# Patient Record
Sex: FEMALE | URBAN - METROPOLITAN AREA
[De-identification: names, ages, dates, MRNs, and addresses within clinical notes are randomized per-mention and may not be internally consistent; named-entity substitution may affect disease eponyms.]

---

## 2018-07-30 ENCOUNTER — APPOINTMENT (RX ONLY)
Dept: URBAN - METROPOLITAN AREA CLINIC 20 | Facility: CLINIC | Age: 73
Setting detail: DERMATOLOGY
End: 2018-07-30

## 2018-07-30 DIAGNOSIS — Z85.828 PERSONAL HISTORY OF OTHER MALIGNANT NEOPLASM OF SKIN: ICD-10-CM

## 2018-07-30 DIAGNOSIS — L82.0 INFLAMED SEBORRHEIC KERATOSIS: ICD-10-CM

## 2018-07-30 DIAGNOSIS — L57.0 ACTINIC KERATOSIS: ICD-10-CM

## 2018-07-30 DIAGNOSIS — D22 MELANOCYTIC NEVI: ICD-10-CM

## 2018-07-30 DIAGNOSIS — L81.4 OTHER MELANIN HYPERPIGMENTATION: ICD-10-CM

## 2018-07-30 DIAGNOSIS — L82.1 OTHER SEBORRHEIC KERATOSIS: ICD-10-CM

## 2018-07-30 DIAGNOSIS — B37.2 CANDIDIASIS OF SKIN AND NAIL: ICD-10-CM

## 2018-07-30 DIAGNOSIS — Z71.89 OTHER SPECIFIED COUNSELING: ICD-10-CM

## 2018-07-30 DIAGNOSIS — D18.0 HEMANGIOMA: ICD-10-CM

## 2018-07-30 PROBLEM — D22.5 MELANOCYTIC NEVI OF TRUNK: Status: ACTIVE | Noted: 2018-07-30

## 2018-07-30 PROBLEM — D22.61 MELANOCYTIC NEVI OF RIGHT UPPER LIMB, INCLUDING SHOULDER: Status: ACTIVE | Noted: 2018-07-30

## 2018-07-30 PROBLEM — D18.01 HEMANGIOMA OF SKIN AND SUBCUTANEOUS TISSUE: Status: ACTIVE | Noted: 2018-07-30

## 2018-07-30 PROBLEM — M12.9 ARTHROPATHY, UNSPECIFIED: Status: ACTIVE | Noted: 2018-07-30

## 2018-07-30 PROBLEM — D22.62 MELANOCYTIC NEVI OF LEFT UPPER LIMB, INCLUDING SHOULDER: Status: ACTIVE | Noted: 2018-07-30

## 2018-07-30 PROCEDURE — ? PRESCRIPTION

## 2018-07-30 PROCEDURE — ? OBSERVATION

## 2018-07-30 PROCEDURE — ? LIQUID NITROGEN

## 2018-07-30 PROCEDURE — ? SUNSCREEN RECOMMENDATIONS

## 2018-07-30 PROCEDURE — ? COUNSELING

## 2018-07-30 PROCEDURE — 99203 OFFICE O/P NEW LOW 30 MIN: CPT | Mod: 25

## 2018-07-30 PROCEDURE — 17110 DESTRUCTION B9 LES UP TO 14: CPT

## 2018-07-30 PROCEDURE — 17000 DESTRUCT PREMALG LESION: CPT | Mod: 59

## 2018-07-30 RX ORDER — KETOCONAZOLE 20 MG/G
CREAM TOPICAL
Qty: 1 | Refills: 1 | Status: ERX | COMMUNITY
Start: 2018-07-30

## 2018-07-30 RX ADMIN — KETOCONAZOLE: 20 CREAM TOPICAL at 20:46

## 2018-07-30 ASSESSMENT — LOCATION ZONE DERM
LOCATION ZONE: FACE
LOCATION ZONE: TRUNK
LOCATION ZONE: ARM
LOCATION ZONE: HAND
LOCATION ZONE: NOSE

## 2018-07-30 ASSESSMENT — LOCATION SIMPLE DESCRIPTION DERM
LOCATION SIMPLE: LEFT BREAST
LOCATION SIMPLE: RIGHT BREAST
LOCATION SIMPLE: LEFT HAND
LOCATION SIMPLE: LEFT FOREARM
LOCATION SIMPLE: RIGHT HAND
LOCATION SIMPLE: NOSE
LOCATION SIMPLE: RIGHT FOREHEAD
LOCATION SIMPLE: RIGHT FOREARM
LOCATION SIMPLE: UPPER BACK
LOCATION SIMPLE: RIGHT SHOULDER
LOCATION SIMPLE: RIGHT UPPER BACK

## 2018-07-30 ASSESSMENT — LOCATION DETAILED DESCRIPTION DERM
LOCATION DETAILED: RIGHT PROXIMAL DORSAL FOREARM
LOCATION DETAILED: LEFT RADIAL DORSAL HAND
LOCATION DETAILED: RIGHT MEDIAL BREAST 5-6:00 REGION
LOCATION DETAILED: RIGHT VENTRAL DISTAL FOREARM
LOCATION DETAILED: RIGHT INFERIOR UPPER BACK
LOCATION DETAILED: RIGHT INFERIOR MEDIAL FOREHEAD
LOCATION DETAILED: LEFT VENTRAL DISTAL FOREARM
LOCATION DETAILED: RIGHT MEDIAL BREAST 4-5:00 REGION
LOCATION DETAILED: RIGHT POSTERIOR SHOULDER
LOCATION DETAILED: INFERIOR THORACIC SPINE
LOCATION DETAILED: LEFT VENTRAL PROXIMAL FOREARM
LOCATION DETAILED: LEFT MEDIAL BREAST 6-7:00 REGION
LOCATION DETAILED: SUPERIOR THORACIC SPINE
LOCATION DETAILED: NASAL DORSUM
LOCATION DETAILED: LEFT PROXIMAL DORSAL FOREARM
LOCATION DETAILED: RIGHT MEDIAL UPPER BACK
LOCATION DETAILED: RIGHT LATERAL UPPER BACK
LOCATION DETAILED: LEFT MEDIAL BREAST 8-9:00 REGION
LOCATION DETAILED: RIGHT RADIAL DORSAL HAND
LOCATION DETAILED: RIGHT SUPERIOR UPPER BACK

## 2018-10-30 ENCOUNTER — APPOINTMENT (RX ONLY)
Dept: URBAN - METROPOLITAN AREA CLINIC 20 | Facility: CLINIC | Age: 73
Setting detail: DERMATOLOGY
End: 2018-10-30

## 2018-10-30 DIAGNOSIS — L57.0 ACTINIC KERATOSIS: ICD-10-CM

## 2018-10-30 DIAGNOSIS — B37.2 CANDIDIASIS OF SKIN AND NAIL: ICD-10-CM

## 2018-10-30 DIAGNOSIS — L82.0 INFLAMED SEBORRHEIC KERATOSIS: ICD-10-CM

## 2018-10-30 PROCEDURE — ? COUNSELING

## 2018-10-30 PROCEDURE — ? PRESCRIPTION

## 2018-10-30 PROCEDURE — ? LIQUID NITROGEN

## 2018-10-30 PROCEDURE — 17110 DESTRUCTION B9 LES UP TO 14: CPT

## 2018-10-30 PROCEDURE — 99213 OFFICE O/P EST LOW 20 MIN: CPT | Mod: 25

## 2018-10-30 RX ORDER — KETOCONAZOLE 20 MG/G
CREAM TOPICAL
Qty: 1 | Refills: 1 | Status: ERX

## 2018-10-30 ASSESSMENT — LOCATION DETAILED DESCRIPTION DERM
LOCATION DETAILED: LEFT INFERIOR UPPER BACK
LOCATION DETAILED: LEFT INFERIOR LATERAL UPPER BACK
LOCATION DETAILED: RIGHT MID-UPPER BACK
LOCATION DETAILED: LEFT MEDIAL BREAST 8-9:00 REGION
LOCATION DETAILED: RIGHT SUPERIOR LATERAL MIDBACK
LOCATION DETAILED: RIGHT INFERIOR UPPER BACK
LOCATION DETAILED: LEFT SUPERIOR LATERAL MIDBACK
LOCATION DETAILED: RIGHT SUPERIOR UPPER BACK
LOCATION DETAILED: RIGHT INFERIOR LATERAL UPPER BACK

## 2018-10-30 ASSESSMENT — LOCATION SIMPLE DESCRIPTION DERM
LOCATION SIMPLE: LEFT UPPER BACK
LOCATION SIMPLE: LEFT LOWER BACK
LOCATION SIMPLE: LEFT BREAST
LOCATION SIMPLE: RIGHT LOWER BACK
LOCATION SIMPLE: RIGHT UPPER BACK

## 2018-10-30 ASSESSMENT — LOCATION ZONE DERM: LOCATION ZONE: TRUNK

## 2018-10-30 NOTE — PROCEDURE: LIQUID NITROGEN
Render Post-Care Instructions In Note?: no
Medical Necessity Information: It is in your best interest to select a reason for this procedure from the list below. All of these items fulfill various CMS LCD requirements except the new and changing color options.
Detail Level: Detailed
Consent: The patient's consent was obtained including but not limited to risks of crusting, scabbing, blistering, scarring, darker or lighter pigmentary change, recurrence, incomplete removal and infection.
Medical Necessity Clause: This procedure was medically necessary because the lesions that were treated were:
Post-Care Instructions: I reviewed with the patient in detail post-care instructions. Patient is to wear sunprotection, and avoid picking at any of the treated lesions. Pt may apply Vaseline to crusted or scabbing areas.

## 2019-03-13 ENCOUNTER — HOSPITAL ENCOUNTER (OUTPATIENT)
Facility: MEDICAL CENTER | Age: 74
End: 2019-03-15
Attending: EMERGENCY MEDICINE | Admitting: INTERNAL MEDICINE
Payer: MEDICARE

## 2019-03-13 ENCOUNTER — HOSPITAL ENCOUNTER (OUTPATIENT)
Dept: RADIOLOGY | Facility: MEDICAL CENTER | Age: 74
End: 2019-03-13

## 2019-03-13 PROBLEM — I10 ESSENTIAL HYPERTENSION: Status: ACTIVE | Noted: 2019-03-13

## 2019-03-13 PROBLEM — E87.6 HYPOKALEMIA: Status: ACTIVE | Noted: 2019-03-13

## 2019-03-13 PROBLEM — R25.2 LEG CRAMPS: Status: ACTIVE | Noted: 2019-03-13

## 2019-03-13 PROBLEM — E11.8 TYPE 2 DIABETES MELLITUS WITH COMPLICATION, WITHOUT LONG-TERM CURRENT USE OF INSULIN (HCC): Status: ACTIVE | Noted: 2019-03-13

## 2019-03-13 PROBLEM — R39.81 FUNCTIONAL URINARY INCONTINENCE: Status: ACTIVE | Noted: 2019-03-13

## 2019-03-13 PROBLEM — R07.9 PAIN IN THE CHEST: Status: ACTIVE | Noted: 2019-03-13

## 2019-03-13 LAB
ALBUMIN SERPL BCP-MCNC: 3.5 G/DL (ref 3.2–4.9)
ALBUMIN/GLOB SERPL: 1.5 G/DL
ALP SERPL-CCNC: 65 U/L (ref 30–99)
ALT SERPL-CCNC: 8 U/L (ref 2–50)
ANION GAP SERPL CALC-SCNC: 11 MMOL/L (ref 0–11.9)
AST SERPL-CCNC: 19 U/L (ref 12–45)
B-OH-BUTYR SERPL-MCNC: 0.08 MMOL/L (ref 0.02–0.27)
BASE EXCESS BLDV CALC-SCNC: -1 MMOL/L
BASOPHILS # BLD AUTO: 0.3 % (ref 0–1.8)
BASOPHILS # BLD: 0.03 K/UL (ref 0–0.12)
BILIRUB SERPL-MCNC: 0.3 MG/DL (ref 0.1–1.5)
BODY TEMPERATURE: ABNORMAL CENTIGRADE
BUN SERPL-MCNC: 9 MG/DL (ref 8–22)
CALCIUM SERPL-MCNC: 8.5 MG/DL (ref 8.5–10.5)
CHLORIDE SERPL-SCNC: 110 MMOL/L (ref 96–112)
CHOLEST SERPL-MCNC: 181 MG/DL (ref 100–199)
CO2 SERPL-SCNC: 23 MMOL/L (ref 20–33)
CREAT SERPL-MCNC: 0.62 MG/DL (ref 0.5–1.4)
EKG IMPRESSION: NORMAL
EOSINOPHIL # BLD AUTO: 0.05 K/UL (ref 0–0.51)
EOSINOPHIL NFR BLD: 0.5 % (ref 0–6.9)
ERYTHROCYTE [DISTWIDTH] IN BLOOD BY AUTOMATED COUNT: 39.1 FL (ref 35.9–50)
EST. AVERAGE GLUCOSE BLD GHB EST-MCNC: 321 MG/DL
FERRITIN SERPL-MCNC: 37.2 NG/ML (ref 10–291)
GLOBULIN SER CALC-MCNC: 2.4 G/DL (ref 1.9–3.5)
GLUCOSE BLD-MCNC: 138 MG/DL (ref 65–99)
GLUCOSE BLD-MCNC: 86 MG/DL (ref 65–99)
GLUCOSE SERPL-MCNC: 74 MG/DL (ref 65–99)
HBA1C MFR BLD: 12.8 % (ref 0–5.6)
HCO3 BLDV-SCNC: 23 MMOL/L (ref 24–28)
HCT VFR BLD AUTO: 35.5 % (ref 37–47)
HDLC SERPL-MCNC: 46 MG/DL
HGB BLD-MCNC: 11.6 G/DL (ref 12–16)
IMM GRANULOCYTES # BLD AUTO: 0.04 K/UL (ref 0–0.11)
IMM GRANULOCYTES NFR BLD AUTO: 0.4 % (ref 0–0.9)
IRON SATN MFR SERPL: 7 % (ref 15–55)
IRON SERPL-MCNC: 23 UG/DL (ref 40–170)
LDLC SERPL CALC-MCNC: 119 MG/DL
LIPASE SERPL-CCNC: 5 U/L (ref 11–82)
LYMPHOCYTES # BLD AUTO: 2.49 K/UL (ref 1–4.8)
LYMPHOCYTES NFR BLD: 25.9 % (ref 22–41)
MAGNESIUM SERPL-MCNC: 1.7 MG/DL (ref 1.5–2.5)
MCH RBC QN AUTO: 28 PG (ref 27–33)
MCHC RBC AUTO-ENTMCNC: 32.7 G/DL (ref 33.6–35)
MCV RBC AUTO: 85.7 FL (ref 81.4–97.8)
MONOCYTES # BLD AUTO: 0.63 K/UL (ref 0–0.85)
MONOCYTES NFR BLD AUTO: 6.5 % (ref 0–13.4)
NEUTROPHILS # BLD AUTO: 6.39 K/UL (ref 2–7.15)
NEUTROPHILS NFR BLD: 66.4 % (ref 44–72)
NRBC # BLD AUTO: 0 K/UL
NRBC BLD-RTO: 0 /100 WBC
OSMOLALITY SERPL: 295 MOSM/KG H2O (ref 278–298)
PCO2 BLDV: 38.2 MMHG (ref 41–51)
PH BLDV: 7.4 [PH] (ref 7.31–7.45)
PHOSPHATE SERPL-MCNC: 2.7 MG/DL (ref 2.5–4.5)
PLATELET # BLD AUTO: 305 K/UL (ref 164–446)
PMV BLD AUTO: 10.5 FL (ref 9–12.9)
PO2 BLDV: 37.1 MMHG (ref 25–40)
POTASSIUM SERPL-SCNC: 2.7 MMOL/L (ref 3.6–5.5)
PROT SERPL-MCNC: 5.9 G/DL (ref 6–8.2)
RBC # BLD AUTO: 4.14 M/UL (ref 4.2–5.4)
SAO2 % BLDV: 67 %
SODIUM SERPL-SCNC: 144 MMOL/L (ref 135–145)
TIBC SERPL-MCNC: 343 UG/DL (ref 250–450)
TRIGL SERPL-MCNC: 81 MG/DL (ref 0–149)
TROPONIN I SERPL-MCNC: 0.01 NG/ML (ref 0–0.04)
WBC # BLD AUTO: 9.6 K/UL (ref 4.8–10.8)

## 2019-03-13 PROCEDURE — 83690 ASSAY OF LIPASE: CPT

## 2019-03-13 PROCEDURE — 99220 PR INITIAL OBSERVATION CARE,LEVL III: CPT | Mod: GC | Performed by: INTERNAL MEDICINE

## 2019-03-13 PROCEDURE — 84484 ASSAY OF TROPONIN QUANT: CPT

## 2019-03-13 PROCEDURE — 82803 BLOOD GASES ANY COMBINATION: CPT

## 2019-03-13 PROCEDURE — 83735 ASSAY OF MAGNESIUM: CPT

## 2019-03-13 PROCEDURE — 83036 HEMOGLOBIN GLYCOSYLATED A1C: CPT

## 2019-03-13 PROCEDURE — 83930 ASSAY OF BLOOD OSMOLALITY: CPT

## 2019-03-13 PROCEDURE — 82728 ASSAY OF FERRITIN: CPT

## 2019-03-13 PROCEDURE — 93005 ELECTROCARDIOGRAM TRACING: CPT | Performed by: STUDENT IN AN ORGANIZED HEALTH CARE EDUCATION/TRAINING PROGRAM

## 2019-03-13 PROCEDURE — 36415 COLL VENOUS BLD VENIPUNCTURE: CPT

## 2019-03-13 PROCEDURE — 80053 COMPREHEN METABOLIC PANEL: CPT

## 2019-03-13 PROCEDURE — 81002 URINALYSIS NONAUTO W/O SCOPE: CPT

## 2019-03-13 PROCEDURE — 84100 ASSAY OF PHOSPHORUS: CPT

## 2019-03-13 PROCEDURE — 82962 GLUCOSE BLOOD TEST: CPT | Mod: 91

## 2019-03-13 PROCEDURE — A9270 NON-COVERED ITEM OR SERVICE: HCPCS | Performed by: STUDENT IN AN ORGANIZED HEALTH CARE EDUCATION/TRAINING PROGRAM

## 2019-03-13 PROCEDURE — 82010 KETONE BODYS QUAN: CPT

## 2019-03-13 PROCEDURE — G0378 HOSPITAL OBSERVATION PER HR: HCPCS

## 2019-03-13 PROCEDURE — 83550 IRON BINDING TEST: CPT

## 2019-03-13 PROCEDURE — 83540 ASSAY OF IRON: CPT

## 2019-03-13 PROCEDURE — 700105 HCHG RX REV CODE 258: Performed by: EMERGENCY MEDICINE

## 2019-03-13 PROCEDURE — 700102 HCHG RX REV CODE 250 W/ 637 OVERRIDE(OP): Performed by: STUDENT IN AN ORGANIZED HEALTH CARE EDUCATION/TRAINING PROGRAM

## 2019-03-13 PROCEDURE — 80061 LIPID PANEL: CPT

## 2019-03-13 PROCEDURE — 99285 EMERGENCY DEPT VISIT HI MDM: CPT

## 2019-03-13 PROCEDURE — 85025 COMPLETE CBC W/AUTO DIFF WBC: CPT

## 2019-03-13 RX ORDER — CALCIUM CITRATE/VITAMIN D3 200MG-6.25
1 TABLET ORAL DAILY
Status: ON HOLD | COMMUNITY
End: 2023-11-21

## 2019-03-13 RX ORDER — IBUPROFEN 400 MG/1
400 TABLET ORAL EVERY 6 HOURS PRN
Status: DISCONTINUED | OUTPATIENT
Start: 2019-03-13 | End: 2019-03-15 | Stop reason: HOSPADM

## 2019-03-13 RX ORDER — POTASSIUM CHLORIDE 20 MEQ/1
40 TABLET, EXTENDED RELEASE ORAL ONCE
Status: DISCONTINUED | OUTPATIENT
Start: 2019-03-13 | End: 2019-03-13

## 2019-03-13 RX ORDER — SODIUM CHLORIDE 9 MG/ML
1000 INJECTION, SOLUTION INTRAVENOUS ONCE
Status: COMPLETED | OUTPATIENT
Start: 2019-03-13 | End: 2019-03-13

## 2019-03-13 RX ORDER — AMOXICILLIN 250 MG
2 CAPSULE ORAL 2 TIMES DAILY
Status: DISCONTINUED | OUTPATIENT
Start: 2019-03-13 | End: 2019-03-15 | Stop reason: HOSPADM

## 2019-03-13 RX ORDER — ACETAMINOPHEN 325 MG/1
650 TABLET ORAL EVERY 4 HOURS PRN
Status: DISCONTINUED | OUTPATIENT
Start: 2019-03-13 | End: 2019-03-15 | Stop reason: HOSPADM

## 2019-03-13 RX ORDER — POTASSIUM CHLORIDE 20 MEQ/1
40 TABLET, EXTENDED RELEASE ORAL 2 TIMES DAILY
Status: DISCONTINUED | OUTPATIENT
Start: 2019-03-13 | End: 2019-03-14

## 2019-03-13 RX ORDER — POLYETHYLENE GLYCOL 3350 17 G/17G
1 POWDER, FOR SOLUTION ORAL
Status: DISCONTINUED | OUTPATIENT
Start: 2019-03-13 | End: 2019-03-15 | Stop reason: HOSPADM

## 2019-03-13 RX ORDER — HYDRALAZINE HYDROCHLORIDE 20 MG/ML
10 INJECTION INTRAMUSCULAR; INTRAVENOUS EVERY 6 HOURS PRN
Status: DISCONTINUED | OUTPATIENT
Start: 2019-03-13 | End: 2019-03-15 | Stop reason: HOSPADM

## 2019-03-13 RX ORDER — NAPROXEN SODIUM 220 MG
440 TABLET ORAL 2 TIMES DAILY PRN
Status: ON HOLD | COMMUNITY
End: 2019-05-29

## 2019-03-13 RX ORDER — BISACODYL 10 MG
10 SUPPOSITORY, RECTAL RECTAL
Status: DISCONTINUED | OUTPATIENT
Start: 2019-03-13 | End: 2019-03-15 | Stop reason: HOSPADM

## 2019-03-13 RX ORDER — DEXTROSE MONOHYDRATE 25 G/50ML
25 INJECTION, SOLUTION INTRAVENOUS
Status: DISCONTINUED | OUTPATIENT
Start: 2019-03-13 | End: 2019-03-14

## 2019-03-13 RX ORDER — IBUPROFEN 200 MG
400 TABLET ORAL EVERY 8 HOURS PRN
Status: ON HOLD | COMMUNITY
End: 2019-05-29

## 2019-03-13 RX ADMIN — SODIUM CHLORIDE 1000 ML: 9 INJECTION, SOLUTION INTRAVENOUS at 19:00

## 2019-03-13 RX ADMIN — POTASSIUM CHLORIDE 40 MEQ: 1500 TABLET, EXTENDED RELEASE ORAL at 22:55

## 2019-03-13 ASSESSMENT — ENCOUNTER SYMPTOMS
SEIZURES: 0
DIZZINESS: 1
ORTHOPNEA: 0
FOCAL WEAKNESS: 0
GASTROINTESTINAL NEGATIVE: 1
LOSS OF CONSCIOUSNESS: 0
CONSTITUTIONAL NEGATIVE: 1
RESPIRATORY NEGATIVE: 1
PSYCHIATRIC NEGATIVE: 1
SENSORY CHANGE: 0
HEADACHES: 0
PALPITATIONS: 0
EYES NEGATIVE: 1
MUSCULOSKELETAL NEGATIVE: 1

## 2019-03-13 ASSESSMENT — PATIENT HEALTH QUESTIONNAIRE - PHQ9
2. FEELING DOWN, DEPRESSED, IRRITABLE, OR HOPELESS: SEVERAL DAYS
8. MOVING OR SPEAKING SO SLOWLY THAT OTHER PEOPLE COULD HAVE NOTICED. OR THE OPPOSITE, BEING SO FIGETY OR RESTLESS THAT YOU HAVE BEEN MOVING AROUND A LOT MORE THAN USUAL: NOT AT ALL
6. FEELING BAD ABOUT YOURSELF - OR THAT YOU ARE A FAILURE OR HAVE LET YOURSELF OR YOUR FAMILY DOWN: NOT AL ALL
1. LITTLE INTEREST OR PLEASURE IN DOING THINGS: SEVERAL DAYS
4. FEELING TIRED OR HAVING LITTLE ENERGY: NEARLY EVERY DAY
SUM OF ALL RESPONSES TO PHQ9 QUESTIONS 1 AND 2: 2
3. TROUBLE FALLING OR STAYING ASLEEP OR SLEEPING TOO MUCH: NEARLY EVERY DAY
7. TROUBLE CONCENTRATING ON THINGS, SUCH AS READING THE NEWSPAPER OR WATCHING TELEVISION: NOT AT ALL
SUM OF ALL RESPONSES TO PHQ QUESTIONS 1-9: 8
5. POOR APPETITE OR OVEREATING: NOT AT ALL
9. THOUGHTS THAT YOU WOULD BE BETTER OFF DEAD, OR OF HURTING YOURSELF: NOT AT ALL

## 2019-03-13 ASSESSMENT — LIFESTYLE VARIABLES
EVER_SMOKED: YES
DO YOU DRINK ALCOHOL: NO

## 2019-03-14 LAB
ACETONE UR QL: NEGATIVE
ALBUMIN SERPL BCP-MCNC: 3.4 G/DL (ref 3.2–4.9)
ALBUMIN/GLOB SERPL: 1.4 G/DL
ALP SERPL-CCNC: 63 U/L (ref 30–99)
ALT SERPL-CCNC: 7 U/L (ref 2–50)
ANION GAP SERPL CALC-SCNC: 6 MMOL/L (ref 0–11.9)
APPEARANCE UR: CLEAR
AST SERPL-CCNC: 11 U/L (ref 12–45)
BACTERIA #/AREA URNS HPF: NEGATIVE /HPF
BASOPHILS # BLD AUTO: 0.4 % (ref 0–1.8)
BASOPHILS # BLD: 0.03 K/UL (ref 0–0.12)
BILIRUB SERPL-MCNC: 0.4 MG/DL (ref 0.1–1.5)
BILIRUB UR QL STRIP.AUTO: NEGATIVE
BUN SERPL-MCNC: 7 MG/DL (ref 8–22)
CALCIUM SERPL-MCNC: 8.5 MG/DL (ref 8.5–10.5)
CHLORIDE SERPL-SCNC: 109 MMOL/L (ref 96–112)
CO2 SERPL-SCNC: 27 MMOL/L (ref 20–33)
COLOR UR: YELLOW
CREAT SERPL-MCNC: 0.62 MG/DL (ref 0.5–1.4)
EOSINOPHIL # BLD AUTO: 0.11 K/UL (ref 0–0.51)
EOSINOPHIL NFR BLD: 1.6 % (ref 0–6.9)
EPI CELLS #/AREA URNS HPF: NEGATIVE /HPF
ERYTHROCYTE [DISTWIDTH] IN BLOOD BY AUTOMATED COUNT: 37.8 FL (ref 35.9–50)
FERRITIN SERPL-MCNC: 34.5 NG/ML (ref 10–291)
GLOBULIN SER CALC-MCNC: 2.4 G/DL (ref 1.9–3.5)
GLUCOSE BLD-MCNC: 154 MG/DL (ref 65–99)
GLUCOSE BLD-MCNC: 162 MG/DL (ref 65–99)
GLUCOSE BLD-MCNC: 163 MG/DL (ref 65–99)
GLUCOSE BLD-MCNC: 166 MG/DL (ref 65–99)
GLUCOSE BLD-MCNC: 176 MG/DL (ref 65–99)
GLUCOSE BLD-MCNC: 185 MG/DL (ref 65–99)
GLUCOSE SERPL-MCNC: 180 MG/DL (ref 65–99)
GLUCOSE UR STRIP.AUTO-MCNC: 100 MG/DL
HCT VFR BLD AUTO: 31 % (ref 37–47)
HGB BLD-MCNC: 10.5 G/DL (ref 12–16)
HYALINE CASTS #/AREA URNS LPF: NORMAL /LPF
IMM GRANULOCYTES # BLD AUTO: 0.02 K/UL (ref 0–0.11)
IMM GRANULOCYTES NFR BLD AUTO: 0.3 % (ref 0–0.9)
KETONES UR STRIP.AUTO-MCNC: NEGATIVE MG/DL
LEUKOCYTE ESTERASE UR QL STRIP.AUTO: ABNORMAL
LYMPHOCYTES # BLD AUTO: 1.91 K/UL (ref 1–4.8)
LYMPHOCYTES NFR BLD: 28.3 % (ref 22–41)
MAGNESIUM SERPL-MCNC: 1.6 MG/DL (ref 1.5–2.5)
MCH RBC QN AUTO: 28 PG (ref 27–33)
MCHC RBC AUTO-ENTMCNC: 33.9 G/DL (ref 33.6–35)
MCV RBC AUTO: 82.7 FL (ref 81.4–97.8)
MICRO URNS: ABNORMAL
MONOCYTES # BLD AUTO: 0.5 K/UL (ref 0–0.85)
MONOCYTES NFR BLD AUTO: 7.4 % (ref 0–13.4)
NEUTROPHILS # BLD AUTO: 4.19 K/UL (ref 2–7.15)
NEUTROPHILS NFR BLD: 62 % (ref 44–72)
NITRITE UR QL STRIP.AUTO: NEGATIVE
NRBC # BLD AUTO: 0 K/UL
NRBC BLD-RTO: 0 /100 WBC
PH UR STRIP.AUTO: 6 [PH]
PLATELET # BLD AUTO: 266 K/UL (ref 164–446)
PMV BLD AUTO: 9.8 FL (ref 9–12.9)
POTASSIUM SERPL-SCNC: 4.1 MMOL/L (ref 3.6–5.5)
PROT SERPL-MCNC: 5.8 G/DL (ref 6–8.2)
PROT UR QL STRIP: NEGATIVE MG/DL
RBC # BLD AUTO: 3.75 M/UL (ref 4.2–5.4)
RBC # URNS HPF: NORMAL /HPF
RBC UR QL AUTO: NEGATIVE
SODIUM SERPL-SCNC: 142 MMOL/L (ref 135–145)
SP GR UR STRIP.AUTO: 1.01
UROBILINOGEN UR STRIP.AUTO-MCNC: 0.2 MG/DL
WBC # BLD AUTO: 6.8 K/UL (ref 4.8–10.8)
WBC #/AREA URNS HPF: NORMAL /HPF

## 2019-03-14 PROCEDURE — A9270 NON-COVERED ITEM OR SERVICE: HCPCS | Performed by: STUDENT IN AN ORGANIZED HEALTH CARE EDUCATION/TRAINING PROGRAM

## 2019-03-14 PROCEDURE — 700102 HCHG RX REV CODE 250 W/ 637 OVERRIDE(OP): Performed by: STUDENT IN AN ORGANIZED HEALTH CARE EDUCATION/TRAINING PROGRAM

## 2019-03-14 PROCEDURE — 85025 COMPLETE CBC W/AUTO DIFF WBC: CPT

## 2019-03-14 PROCEDURE — 700102 HCHG RX REV CODE 250 W/ 637 OVERRIDE(OP): Performed by: INTERNAL MEDICINE

## 2019-03-14 PROCEDURE — 80053 COMPREHEN METABOLIC PANEL: CPT

## 2019-03-14 PROCEDURE — A9270 NON-COVERED ITEM OR SERVICE: HCPCS | Performed by: INTERNAL MEDICINE

## 2019-03-14 PROCEDURE — 82962 GLUCOSE BLOOD TEST: CPT

## 2019-03-14 PROCEDURE — 36415 COLL VENOUS BLD VENIPUNCTURE: CPT

## 2019-03-14 PROCEDURE — 96372 THER/PROPH/DIAG INJ SC/IM: CPT

## 2019-03-14 PROCEDURE — 82728 ASSAY OF FERRITIN: CPT

## 2019-03-14 PROCEDURE — 83735 ASSAY OF MAGNESIUM: CPT

## 2019-03-14 PROCEDURE — 700111 HCHG RX REV CODE 636 W/ 250 OVERRIDE (IP): Performed by: STUDENT IN AN ORGANIZED HEALTH CARE EDUCATION/TRAINING PROGRAM

## 2019-03-14 PROCEDURE — 81001 URINALYSIS AUTO W/SCOPE: CPT

## 2019-03-14 PROCEDURE — G0378 HOSPITAL OBSERVATION PER HR: HCPCS

## 2019-03-14 RX ORDER — DEXTROSE MONOHYDRATE 25 G/50ML
25 INJECTION, SOLUTION INTRAVENOUS
Status: DISCONTINUED | OUTPATIENT
Start: 2019-03-14 | End: 2019-03-15

## 2019-03-14 RX ORDER — ATORVASTATIN CALCIUM 20 MG/1
20 TABLET, FILM COATED ORAL EVERY EVENING
Status: DISCONTINUED | OUTPATIENT
Start: 2019-03-14 | End: 2019-03-15 | Stop reason: HOSPADM

## 2019-03-14 RX ORDER — ANALGESIC BALM 1.74; 4.06 G/29G; G/29G
OINTMENT TOPICAL 3 TIMES DAILY
Status: DISCONTINUED | OUTPATIENT
Start: 2019-03-14 | End: 2019-03-15 | Stop reason: HOSPADM

## 2019-03-14 RX ORDER — LISINOPRIL 2.5 MG/1
5 TABLET ORAL
Status: DISCONTINUED | OUTPATIENT
Start: 2019-03-14 | End: 2019-03-14

## 2019-03-14 RX ORDER — POTASSIUM CHLORIDE 20 MEQ/1
40 TABLET, EXTENDED RELEASE ORAL ONCE
Status: COMPLETED | OUTPATIENT
Start: 2019-03-14 | End: 2019-03-14

## 2019-03-14 RX ORDER — GABAPENTIN 100 MG/1
100 CAPSULE ORAL 3 TIMES DAILY
Status: DISCONTINUED | OUTPATIENT
Start: 2019-03-14 | End: 2019-03-14

## 2019-03-14 RX ORDER — INSULIN GLARGINE 100 [IU]/ML
5 INJECTION, SOLUTION SUBCUTANEOUS EVERY EVENING
Status: DISCONTINUED | OUTPATIENT
Start: 2019-03-14 | End: 2019-03-14

## 2019-03-14 RX ADMIN — ACETAMINOPHEN 650 MG: 325 TABLET, FILM COATED ORAL at 04:06

## 2019-03-14 RX ADMIN — GABAPENTIN 100 MG: 100 CAPSULE ORAL at 08:22

## 2019-03-14 RX ADMIN — METFORMIN HYDROCHLORIDE 500 MG: 500 TABLET, FILM COATED ORAL at 11:45

## 2019-03-14 RX ADMIN — IBUPROFEN 400 MG: 400 TABLET, FILM COATED ORAL at 00:20

## 2019-03-14 RX ADMIN — ATORVASTATIN CALCIUM 20 MG: 20 TABLET, FILM COATED ORAL at 16:42

## 2019-03-14 RX ADMIN — POTASSIUM CHLORIDE 40 MEQ: 1500 TABLET, EXTENDED RELEASE ORAL at 11:45

## 2019-03-14 RX ADMIN — ENOXAPARIN SODIUM 40 MG: 100 INJECTION SUBCUTANEOUS at 06:28

## 2019-03-14 RX ADMIN — POTASSIUM BICARBONATE 50 MEQ: 978 TABLET, EFFERVESCENT ORAL at 04:03

## 2019-03-14 RX ADMIN — ANALGESIC BALM: 1.74; 4.06 OINTMENT TOPICAL at 14:39

## 2019-03-14 RX ADMIN — IBUPROFEN 400 MG: 400 TABLET, FILM COATED ORAL at 20:23

## 2019-03-14 RX ADMIN — LISINOPRIL 5 MG: 2.5 TABLET ORAL at 08:23

## 2019-03-14 RX ADMIN — IBUPROFEN 400 MG: 400 TABLET, FILM COATED ORAL at 13:47

## 2019-03-14 RX ADMIN — POTASSIUM BICARBONATE 50 MEQ: 978 TABLET, EFFERVESCENT ORAL at 06:28

## 2019-03-14 RX ADMIN — ANALGESIC BALM: 1.74; 4.06 OINTMENT TOPICAL at 20:23

## 2019-03-14 RX ADMIN — METFORMIN HYDROCHLORIDE 500 MG: 500 TABLET, FILM COATED ORAL at 16:42

## 2019-03-14 RX ADMIN — IBUPROFEN 400 MG: 400 TABLET, FILM COATED ORAL at 06:25

## 2019-03-14 ASSESSMENT — COGNITIVE AND FUNCTIONAL STATUS - GENERAL
SUGGESTED CMS G CODE MODIFIER MOBILITY: CH
MOBILITY SCORE: 24
SUGGESTED CMS G CODE MODIFIER DAILY ACTIVITY: CH
DAILY ACTIVITIY SCORE: 24

## 2019-03-14 ASSESSMENT — ENCOUNTER SYMPTOMS
DOUBLE VISION: 0
DIZZINESS: 0
CHILLS: 0
NAUSEA: 0
BLURRED VISION: 0
FEVER: 0
COUGH: 0
HEMOPTYSIS: 0
INSOMNIA: 0
VOMITING: 0
DEPRESSION: 0
HEADACHES: 0
PALPITATIONS: 0
MYALGIAS: 0

## 2019-03-14 NOTE — H&P
Internal Medicine Admitting History and Physical    Note Author: Scalret Cole M.D.     Name Molly Fountain       1945   Age/Sex 73 y.o. female   MRN [id number]   Code Status DNAR/DNI     After 5PM or if no immediate response to page, please call for cross-coverage  Attending/Team:  / Eliezer See Patient List for primary contact information  Call [phone] to page    1st Call - Day Intern (R1):   Dr. Sims 2nd Call - Day Sr. Resident (R2/R3):   Dr. Billings     Chief Complaint:   Chief Complaint   Patient presents with   • ALOC     transfer from [facility], AOx1 to [facility], FSBS initially 510 received 20 units insulin subq, now AOx4. FSBS now 86 on arrival.    • Leg Pain     cramping pain x 3-4 weeks       HPI:  Molly Fountain is a 73 y.o. old patient with PMH of hypertension and diabetes mellitus type 2 who was transferred from [facility].  She presented with an altered mental status and leg cramps.  She had associated lightheadedness, headache as well.  Her blood sugar was noted to be 510 and received 20 units of insulin.  This improved her alertness.  On presentation at this hospital, she was found to have blood glucose of 86.  She was diagnosed with diabetes mellitus about 2 years ago however not taking any medications.  She was also diagnosed with high blood pressure and is not taking any medications at this time.  She has leg cramps for the past 4 weeks, mostly at night.  She denied having any fever, chills, new rash, shortness of breath, abdominal pain, nausea or vomiting, heartburn, or joint pains.   She stated that she has intermittent chest pain on the left side, last about 30 seconds, no radiation, not reproducible, no identified aggravating or alleviating factors.  She also has urinary incontinence for the past couple months and uses diaper.   In the ED, the patient was stable. Vitals unremarkable except blood pressure 155/86. The patient will be  admitted to the hospital for further evaluation and management for multiple reasons including altered mental status, leg cramps, urinary incontinence and chest pain.    Review of Systems   Constitutional: Negative.    HENT: Negative.    Eyes: Negative.    Respiratory: Negative.    Cardiovascular: Positive for chest pain. Negative for palpitations, orthopnea and leg swelling.   Gastrointestinal: Negative.    Genitourinary: Negative.         Urinary incontinence   Musculoskeletal: Negative.    Skin: Negative.    Neurological: Positive for dizziness. Negative for sensory change, focal weakness, seizures, loss of consciousness and headaches.   Endo/Heme/Allergies: Negative.    Psychiatric/Behavioral: Negative.    All other systems reviewed and are negative.      Past Medical History (Chronic medical problem, known complications and current treatment)    Past Medical History:   Diagnosis Date   • Essential hypertension 3/13/2019   • Type 2 diabetes mellitus with complication, without long-term current use of insulin (HCC) 3/13/2019       Past Surgical History:  History reviewed. No pertinent surgical history.    Current Outpatient Medications:  Home Medications     Reviewed by Roberto Perez R.N. (Registered Nurse) on 03/13/19 at 1659  Med List Status: Not Addressed   Medication Last Dose Status        Patient Oliver Taking any Medications                       Medication Allergy/Sensitivities:  Allergies   Allergen Reactions   • Sulfa Drugs Rash     Full body rash       Family History (mandatory)   Family History   Problem Relation Age of Onset   • Heart Disease Father    • Hypertension Father        Social History (mandatory)   Social History     Social History   • Marital status:      Spouse name: N/A   • Number of children: N/A   • Years of education: N/A     Occupational History   • Not on file.     Social History Main Topics   • Smoking status: Never Smoker   • Smokeless tobacco: Never Used   • Alcohol  use Yes      Comment: occasionally   • Drug use: No   • Sexual activity: Not on file     Other Topics Concern   • Not on file     Social History Narrative   • No narrative on file     Living situation: Home  PCP : No primary care provider on file.    Physical Exam     Vitals:    03/13/19 1830 03/13/19 1931 03/13/19 2000 03/13/19 2030   BP:       Pulse: (!) 102 97 97 98   Resp: 14      Temp:       TempSrc:       SpO2: 96% 95% 98% 98%   Weight:       Height:         Body mass index is 27.34 kg/m².  /91   Pulse 98   Temp 37.3 °C (99.1 °F) (Temporal)   Resp 14   Ht 1.524 m (5')   Wt 63.5 kg (140 lb)   LMP  (LMP Unknown)   SpO2 98%   BMI 27.34 kg/m²   O2 therapy: Pulse Oximetry: 98 %    Physical Exam   Constitutional: She is oriented to person, place, and time and well-developed, well-nourished, and in no distress. No distress.   HENT:   Head: Normocephalic and atraumatic.   Eyes: Pupils are equal, round, and reactive to light. EOM are normal.   Neck: Normal range of motion. Neck supple.   Cardiovascular: Normal rate, regular rhythm and normal heart sounds.    No murmur heard.  Pulmonary/Chest: Effort normal and breath sounds normal. No respiratory distress. She has no wheezes.   Abdominal: Soft. Bowel sounds are normal. She exhibits no distension. There is no tenderness.   Musculoskeletal: Normal range of motion. She exhibits no edema or tenderness.   Neurological: She is alert and oriented to person, place, and time.   Skin: Skin is warm and dry.   Vitals reviewed.      Data Review       Old Records Request:   Completed  Current Records review/summary: Completed    Lab Data Review:  Recent Results (from the past 24 hour(s))   ACCU-CHEK GLUCOSE    Collection Time: 03/13/19  4:50 PM   Result Value Ref Range    Glucose - Accu-Ck 86 65 - 99 mg/dL   HEMOGLOBIN A1C    Collection Time: 03/13/19  4:50 PM   Result Value Ref Range    Glycohemoglobin 12.8 (H) 0.0 - 5.6 %    Est Avg Glucose 321 mg/dL   MAGNESIUM     Collection Time: 03/13/19  4:50 PM   Result Value Ref Range    Magnesium 1.7 1.5 - 2.5 mg/dL   PHOSPHORUS    Collection Time: 03/13/19  4:50 PM   Result Value Ref Range    Phosphorus 2.7 2.5 - 4.5 mg/dL   BETA-HYDROXYBUTYRIC ACID    Collection Time: 03/13/19  4:50 PM   Result Value Ref Range    beta-Hydroxybutyric Acid 0.08 0.02 - 0.27 mmol/L   OSMOLALITY SERUM    Collection Time: 03/13/19  4:50 PM   Result Value Ref Range    Osmolality Serum 295 278 - 298 mOsm/kg H2O   LIPASE    Collection Time: 03/13/19  4:50 PM   Result Value Ref Range    Lipase 5 (L) 11 - 82 U/L   VENOUS BLOOD GAS    Collection Time: 03/13/19  7:05 PM   Result Value Ref Range    Venous Bg Ph 7.40 7.31 - 7.45    Venous Bg Pco2 38.2 (L) 41.0 - 51.0 mmHg    Venous Bg Po2 37.1 25.0 - 40.0 mmHg    Venous Bg O2 Saturation 67.0 %    Venous Bg Hco3 23 (L) 24 - 28 mmol/L    Venous Bg Base Excess -1 mmol/L    Body Temp see below Centigrade       Imaging/Procedures Review:    Independant Imaging Review: Completed  OUTSIDE IMAGES-CT HEAD   Final Result           EKG:   EKG Independant Review: Deferred  EKG to be performed on the floor    Records reviewed and summarized in current documentation :  Yes  [employer] teaching service handout given to patient:  Yes         Assessment/Plan     * Type 2 diabetes mellitus with complication, without long-term current use of insulin (HCC)   Assessment & Plan    Diagnosed with type II tablets mellitus about couple years ago, not on any medication.  -Consult for diabetic education  -A1c 12.8%, goal A1c 7-8%  -Sliding scale insulin with hypoglycemia protocol  -Day team to consider starting Lantus and lispro before sending her home.  I think Metformin and glipizide alone will not be enough to bring her A1c down to 7 days 8%     Pain in the chest   Assessment & Plan    Atypical chest pain.  Less likely to be cardiac pain.  -Follow-up with EKG and troponin I  - Aggressive lifestyle and risk factor modifications discussed  extensively with patient.  - Pain control with Tylenol     Leg cramps   Assessment & Plan    May be associated with diabetes (diabetic neuropathy), associated with hypokalemia or restless leg syndrome  -A1c 12.8%, possibility of diabetic neuropathy- however she denied having any tingling or numbness at this time.  Intact sensations.  -Ask her about leg cramps in the morning after repeating potassium  -Follow-up with iron panel and ferritin     Essential hypertension   Assessment & Plan    Stated her blood pressure is elevated and she is not taking any medication.  Blood pressure 155/86 in the ER, asymptomatic  -Consider starting her on lisinopril given diabetes     Functional urinary incontinence   Assessment & Plan    Has been noticing urinary incontinence for the past couple months, uses diaper  -Encourage pelvic floor muscle exercise and consider oxybutynin if needed     Hypokalemia   Assessment & Plan    Potassium 3.0 previous hospital  -Provided K Dur 40 mEq x2, replete as needed       Anticipated Hospital stay: Observation admit    Quality Measures  Quality-Core Measures   Reviewed items::  EKG reviewed, Labs reviewed, Medications reviewed and Radiology images reviewed  Aden catheter::  No Aden  DVT prophylaxis pharmacological::  Enoxaparin (Lovenox)  DVT prophylaxis - mechanical:  SCDs  Ulcer Prophylaxis::  Not indicated      PCP: No primary care provider on file.    Scarlet Cole M.D.

## 2019-03-14 NOTE — ED NOTES
Pt lying in bed, family at bedside, aox4, resp even/unlabored, vss, pt/family updated on status/plan of care, pt c/o pains to bilat lower ext - chronic x 2-3 years

## 2019-03-14 NOTE — PROGRESS NOTES
Contacted on call doctor for the pts blood sugar of 176 and no insulin ordered for 0030. MD said new orders would be placed for it.  No new orders placed for this am insulin

## 2019-03-14 NOTE — ASSESSMENT & PLAN NOTE
Atypical chest pain.  Less likely to be cardiac pain.  -Follow-up with EKG and troponin I  - Aggressive lifestyle and risk factor modifications discussed extensively with patient.  - Pain control with Tylenol

## 2019-03-14 NOTE — PROGRESS NOTES
Patient educated on insulin injection at lunchtime. For dinner insulin check required 2 units. Patient remembered education with minimal prompting and was able to do injection successfully and safely.

## 2019-03-14 NOTE — ASSESSMENT & PLAN NOTE
Diagnosed with type II tablets mellitus about 4 years ago, not on any medication.  A1c 12.8%, goal A1c 7-8%  Sliding scale insulin with hypoglycemia protocol  Metformin started  Atorvastatin started   Continue sliding scale to assess insulin requirement, will consider starting lantus   Diabetes education

## 2019-03-14 NOTE — PROGRESS NOTES
Shift report received from night RN, assumed care at 0715. Patient up in bed, routinely medicated prn per MAR. AOx4, up with assist x1, calls appropriately, bed alarm not in use. PIV assessed and locked. O2 RA, SPO2 97%. VTE lovenox. Plan is DM education prior to dc and med management. Needs met at this time.    Discussed POC for multimodal pain management, monitoring VS/labs/I&O, mobility, day time routine, comfort, and safety. Patient has call light and personal belongings within reach. Safety and fall precautions in place. Reviewed current labs, notes, medications, and orders. Hourly rounding in place with RN and CNA.

## 2019-03-14 NOTE — ASSESSMENT & PLAN NOTE
Stated her blood pressure is elevated and she is not taking any medication.  Blood pressure 155/86 in the ER, asymptomatic    -Will hold off on starting blood pressure medications in the acute setting   -Pt has pCP appointment in few weeks.

## 2019-03-14 NOTE — CARE PLAN
Problem: Safety  Goal: Will remain free from falls  Outcome: PROGRESSING AS EXPECTED  No fall has occurred. Pt is SBA with a steady gait. Pt calls for assistance.     Problem: Infection  Goal: Will remain free from infection  Outcome: PROGRESSING AS EXPECTED  No s/s of infection. Hands are washed before and after entering the room.

## 2019-03-14 NOTE — ED NOTES
Chief Complaint   Patient presents with   • ALOC     transfer from [facility], AOx1 to self, FSBS initially 510 received 20 units insulin subq, now AOx4. FSBS now 86 on arrival.    • Leg Pain     cramping pain x 3-4 weeks     BIB EMS to [facility], triage completed, labs drawn and sent, in gown, on monitor.     /91   Pulse (!) 109   Temp 37.3 °C (99.1 °F) (Temporal)   Resp 20   Ht 1.524 m (5')   Wt 63.5 kg (140 lb)   LMP  (LMP Unknown)   SpO2 95%   BMI 27.34 kg/m²

## 2019-03-14 NOTE — PROGRESS NOTES
Internal Medicine Interval Note  Note Author: Sandra Billings M.D.     Name Molly Fountain       1945   Age/Sex 73 y.o. female   MRN [id number]   Code Status DNAR/DNI     After 5PM or if no immediate response to page, please call for cross-coverage  Attending/Team: Dr Muller/porsha  See Patient List for primary contact information  Call [phone] to page    1st Call - Day Intern (R1):   Bethany  2nd Call - Day Sr. Resident (R2/R3):   bala          Reason for interval visit  (Principal Problem)   Type 2 diabetes mellitus with complication, without long-term current use of insulin (HCC)    Interval Problem Daily Status Update  (24 hours)   Admitted overnight after transfer from [facility] for hyperglycemia. Blood sugar <200 overnight. IV fluids discontinued. Will start metformin, will consider starting lantus prior to discharge.   Has b/l leg pains over the shins without numbness and tingling. Unlikely to be diabetic neuropathy. Also has knee and hip pain, likely OA.       Review of Systems   Constitutional: Negative for chills and fever.   HENT: Negative for hearing loss and tinnitus.    Eyes: Negative for blurred vision and double vision.   Respiratory: Negative for cough and hemoptysis.    Cardiovascular: Negative for chest pain and palpitations.   Gastrointestinal: Negative for nausea and vomiting.   Genitourinary: Negative for dysuria and urgency.   Musculoskeletal: Positive for joint pain. Negative for myalgias.   Skin: Negative for itching and rash.   Neurological: Negative for dizziness and headaches.   Psychiatric/Behavioral: Negative for depression. The patient does not have insomnia.        Consultants/Specialty  None   PCP: @PCP    Disposition  inpatient    Quality Measures  Quality-Core Measures   Reviewed items::  EKG reviewed, Labs reviewed, Radiology images reviewed and Medications reviewed  Aden catheter::  No Aden  DVT prophylaxis pharmacological::   Enoxaparin (Lovenox)          Physical Exam       Vitals:    03/14/19 0500 03/14/19 0823 03/14/19 0845 03/14/19 1220   BP: (!) 172/96 121/86 (!) 175/89 145/89   Pulse: 93  (!) 102 99   Resp: 17  18 18   Temp: 36.6 °C (97.8 °F)  36.6 °C (97.8 °F) 36.6 °C (97.9 °F)   TempSrc: Temporal  Temporal Temporal   SpO2: 97%  96% 95%   Weight:       Height:         Body mass index is 27.34 kg/m². Weight: 63.5 kg (140 lb)  Oxygen Therapy:  Pulse Oximetry: 95 %, O2 (LPM): 0, O2 Delivery: None (Room Air)    Physical Exam   Constitutional: She is oriented to person, place, and time and well-developed, well-nourished, and in no distress.   HENT:   Head: Normocephalic and atraumatic.   Eyes: Pupils are equal, round, and reactive to light. Conjunctivae and EOM are normal.   Neck: Normal range of motion. Neck supple.   Cardiovascular: Normal rate, regular rhythm and normal heart sounds.    Pulmonary/Chest: Breath sounds normal. No respiratory distress. She has no wheezes.   Abdominal: Soft. Bowel sounds are normal. She exhibits no distension. There is no tenderness.   Musculoskeletal: Normal range of motion. She exhibits no edema.   Tenderness over B/L shins R>L    Neurological: She is alert and oriented to person, place, and time. GCS score is 15.   Skin: Skin is warm.   Psychiatric: Affect normal.         Lab Data Review:         3/14/2019  1:00 PM    Recent Labs      03/13/19 1650 03/13/19 1905 03/14/19   0612   SODIUM   --   144  142   POTASSIUM   --   2.7*  4.1   CHLORIDE   --   110  109   CO2   --   23  27   BUN   --   9  7*   CREATININE   --   0.62  0.62   MAGNESIUM  1.7   --   1.6   PHOSPHORUS  2.7   --    --    CALCIUM   --   8.5  8.5       Recent Labs      03/13/19   1650  03/13/19   1905  03/14/19   0612   ALTSGPT   --   8  7   ASTSGOT   --   19  11*   ALKPHOSPHAT   --   65  63   TBILIRUBIN   --   0.3  0.4   LIPASE  5*   --    --    GLUCOSE   --   74  180*       Recent Labs      03/13/19   1650  03/13/19   2684   03/14/19   0612  03/14/19   1048   RBC  4.14*   --   3.75*   --    HEMOGLOBIN  11.6*   --   10.5*   --    HEMATOCRIT  35.5*   --   31.0*   --    PLATELETCT  305   --   266   --    IRON   --   23*   --    --    FERRITIN   --   37.2   --   34.5   TOTIRONBC   --   343   --    --        Recent Labs      03/13/19   1650  03/13/19   1905  03/14/19   0612   WBC  9.6   --   6.8   NEUTSPOLYS  66.40   --   62.00   LYMPHOCYTES  25.90   --   28.30   MONOCYTES  6.50   --   7.40   EOSINOPHILS  0.50   --   1.60   BASOPHILS  0.30   --   0.40   ASTSGOT   --   19  11*   ALTSGPT   --   8  7   ALKPHOSPHAT   --   65  63   TBILIRUBIN   --   0.3  0.4           Assessment/Plan     * Type 2 diabetes mellitus with complication, without long-term current use of insulin (HCC)   Assessment & Plan    Diagnosed with type II tablets mellitus about 4 years ago, not on any medication.  A1c 12.8%, goal A1c 7-8%  Sliding scale insulin with hypoglycemia protocol  Metformin started  Atorvastatin started   Continue sliding scale to assess insulin requirement, will consider starting lantus   Diabetes education      Pain in the chest   Assessment & Plan    Atypical chest pain.  Less likely to be cardiac pain.  -Follow-up with EKG and troponin I  - Aggressive lifestyle and risk factor modifications discussed extensively with patient.  - Pain control with Tylenol     Leg cramps   Assessment & Plan    Has bilateral leg pain, anteriorly on the shins  No numbness tingling   Unlikely to be diabetic neuropathy   Ct tylenol       Essential hypertension   Assessment & Plan    Stated her blood pressure is elevated and she is not taking any medication.  Blood pressure 155/86 in the ER, asymptomatic  Will hold off on starting blood pressure medications in the acute setting   Pt has pCP appointment in few weeks.      Functional urinary incontinence   Assessment & Plan    Has been noticing urinary incontinence for the past couple months, uses diaper  -Encourage pelvic  floor muscle exercise   Likely related to hyperglycemia uncontrolled   Follow up with PCP     Hypokalemia   Assessment & Plan    Potassium 3.0 previous hospital  -Provided K Dur 40 mEq x2, replete as needed  Improved to 4

## 2019-03-14 NOTE — DISCHARGE SUMMARY
Internal Medicine Discharge Summary  Note Author: Sandra Billings M.D.       Admit Date:  3/13/2019       Discharge Date:   3/15/2019    Service:   R Internal Medicine [place] Team  Attending Physician(s):   Dr Muller        Senior Resident(s):   Dr Billings  German Resident(s):   Dr Sims   PCP: No primary care provider on file.      Primary Diagnosis:   Uncontrolled diabetes mellitus     Secondary Diagnoses:                Principal Problem:    Type 2 diabetes mellitus with complication, without long-term current use of insulin (HCC) POA: Unknown  Active Problems:    Essential hypertension POA: Unknown    Leg cramps POA: Unknown    Pain in the chest POA: Unknown    Hypokalemia POA: Unknown    Functional urinary incontinence POA: Unknown  Resolved Problems:    * No resolved hospital problems. *      Hospital Summary (Brief Narrative):       Ms Fountain is a 74 yo female with PMH of Diabetes mellitus and HTN but not taking any medications, transferred from [facility]. She presented with an altered mental status and leg pain.  She had associated lightheadedness, headache as well.  Her blood sugar was noted to be 510 and received 20 units of insulin.  This improved her alertness.  On presentation to [facility], she was found to have blood glucose of 86.  She has leg pain for the past 4 weeks, mostly at night. It is on bilateral anterior legs over the shins, aching pain, no leg swelling  She denied having any fever, chills, new rash, shortness of breath, abdominal pain, nausea or vomiting, heartburn, or joint pains. In the ED,  Vitals unremarkable except blood pressure 155/86. She was started on Insulin sliding scale, IV fluids. Her blood sugars remained less than 200 throughout hospital stay and ranged between 140-180s. She was started on Metformin 500 mg BID which she tolerated and increase to thousand milligrams twice daily.  Although she had a HbA1C 12.4 since her pre-meal and  fasting blood sugars were less than 180 we did not initiate Lantus insulin on discharge.  We educated the patient to keep a log of blood sugar at home and take it to the primary care provider.  Patient received diabetes education along with her  prior to discharge.  Her lipid panel showed total cholesterol 181, triglycerides 81, HDL 46, .  Atorvastatin 20 mg started.  She was noted to have high blood pressure, highest recorded was 176/96 mmhg.  On the day of discharge it was 136/82.  We recommended the patient to keep a log of blood pressure at home and take it to the primary care provider.  we did not initiate antihypertensives in the acute setting.  Patient also complained of leg pain over bilateral shin area with tenderness.  She denied any burning sensation numbness and tingling.  There was no's redness or swelling.  She received Toradol and menthol local application cream with good response.  It was not suggestive of neuropathy pain.   She was noted to have iron deficiency anemia. Ferous sulphate started, recommended outpatient GI consult for colonoscopy.   Patient was discharged on 3/15/2019.  She has a follow-up appointment with her primary care provider at the end of this month.  We strongly advised her to follow-up with her PCP and continue taking her medications.  Patient verbalized understanding.    Patient /Hospital Summary (Details -- Problem Oriented) :          Pain in the chest   Assessment & Plan    No EKG changes or troponin elevation.  - Aggressive lifestyle and risk factor modifications discussed extensively with patient.  - Pain control with Tylenol  Resolved on discharge.   Leg cramps   Assessment & Plan    Has bilateral leg pain, anteriorly on the shins  No numbness tingling   Unlikely to be diabetic neuropathy   Ct tylenol, menthol local application.  Short course of ibuprofen with food.       Essential hypertension   Assessment & Plan    Stated her blood pressure is elevated  and she is not taking any medication.  Blood pressure 155/86 in the ER, highest reported was 176/92.  Asymptomatic  Will hold off on starting blood pressure medications in the acute setting   Pt has pCP appointment in few weeks.      * Type 2 diabetes mellitus with complication, without long-term current use of insulin ([facility])   Assessment & Plan    Diagnosed with type II tablets mellitus about 4 years ago, not on any medication.  A1c 12.8%, goal A1c 7-8%  Sliding scale insulin with hypoglycemia protocol  Metformin thousand milligrams twice daily started  Atorvastatin started   Diabetes education done prior to discharge.     Functional urinary incontinence   Assessment & Plan    Has been noticing urinary incontinence for the past couple months, uses diaper  -Encourage pelvic floor muscle exercise   Likely related to hyperglycemia uncontrolled   Follow up with PCP     Hypokalemia   Assessment & Plan    Potassium 3.0 previous hospital  -Provided K Dur 40 mEq x2, replete as needed  Improved to 4         Consultants:     None    Procedures:        None    Imaging/ Testing:      OUTSIDE IMAGES-CT HEAD   Final Result            Discharge Medications:         Medication Reconciliation: Completed     Medication List      START taking these medications      Instructions   atorvastatin 20 MG Tabs  Commonly known as:  LIPITOR   Doctor's comments:  For diabetes.  Take 1 Tab by mouth every evening.  Dose:  20 mg     Blood Glucose Test Strips   Doctor's comments:  Or per formulary preference. ICD-10 code: E11.65 Uncontrolled type 2 Diabetes Mellitus  Use one Accucheck Guide strip to test blood sugar once daily early morning before first meal. For uncontrolled diabetes. E11.65     ferrous sulfate 325 (65 Fe) MG tablet   Doctor's comments:  For iron deficiency anemia.  Take 1 Tab by mouth every day.  Dose:  325 mg     Lancets   Doctor's comments:  Or per formulary preference. ICD-10 code: E11.65 Uncontrolled type 2 Diabetes  Mellitus.  Use one fast clix lancet to test blood sugar once daily early morning before first meal. For uncontrolled diabetes. E11.65     metformin 1000 MG tablet  Commonly known as:  GLUCOPHAGE   Doctor's comments:  For diabetes.  Take 1 Tab by mouth 2 times a day, with meals.  Dose:  1000 mg        CONTINUE taking these medications      Instructions   ibuprofen 200 MG Tabs  Commonly known as:  MOTRIN   Take 400 mg by mouth every 8 hours as needed for Mild Pain.  Dose:  400 mg     MULTIVITAMIN GUMMIES WOMENS Chew   Take 1 Tab by mouth every day.  Dose:  1 Tab     naproxen 220 MG tablet  Commonly known as:  ANAPROX   Take 440 mg by mouth 2 times a day as needed (pain).  Dose:  440 mg            Can use .DISCHARGEMEDSLIST if going to another facility         Disposition:   Discharge to home     Diet:   Diabetic diet     Activity:   As tolerated     Instructions:         The patient was instructed to return to the ER in the event of worsening symptoms. I have counseled the patient on the importance of compliance and the patient has agreed to proceed with all medical recommendations and follow up plan indicated above.   The patient understands that all medications come with benefits and risks. Risks may include permanent injury or death and these risks can be minimized with close reassessment and monitoring.        Primary Care Provider:    Dr Bayron Fowler   Discharge summary faxed to primary care provider:  Completed  Copy of discharge summary given to the patient: Deferred      Follow up appointment details :      FU with PCP     Pending Studies:        None     Time spent on discharge day patient visit, preparing discharge paperwork and arranging for patient follow up.    Summary of follow up issues:   Patient to keep a log of blood sugars at home daily and blood pressure checks, recommended to take it to PCP.     Discharge Time (Minutes) :    45 min   Hospital Course Type: Observation Stay      Condition on  Discharge  Stable   ______________________________________________________________________    Interval history/exam for day of discharge:    No new complaints.    Vitals:    03/14/19 0845 03/14/19 1220 03/14/19 1300 03/14/19 1629   BP: (!) 175/89 145/89  147/95   Pulse: (!) 102 99  (!) 107   Resp: 18 18  18   Temp: 36.6 °C (97.8 °F) 36.6 °C (97.9 °F)  36.1 °C (97 °F)   TempSrc: Temporal Temporal  Temporal   SpO2: 96% 95%  95%   Weight:   68.5 kg (151 lb 0.2 oz)    Height:         Weight/BMI: Body mass index is 29.49 kg/m².  Pulse Oximetry: 95 %, O2 (LPM): 0, O2 Delivery: None (Room Air)    General: alert and oriented   CVS: S1 S2 normal   PULM: Clear to auscultate       Most Recent Labs:    Lab Results   Component Value Date/Time    WBC 6.8 03/14/2019 06:12 AM    RBC 3.75 (L) 03/14/2019 06:12 AM    HEMOGLOBIN 10.5 (L) 03/14/2019 06:12 AM    HEMATOCRIT 31.0 (L) 03/14/2019 06:12 AM    MCV 82.7 03/14/2019 06:12 AM    MCH 28.0 03/14/2019 06:12 AM    MCHC 33.9 03/14/2019 06:12 AM    MPV 9.8 03/14/2019 06:12 AM    NEUTSPOLYS 62.00 03/14/2019 06:12 AM    LYMPHOCYTES 28.30 03/14/2019 06:12 AM    MONOCYTES 7.40 03/14/2019 06:12 AM    EOSINOPHILS 1.60 03/14/2019 06:12 AM    BASOPHILS 0.40 03/14/2019 06:12 AM      Lab Results   Component Value Date/Time    SODIUM 142 03/14/2019 06:12 AM    POTASSIUM 4.1 03/14/2019 06:12 AM    CHLORIDE 109 03/14/2019 06:12 AM    CO2 27 03/14/2019 06:12 AM    GLUCOSE 180 (H) 03/14/2019 06:12 AM    BUN 7 (L) 03/14/2019 06:12 AM    CREATININE 0.62 03/14/2019 06:12 AM      Lab Results   Component Value Date/Time    ALTSGPT 7 03/14/2019 06:12 AM    ASTSGOT 11 (L) 03/14/2019 06:12 AM    ALKPHOSPHAT 63 03/14/2019 06:12 AM    TBILIRUBIN 0.4 03/14/2019 06:12 AM    LIPASE 5 (L) 03/13/2019 04:50 PM    ALBUMIN 3.4 03/14/2019 06:12 AM    GLOBULIN 2.4 03/14/2019 06:12 AM     No results found for: PROTHROMBTM, INR

## 2019-03-14 NOTE — PROGRESS NOTES
Pt came to the floor at 2332. Pt was made comfortable in bed. Pts vitals vini assessment were done. Pt

## 2019-03-14 NOTE — SENIOR ADMIT NOTE
Senior Admission Note    In summary: Molly Fountain is a 73 y.o. female with past medical history of DM2 (on no meds) presented to [facility] ED with AMS and LE cramping states this has been going on for months but was significantly exacerbated day of presentation. She also notes intermittent atypical chest pain. She was noted to have  and hypokalemia, as well as PREET vs CKD, she was given 20 units subQ insulin and transferred to the [facility] ED. In the [facility] ED pts sugar was 86. HA1c was 12.8. Vitals showed tachycardia and HTN. Pt was then admitted for further mgmt of her conditions.    Pertinent physical exam findings:     Benign Exam    Assessment and plan in summary:    1.DM2   - Start on insulin regimen and IVF   - Metformin on DC   - Lipid panel for ASCVD, will likely need statin    - Consider ACEi/ARB on DC as well as pt has HTN and likely CKD    2.Hypokalemia   - Likely worsened given insulin administration   - Recheck and replete appropriately     3.PREET vs CKD   - Pt appears euvolemic but possibly PREET from dehydration due to polyuria   - Urea wnl suggesting this is chronic   - IVF and CTM, avoid nephrotoxic drugs   - Consider ACEi/ARB on DC    4.Atypical Chest Pain   - None today, states occurs randomly, not associated with exertion/SOB/N/V/diaphoresis, L sided, nonradiating, could not describe further   - Trop/EKG x1   - CTM    For full plan, please see Intern note for details   Nigel Castillo M.D.  PGY 2

## 2019-03-14 NOTE — ASSESSMENT & PLAN NOTE
Has been noticing urinary incontinence for the past couple months, uses diaper  -Encourage pelvic floor muscle exercise   Likely related to hyperglycemia uncontrolled   Follow up with PCP

## 2019-03-14 NOTE — ASSESSMENT & PLAN NOTE
Has bilateral leg pain, anteriorly on the shins  No numbness tingling     Unlikely to be diabetic neuropathy   Ct tylenol  -Patient counseled on use/risks/benefits/how to take/mitigate risk of ibuprofen and naproxen. Encouraged to use acetaminophen as well. Patient verbalized understanding and agreement.

## 2019-03-14 NOTE — ED PROVIDER NOTES
"ED Provider Note    Scribed for Roberto Spain M.D. by Roberto Spain. 3/13/2019,  6:26 PM.    CHIEF COMPLAINT  Chief Complaint   Patient presents with   • ALOC     transfer from [facility], AOx1 to [facility], FSBS initially 510 received 20 units insulin subq, now AOx4. FSBS now 86 on arrival.    • Leg Pain     cramping pain x 3-4 weeks       HPI  Molly Fountain is a 73 y.o. female who presents to the Emergency Department as a transfer from [facility], where she presented today at about 1 PM.  She presented there with altered mental status.  She was noted to be \"an incredibly poor historian,\" reporting that she has been having intermittent tingling sensations in her lower extremities.  She was denying any pain, and has been ambulatory.  She describes vague bilateral hip pain.  She denied headache and or confusion, but was oriented only to herself.  Her blood sugar was noted to be 510, and she received 20 units of subcu insulin.  This seemed to improve her alertness.  Her blood sugars 86 on arrival. During evaluation in our facility the patient answers questions appropriately. She states that she has had symptoms of leg cramps, light headedness, and increased urinary incontinence for 3 or 4 weeks.She continues to report crampy leg pain. She states that she was told many years ago that she had diabetes, however she stopped taking medication due to adverse medication side effects. She denies any fevers, chills, nausea, vomiting, chest pain or shortness of breath.     Her vital signs at the transferring facility showed a heart rate as high as 126, though it has improved significantly.  She had no localizing findings on her neurologic exam.  The patient's laboratory tests show an unremarkable CBC, a complete metabolic panel that was also unremarkable, with the exception of a glucose of greater than 500, as reported above.  She had a chest x-ray and a head CT performed at the transferring facility.  " These were unremarkable.  She also had an unremarkable EKG except for sinus tachycardia.    REVIEW OF SYSTEMS  See HPI for further details. All other systems are negative.     PAST MEDICAL HISTORY   has a past medical history of Essential hypertension (3/13/2019) and Type 2 diabetes mellitus with complication, without long-term current use of insulin (HCC) (3/13/2019).Remote history of diabetes.  Currently untreated.  No history of stroke, DVT, or PE.    SOCIAL HISTORY  Social History     Social History Main Topics   • Smoking status: Never Smoker   • Smokeless tobacco: Never Used   • Alcohol use Yes      Comment: occasionally   • Drug use: No   • Sexual activity: Not on file     History   Drug Use No       SURGICAL HISTORY  patient denies any surgical history    CURRENT MEDICATIONS  Home Medications     Reviewed by Jose Quesada (Pharmacy Tech) on 03/13/19 at 2058  Med List Status: Complete   Medication Last Dose Status   ibuprofen (MOTRIN) 200 MG Tab 3/12/2019 Active   Multiple Vitamins-Minerals (MULTIVITAMIN GUMMIES WOMENS) Chew Tab >2 days Active   naproxen (ANAPROX) 220 MG tablet 3/12/2019 Active                ALLERGIES  Allergies   Allergen Reactions   • Sulfa Drugs Rash     Full body rash       PHYSICAL EXAM  VITAL SIGNS: /91   Pulse 94   Temp 37.3 °C (99.1 °F) (Temporal)   Resp 14   Ht 1.524 m (5')   Wt 63.5 kg (140 lb)   LMP  (LMP Unknown)   SpO2 96%   BMI 27.34 kg/m²   Pulse ox interpretation: I interpret this pulse ox as normal.  Constitutional: Alert in no apparent distress.  Slightly slow to respond.  HENT: No signs of trauma, Bilateral external ears normal, Nose normal.   Eyes: Conjunctiva normal, Non-icteric.   Neck: Normal range of motion, Supple, No stridor.   Lymphatic: No lymphadenopathy noted.   Cardiovascular: Regular slightly tachycardic rate and rhythm, no murmurs.   Thorax & Lungs: Normal breath sounds, No respiratory distress, No wheezing, No chest tenderness.    Abdomen: Bowel sounds normal, Soft, No tenderness, No masses, No pulsatile masses. No peritoneal signs.  Skin: Warm, Dry, No erythema, No rash.   Extremities: Intact distal pulses, No edema, No cyanosis.  Musculoskeletal: Good range of motion in all major joints. No or major deformities noted.   Neurologic: Alert , Normal motor function, Normal sensory function, No focal deficits noted.   Psychiatric: Affect normal, Judgment normal, Mood normal.     DIAGNOSTIC STUDIES / PROCEDURES    EKG  Interpreted by me    Outpatient EKG reviewed.  Unremarkable and nonischemic except for sinus tachycardia.    LABS  Labs Reviewed   HEMOGLOBIN A1C - Abnormal; Notable for the following:        Result Value    Glycohemoglobin 12.8 (*)     All other components within normal limits   VENOUS BLOOD GAS - Abnormal; Notable for the following:     Venous Bg Pco2 38.2 (*)     Venous Bg Hco3 23 (*)     All other components within normal limits   LIPASE - Abnormal; Notable for the following:     Lipase 5 (*)     All other components within normal limits   URINALYSIS - Abnormal; Notable for the following:     Glucose 100 (*)     Leukocyte Esterase Trace (*)     All other components within normal limits   IRON/TOTAL IRON BIND - Abnormal; Notable for the following:     Iron 23 (*)     % Saturation 7 (*)     All other components within normal limits   CBC WITH DIFFERENTIAL - Abnormal; Notable for the following:     RBC 4.14 (*)     Hemoglobin 11.6 (*)     Hematocrit 35.5 (*)     MCHC 32.7 (*)     All other components within normal limits   COMP METABOLIC PANEL - Abnormal; Notable for the following:     Potassium 2.7 (*)     Total Protein 5.9 (*)     All other components within normal limits   LIPID PROFILE - Abnormal; Notable for the following:      (*)     All other components within normal limits   ACCU-CHEK GLUCOSE - Abnormal; Notable for the following:     Glucose - Accu-Ck 138 (*)     All other components within normal limits    ACCU-CHEK GLUCOSE - Abnormal; Notable for the following:     Glucose - Accu-Ck 176 (*)     All other components within normal limits   MAGNESIUM   PHOSPHORUS   BETA-HYDROXYBUTYRIC ACID   OSMOLALITY SERUM   KETONES-URINE QUAL(ACETONE URINE QUAL)   FERRITIN   TROPONIN   ESTIMATED GFR   URINE MICROSCOPIC (W/UA)   CBC WITH DIFFERENTIAL   COMP METABOLIC PANEL   MAGNESIUM   ACCU-CHEK GLUCOSE     All labs reviewed by me.    RADIOLOGY  OUTSIDE IMAGES-CT HEAD   Final Result        The radiologist's interpretation of all radiological studies have been reviewed by me.    COURSE & MEDICAL DECISION MAKING  Nursing notes, VS, PMSFHx reviewed in chart.     6:26 PM Patient seen and examined at bedside. Differential diagnosis includes but is not limited to altered mental status secondary to dehydration, uremia, liver disease, hypothyroidism, less likely stroke, given the patient has had a normal head CT.  Further, she has improved significantly in terms of her mental status as her severe hyper glycemia was corrected.  Nonetheless, she is an uncontrolled diabetic who will need prompt implementation of appropriate glycemic control to avoid serious consequences such as DKA. Ordered for screening laboratory tests to evaluate. Patient will be treated with IV fluids for her symptoms.     HYDRATION: Based on the patient's presentation of Dehydration, Hyperglycemia and Tachycardia the patient was given IV fluids. IV Hydration was used because oral hydration was not as rapid as required. Upon recheck following hydration, the patient was improved, with moist mucus membranes, and improved heart rate. .    This patient continued to improve with IV fluids.  She has a hemoglobin A1c of 12.8, indicative of prolonged and severe hyperglycemia with no controlled.  She will need to be admitted, and I have paged and spoken with the resident team, who agrees to admit.    DISPOSITION:  Patient will be admitted to R in stable condition.      FINAL  IMPRESSION  1. Uncontrolled diabetes mellitus  2. Altered mental status  3. Dehydration

## 2019-03-14 NOTE — CONSULTS
Diabetes education: Pt has a hx of diabetes, with no diabetes medications listed. Pt had a blood sugar of        510 before transferred to [facility]. Blood sugar at [facility] was 74. Hg a1c was 12.8%.  Pt is currently on Regular insulin sliding scale coverage ac and hs with current blood sugars of 162 (2 units), 163 ( 2 units) and 166  (2 units). Pt is also on Metformin.  Spoke with nursing, who will have patient give her own insulin. CDE to follow up tomorrow when  present.  Plan: Please have patient give her own insulin. CDE to meet with pt and  tomorrow.  Please call [phone] when  available for education.

## 2019-03-14 NOTE — ED NOTES
Med rec completed per pt at bedside  Allergies reviewed  No ABX in last 30 days   Pt denies taking any prescription medications

## 2019-03-15 VITALS
OXYGEN SATURATION: 92 % | HEIGHT: 60 IN | TEMPERATURE: 98.2 F | BODY MASS INDEX: 29.65 KG/M2 | RESPIRATION RATE: 18 BRPM | SYSTOLIC BLOOD PRESSURE: 134 MMHG | WEIGHT: 151.01 LBS | DIASTOLIC BLOOD PRESSURE: 92 MMHG | HEART RATE: 101 BPM

## 2019-03-15 LAB
GLUCOSE BLD-MCNC: 161 MG/DL (ref 65–99)
GLUCOSE BLD-MCNC: 209 MG/DL (ref 65–99)

## 2019-03-15 PROCEDURE — 82962 GLUCOSE BLOOD TEST: CPT

## 2019-03-15 PROCEDURE — 99217 PR OBSERVATION CARE DISCHARGE: CPT | Performed by: INTERNAL MEDICINE

## 2019-03-15 PROCEDURE — 96372 THER/PROPH/DIAG INJ SC/IM: CPT

## 2019-03-15 PROCEDURE — A9270 NON-COVERED ITEM OR SERVICE: HCPCS | Performed by: STUDENT IN AN ORGANIZED HEALTH CARE EDUCATION/TRAINING PROGRAM

## 2019-03-15 PROCEDURE — 700111 HCHG RX REV CODE 636 W/ 250 OVERRIDE (IP): Performed by: STUDENT IN AN ORGANIZED HEALTH CARE EDUCATION/TRAINING PROGRAM

## 2019-03-15 PROCEDURE — 700102 HCHG RX REV CODE 250 W/ 637 OVERRIDE(OP): Performed by: STUDENT IN AN ORGANIZED HEALTH CARE EDUCATION/TRAINING PROGRAM

## 2019-03-15 PROCEDURE — G0378 HOSPITAL OBSERVATION PER HR: HCPCS

## 2019-03-15 RX ORDER — DEXTROSE MONOHYDRATE 25 G/50ML
25 INJECTION, SOLUTION INTRAVENOUS
Status: DISCONTINUED | OUTPATIENT
Start: 2019-03-15 | End: 2019-03-15 | Stop reason: HOSPADM

## 2019-03-15 RX ORDER — FERROUS SULFATE 325(65) MG
325 TABLET ORAL DAILY
Qty: 30 TAB | Refills: 3 | Status: ON HOLD | OUTPATIENT
Start: 2019-03-15 | End: 2023-11-21

## 2019-03-15 RX ORDER — DEXTROSE MONOHYDRATE 25 G/50ML
25 INJECTION, SOLUTION INTRAVENOUS
Status: DISCONTINUED | OUTPATIENT
Start: 2019-03-15 | End: 2019-03-15

## 2019-03-15 RX ORDER — LANCETS 30 GAUGE
EACH MISCELLANEOUS
Qty: 100 EACH | Refills: 0 | Status: SHIPPED | OUTPATIENT
Start: 2019-03-15 | End: 2019-03-15

## 2019-03-15 RX ORDER — ATORVASTATIN CALCIUM 20 MG/1
20 TABLET, FILM COATED ORAL EVERY EVENING
Qty: 30 TAB | Refills: 0 | Status: ON HOLD | OUTPATIENT
Start: 2019-03-15 | End: 2023-11-21

## 2019-03-15 RX ORDER — KETOROLAC TROMETHAMINE 10 MG/1
10 TABLET, FILM COATED ORAL ONCE
Status: COMPLETED | OUTPATIENT
Start: 2019-03-15 | End: 2019-03-15

## 2019-03-15 RX ORDER — LANCETS 30 GAUGE
EACH MISCELLANEOUS
Qty: 100 EACH | Refills: 1 | Status: SHIPPED | OUTPATIENT
Start: 2019-03-15

## 2019-03-15 RX ADMIN — ANALGESIC BALM: 1.74; 4.06 OINTMENT TOPICAL at 06:07

## 2019-03-15 RX ADMIN — ANALGESIC BALM: 1.74; 4.06 OINTMENT TOPICAL at 11:21

## 2019-03-15 RX ADMIN — KETOROLAC TROMETHAMINE 10 MG: 10 TABLET, FILM COATED ORAL at 11:21

## 2019-03-15 RX ADMIN — IBUPROFEN 400 MG: 400 TABLET, FILM COATED ORAL at 12:20

## 2019-03-15 RX ADMIN — ENOXAPARIN SODIUM 40 MG: 100 INJECTION SUBCUTANEOUS at 06:10

## 2019-03-15 RX ADMIN — IBUPROFEN 400 MG: 400 TABLET, FILM COATED ORAL at 06:06

## 2019-03-15 ASSESSMENT — ENCOUNTER SYMPTOMS
DEPRESSION: 0
DIZZINESS: 0
HEMOPTYSIS: 0
INSOMNIA: 0
HEADACHES: 0
VOMITING: 0
NAUSEA: 0
COUGH: 0
PALPITATIONS: 0
DOUBLE VISION: 0
CHILLS: 0
FEVER: 0
BLURRED VISION: 0
MYALGIAS: 0

## 2019-03-15 NOTE — PROGRESS NOTES
Shift report received from night RN, assumed care at 0715. Patient up in bed, routinely medicated prn per MAR. AOx4, up by self, calls appropriately, bed alarm not in use. PIV assessed and locked. O2 RA, SPO2 91%. VTE lovenox. Plan is DM education prior to dc and med management. Needs met at this time.     Discussed POC for multimodal pain management, monitoring VS/labs/I&O, mobility, day time routine, comfort, and safety. Patient has call light and personal belongings within reach. Safety and fall precautions in place. Reviewed current labs, notes, medications, and orders. Hourly rounding in place with RN and CNA.

## 2019-03-15 NOTE — CARE PLAN
Problem: Communication  Goal: The ability to communicate needs accurately and effectively will improve  Outcome: PROGRESSING AS EXPECTED  Report given bedside. Pt updated on plan of care. Pt verbalizes understanding. Pt encouraged to voice needs and concerns. Communication board in use. Call light within reach. Pt calls appropriately. Hourly rounding in place. Pt has no needs or concerns at this time.     Problem: Knowledge Deficit  Goal: Knowledge of disease process/condition, treatment plan, diagnostic tests, and medications will improve  Outcome: PROGRESSING AS EXPECTED  Reviewed POC including safety, mobility, pain management, medication administration, DVT prophylaxis, and discharge. Call light within reach. Pt calls appropriately. Hourly rounding in place. Pt has no needs or concerns at this time.

## 2019-03-15 NOTE — PROGRESS NOTES
Diabetes education: Met with pt and  for diabetes education. Discussed what diabetes was, what effects blood sugars, goals for blood sugars, hypoglycemia, hyperglycemia and finger sticks. Discussed metformin and how it works.  Pt was given an Accucheck guide meter and taught to use. Pt had done finger sticks before.  Plan: Pt will need a prescription for Accucheck guide test strips and fast clix lancets e scripted to [facility]. Prescriptions needs to have dx code ( E11.65), directions, quantity and refills for medicare to cover. Medicare allows for testing once a day on oral or no diabetes medications. Provider needs to have Medicare privileges for coverage. Please call [phone] if needs change.

## 2019-03-15 NOTE — PROGRESS NOTES
Internal Medicine Interval Note  Note Author: Fernando Sims M.D.     Name Molly Fountain 1945   Age/Sex 73 y.o. female   MRN [id number]   Code Status DNAR/DNI     After 5PM or if no immediate response to page, please call for cross-coverage  Attending/Team: Dr Muller/porsha  See Patient List for primary contact information  Call [phone] to page    1st Call - Day Intern (R1):   Dr Sims  2nd Call - Day Sr. Resident (R2/R3):   Dr Billings          Reason for interval visit  (Principal Problem)   Type 2 diabetes mellitus with complication, without long-term current use of insulin (HCC)    Interval Problem Daily Status Update  (24 hours)     No acute events overnight. Patient is doing well without concerns. Blood sugar in tolerable range.    -Discharge to home after diabetes education.  -Metformin 100o mg BID.  -No daily asa/ACEi/ARB upon discharge. PCP to evaluate need for these medications.      Review of Systems   Constitutional: Negative for chills and fever.   HENT: Negative for hearing loss and tinnitus.    Eyes: Negative for blurred vision and double vision.   Respiratory: Negative for cough and hemoptysis.    Cardiovascular: Negative for chest pain and palpitations.   Gastrointestinal: Negative for nausea and vomiting.   Genitourinary: Negative for dysuria and urgency.   Musculoskeletal: Positive for joint pain. Negative for myalgias.   Skin: Negative for itching and rash.   Neurological: Negative for dizziness and headaches.   Psychiatric/Behavioral: Negative for depression. The patient does not have insomnia.        Consultants/Specialty  None   PCP: No primary care provider on file.      Disposition  Discharge to home after diabetes education.    Quality Measures  Quality-Core Measures   Reviewed items::  EKG reviewed, Labs reviewed, Radiology images reviewed and Medications reviewed  Aden catheter::  No Aden  DVT prophylaxis pharmacological::  Enoxaparin  (Lovenox)          Physical Exam       Vitals:    03/14/19 1629 03/14/19 2053 03/15/19 0429 03/15/19 0923   BP: 147/95 152/83 134/81 134/92   Pulse: (!) 107 99 (!) 104 (!) 101   Resp: 18 16 20 18   Temp: 36.1 °C (97 °F) 37.3 °C (99.2 °F) 36.2 °C (97.2 °F) 36.8 °C (98.2 °F)   TempSrc: Temporal Temporal Temporal Temporal   SpO2: 95% 95% 91% 92%   Weight:       Height:         Body mass index is 29.49 kg/m².    Oxygen Therapy:  Pulse Oximetry: 92 %, O2 Delivery: None (Room Air)    Physical Exam   Constitutional: She is oriented to person, place, and time and well-developed, well-nourished, and in no distress.   HENT:   Head: Normocephalic and atraumatic.   Eyes: Pupils are equal, round, and reactive to light. Conjunctivae and EOM are normal.   Neck: Normal range of motion. Neck supple.   Cardiovascular: Normal rate, regular rhythm and normal heart sounds.    Pulmonary/Chest: Breath sounds normal. No respiratory distress. She has no wheezes.   Abdominal: Soft. Bowel sounds are normal. She exhibits no distension. There is no tenderness.   Musculoskeletal: Normal range of motion. She exhibits no edema.   Tenderness over B/L shins R>L    Neurological: She is alert and oriented to person, place, and time. GCS score is 15.   Skin: Skin is warm.   Psychiatric: Affect normal.         Lab Data Review:         3/14/2019  1:00 PM    Recent Labs      03/13/19   1650 03/13/19 1905 03/14/19   0612   SODIUM   --   144  142   POTASSIUM   --   2.7*  4.1   CHLORIDE   --   110  109   CO2   --   23  27   BUN   --   9  7*   CREATININE   --   0.62  0.62   MAGNESIUM  1.7   --   1.6   PHOSPHORUS  2.7   --    --    CALCIUM   --   8.5  8.5       Recent Labs      03/13/19   1650  03/13/19   1905 03/14/19   0612   ALTSGPT   --   8  7   ASTSGOT   --   19  11*   ALKPHOSPHAT   --   65  63   TBILIRUBIN   --   0.3  0.4   LIPASE  5*   --    --    GLUCOSE   --   74  180*       Recent Labs      03/13/19   1650  03/13/19   1905  03/14/19   0612   03/14/19   1048   RBC  4.14*   --   3.75*   --    HEMOGLOBIN  11.6*   --   10.5*   --    HEMATOCRIT  35.5*   --   31.0*   --    PLATELETCT  305   --   266   --    IRON   --   23*   --    --    FERRITIN   --   37.2   --   34.5   [facility]   --   343   --    --        Recent Labs      03/13/19   1650  03/13/19   1905  03/14/19   0612   WBC  9.6   --   6.8   NEUTSPOLYS  66.40   --   62.00   LYMPHOCYTES  25.90   --   28.30   MONOCYTES  6.50   --   7.40   EOSINOPHILS  0.50   --   1.60   BASOPHILS  0.30   --   0.40   ASTSGOT   --   19  11*   ALTSGPT   --   8  7   ALKPHOSPHAT   --   65  63   TBILIRUBIN   --   0.3  0.4           Assessment/Plan     * Type 2 diabetes mellitus with complication, without long-term current use of insulin (HCC)   Assessment & Plan    Diagnosed with type II tablets mellitus about 4 years ago, not on any medication.  A1c 12.8%, goal A1c 7-8%  Sliding scale insulin with hypoglycemia protocol  Metformin started  Atorvastatin started   Continue sliding scale to assess insulin requirement, will consider starting lantus   Diabetes education      Pain in the chest   Assessment & Plan    Atypical chest pain.  Less likely to be cardiac pain.  -Follow-up with EKG and troponin I  - Aggressive lifestyle and risk factor modifications discussed extensively with patient.  - Pain control with Tylenol     Leg cramps   Assessment & Plan    Has bilateral leg pain, anteriorly on the shins  No numbness tingling     Unlikely to be diabetic neuropathy   Ct tylenol  -Patient counseled on use/risks/benefits/how to take/mitigate risk of ibuprofen and naproxen. Encouraged to use acetaminophen as well. Patient verbalized understanding and agreement.       Essential hypertension   Assessment & Plan    Stated her blood pressure is elevated and she is not taking any medication.  Blood pressure 155/86 in the ER, asymptomatic    -Will hold off on starting blood pressure medications in the acute setting   -Pt has pCP appointment  in few weeks.      Functional urinary incontinence   Assessment & Plan    Has been noticing urinary incontinence for the past couple months, uses diaper  -Encourage pelvic floor muscle exercise   Likely related to hyperglycemia uncontrolled   Follow up with PCP     Hypokalemia   Assessment & Plan    Potassium 3.0 previous hospital  -Provided K Dur 40 mEq x2, replete as needed  Improved to 4

## 2019-03-15 NOTE — CARE PLAN
Problem: Safety  Goal: Will remain free from injury  Non-skid socks on and belongings within reach. Call light within reach. Hourly rounding in place.      Problem: Venous Thromboembolism (VTW)/Deep Vein Thrombosis (DVT) Prevention:  Goal: Patient will participate in Venous Thrombosis (VTE)/Deep Vein Thrombosis (DVT)Prevention Measures  SCDs to BLE. Mobilization at least three times a day with staff and PT/OT.

## 2019-03-15 NOTE — PROGRESS NOTES
Diabetes education: CDE will meet with pt tomorrow. Please see consult note.  Plan: Please have patient give her own insulin. CDE to meet with pt and  tomorrow.  Please call [phone] when  available for education.

## 2019-03-15 NOTE — DISCHARGE INSTRUCTIONS
Discharge Instructions    Discharged to home by car with relative. Discharged via wheelchair, hospital escort: Yes.  Special equipment needed: Not Applicable    Be sure to schedule a follow-up appointment with your primary care doctor or any specialists as instructed.     Discharge Plan:   Diet Plan: Discussed  Activity Level: Discussed  Confirmed Follow up Appointment: Patient to Call and Schedule Appointment  Confirmed Symptoms Management: Discussed  Medication Reconciliation Updated: Yes  Pneumococcal Vaccine Administered/Refused: Not given - Patient refused pneumococcal vaccine  Influenza Vaccine Indication: Patient Refuses    I understand that a diet low in cholesterol, fat, and sodium is recommended for good health. Unless I have been given specific instructions below for another diet, I accept this instruction as my diet prescription.   Other diet: Regular    Special Instructions: None    · Is patient discharged on Warfarin / Coumadin?   No     Diabetes Mellitus and Food  It is important for you to manage your blood sugar (glucose) level. Your blood glucose level can be greatly affected by what you eat. Eating healthier foods in the appropriate amounts throughout the day at about the same time each day will help you control your blood glucose level. It can also help slow or prevent worsening of your diabetes mellitus. Healthy eating may even help you improve the level of your blood pressure and reach or maintain a healthy weight.  General recommendations for healthful eating and cooking habits include:  · Eating meals and snacks regularly. Avoid going long periods of time without eating to lose weight.  · Eating a diet that consists mainly of plant-based foods, such as fruits, vegetables, nuts, legumes, and whole grains.  · Using low-heat cooking methods, such as baking, instead of high-heat cooking methods, such as deep frying.  Work with your dietitian to make sure you understand how to use the Nutrition  Facts information on food labels.  How can food affect me?  Carbohydrates   Carbohydrates affect your blood glucose level more than any other type of food. Your dietitian will help you determine how many carbohydrates to eat at each meal and teach you how to count carbohydrates. Counting carbohydrates is important to keep your blood glucose at a healthy level, especially if you are using insulin or taking certain medicines for diabetes mellitus.  Alcohol   Alcohol can cause sudden decreases in blood glucose (hypoglycemia), especially if you use insulin or take certain medicines for diabetes mellitus. Hypoglycemia can be a life-threatening condition. Symptoms of hypoglycemia (sleepiness, dizziness, and disorientation) are similar to symptoms of having too much alcohol.  If your health care provider has given you approval to drink alcohol, do so in moderation and use the following guidelines:  · Women should not have more than one drink per day, and men should not have more than two drinks per day. One drink is equal to:  ¨ 12 oz of beer.  ¨ 5 oz of wine.  ¨ 1½ oz of hard liquor.  · Do not drink on an empty stomach.  · Keep yourself hydrated. Have water, diet soda, or unsweetened iced tea.  · Regular soda, juice, and other mixers might contain a lot of carbohydrates and should be counted.  What foods are not recommended?  As you make food choices, it is important to remember that all foods are not the same. Some foods have fewer nutrients per serving than other foods, even though they might have the same number of calories or carbohydrates. It is difficult to get your body what it needs when you eat foods with fewer nutrients. Examples of foods that you should avoid that are high in calories and carbohydrates but low in nutrients include:  · Trans fats (most processed foods list trans fats on the Nutrition Facts label).  · Regular soda.  · Juice.  · Candy.  · Sweets, such as cake, pie, doughnuts, and cookies.  · Fried  foods.  What foods can I eat?  Eat nutrient-rich foods, which will nourish your body and keep you healthy. The food you should eat also will depend on several factors, including:  · The calories you need.  · The medicines you take.  · Your weight.  · Your blood glucose level.  · Your blood pressure level.  · Your cholesterol level.  You should eat a variety of foods, including:  · Protein.  ¨ Lean cuts of meat.  ¨ Proteins low in saturated fats, such as fish, egg whites, and beans. Avoid processed meats.  · Fruits and vegetables.  ¨ Fruits and vegetables that may help control blood glucose levels, such as apples, mangoes, and yams.  · Dairy products.  ¨ Choose fat-free or low-fat dairy products, such as milk, yogurt, and cheese.  · Grains, bread, pasta, and rice.  ¨ Choose whole grain products, such as multigrain bread, whole oats, and brown rice. These foods may help control blood pressure.  · Fats.  ¨ Foods containing healthful fats, such as nuts, avocado, olive oil, canola oil, and fish.  Does everyone with diabetes mellitus have the same meal plan?  Because every person with diabetes mellitus is different, there is not one meal plan that works for everyone. It is very important that you meet with a dietitian who will help you create a meal plan that is just right for you.  This information is not intended to replace advice given to you by your health care provider. Make sure you discuss any questions you have with your health care provider.  Document Released: 09/14/2006 Document Revised: 05/25/2017 Document Reviewed: 11/14/2014  [employer] Interactive Patient Education © 2017 [employer]    Diabetes Mellitus and Exercise  Exercising regularly is important for your overall health, especially when you have diabetes (diabetes mellitus). Exercising is not only about losing weight. It has many health benefits, such as increasing muscle strength and bone density and reducing body fat and stress. This leads to  improved fitness, flexibility, and endurance, all of which result in better overall health.  Exercise has additional benefits for people with diabetes, including:  · Reducing appetite.  · Helping to lower and control blood glucose.  · Lowering blood pressure.  · Helping to control amounts of fatty substances (lipids) in the blood, such as cholesterol and triglycerides.  · Helping the body to respond better to insulin (improving insulin sensitivity).  · Reducing how much insulin the body needs.  · Decreasing the risk for heart disease by:  ¨ Lowering cholesterol and triglyceride levels.  ¨ Increasing the levels of good cholesterol.  ¨ Lowering blood glucose levels.  What is my activity plan?  Your health care provider or certified diabetes educator can help you make a plan for the type and frequency of exercise (activity plan) that works for you. Make sure that you:  · Do at least 150 minutes of moderate-intensity or vigorous-intensity exercise each week. This could be brisk walking, biking, or water aerobics.  ¨ Do stretching and strength exercises, such as yoga or weightlifting, at least 2 times a week.  ¨ Spread out your activity over at least 3 days of the week.  · Get some form of physical activity every day.  ¨ Do not go more than 2 days in a row without some kind of physical activity.  ¨ Avoid being inactive for more than 90 minutes at a time. Take frequent breaks to walk or stretch.  · Choose a type of exercise or activity that you enjoy, and set realistic goals.  · Start slowly, and gradually increase the intensity of your exercise over time.  What do I need to know about managing my diabetes?  · Check your blood glucose before and after exercising.  ¨ If your blood glucose is higher than 240 mg/dL (13.3 mmol/L) before you exercise, check your urine for ketones. If you have ketones in your urine, do not exercise until your blood glucose returns to normal.  · Know the symptoms of low blood glucose  (hypoglycemia) and how to treat it. Your risk for hypoglycemia increases during and after exercise. Common symptoms of hypoglycemia can include:  ¨ Hunger.  ¨ Anxiety.  ¨ Sweating and feeling clammy.  ¨ Confusion.  ¨ Dizziness or feeling light-headed.  ¨ Increased heart rate or palpitations.  ¨ Blurry vision.  ¨ Tingling or numbness around the mouth, lips, or tongue.  ¨ Tremors or shakes.  ¨ Irritability.  · Keep a rapid-acting carbohydrate snack available before, during, and after exercise to help prevent or treat hypoglycemia.  · Avoid injecting insulin into areas of the body that are going to be exercised. For example, avoid injecting insulin into:  ¨ The arms, when playing tennis.  ¨ The legs, when jogging.  · Keep records of your exercise habits. Doing this can help you and your health care provider adjust your diabetes management plan as needed. Write down:  ¨ Food that you eat before and after you exercise.  ¨ Blood glucose levels before and after you exercise.  ¨ The type and amount of exercise you have done.  ¨ When your insulin is expected to peak, if you use insulin. Avoid exercising at times when your insulin is peaking.  · When you start a new exercise or activity, work with your health care provider to make sure the activity is safe for you, and to adjust your insulin, medicines, or food intake as needed.  · Drink plenty of water while you exercise to prevent dehydration or heat stroke. Drink enough fluid to keep your urine clear or pale yellow.  This information is not intended to replace advice given to you by your health care provider. Make sure you discuss any questions you have with your health care provider.  Document Released: 03/09/2005 Document Revised: 07/07/2017 Document Reviewed: 05/29/2017  [employer] Interactive Patient Education © 2017 [employer]      Diabetes Mellitus and Skin Care  Diabetes (diabetes mellitus) can lead to health problems over time, including skin problems. People with  diabetes have a higher risk for many types of skin complications. This is because having poorly controlled blood sugar (glucose) levels can:  · Damage nerves and blood vessels. This can result in decreased feeling in your legs and feet, which means you may not notice minor skin injuries that could lead to serious problems.  · Reduce blood flow (circulation), which makes wounds heal more slowly and increases your risk of infection.  · Cause areas of skin to become thick or discolored.  What are some common skin conditions that affect people with diabetes?  Diabetes often causes dry skin. It can also cause the skin on the feet to get thinner, break more easily, and heal more slowly. There are certain skin conditions that commonly affect people who have diabetes, such as:  · Bacterial skin infections, such as styes, boils, infected hair follicles, and infections of the skin around the nails.  · Fungal skin infections. These are most common in areas where skin rubs together, such as in the armpits or under the breasts.  · Open sores, especially on the feet.  · Tissue death (gangrene). This can happen on your feet if a serious infection does not heal properly. Gangrene can cause the need for a foot or leg to be surgically removed (amputated).  Diabetes can also cause the skin to change. You may develop:  · Dark, velvety markings on the skin that usually appear on the face, neck, armpits, inner thighs, and groin (acanthosis nigricans). This typically affects people of -American and American-[place] descent.  · Red, raised, scar-like tissue that may itch, feel painful, or develop into a wound (necrobiosis lipoidica).  · Blisters on feet, toes, hands, or fingers.  · Thickened, wax-like areas of skin that usually occur on the hands, forehead, or toes (digital sclerosis).  · Brown or red ring-shaped or half-ring-shaped patches of skin on the ears or fingers (disseminated granuloma).  · Pea-shaped yellow bumps that may  be itchy and surrounded by a red ring (eruptive xanthomatosis). This usually affects the arms, feet, buttocks, and the top of the hands.  · Round, discolored patches of tan skin that do not hurt or itch (diabetic dermopathy). These may look like age spots.  What do I need to know about itchy skin?  It is common for people with diabetes to have itchy skin caused by dryness. Frequent high blood glucose levels can cause itchiness, and poor circulation and certain skin infections can make dry, itchy skin worse. If you have itchy skin that is red or covered in a rash, this could be a sign of an allergic reaction to a medicine.  If you have a rash or if your skin is very itchy, contact your health care provider. You may need help to manage your diabetes better, or you may need treatment for an infection.  How can I prevent skin breakdown?  When you have diabetes and you get a badly infected ulcer or sore that does not heal, your skin can break down, especially if you have poor circulation or are on bed rest. To prevent skin breakdown:  · Keep your skin clean and dry. Wash your skin often. Do not use hot water.  · Do not use any products that contain nicotine or tobacco, such as cigarettes and e-cigarettes. Smoking affects the body’s ability to heal. If you need help quitting, ask your health care provider.  · Check your skin every day for cuts, bruises, redness, blisters, or sores, especially on your feet. Tell your health care provider about any cuts, wounds, or sores you have, especially if they are healing slowly.  · If you are on bed rest, try to change positions often.  What else do I need to know about taking care of my skin?    · To relieve dry skin and itching:  ¨ Limit baths and showers to 5-10 minutes.  ¨ Bathe with lukewarm water instead of hot water.  ¨ Use mild soap and gentle skin cleansers. Do not use soap that is perfumed or harsh or dries your skin.  ¨ Put on lotion as soon as you finish bathing.  · Make  sure that your health care provider performs a visual foot exam at every medical visit.  · Schedule a foot exam with your health care provider once every year. This exam includes an inspection of the structure and skin of your feet.  · If you get a skin injury, such as a cut, blister, or sore, check the area every day for signs of infection. Check for:  ¨ More redness, swelling, or pain.  ¨ More fluid or blood.  ¨ Warmth.  ¨ Pus or a bad smell.  Contact a health care provider if:  · You develop a cut or sore, especially on your feet.  · You develop signs of infection after a skin injury.  · Your blood glucose level is higher than 240 mg/dL (13.3 mmol/L) for 2 days in a row.  · You have itchy skin that develops redness or a rash.  · You have discolored areas of skin.  · You have areas where your skin is changing, such as thickening or appearing shiny.  This information is not intended to replace advice given to you by your health care provider. Make sure you discuss any questions you have with your health care provider.  Document Released: 05/30/2017 Document Revised: 07/07/2017 Document Reviewed: 05/30/2017  [employer] Interactive Patient Education © 2017 [employer]        Diabetes Mellitus and Sick Day Management  Blood sugar (glucose) can be difficult to control when you are sick. Common illnesses that can cause problems for people with diabetes (diabetes mellitus) include colds, fever, flu (influenza), nausea, vomiting, and diarrhea. These illnesses can cause stress and loss of body fluids (dehydration), and those issues can cause blood glucose levels to increase. Because of this, it is very important to take your insulin and diabetes medicines and eat some form of carbohydrate when you are sick.  You should make a plan for days when you are sick (sick day plan) as part of your diabetes management plan. You and your health care provider should make this plan in advance. The following guidelines are intended to  help you manage an illness that lasts for about 24 hours or less. Your health care provider may also give you more specific instructions.  What do I need to do to manage my blood glucose?  · Check your blood glucose every 2-4 hours, or as often as told by your health care provider.  · Know your sick day treatment goals. Your target blood glucose levels may be different when you are sick.  · If you use insulin, take your usual dose.  ¨ If your blood glucose continues to be too high, you may need to take an additional insulin dose as told by your health care provider.  · If you use oral diabetes medicine, you may need to stop taking it if you are not able to eat or drink normally. Ask your health care provider about whether you need to stop taking these medicines while you are sick.  · If you use injectable hormone medicines other than insulin to control your diabetes, ask your health care provider about whether you need to stop taking these medicines while you are sick.  What else can I do to manage my diabetes when I am sick?  Check your ketones  · If you have type 1 diabetes, check your urine ketones every 4 hours.  · If you have type 2 diabetes, check your urine ketones as often as told by your health care provider.  Drink fluids  · Drink enough fluid to keep your urine clear or pale yellow. This is especially important if you have a fever, vomiting, or diarrhea. Those symptoms can lead to dehydration.  · Follow any instructions from your health care provider about beverages to avoid.  ¨ Do not drink alcohol, caffeine, or drinks that contain a lot of sugar.  Take medicines as directed  · Take-over-the-counter and prescription medicines only as told by your health care provider.  · Check medicine labels for added sugars. Some medicines may contain sugar or types of sugars that can raise your blood glucose level.  What foods can I eat when I am sick?  You need to eat some form of carbohydrates when you are sick. You  should eat 45-50 grams (45-50 g) of carbohydrates every 3-4 hours until you feel better.  All of the food choices below contain about 15 g of carbohydrates. Plan ahead and keep some of these foods around so you have them if you get sick.  · 4-6 oz (120-177 mL) carbonated beverage that contains sugar, such as regular (not diet) soda. You may be able to drink carbonated beverages more easily if you open the beverage and let it sit at room temperature for a few minutes before drinking.  · ½ of a twin frozen ice pop.  · 4 oz (120 g) regular gelatin.  · 4 oz (120 mL) fruit juice.  · 4 oz (120 g) ice cream or frozen yogurt.  · 2 oz (60 g) sherbet.  · 8 oz (240 mL) clear broth or soup.  · 4 oz (120 g) regular custard.  · 4 oz (120 g) regular pudding.  · 8 oz (240 g) plain yogurt.  · 1 slice bread or toast.  · 6 saltine crackers.  · 5 vanilla wafers.  Questions to ask your health care provider  Consider asking the following questions so you know what to do on days when you are sick:  · Should I adjust my diabetes medicines?  · How often do I need to check my blood glucose?  · What supplies do I need to manage my diabetes at home when I am sick?  · What number can I call if I have questions?  · What foods and drinks should I avoid?  Contact a health care provider if:  · You develop symptoms of diabetic ketoacidosis, such as:  ¨ Fatigue.  ¨ Weight loss.  ¨ Excessive thirst.  ¨ Light-headedness.  ¨ Fruity or sweet-smelling breath.  ¨ Excessive urination.  ¨ Vision changes.  ¨ Confusion or irritability.  ¨ Nausea.  ¨ Vomiting.  ¨ Rapid breathing.  ¨ Pain in the abdomen.  ¨ Feeling flushed.  · You are unable to drink fluids without vomiting.  · You have any of the following for more than 6 hours:  ¨ Nausea.  ¨ Vomiting.  ¨ Diarrhea.  · Your blood glucose is at or above 240 mg/dL (13.3 mmol/L), even after you take an additional insulin dose.  · You have a change in how you think, feel, or act (mental status).  · You develop  another serious illness.  · You have been sick or have had a fever for 2 days or longer and you are not getting better.  Get help right away if:  · Your blood glucose is lower than 54 mg/dL (3.0 mmol/L).  · You have difficulty breathing.  · You have moderate or high ketone levels in your urine.  · You used emergency glucagon to treat low blood glucose.  Summary  · Blood sugar (glucose) can be difficult to control when you are sick. Common illnesses that can cause problems for people with diabetes (diabetes mellitus) include colds, fever, flu (influenza), nausea, vomiting, and diarrhea.  · Illnesses can cause stress and loss of body fluids (dehydration), and those issues can cause blood glucose levels to increase.  · Make a plan for days when you are sick (sick day plan) as part of your diabetes management plan. You and your health care provider should make this plan in advance.  · It is very important to take your insulin and diabetes medicines and to eat some form of carbohydrate when you are sick.  · Contact your health care provider if have problems managing your blood glucose levels when you are sick, or if you have been sick or had a fever for 2 days or longer and are not getting better.  This information is not intended to replace advice given to you by your health care provider. Make sure you discuss any questions you have with your health care provider.  Document Released: 12/20/2004 Document Revised: 09/15/2017 Document Reviewed: 09/15/2017  [employer] Interactive Patient Education © 2017 [employer]      Depression / Suicide Risk    As you are discharged from this [facility] facility, it is important to learn how to keep safe from harming yourself.    Recognize the warning signs:  · Abrupt changes in personality, positive or negative- including increase in energy   · Giving away possessions  · Change in eating patterns- significant weight changes-  positive or negative  · Change in sleeping patterns-  unable to sleep or sleeping all the time   · Unwillingness or inability to communicate  · Depression  · Unusual sadness, discouragement and loneliness  · Talk of wanting to die  · Neglect of personal appearance   · Rebelliousness- reckless behavior  · Withdrawal from people/activities they love  · Confusion- inability to concentrate     If you or a loved one observes any of these behaviors or has concerns about self-harm, here's what you can do:  · Talk about it- your feelings and reasons for harming yourself  · Remove any means that you might use to hurt yourself (examples: pills, rope, extension cords, firearm)  · Get professional help from the community (Mental Health, Substance Abuse, psychological counseling)  · Do not be alone:Call your Safe Contact- someone whom you trust who will be there for you.  · Call your local CRISIS HOTLINE 849-6265 or 111-108-2257  · Call your local Children's Mobile Crisis Response Team [place] (165) 116-3997 or www.[employer]  · Call the toll free National Suicide Prevention Hotlines   · National Suicide Prevention Lifeline 124-309-NCFP (8162)  · National Hope Line Network 800-SUICIDE (323-9045)

## 2019-03-15 NOTE — PROGRESS NOTES
MDs have signed off on patient to go home today. Patient will be leaving today with  to go home. Discharge instructions to be given and post education on DM.

## 2019-05-26 ENCOUNTER — HOSPITAL ENCOUNTER (OUTPATIENT)
Facility: MEDICAL CENTER | Age: 74
DRG: 378 | End: 2019-05-26
Payer: MEDICARE

## 2019-05-26 ENCOUNTER — HOSPITAL ENCOUNTER (OUTPATIENT)
Dept: RADIOLOGY | Facility: MEDICAL CENTER | Age: 74
End: 2019-05-26

## 2019-05-26 ENCOUNTER — HOSPITAL ENCOUNTER (INPATIENT)
Facility: MEDICAL CENTER | Age: 74
LOS: 3 days | DRG: 378 | End: 2019-05-29
Attending: HOSPITALIST | Admitting: INTERNAL MEDICINE
Payer: MEDICARE

## 2019-05-26 PROBLEM — D62 ACUTE BLOOD LOSS ANEMIA: Status: ACTIVE | Noted: 2019-05-26

## 2019-05-26 PROBLEM — K92.2 GI BLEED: Status: ACTIVE | Noted: 2019-05-26

## 2019-05-26 LAB
ALBUMIN SERPL BCP-MCNC: 3.7 G/DL (ref 3.2–4.9)
ALBUMIN/GLOB SERPL: 2.1 G/DL
ALP SERPL-CCNC: 48 U/L (ref 30–99)
ALT SERPL-CCNC: 10 U/L (ref 2–50)
ANION GAP SERPL CALC-SCNC: 9 MMOL/L (ref 0–11.9)
APTT PPP: 29.7 SEC (ref 24.7–36)
AST SERPL-CCNC: 14 U/L (ref 12–45)
BASOPHILS # BLD AUTO: 0.6 % (ref 0–1.8)
BASOPHILS # BLD: 0.05 K/UL (ref 0–0.12)
BILIRUB SERPL-MCNC: 0.3 MG/DL (ref 0.1–1.5)
BUN SERPL-MCNC: 23 MG/DL (ref 8–22)
CALCIUM SERPL-MCNC: 8.6 MG/DL (ref 8.5–10.5)
CHLORIDE SERPL-SCNC: 106 MMOL/L (ref 96–112)
CO2 SERPL-SCNC: 25 MMOL/L (ref 20–33)
CREAT SERPL-MCNC: 0.6 MG/DL (ref 0.5–1.4)
EOSINOPHIL # BLD AUTO: 0.05 K/UL (ref 0–0.51)
EOSINOPHIL NFR BLD: 0.6 % (ref 0–6.9)
ERYTHROCYTE [DISTWIDTH] IN BLOOD BY AUTOMATED COUNT: 40.5 FL (ref 35.9–50)
GLOBULIN SER CALC-MCNC: 1.8 G/DL (ref 1.9–3.5)
GLUCOSE SERPL-MCNC: 108 MG/DL (ref 65–99)
HCT VFR BLD AUTO: 30.9 % (ref 37–47)
HGB BLD-MCNC: 9.8 G/DL (ref 12–16)
IMM GRANULOCYTES # BLD AUTO: 0.03 K/UL (ref 0–0.11)
IMM GRANULOCYTES NFR BLD AUTO: 0.4 % (ref 0–0.9)
INR PPP: 1.13 (ref 0.87–1.13)
IRON SATN MFR SERPL: 14 % (ref 15–55)
IRON SERPL-MCNC: 41 UG/DL (ref 40–170)
LYMPHOCYTES # BLD AUTO: 2.34 K/UL (ref 1–4.8)
LYMPHOCYTES NFR BLD: 29.2 % (ref 22–41)
MCH RBC QN AUTO: 27.1 PG (ref 27–33)
MCHC RBC AUTO-ENTMCNC: 31.7 G/DL (ref 33.6–35)
MCV RBC AUTO: 85.6 FL (ref 81.4–97.8)
MONOCYTES # BLD AUTO: 0.54 K/UL (ref 0–0.85)
MONOCYTES NFR BLD AUTO: 6.7 % (ref 0–13.4)
NEUTROPHILS # BLD AUTO: 5.01 K/UL (ref 2–7.15)
NEUTROPHILS NFR BLD: 62.5 % (ref 44–72)
NRBC # BLD AUTO: 0 K/UL
NRBC BLD-RTO: 0 /100 WBC
PLATELET # BLD AUTO: 282 K/UL (ref 164–446)
PMV BLD AUTO: 9.7 FL (ref 9–12.9)
POTASSIUM SERPL-SCNC: 3.4 MMOL/L (ref 3.6–5.5)
PROT SERPL-MCNC: 5.5 G/DL (ref 6–8.2)
PROTHROMBIN TIME: 14.6 SEC (ref 12–14.6)
RBC # BLD AUTO: 3.61 M/UL (ref 4.2–5.4)
SODIUM SERPL-SCNC: 140 MMOL/L (ref 135–145)
TIBC SERPL-MCNC: 294 UG/DL (ref 250–450)
WBC # BLD AUTO: 8 K/UL (ref 4.8–10.8)

## 2019-05-26 PROCEDURE — 700105 HCHG RX REV CODE 258: Performed by: INTERNAL MEDICINE

## 2019-05-26 PROCEDURE — 83540 ASSAY OF IRON: CPT

## 2019-05-26 PROCEDURE — 85610 PROTHROMBIN TIME: CPT

## 2019-05-26 PROCEDURE — 85730 THROMBOPLASTIN TIME PARTIAL: CPT

## 2019-05-26 PROCEDURE — 82962 GLUCOSE BLOOD TEST: CPT

## 2019-05-26 PROCEDURE — A9270 NON-COVERED ITEM OR SERVICE: HCPCS | Performed by: INTERNAL MEDICINE

## 2019-05-26 PROCEDURE — 85025 COMPLETE CBC W/AUTO DIFF WBC: CPT

## 2019-05-26 PROCEDURE — 80053 COMPREHEN METABOLIC PANEL: CPT

## 2019-05-26 PROCEDURE — 700111 HCHG RX REV CODE 636 W/ 250 OVERRIDE (IP): Performed by: INTERNAL MEDICINE

## 2019-05-26 PROCEDURE — 700102 HCHG RX REV CODE 250 W/ 637 OVERRIDE(OP): Performed by: INTERNAL MEDICINE

## 2019-05-26 PROCEDURE — 99223 1ST HOSP IP/OBS HIGH 75: CPT | Mod: AI | Performed by: INTERNAL MEDICINE

## 2019-05-26 PROCEDURE — 82728 ASSAY OF FERRITIN: CPT

## 2019-05-26 PROCEDURE — 83550 IRON BINDING TEST: CPT

## 2019-05-26 PROCEDURE — C9113 INJ PANTOPRAZOLE SODIUM, VIA: HCPCS | Performed by: INTERNAL MEDICINE

## 2019-05-26 PROCEDURE — 770020 HCHG ROOM/CARE - TELE (206)

## 2019-05-26 PROCEDURE — 36415 COLL VENOUS BLD VENIPUNCTURE: CPT

## 2019-05-26 RX ORDER — CALCIUM CITRATE/VITAMIN D3 200MG-6.25
1 TABLET ORAL DAILY
Status: DISCONTINUED | OUTPATIENT
Start: 2019-05-27 | End: 2019-05-26

## 2019-05-26 RX ORDER — LISINOPRIL 10 MG/1
10 TABLET ORAL DAILY
Status: DISCONTINUED | OUTPATIENT
Start: 2019-05-27 | End: 2019-05-29 | Stop reason: HOSPADM

## 2019-05-26 RX ORDER — ATORVASTATIN CALCIUM 20 MG/1
20 TABLET, FILM COATED ORAL EVERY EVENING
Status: DISCONTINUED | OUTPATIENT
Start: 2019-05-26 | End: 2019-05-29 | Stop reason: HOSPADM

## 2019-05-26 RX ORDER — AMOXICILLIN 250 MG
2 CAPSULE ORAL 2 TIMES DAILY
Status: DISCONTINUED | OUTPATIENT
Start: 2019-05-26 | End: 2019-05-29 | Stop reason: HOSPADM

## 2019-05-26 RX ORDER — POLYETHYLENE GLYCOL 3350 17 G/17G
1 POWDER, FOR SOLUTION ORAL
Status: DISCONTINUED | OUTPATIENT
Start: 2019-05-26 | End: 2019-05-29 | Stop reason: HOSPADM

## 2019-05-26 RX ORDER — POTASSIUM CHLORIDE 1.5 G/1.58G
20 POWDER, FOR SOLUTION ORAL DAILY
Status: ON HOLD | COMMUNITY
End: 2023-11-21

## 2019-05-26 RX ORDER — PANTOPRAZOLE SODIUM 40 MG/10ML
40 INJECTION, POWDER, LYOPHILIZED, FOR SOLUTION INTRAVENOUS 2 TIMES DAILY
Status: DISCONTINUED | OUTPATIENT
Start: 2019-05-26 | End: 2019-05-28

## 2019-05-26 RX ORDER — MELOXICAM 7.5 MG/1
15 TABLET ORAL 2 TIMES DAILY
Status: ON HOLD | COMMUNITY
End: 2019-05-29

## 2019-05-26 RX ORDER — ONDANSETRON 4 MG/1
4 TABLET, ORALLY DISINTEGRATING ORAL EVERY 4 HOURS PRN
Status: DISCONTINUED | OUTPATIENT
Start: 2019-05-26 | End: 2019-05-29 | Stop reason: HOSPADM

## 2019-05-26 RX ORDER — LISINOPRIL 10 MG/1
10 TABLET ORAL DAILY
Status: ON HOLD | COMMUNITY
End: 2023-11-21

## 2019-05-26 RX ORDER — SODIUM CHLORIDE 9 MG/ML
INJECTION, SOLUTION INTRAVENOUS CONTINUOUS
Status: DISCONTINUED | OUTPATIENT
Start: 2019-05-26 | End: 2019-05-29 | Stop reason: HOSPADM

## 2019-05-26 RX ORDER — POTASSIUM CHLORIDE 1.5 G/1.58G
20 POWDER, FOR SOLUTION ORAL DAILY
Status: DISCONTINUED | OUTPATIENT
Start: 2019-05-27 | End: 2019-05-27

## 2019-05-26 RX ORDER — BISACODYL 10 MG
10 SUPPOSITORY, RECTAL RECTAL
Status: DISCONTINUED | OUTPATIENT
Start: 2019-05-26 | End: 2019-05-29 | Stop reason: HOSPADM

## 2019-05-26 RX ORDER — FERROUS SULFATE 325(65) MG
325 TABLET ORAL DAILY
Status: DISCONTINUED | OUTPATIENT
Start: 2019-05-27 | End: 2019-05-29 | Stop reason: HOSPADM

## 2019-05-26 RX ORDER — ONDANSETRON 2 MG/ML
4 INJECTION INTRAMUSCULAR; INTRAVENOUS EVERY 4 HOURS PRN
Status: DISCONTINUED | OUTPATIENT
Start: 2019-05-26 | End: 2019-05-29 | Stop reason: HOSPADM

## 2019-05-26 RX ADMIN — SODIUM CHLORIDE: 9 INJECTION, SOLUTION INTRAVENOUS at 21:49

## 2019-05-26 RX ADMIN — ATORVASTATIN CALCIUM 20 MG: 20 TABLET, FILM COATED ORAL at 21:49

## 2019-05-26 RX ADMIN — PANTOPRAZOLE SODIUM 40 MG: 40 INJECTION, POWDER, LYOPHILIZED, FOR SOLUTION INTRAVENOUS at 21:50

## 2019-05-26 ASSESSMENT — ENCOUNTER SYMPTOMS
LOSS OF CONSCIOUSNESS: 0
HEADACHES: 0
INSOMNIA: 0
FALLS: 0
DIZZINESS: 0
NERVOUS/ANXIOUS: 0
ABDOMINAL PAIN: 0
MYALGIAS: 0
CONSTIPATION: 0
PALPITATIONS: 0
COUGH: 0
SEIZURES: 0
EYE REDNESS: 0
BLOOD IN STOOL: 1
HEMOPTYSIS: 0
WEAKNESS: 0
FEVER: 0
SHORTNESS OF BREATH: 0
WHEEZING: 0
CHILLS: 0
FOCAL WEAKNESS: 0
TREMORS: 0
NAUSEA: 0
EYE PAIN: 0
DIARRHEA: 0
VOMITING: 0

## 2019-05-26 ASSESSMENT — COGNITIVE AND FUNCTIONAL STATUS - GENERAL
SUGGESTED CMS G CODE MODIFIER MOBILITY: CH
DAILY ACTIVITIY SCORE: 24
MOBILITY SCORE: 24
SUGGESTED CMS G CODE MODIFIER DAILY ACTIVITY: CH

## 2019-05-26 ASSESSMENT — PATIENT HEALTH QUESTIONNAIRE - PHQ9
7. TROUBLE CONCENTRATING ON THINGS, SUCH AS READING THE NEWSPAPER OR WATCHING TELEVISION: NOT AT ALL
SUM OF ALL RESPONSES TO PHQ9 QUESTIONS 1 AND 2: 1
1. LITTLE INTEREST OR PLEASURE IN DOING THINGS: NOT AT ALL
6. FEELING BAD ABOUT YOURSELF - OR THAT YOU ARE A FAILURE OR HAVE LET YOURSELF OR YOUR FAMILY DOWN: NOT AL ALL
8. MOVING OR SPEAKING SO SLOWLY THAT OTHER PEOPLE COULD HAVE NOTICED. OR THE OPPOSITE, BEING SO FIGETY OR RESTLESS THAT YOU HAVE BEEN MOVING AROUND A LOT MORE THAN USUAL: NOT AT ALL
SUM OF ALL RESPONSES TO PHQ QUESTIONS 1-9: 1
2. FEELING DOWN, DEPRESSED, IRRITABLE, OR HOPELESS: SEVERAL DAYS
9. THOUGHTS THAT YOU WOULD BE BETTER OFF DEAD, OR OF HURTING YOURSELF: NOT AT ALL
3. TROUBLE FALLING OR STAYING ASLEEP OR SLEEPING TOO MUCH: NOT AT ALL
4. FEELING TIRED OR HAVING LITTLE ENERGY: NOT AT ALL
5. POOR APPETITE OR OVEREATING: NOT AT ALL

## 2019-05-26 ASSESSMENT — LIFESTYLE VARIABLES: EVER_SMOKED: NEVER

## 2019-05-27 LAB
ALBUMIN SERPL BCP-MCNC: 3.5 G/DL (ref 3.2–4.9)
ALBUMIN/GLOB SERPL: 2.2 G/DL
ALP SERPL-CCNC: 44 U/L (ref 30–99)
ALT SERPL-CCNC: 9 U/L (ref 2–50)
ANION GAP SERPL CALC-SCNC: 6 MMOL/L (ref 0–11.9)
AST SERPL-CCNC: 12 U/L (ref 12–45)
BILIRUB SERPL-MCNC: 0.3 MG/DL (ref 0.1–1.5)
BUN SERPL-MCNC: 23 MG/DL (ref 8–22)
CALCIUM SERPL-MCNC: 8.4 MG/DL (ref 8.5–10.5)
CHLORIDE SERPL-SCNC: 110 MMOL/L (ref 96–112)
CO2 SERPL-SCNC: 25 MMOL/L (ref 20–33)
CREAT SERPL-MCNC: 0.59 MG/DL (ref 0.5–1.4)
ERYTHROCYTE [DISTWIDTH] IN BLOOD BY AUTOMATED COUNT: 40.7 FL (ref 35.9–50)
FERRITIN SERPL-MCNC: 13.3 NG/ML (ref 10–291)
GLOBULIN SER CALC-MCNC: 1.6 G/DL (ref 1.9–3.5)
GLUCOSE BLD-MCNC: 100 MG/DL (ref 65–99)
GLUCOSE BLD-MCNC: 119 MG/DL (ref 65–99)
GLUCOSE SERPL-MCNC: 98 MG/DL (ref 65–99)
HCT VFR BLD AUTO: 28.3 % (ref 37–47)
HCT VFR BLD AUTO: 28.6 % (ref 37–47)
HCT VFR BLD AUTO: 29.3 % (ref 37–47)
HCT VFR BLD AUTO: 30.5 % (ref 37–47)
HCT VFR BLD AUTO: 32.1 % (ref 37–47)
HGB BLD-MCNC: 10.1 G/DL (ref 12–16)
HGB BLD-MCNC: 8.9 G/DL (ref 12–16)
HGB BLD-MCNC: 9 G/DL (ref 12–16)
HGB BLD-MCNC: 9.6 G/DL (ref 12–16)
HGB BLD-MCNC: 9.8 G/DL (ref 12–16)
MCH RBC QN AUTO: 26.9 PG (ref 27–33)
MCHC RBC AUTO-ENTMCNC: 31.5 G/DL (ref 33.6–35)
MCV RBC AUTO: 85.6 FL (ref 81.4–97.8)
PLATELET # BLD AUTO: 250 K/UL (ref 164–446)
PMV BLD AUTO: 9.6 FL (ref 9–12.9)
POTASSIUM SERPL-SCNC: 3.2 MMOL/L (ref 3.6–5.5)
PROT SERPL-MCNC: 5.1 G/DL (ref 6–8.2)
RBC # BLD AUTO: 3.34 M/UL (ref 4.2–5.4)
SODIUM SERPL-SCNC: 141 MMOL/L (ref 135–145)
WBC # BLD AUTO: 6.9 K/UL (ref 4.8–10.8)

## 2019-05-27 PROCEDURE — 97165 OT EVAL LOW COMPLEX 30 MIN: CPT

## 2019-05-27 PROCEDURE — 700105 HCHG RX REV CODE 258: Performed by: INTERNAL MEDICINE

## 2019-05-27 PROCEDURE — 85018 HEMOGLOBIN: CPT | Mod: 91

## 2019-05-27 PROCEDURE — 80053 COMPREHEN METABOLIC PANEL: CPT

## 2019-05-27 PROCEDURE — 700101 HCHG RX REV CODE 250: Performed by: INTERNAL MEDICINE

## 2019-05-27 PROCEDURE — 700111 HCHG RX REV CODE 636 W/ 250 OVERRIDE (IP): Performed by: FAMILY MEDICINE

## 2019-05-27 PROCEDURE — 36415 COLL VENOUS BLD VENIPUNCTURE: CPT

## 2019-05-27 PROCEDURE — 85014 HEMATOCRIT: CPT | Mod: 91

## 2019-05-27 PROCEDURE — C9113 INJ PANTOPRAZOLE SODIUM, VIA: HCPCS | Performed by: INTERNAL MEDICINE

## 2019-05-27 PROCEDURE — A9270 NON-COVERED ITEM OR SERVICE: HCPCS | Performed by: INTERNAL MEDICINE

## 2019-05-27 PROCEDURE — 700102 HCHG RX REV CODE 250 W/ 637 OVERRIDE(OP): Performed by: INTERNAL MEDICINE

## 2019-05-27 PROCEDURE — 700111 HCHG RX REV CODE 636 W/ 250 OVERRIDE (IP): Performed by: INTERNAL MEDICINE

## 2019-05-27 PROCEDURE — 99233 SBSQ HOSP IP/OBS HIGH 50: CPT | Performed by: INTERNAL MEDICINE

## 2019-05-27 PROCEDURE — 97161 PT EVAL LOW COMPLEX 20 MIN: CPT

## 2019-05-27 PROCEDURE — 82962 GLUCOSE BLOOD TEST: CPT

## 2019-05-27 PROCEDURE — 770020 HCHG ROOM/CARE - TELE (206)

## 2019-05-27 PROCEDURE — 85027 COMPLETE CBC AUTOMATED: CPT

## 2019-05-27 RX ORDER — POTASSIUM CHLORIDE 20 MEQ/1
20 TABLET, EXTENDED RELEASE ORAL DAILY
Status: DISCONTINUED | OUTPATIENT
Start: 2019-05-28 | End: 2019-05-29

## 2019-05-27 RX ORDER — HYDRALAZINE HYDROCHLORIDE 20 MG/ML
10 INJECTION INTRAMUSCULAR; INTRAVENOUS ONCE
Status: COMPLETED | OUTPATIENT
Start: 2019-05-27 | End: 2019-05-27

## 2019-05-27 RX ADMIN — PANTOPRAZOLE SODIUM 40 MG: 40 INJECTION, POWDER, LYOPHILIZED, FOR SOLUTION INTRAVENOUS at 04:57

## 2019-05-27 RX ADMIN — SENNOSIDES,DOCUSATE SODIUM 2 TABLET: 8.6; 5 TABLET, FILM COATED ORAL at 17:03

## 2019-05-27 RX ADMIN — LISINOPRIL 10 MG: 10 TABLET ORAL at 04:53

## 2019-05-27 RX ADMIN — PANTOPRAZOLE SODIUM 40 MG: 40 INJECTION, POWDER, LYOPHILIZED, FOR SOLUTION INTRAVENOUS at 17:02

## 2019-05-27 RX ADMIN — HYDRALAZINE HYDROCHLORIDE 10 MG: 20 INJECTION INTRAMUSCULAR; INTRAVENOUS at 22:04

## 2019-05-27 RX ADMIN — FERROUS SULFATE TAB 325 MG (65 MG ELEMENTAL FE) 325 MG: 325 (65 FE) TAB at 04:53

## 2019-05-27 RX ADMIN — POTASSIUM CHLORIDE 20 MEQ: 1.5 POWDER, FOR SOLUTION ORAL at 04:53

## 2019-05-27 RX ADMIN — POLYETHYLENE GLYCOL 3350, SODIUM SULFATE ANHYDROUS, SODIUM BICARBONATE, SODIUM CHLORIDE, POTASSIUM CHLORIDE 4 L: 236; 22.74; 6.74; 5.86; 2.97 POWDER, FOR SOLUTION ORAL at 17:03

## 2019-05-27 RX ADMIN — SODIUM CHLORIDE: 9 INJECTION, SOLUTION INTRAVENOUS at 09:40

## 2019-05-27 RX ADMIN — ATORVASTATIN CALCIUM 20 MG: 20 TABLET, FILM COATED ORAL at 17:03

## 2019-05-27 ASSESSMENT — PATIENT HEALTH QUESTIONNAIRE - PHQ9
1. LITTLE INTEREST OR PLEASURE IN DOING THINGS: NOT AT ALL
8. MOVING OR SPEAKING SO SLOWLY THAT OTHER PEOPLE COULD HAVE NOTICED. OR THE OPPOSITE, BEING SO FIGETY OR RESTLESS THAT YOU HAVE BEEN MOVING AROUND A LOT MORE THAN USUAL: NOT AT ALL
7. TROUBLE CONCENTRATING ON THINGS, SUCH AS READING THE NEWSPAPER OR WATCHING TELEVISION: NOT AT ALL
3. TROUBLE FALLING OR STAYING ASLEEP OR SLEEPING TOO MUCH: NOT AT ALL
2. FEELING DOWN, DEPRESSED, IRRITABLE, OR HOPELESS: NOT AT ALL
4. FEELING TIRED OR HAVING LITTLE ENERGY: NOT AT ALL
6. FEELING BAD ABOUT YOURSELF - OR THAT YOU ARE A FAILURE OR HAVE LET YOURSELF OR YOUR FAMILY DOWN: NOT AL ALL
SUM OF ALL RESPONSES TO PHQ9 QUESTIONS 1 AND 2: 0
9. THOUGHTS THAT YOU WOULD BE BETTER OFF DEAD, OR OF HURTING YOURSELF: NOT AT ALL
5. POOR APPETITE OR OVEREATING: NOT AT ALL
SUM OF ALL RESPONSES TO PHQ QUESTIONS 1-9: 0

## 2019-05-27 ASSESSMENT — GAIT ASSESSMENTS
GAIT LEVEL OF ASSIST: SUPERVISED
DISTANCE (FEET): 225

## 2019-05-27 ASSESSMENT — ENCOUNTER SYMPTOMS
STRIDOR: 0
FEVER: 0
VOMITING: 0
FOCAL WEAKNESS: 0
BLURRED VISION: 0
EYE REDNESS: 0
NERVOUS/ANXIOUS: 0
NAUSEA: 0
COUGH: 0
SHORTNESS OF BREATH: 0
BLOOD IN STOOL: 1
INSOMNIA: 0
PALPITATIONS: 0
ORTHOPNEA: 0
MYALGIAS: 0
DIZZINESS: 0
DIARRHEA: 0
DEPRESSION: 0
EYE DISCHARGE: 0
SEIZURES: 0
SPUTUM PRODUCTION: 0
HEARTBURN: 0
BACK PAIN: 0
ABDOMINAL PAIN: 0
EYE PAIN: 0
CHILLS: 0
HEADACHES: 0
WEIGHT LOSS: 0
NECK PAIN: 0

## 2019-05-27 ASSESSMENT — COGNITIVE AND FUNCTIONAL STATUS - GENERAL
CLIMB 3 TO 5 STEPS WITH RAILING: A LITTLE
WALKING IN HOSPITAL ROOM: A LITTLE
MOBILITY SCORE: 22
DAILY ACTIVITIY SCORE: 24
SUGGESTED CMS G CODE MODIFIER DAILY ACTIVITY: CH
SUGGESTED CMS G CODE MODIFIER MOBILITY: CJ

## 2019-05-27 ASSESSMENT — ACTIVITIES OF DAILY LIVING (ADL): TOILETING: INDEPENDENT

## 2019-05-27 NOTE — THERAPY
"Physical Therapy Evaluation completed.   Bed Mobility:  Supine to Sit: Modified Independent  Transfers: Sit to Stand: Modified Independent  Gait: Level Of Assist: Supervised with No Equipment Needed       Plan of Care: Patient with no further skilled PT needs in the acute care setting at this time  Discharge Recommendations: Equipment: No Equipment Needed. Post-acute therapy Anticipate that the patient will have no further physical therapy needs after discharge from the hospital.    Ms. Fountain is a 74 y/o female who presents to acute secondary to GI bleed. She presents with lower extremity strenght that is equal bilaterally and WFL. She was able to perform gait, transfers, and bed mobility without physical assist. No AD used. No additional acute PT needs. Recommend continue to ambulate regularly with nursing staff to maintain current level of function.  Patient is currently not being actively followed for therapy services at this time, however may be seen if requested by attending provider for 1 more visit within 30 days to address any discharge or equipment needs    See \"Rehab Therapy-Acute\" Patient Summary Report for complete documentation.     "

## 2019-05-27 NOTE — PROGRESS NOTES
Patient admitted to room 732-2 at 1900.   Patient oriented to room and answered all questions. Paged hospitalist and GI on call to alert that patient has been admitted.   Patient is A/O x 4. Patient has no complaints at this time. Respirations are even and unlabored, no chest pain noted, no distress noted. Will continue to monitor patient closely.

## 2019-05-27 NOTE — H&P
Hospital Medicine History & Physical Note    Date of Service  5/26/2019    Primary Care Physician  No primary care provider on file.    Consultants  CHARMAINE Keller    Code Status  full    Chief Complaint  Direct admiit for GI bleed    History of Presenting Illness  73 y.o. female who presented 5/26/2019 Direct admiit for GI bleed  No records were available when I saw the patient at this time of writing.   This morning she went to the bathroom and she had bright red blood per rectum. She actually took a picture of the toilet an showed it to me. Prior to that she has been having loose stools as she is on metformin for diabetes. She denied belly pain. She was slightly nauseous. She denied dizziness or palpitations. No unusual food or recent travel.   She has followed [facility] before and last had a colonoscopy that she said was notmal and nofurther follow up recommended, in 2016. In the past she said she has had polyps removed. She denied history of ulcers. She is not on blood thinners. She has been taken off NSAIDs in the past and is only on Mobic.  She was seen at outside facility. Per accepting hospitalist intake note, Hb unchanged and a CT Ab was done there and it was benign. CHARMAINE Keller was made aware and will consult. Acoording to the patient, she had another episode of bloody stools there. She was not dizzy, no chest pain, palpitations or shortness of breath  When I saw her at the medical floor, she is in no acute distress. She is slightly tachycardic on auscultation. Currently has not gone to the bathroom yet for bowel movement.    Review of Systems  Review of Systems   Constitutional: Negative for chills and fever.   HENT: Negative for congestion, hearing loss and nosebleeds.    Eyes: Negative for pain and redness.   Respiratory: Negative for cough, hemoptysis, shortness of breath and wheezing.    Cardiovascular: Negative for chest pain and palpitations.   Gastrointestinal: Positive for blood in  stool. Negative for abdominal pain, constipation, diarrhea, nausea and vomiting.   Genitourinary: Negative for dysuria, frequency and hematuria.   Musculoskeletal: Negative for falls, joint pain and myalgias.   Skin: Negative for rash.   Neurological: Negative for dizziness, tremors, focal weakness, seizures, loss of consciousness, weakness and headaches.   Psychiatric/Behavioral: The patient is not nervous/anxious and does not have insomnia.    All other systems reviewed and are negative.      Past Medical History   has a past medical history of Essential hypertension (3/13/2019) and Type 2 diabetes mellitus with complication, without long-term current use of insulin (HCC) (3/13/2019). She also has no past medical history of Asthma; CAD (coronary artery disease); Cancer (HCC); Chronic obstructive pulmonary disease (HCC); Congestive heart failure (HCC); Infectious disease; Liver disease; Psychiatric disorder; Renal disorder; Seizure disorder (HCC); or Stroke ([facility]).    Surgical History   has no past surgical history on file.     Family History  family history includes Heart Disease in her father; Hypertension in her father.     Social History   reports that she has never smoked. She has never used smokeless tobacco. She reports that she drinks alcohol. She reports that she does not use drugs.    Allergies  Allergies   Allergen Reactions   • Sulfa Drugs Rash     Full body rash       Medications  Prior to Admission Medications   Prescriptions Last Dose Informant Patient Reported? Taking?   Blood Glucose Test Strips 5/26/2019 at Unknown time  No No   Sig: Use one Accucheck Guide strip to test blood sugar once daily early morning before first meal. For uncontrolled diabetes. E11.65   Lancets 5/26/2019 at Unknown time  No No   Sig: Use one fast clix lancet to test blood sugar once daily early morning before first meal. For uncontrolled diabetes. E11.65   Multiple Vitamins-Minerals (MULTIVITAMIN GUMMIES WOMENS) Chew Tab  5/25/2019 at Unknown time Patient Yes No   Sig: Take 1 Tab by mouth every day.   atorvastatin (LIPITOR) 20 MG Tab 5/25/2019 at Unknown time  No No   Sig: Take 1 Tab by mouth every evening.   ferrous sulfate 325 (65 Fe) MG tablet   No No   Sig: Take 1 Tab by mouth every day.   ibuprofen (MOTRIN) 200 MG Tab  Patient Yes No   Sig: Take 400 mg by mouth every 8 hours as needed for Mild Pain.   lisinopril (PRINIVIL) 10 MG Tab   Yes Yes   Sig: Take 10 mg by mouth every day.   meloxicam (MOBIC) 7.5 MG Tab 5/25/2019 at Unknown time  Yes Yes   Sig: Take 15 mg by mouth 2 Times a Day.   metFORMIN (GLUCOPHAGE) 1000 MG tablet 5/26/2019 at 0900  No No   Sig: Take 1 Tab by mouth 2 times a day, with meals.   naproxen (ANAPROX) 220 MG tablet  Patient Yes No   Sig: Take 440 mg by mouth 2 times a day as needed (pain).   potassium chloride (KLOR-CON) 20 MEQ Pack   Yes Yes   Sig: Take 20 mEq by mouth every day.      Facility-Administered Medications: None       Physical Exam  Temp:  [36.8 °C (98.3 °F)] 36.8 °C (98.3 °F)  Pulse:  [111] 111  Resp:  [18] 18  BP: (147)/(86) 147/86  SpO2:  [96 %] 96 %    Physical Exam   Constitutional: She appears well-developed.   HENT:   Head: Normocephalic and atraumatic.   Eyes: Conjunctivae are normal. No scleral icterus.   Neck: Normal range of motion. Neck supple.   Cardiovascular: Normal rate and regular rhythm.  Exam reveals no gallop and no friction rub.    No murmur heard.  Tachycardia   Pulmonary/Chest: Effort normal and breath sounds normal. No respiratory distress. She has no wheezes. She has no rales.   Abdominal: Soft. Bowel sounds are normal. She exhibits no distension. There is no tenderness. There is no rebound and no guarding.   Musculoskeletal: She exhibits no edema or tenderness.   Neurological: She is alert.   Skin: Skin is warm. There is pallor.   Psychiatric: She has a normal mood and affect. Her behavior is normal.       Laboratory:          No results for input(s): ALTSGPT, ASTSGOT,  ALKPHOSPHAT, TBILIRUBIN, DBILIRUBIN, GAMMAGT, AMYLASE, LIPASE, ALB, PREALBUMIN, GLUCOSE in the last 72 hours.              No results for input(s): TROPONINI in the last 72 hours.    Urinalysis:    No results found     Imaging:  No orders to display         Assessment/Plan:  I anticipate this patient will require at least two midnights for appropriate medical management, necessitating inpatient admission.    * GI bleed   Assessment & Plan    Bowel rest, IV fluids, IV PPI, antiemetics.  Dr. Oliveira, GI consulting.  Ordered labs. Trend hemoglobin  Ordered coagulation studies  Told nurse that if records can't be found to have records from the transferring facility resent.     Essential hypertension- (present on admission)   Assessment & Plan    Blood pressure normal range  Resume her blood pressure medications.     Type 2 diabetes mellitus with complication, without long-term current use of insulin (HCC)- (present on admission)   Assessment & Plan    STOP metformin. May have to do alternatives as she is having GI symptoms with this medication.  Ordered sliding scale for now     Acute blood loss anemia   Assessment & Plan    Ordered H/H, trend.         VTE prophylaxis: SCDs  Reviewed vitals, labs, imaging, staff notes.  Discussed assessment and plan with Molly Fountain  Discussed with RN.

## 2019-05-27 NOTE — PROGRESS NOTES
Patient is a direct admit from [facility].   Dr Hannah is accepting the patient.   Dr Oliveira is consulting for GI.   ADT signed and held, needs to be released when the patient arrives on the unit.

## 2019-05-27 NOTE — CARE PLAN
Problem: Communication  Goal: The ability to communicate needs accurately and effectively will improve  Outcome: PROGRESSING AS EXPECTED  Discussed with patient during admission

## 2019-05-27 NOTE — ASSESSMENT & PLAN NOTE
GI consulted  S/p colonoscopy on 5/28 showing diverticulosis, old bloods no active bleeding, per GI continue monitoring hb today possible dc in am if stable.

## 2019-05-27 NOTE — CARE PLAN
Problem: Bowel/Gastric:  Goal: Normal bowel function is maintained or improved  Outcome: PROGRESSING AS EXPECTED  Pt had large bm this morning on my shift. She notified me of bloody appearance and I was able to assess bm in toilet. Pt reports no abdominal pain/discomfort. No N/V/D present at this time. Will continue to monitor.

## 2019-05-27 NOTE — PROGRESS NOTES
2 RN skin check complete with KOLE Grijalva.   Devices in place NA.  Skin assessed under devices NA.  Confirmed pressure ulcers found on NA.  New potential pressure ulcers noted on NA. Wound consult placed NA.  The following interventions in place patient turns self.

## 2019-05-27 NOTE — CARE PLAN
Problem: Safety  Goal: Will remain free from injury  Outcome: PROGRESSING AS EXPECTED  Pt has not suffered any injury. She is compliant with calling for assistance and ambulates well with standby assistance. Will continue to monitor.

## 2019-05-27 NOTE — PROGRESS NOTES
[facility] Medicine Daily Progress Note    Date of Service  5/27/2019    Chief Complaint  73 y.o. female admitted 5/26/2019 with GI bleed     Hospital Course    74 y/o F with PMH of DMII, DLD, HTN came in for above.      Interval Problem Update  Still having mild bloody stool. Denies nausea, vomiting, abdominal pain. She takes mobic regularly. No history of /DU  I saw and examined her today    Consultants/Specialty  GI    Code Status  full    Disposition  Remain on the floor    Review of Systems  Review of Systems   Constitutional: Negative for chills, fever and weight loss.   HENT: Negative for congestion and nosebleeds.    Eyes: Negative for blurred vision, pain, discharge and redness.   Respiratory: Negative for cough, sputum production, shortness of breath and stridor.    Cardiovascular: Negative for chest pain, palpitations and orthopnea.   Gastrointestinal: Positive for blood in stool. Negative for abdominal pain, diarrhea, heartburn, nausea and vomiting.   Genitourinary: Negative for dysuria, frequency and urgency.   Musculoskeletal: Negative for back pain, myalgias and neck pain.   Skin: Negative for itching and rash.   Neurological: Negative for dizziness, focal weakness, seizures and headaches.   Psychiatric/Behavioral: Negative for depression. The patient is not nervous/anxious and does not have insomnia.         Physical Exam  Temp:  [36.2 °C (97.1 °F)-36.9 °C (98.4 °F)] 36.2 °C (97.1 °F)  Pulse:  [] 108  Resp:  [18] 18  BP: (142-156)/(86-91) 142/91  SpO2:  [92 %-96 %] 92 %    Physical Exam   Constitutional: She is oriented to person, place, and time. No distress.   HENT:   Head: Normocephalic and atraumatic.   Mouth/Throat: Oropharynx is clear and moist.   Eyes: Pupils are equal, round, and reactive to light. Conjunctivae and EOM are normal.   Neck: Normal range of motion. Neck supple. No tracheal deviation present. No thyromegaly present.   Cardiovascular: Normal rate and regular rhythm.    No  murmur heard.  Pulmonary/Chest: Effort normal and breath sounds normal. No respiratory distress. She has no wheezes.   Abdominal: Soft. Bowel sounds are normal. She exhibits no distension. There is no tenderness.   Musculoskeletal: She exhibits no edema or tenderness.   Neurological: She is alert and oriented to person, place, and time. No cranial nerve deficit.   Skin: Skin is warm and dry. She is not diaphoretic. No erythema.   Psychiatric: She has a normal mood and affect. Her behavior is normal. Thought content normal.   Nursing note and vitals reviewed.      Fluids  No intake or output data in the 24 hours ending 05/27/19 0755    Laboratory  Recent Labs      05/26/19 2038 05/27/19   0026  05/27/19   0446   WBC  8.0  6.9   --    RBC  3.61*  3.34*   --    HEMOGLOBIN  9.8*  9.0*  9.6*   HEMATOCRIT  30.9*  28.6*  29.3*   MCV  85.6  85.6   --    MCH  27.1  26.9*   --    MCHC  31.7*  31.5*   --    RDW  40.5  40.7   --    PLATELETCT  282  250   --    MPV  9.7  9.6   --      Recent Labs      05/26/19 2038 05/27/19   0026   SODIUM  140  141   POTASSIUM  3.4*  3.2*   CHLORIDE  106  110   CO2  25  25   GLUCOSE  108*  98   BUN  23*  23*   CREATININE  0.60  0.59   CALCIUM  8.6  8.4*     Recent Labs      05/26/19   2255   APTT  29.7   INR  1.13               Imaging  No orders to display        Assessment/Plan  * GI bleed   Assessment & Plan    Likely lower GI bleed  hgb stable since admission  GI on  Will need colonoscopy       Essential hypertension- (present on admission)   Assessment & Plan    Blood pressure normal range  Resume her blood pressure medications.     Type 2 diabetes mellitus with complication, without long-term current use of insulin (HCC)- (present on admission)   Assessment & Plan    STOP metformin. May have to do alternatives as she is having GI symptoms with this medication.  Ordered sliding scale for now     Hypokalemia- (present on admission)   Assessment & Plan    K 3.2 worsening  Replete as  needed  Follow cmp in am     Acute blood loss anemia   Assessment & Plan    Trend Hbg closely  No need for transfusion yet          VTE prophylaxis: scds

## 2019-05-27 NOTE — CONSULTS
Gastroenterology Consult Note     Date of Consult: 05/27/2019  Referring Physician: Orlando     Reason for consult: hematochezia        HPI: This is a 74 yo female with history of DM and colon polyps who reports that she began passing BRB with her stool yesterday morning. She says that she felt like she was going to have diarrhea and ended up passing liquid stool with BRB. She says that she had no associated pain. She denies N/V. She reports no anticoagulation use. She does typically have some diarrhea which has been associated with her Metformin use. She says that she has followed with Dr Doss in the past and her last colonoscopy was 3 years ago. She says that she didn't have any polyps during this exam but she did have polyps in the past. Originally, she was seen at an outside hospital but was transported to [facility] for further GI evaluation. Currently she has no acute symptoms.      PMHX:  Past Medical History:   Diagnosis Date   • Essential hypertension 3/13/2019   • Type 2 diabetes mellitus with complication, without long-term current use of insulin (HCC) 3/13/2019          PSurgHx: No previous surgeries     ALLERGIES:Sulfa drugs     SocHx:   Social History     Social History   • Marital status:      Spouse name: N/A   • Number of children: N/A   • Years of education: N/A     Occupational History   • Not on file.     Social History Main Topics   • Smoking status: Never Smoker   • Smokeless tobacco: Never Used   • Alcohol use Yes      Comment: occasionally   • Drug use: No   • Sexual activity: Not on file     Other Topics Concern   • Not on file     Social History Narrative   • No narrative on file        FAMHx:   Family History   Problem Relation Age of Onset   • Heart Disease Father    • Hypertension Father         ROS:  Constitutional: No fevers, chills, no night sweats, no weight changes  HEENT: no vision or hearing changes, no dry mouth, no change in  "smell  CARDIO: no palpitations, no orthopnea, no chest pain  PULM: no cough, no shortness of breath  NEURO: no Seizures, no memory impairment, no change in sensation  GI: as above  : no dysuria, no hematuria  HEME: no anemia, no easy brusing  MUSCULOSKELETAL: no muscle aches, no back pain, no arthritis  PSYCH: no anxiety or depression  SKIN: no rashes     PE:  Vitals:    05/26/19 1900 05/27/19 0025 05/27/19 0625 05/27/19 0800   BP: 147/86 156/89 142/91 (!) 162/92   Pulse: (!) 111 98 (!) 108 (!) 113   Resp: 18 18 18 20   Temp: 36.8 °C (98.3 °F) 36.9 °C (98.4 °F) 36.2 °C (97.1 °F) 36.5 °C (97.7 °F)   TempSrc: Temporal Temporal Temporal Temporal   SpO2: 96% 92% 92% 96%   Weight: 61.6 kg (135 lb 12.9 oz)      Height: 1.6 m (5' 3\")        Gen: AAOx3, NAD, lying in bed  HEENT: PERRL, EOMI, nares patent, Mucous membranes moist  Neck: supple, no cervical or supraclavicular adenopathy  CVS: regular rhythm, normal rate, no MRG  Pulm: CTAB, no crackles  Abd: soft, Nd, NT, no guarding or rebound  Ext: no edema, normal sensation  NEURO: grossly normal, no weakness  Skin: warm, no rash  Psych: normal Affect, no anxiety     LABS:  Lab Results   Component Value Date/Time    SODIUM 141 05/27/2019 12:26 AM    POTASSIUM 3.2 (L) 05/27/2019 12:26 AM    CHLORIDE 110 05/27/2019 12:26 AM    CO2 25 05/27/2019 12:26 AM    GLUCOSE 98 05/27/2019 12:26 AM    BUN 23 (H) 05/27/2019 12:26 AM    CREATININE 0.59 05/27/2019 12:26 AM      Lab Results   Component Value Date/Time    WBC 6.9 05/27/2019 12:26 AM    RBC 3.34 (L) 05/27/2019 12:26 AM    HEMOGLOBIN 9.8 (L) 05/27/2019 12:10 PM    HEMATOCRIT 30.5 (L) 05/27/2019 12:10 PM    MCV 85.6 05/27/2019 12:26 AM    MCH 26.9 (L) 05/27/2019 12:26 AM    MCHC 31.5 (L) 05/27/2019 12:26 AM    MPV 9.6 05/27/2019 12:26 AM    NEUTSPOLYS 62.50 05/26/2019 08:38 PM    LYMPHOCYTES 29.20 05/26/2019 08:38 PM    MONOCYTES 6.70 05/26/2019 08:38 PM    EOSINOPHILS 0.60 05/26/2019 08:38 PM    BASOPHILS 0.60 05/26/2019 " 08:38 PM        Lab Results   Component Value Date/Time    PROTHROMBTM 14.6 05/26/2019 10:55 PM    INR 1.13 05/26/2019 10:55 PM      Recent Labs      05/26/19 2038  05/26/19   2255  05/27/19   0026   ASTSGOT  14   --   12   ALTSGPT  10   --   9   TBILIRUBIN  0.3   --   0.3   GLOBULIN  1.8*   --   1.6*   INR   --   1.13   --           Problem List Items Addressed This Visit     None           ASSESSMENT: 72 yo female with painless hematochezia. She has now had multiple bloody stools. Most likely this is a diverticular bleed. She did have a colonoscopy 3 years ago which was unremarkable and she was given f/u in 5 years. She does have anemia which is likely due to acute blood loss. She does have an elevated BUN which potentially would raise concern for upper GI bleed but the patient has not had melena or any other symptoms to suggest PUD.     PLAN:   1) clear liquid diet now  2) NPO after midnight  3) colonoscopy tomorrow in the OR  4) monitor stools and H/H. Transfuse if necessary  5) Start bowel prep tonight at 1800      Thank you for this consult.     Ger Nichols MD

## 2019-05-27 NOTE — PROGRESS NOTES
[facility]  GI Dr Oliveira aware  Multiple episodes of BRBPR.  Hgb unchanged from baseline but cont's to have bleeding  CT Abd benign  Takes Alleve but is on no blood thinners

## 2019-05-28 ENCOUNTER — ANESTHESIA (OUTPATIENT)
Dept: SURGERY | Facility: MEDICAL CENTER | Age: 74
DRG: 378 | End: 2019-05-28
Payer: MEDICARE

## 2019-05-28 ENCOUNTER — ANESTHESIA EVENT (OUTPATIENT)
Dept: SURGERY | Facility: MEDICAL CENTER | Age: 74
DRG: 378 | End: 2019-05-28
Payer: MEDICARE

## 2019-05-28 LAB
GLUCOSE BLD-MCNC: 100 MG/DL (ref 65–99)
GLUCOSE BLD-MCNC: 102 MG/DL (ref 65–99)
GLUCOSE BLD-MCNC: 118 MG/DL (ref 65–99)
GLUCOSE BLD-MCNC: 222 MG/DL (ref 65–99)
GLUCOSE BLD-MCNC: 243 MG/DL (ref 65–99)
GLUCOSE BLD-MCNC: 89 MG/DL (ref 65–99)
GLUCOSE BLD-MCNC: 98 MG/DL (ref 65–99)
GLUCOSE BLD-MCNC: 99 MG/DL (ref 65–99)
HCT VFR BLD AUTO: 28.2 % (ref 37–47)
HCT VFR BLD AUTO: 29.2 % (ref 37–47)
HCT VFR BLD AUTO: 29.4 % (ref 37–47)
HCT VFR BLD AUTO: 32.8 % (ref 37–47)
HGB BLD-MCNC: 10.6 G/DL (ref 12–16)
HGB BLD-MCNC: 9.2 G/DL (ref 12–16)
HGB BLD-MCNC: 9.2 G/DL (ref 12–16)
HGB BLD-MCNC: 9.6 G/DL (ref 12–16)

## 2019-05-28 PROCEDURE — A9270 NON-COVERED ITEM OR SERVICE: HCPCS | Performed by: INTERNAL MEDICINE

## 2019-05-28 PROCEDURE — 85014 HEMATOCRIT: CPT

## 2019-05-28 PROCEDURE — 0DJD8ZZ INSPECTION OF LOWER INTESTINAL TRACT, VIA NATURAL OR ARTIFICIAL OPENING ENDOSCOPIC: ICD-10-PCS | Performed by: INTERNAL MEDICINE

## 2019-05-28 PROCEDURE — 160009 HCHG ANES TIME/MIN: Performed by: INTERNAL MEDICINE

## 2019-05-28 PROCEDURE — 700102 HCHG RX REV CODE 250 W/ 637 OVERRIDE(OP): Performed by: HOSPITALIST

## 2019-05-28 PROCEDURE — 160035 HCHG PACU - 1ST 60 MINS PHASE I: Performed by: INTERNAL MEDICINE

## 2019-05-28 PROCEDURE — 770020 HCHG ROOM/CARE - TELE (206)

## 2019-05-28 PROCEDURE — 700101 HCHG RX REV CODE 250: Performed by: ANESTHESIOLOGY

## 2019-05-28 PROCEDURE — 700102 HCHG RX REV CODE 250 W/ 637 OVERRIDE(OP): Performed by: INTERNAL MEDICINE

## 2019-05-28 PROCEDURE — 160002 HCHG RECOVERY MINUTES (STAT): Performed by: INTERNAL MEDICINE

## 2019-05-28 PROCEDURE — 700111 HCHG RX REV CODE 636 W/ 250 OVERRIDE (IP): Performed by: ANESTHESIOLOGY

## 2019-05-28 PROCEDURE — 700105 HCHG RX REV CODE 258: Performed by: ANESTHESIOLOGY

## 2019-05-28 PROCEDURE — A9270 NON-COVERED ITEM OR SERVICE: HCPCS | Performed by: HOSPITALIST

## 2019-05-28 PROCEDURE — 82962 GLUCOSE BLOOD TEST: CPT

## 2019-05-28 PROCEDURE — 160028 HCHG SURGERY MINUTES - 1ST 30 MINS LEVEL 3: Performed by: INTERNAL MEDICINE

## 2019-05-28 PROCEDURE — 36415 COLL VENOUS BLD VENIPUNCTURE: CPT

## 2019-05-28 PROCEDURE — 99232 SBSQ HOSP IP/OBS MODERATE 35: CPT | Performed by: HOSPITALIST

## 2019-05-28 PROCEDURE — 85018 HEMOGLOBIN: CPT | Mod: 91

## 2019-05-28 PROCEDURE — 160048 HCHG OR STATISTICAL LEVEL 1-5: Performed by: INTERNAL MEDICINE

## 2019-05-28 RX ORDER — OXYCODONE HYDROCHLORIDE AND ACETAMINOPHEN 5; 325 MG/1; MG/1
2 TABLET ORAL
Status: DISCONTINUED | OUTPATIENT
Start: 2019-05-28 | End: 2019-05-28 | Stop reason: HOSPADM

## 2019-05-28 RX ORDER — DEXAMETHASONE SODIUM PHOSPHATE 4 MG/ML
INJECTION, SOLUTION INTRA-ARTICULAR; INTRALESIONAL; INTRAMUSCULAR; INTRAVENOUS; SOFT TISSUE PRN
Status: DISCONTINUED | OUTPATIENT
Start: 2019-05-28 | End: 2019-05-28 | Stop reason: SURG

## 2019-05-28 RX ORDER — ONDANSETRON 2 MG/ML
INJECTION INTRAMUSCULAR; INTRAVENOUS PRN
Status: DISCONTINUED | OUTPATIENT
Start: 2019-05-28 | End: 2019-05-28 | Stop reason: SURG

## 2019-05-28 RX ORDER — ONDANSETRON 2 MG/ML
4 INJECTION INTRAMUSCULAR; INTRAVENOUS
Status: DISCONTINUED | OUTPATIENT
Start: 2019-05-28 | End: 2019-05-28 | Stop reason: HOSPADM

## 2019-05-28 RX ORDER — OXYCODONE HYDROCHLORIDE AND ACETAMINOPHEN 5; 325 MG/1; MG/1
1 TABLET ORAL
Status: DISCONTINUED | OUTPATIENT
Start: 2019-05-28 | End: 2019-05-28 | Stop reason: HOSPADM

## 2019-05-28 RX ORDER — HALOPERIDOL 5 MG/ML
1 INJECTION INTRAMUSCULAR
Status: DISCONTINUED | OUTPATIENT
Start: 2019-05-28 | End: 2019-05-28 | Stop reason: HOSPADM

## 2019-05-28 RX ORDER — OMEPRAZOLE 20 MG/1
20 CAPSULE, DELAYED RELEASE ORAL 2 TIMES DAILY
Status: DISCONTINUED | OUTPATIENT
Start: 2019-05-28 | End: 2019-05-29 | Stop reason: HOSPADM

## 2019-05-28 RX ORDER — SODIUM CHLORIDE, SODIUM LACTATE, POTASSIUM CHLORIDE, CALCIUM CHLORIDE 600; 310; 30; 20 MG/100ML; MG/100ML; MG/100ML; MG/100ML
INJECTION, SOLUTION INTRAVENOUS
Status: DISCONTINUED | OUTPATIENT
Start: 2019-05-28 | End: 2019-05-28 | Stop reason: SURG

## 2019-05-28 RX ORDER — SODIUM CHLORIDE, SODIUM LACTATE, POTASSIUM CHLORIDE, CALCIUM CHLORIDE 600; 310; 30; 20 MG/100ML; MG/100ML; MG/100ML; MG/100ML
INJECTION, SOLUTION INTRAVENOUS CONTINUOUS
Status: DISCONTINUED | OUTPATIENT
Start: 2019-05-28 | End: 2019-05-28 | Stop reason: HOSPADM

## 2019-05-28 RX ORDER — HYDRALAZINE HYDROCHLORIDE 20 MG/ML
5 INJECTION INTRAMUSCULAR; INTRAVENOUS
Status: DISCONTINUED | OUTPATIENT
Start: 2019-05-28 | End: 2019-05-28 | Stop reason: HOSPADM

## 2019-05-28 RX ADMIN — ONDANSETRON 4 MG: 2 INJECTION INTRAMUSCULAR; INTRAVENOUS at 11:20

## 2019-05-28 RX ADMIN — SODIUM CHLORIDE, POTASSIUM CHLORIDE, SODIUM LACTATE AND CALCIUM CHLORIDE: 600; 310; 30; 20 INJECTION, SOLUTION INTRAVENOUS at 10:59

## 2019-05-28 RX ADMIN — LISINOPRIL 10 MG: 10 TABLET ORAL at 04:58

## 2019-05-28 RX ADMIN — FENTANYL CITRATE 50 MCG: 50 INJECTION, SOLUTION INTRAMUSCULAR; INTRAVENOUS at 11:02

## 2019-05-28 RX ADMIN — PROPOFOL 120 MG: 10 INJECTION, EMULSION INTRAVENOUS at 11:02

## 2019-05-28 RX ADMIN — FENTANYL CITRATE 50 MCG: 50 INJECTION, SOLUTION INTRAMUSCULAR; INTRAVENOUS at 11:20

## 2019-05-28 RX ADMIN — INSULIN HUMAN 2 UNITS: 100 INJECTION, SOLUTION PARENTERAL at 17:23

## 2019-05-28 RX ADMIN — DEXAMETHASONE SODIUM PHOSPHATE 4 MG: 4 INJECTION, SOLUTION INTRA-ARTICULAR; INTRALESIONAL; INTRAMUSCULAR; INTRAVENOUS; SOFT TISSUE at 11:05

## 2019-05-28 RX ADMIN — INSULIN HUMAN 2 UNITS: 100 INJECTION, SOLUTION PARENTERAL at 21:00

## 2019-05-28 RX ADMIN — OMEPRAZOLE 20 MG: 20 CAPSULE, DELAYED RELEASE ORAL at 16:52

## 2019-05-28 RX ADMIN — ATORVASTATIN CALCIUM 20 MG: 20 TABLET, FILM COATED ORAL at 16:52

## 2019-05-28 ASSESSMENT — ENCOUNTER SYMPTOMS
ORTHOPNEA: 0
COUGH: 0
HEADACHES: 0
DEPRESSION: 0
BLURRED VISION: 0
BLOOD IN STOOL: 1
NECK PAIN: 0
NAUSEA: 0
DIZZINESS: 0
MYALGIAS: 0
STRIDOR: 0
HEARTBURN: 0
PALPITATIONS: 0
FEVER: 0
CHILLS: 0

## 2019-05-28 ASSESSMENT — PAIN SCALES - GENERAL: PAIN_LEVEL: 0

## 2019-05-28 NOTE — OP REPORT
DATE OF SERVICE:  05/28/2018     INDICATION FOR PROCEDURE:  hematochezia     PROCEDURE PERFORMED: Colonoscopy      CONSENT:  Informed consent was obtained directly from the patient after benefits, risks and possible alternatives were discussed.     MEDICATIONS:  The patient was given general anesthesia for this procedure. Please see the anesthesia record for details     PROCEDURE DESCRIPTION:  The patient was placed in the left lateral decubitus position and provided with supplemental oxygen via nasal cannula.  Vital signs were monitored continuously throughout the procedure. After appropriate sedation, XIAO was performed. The colonoscope was then introduced into the rectum through the anus. The colonoscope was advanced through the colon under direct visualization to the cecum. The scope was then withdrawn and mucosa examined. Retroflexion was performed in the rectum. The patients toleration of this procedure was excellent. The prep was fair as well.      FINDINGS:    1)  large amount of maroon old blood in the colon. No fresh blood in the colon  2) diverticulosis with swelling of the sigmoid mucosa with mild erythema. Suspect diverticular bleed from this area but no active bleeding was noted.        COMPLICATIONS:  No complications or blood loss during or in the immediate postoperative period.     IMPRESSION:  1.  suspected diverticular bleed, no longer bleeding     RECOMMENDATION:    1) monitor stools and H/H  2) If patient continues to bleed, consider bleeding scan or CTA to assess for bleeding  3) Consider IR eval and embolization  4) restart diet

## 2019-05-28 NOTE — ANESTHESIA TIME REPORT
Anesthesia Start and Stop Event Times     Date Time Event    5/28/2019 1059 Anesthesia Start     1133 Anesthesia Stop        Responsible Staff  05/28/19    Name Role Begin End    Daina Renee Anesth 1059 1133        Preop Diagnosis (Free Text):  Pre-op Diagnosis     hematochezia        Preop Diagnosis (Codes):  Diagnosis Information     Diagnosis Code(s):         Post op Diagnosis  Hematochezia      Premium Reason  Non-Premium    Comments:

## 2019-05-28 NOTE — PROGRESS NOTES
Back to room from PACU. AAO x4. Denies any pain. VSS bp 152/78. O2 1L per NC sats. >96%. Taking liquid po's w/out problems, denies nausea/vomiting.

## 2019-05-28 NOTE — ANESTHESIA PREPROCEDURE EVALUATION
From GI note :    72 yo female with painless hematochezia. She has now had multiple bloody stools. Most likely this is a diverticular bleed. She did have a colonoscopy 3 years ago which was unremarkable and she was given f/u in 5 years. She does have anemia which is likely due to acute blood loss. She does have an elevated BUN which potentially would raise concern for upper GI bleed but the patient has not had melena or any other symptoms to suggest PUD.    Relevant Problems   (+) Acute blood loss anemia   (+) Essential hypertension   (+) GI bleed   (+) Type 2 diabetes mellitus with complication, without long-term current use of insulin (HCC)      Within Normal Limits   NEURO     Lab Results   Component Value Date/Time    WBC 6.9 05/27/2019 12:26 AM    RBC 3.34 (L) 05/27/2019 12:26 AM    HEMOGLOBIN 9.2 (L) 05/28/2019 06:02 AM    HEMATOCRIT 28.2 (L) 05/28/2019 06:02 AM    MCV 85.6 05/27/2019 12:26 AM    MCH 26.9 (L) 05/27/2019 12:26 AM    MCHC 31.5 (L) 05/27/2019 12:26 AM    MPV 9.6 05/27/2019 12:26 AM    NEUTSPOLYS 62.50 05/26/2019 08:38 PM    LYMPHOCYTES 29.20 05/26/2019 08:38 PM    MONOCYTES 6.70 05/26/2019 08:38 PM    EOSINOPHILS 0.60 05/26/2019 08:38 PM    BASOPHILS 0.60 05/26/2019 08:38 PM      Lab Results   Component Value Date/Time    SODIUM 141 05/27/2019 12:26 AM    POTASSIUM 3.2 (L) 05/27/2019 12:26 AM    CHLORIDE 110 05/27/2019 12:26 AM    CO2 25 05/27/2019 12:26 AM    GLUCOSE 98 05/27/2019 12:26 AM    BUN 23 (H) 05/27/2019 12:26 AM    CREATININE 0.59 05/27/2019 12:26 AM      Physical Exam    Airway   Mallampati: II  TM distance: >3 FB  Neck ROM: full       Cardiovascular - normal exam  Rhythm: regular  Rate: normal     Dental - normal exam         Pulmonary    Abdominal    Neurological - normal exam                 Anesthesia Plan    ASA 2       Plan - general       Airway plan will be LMA        Induction: intravenous      Pertinent diagnostic labs and testing reviewed    Informed Consent:    Anesthetic  plan and risks discussed with patient.

## 2019-05-28 NOTE — PROGRESS NOTES
0715 Assumed care, bedside report from KOLE Diaz. Pt awake, resting in bed and in no distress. Bed in low position, call light w/in reach.

## 2019-05-28 NOTE — ANESTHESIA PROCEDURE NOTES
Airway  Date/Time: 5/28/2019 11:02 AM  Performed by: ALESHA SAWYER  Authorized by: ALESHA SAWYER     Location:  OR  Urgency:  Elective  Indications for Airway Management:  Anesthesia  Spontaneous Ventilation: absent    Sedation Level:  Deep  Preoxygenated: Yes    Mask Difficulty Assessment:  1 - vent by mask  Final Airway Type:  Supraglottic airway  Final Supraglottic Airway:  Standard LMA  SGA Size:  4  Number of Attempts at Approach:  1

## 2019-05-28 NOTE — PROGRESS NOTES
NPO for colonoscopy scheduled 1233 today. Updated pt of time. Reports having liquid dark/bloody stool. No form stools per pt. VSS /72. RA sats >95%. Denies pain.

## 2019-05-28 NOTE — ANESTHESIA QCDR
2019 [facility] Clinical Data Registry (for Quality Improvement)     Postoperative nausea/vomiting risk protocol (Adult = 18 yrs and Pediatric 3-17 yrs)- (430 and 463)  General inhalation anesthetic (NOT TIVA) with PONV risk factors: No  Provision of anti-emetic therapy with at least 2 different classes of agents: N/A  Patient DID NOT receive anti-emetic therapy and reason is documented in Medical Record: N/A    Multimodal Pain Management- (AQI59)  Patient undergoing Elective Surgery (i.e. Outpatient, or ASC, or Prescheduled Surgery prior to Hospital Admission): No  Use of Multimodal Pain Management, two or more drugs and/or interventions, NOT including systemic opioids: N/A  Exception: Documented allergy to multiple classes of analgesics: N/A    PACU assessment of acute postoperative pain prior to Anesthesia Care End- Applies to Patients Age = 18- (ABG7)  Initial PACU pain score is which of the following: < 7/10  Patient unable to report pain score: N/A    Post-anesthetic transfer of care checklist/protocol to PACU/ICU- (426 and 427)  Upon conclusion of case, patient transferred to which of the following locations: PACU/Non-ICU  Use of transfer checklist/protocol: Yes  Exclusion: Service Performed in Patient Hospital Room (and thus did not require transfer): N/A    PACU Reintubation- (AQI31)  General anesthesia requiring endotracheal intubation (ETT) along with subsequent extubation in OR or PACU: Yes  Required reintubation in the PACU: No   Extubation was a planned trial documented in the medical record prior to removal of the original airway device:  N/A    Unplanned admission to ICU related to anesthesia service up through end of PACU care- (MD51)  Unplanned admission to ICU (not initially anticipated at anesthesia start time): No

## 2019-05-28 NOTE — PROGRESS NOTES
Assumed care of patient, bedside report received from day shift RN.   Patient denies CP, no SOB. Patient is stable with no complaints at this time.   All needs met, fall precautions in place, bed low and locked, call light in reach.  Discussed plan of care with patient, answered any questions.  Will continue to monitor patient closely.

## 2019-05-28 NOTE — ANESTHESIA POSTPROCEDURE EVALUATION
Patient: Molly Fountain    Procedure Summary     Date:  05/28/19 Room / Location:  [facility] 04 / SURGERY [facility]    Anesthesia Start:  1059 Anesthesia Stop:  1133    Procedure:  COLONOSCOPY (N/A Ankle) Diagnosis:  (sigmoid diverticulum)    Surgeon:  Ger Nichols M.D. Responsible Provider:  Daina Rneee    Anesthesia Type:  general ASA Status:  2          Final Anesthesia Type: general  Last vitals  BP   Blood Pressure : 155/83, NIBP: 150/81    Temp   36.9 °C (98.5 °F)    Pulse   Pulse: 96, Heart Rate (Monitored): 96   Resp   18    SpO2   98 %      Anesthesia Post Evaluation    Patient location during evaluation: PACU  Patient participation: complete - patient participated  Level of consciousness: awake and alert  Pain score: 0    Airway patency: patent  Anesthetic complications: no  Cardiovascular status: hemodynamically stable  Respiratory status: acceptable  Hydration status: euvolemic    PONV: none           Nurse Pain Score: 0 (NPRS)

## 2019-05-28 NOTE — THERAPY
"Occupational Therapy Evaluation completed.   Functional Status:  Pt currently able to perform basic ADl's & functional mobility with supervision.  Pt has no AE needs.  Pt reports her  will be able to assist her if needed upon D/C.  Pt encouraged to be OOB for meals.  Plan of Care: Patient with no further skilled OT needs in the acute care setting at this time  Discharge Recommendations:  Equipment: No Equipment Needed. Post-acute therapy Currently anticipate no further skilled therapy needs once patient is discharged from the inpatient setting.    See \"Rehab Therapy-Acute\" Patient Summary Report for complete documentation.    "

## 2019-05-28 NOTE — PROGRESS NOTES
To preop via hospital bed, zoll placed, monitor tech notified. To holding in a hospital gown, armband to left wrist, IV HL, in stable condition.

## 2019-05-29 ENCOUNTER — PATIENT OUTREACH (OUTPATIENT)
Dept: HEALTH INFORMATION MANAGEMENT | Facility: OTHER | Age: 74
End: 2019-05-29

## 2019-05-29 VITALS
HEIGHT: 63 IN | OXYGEN SATURATION: 91 % | SYSTOLIC BLOOD PRESSURE: 140 MMHG | TEMPERATURE: 98.3 F | DIASTOLIC BLOOD PRESSURE: 88 MMHG | HEART RATE: 80 BPM | BODY MASS INDEX: 24.53 KG/M2 | RESPIRATION RATE: 16 BRPM | WEIGHT: 138.45 LBS

## 2019-05-29 LAB
GLUCOSE BLD-MCNC: 111 MG/DL (ref 65–99)
GLUCOSE BLD-MCNC: 166 MG/DL (ref 65–99)
HCT VFR BLD AUTO: 26 % (ref 37–47)
HCT VFR BLD AUTO: 26.7 % (ref 37–47)
HCT VFR BLD AUTO: 26.9 % (ref 37–47)
HGB BLD-MCNC: 8.4 G/DL (ref 12–16)
HGB BLD-MCNC: 8.5 G/DL (ref 12–16)
HGB BLD-MCNC: 8.7 G/DL (ref 12–16)
POTASSIUM SERPL-SCNC: 3.1 MMOL/L (ref 3.6–5.5)

## 2019-05-29 PROCEDURE — 700102 HCHG RX REV CODE 250 W/ 637 OVERRIDE(OP): Performed by: INTERNAL MEDICINE

## 2019-05-29 PROCEDURE — A9270 NON-COVERED ITEM OR SERVICE: HCPCS | Performed by: INTERNAL MEDICINE

## 2019-05-29 PROCEDURE — 85014 HEMATOCRIT: CPT

## 2019-05-29 PROCEDURE — 82962 GLUCOSE BLOOD TEST: CPT

## 2019-05-29 PROCEDURE — 85018 HEMOGLOBIN: CPT | Mod: 91

## 2019-05-29 PROCEDURE — A9270 NON-COVERED ITEM OR SERVICE: HCPCS | Performed by: HOSPITALIST

## 2019-05-29 PROCEDURE — 84132 ASSAY OF SERUM POTASSIUM: CPT

## 2019-05-29 PROCEDURE — 700102 HCHG RX REV CODE 250 W/ 637 OVERRIDE(OP): Performed by: HOSPITALIST

## 2019-05-29 PROCEDURE — 36415 COLL VENOUS BLD VENIPUNCTURE: CPT

## 2019-05-29 PROCEDURE — 99239 HOSP IP/OBS DSCHRG MGMT >30: CPT | Performed by: HOSPITALIST

## 2019-05-29 RX ORDER — AMOXICILLIN 250 MG
2 CAPSULE ORAL 2 TIMES DAILY
Qty: 30 TAB | Refills: 0 | Status: ON HOLD | COMMUNITY
Start: 2019-05-29 | End: 2023-11-21

## 2019-05-29 RX ORDER — POTASSIUM CHLORIDE 20 MEQ/1
40 TABLET, EXTENDED RELEASE ORAL ONCE
Status: COMPLETED | OUTPATIENT
Start: 2019-05-29 | End: 2019-05-29

## 2019-05-29 RX ORDER — OMEPRAZOLE 20 MG/1
20 CAPSULE, DELAYED RELEASE ORAL 2 TIMES DAILY
Qty: 60 CAP | Refills: 0 | Status: ON HOLD | OUTPATIENT
Start: 2019-05-29 | End: 2023-11-21

## 2019-05-29 RX ADMIN — LISINOPRIL 10 MG: 10 TABLET ORAL at 05:59

## 2019-05-29 RX ADMIN — INSULIN HUMAN 1 UNITS: 100 INJECTION, SOLUTION PARENTERAL at 11:41

## 2019-05-29 RX ADMIN — SENNOSIDES,DOCUSATE SODIUM 2 TABLET: 8.6; 5 TABLET, FILM COATED ORAL at 05:58

## 2019-05-29 RX ADMIN — FERROUS SULFATE TAB 325 MG (65 MG ELEMENTAL FE) 325 MG: 325 (65 FE) TAB at 05:57

## 2019-05-29 RX ADMIN — POTASSIUM CHLORIDE 20 MEQ: 1500 TABLET, EXTENDED RELEASE ORAL at 05:58

## 2019-05-29 RX ADMIN — POTASSIUM CHLORIDE 40 MEQ: 1500 TABLET, EXTENDED RELEASE ORAL at 08:35

## 2019-05-29 RX ADMIN — OMEPRAZOLE 20 MG: 20 CAPSULE, DELAYED RELEASE ORAL at 05:59

## 2019-05-29 NOTE — CARE PLAN
Problem: Communication  Goal: The ability to communicate needs accurately and effectively will improve  Outcome: PROGRESSING AS EXPECTED      Problem: Infection  Goal: Will remain free from infection  Outcome: PROGRESSING AS EXPECTED      Problem: Knowledge Deficit  Goal: Knowledge of disease process/condition, treatment plan, diagnostic tests, and medications will improve  Outcome: PROGRESSING AS EXPECTED    Goal: Knowledge of the prescribed therapeutic regimen will improve  Outcome: PROGRESSING AS EXPECTED

## 2019-05-29 NOTE — PROGRESS NOTES
Spoke to hospitalist RN and MD in regards to pt having a bowel movement. Per Dr. Reyes pt is okay to go home with out having a BM. Pt aware and awaiting transportation.

## 2019-05-29 NOTE — PROGRESS NOTES
Handoff report received. Assumed patient care. Patient sitting up in bed.  Denied pain. Patient not in distress, AAOX 4.  Safety precautions in place. Call light and personal belongings within reach. Educated to call for assistance if needed.

## 2019-05-29 NOTE — PROGRESS NOTES
Hospital Medicine Daily Progress Note    Date of Service  5/28/2019    Chief Complaint  73 y.o. female admitted 5/26/2019 with GI bleed     Hospital Course    74 y/o F with PMH of DMII, DLD, HTN came in for above.      Interval Problem Update  S/p colonoscopy showing old blood no active bleeding, per GI will continue monitoring hb if she re bleeds will need bleeding scan and possible IR consult for embolization, denies any N/V/D/C, no abdominal pain, no dizziness or lightheadedness.     Consultants/Specialty  GI    Code Status  full    Disposition  Home when stable.     Review of Systems  Review of Systems   Constitutional: Negative for chills and fever.   HENT: Negative for nosebleeds.    Eyes: Negative for blurred vision.   Respiratory: Negative for cough and stridor.    Cardiovascular: Negative for chest pain, palpitations and orthopnea.   Gastrointestinal: Positive for blood in stool. Negative for heartburn and nausea.   Genitourinary: Negative for dysuria and urgency.   Musculoskeletal: Negative for myalgias and neck pain.   Skin: Negative for rash.   Neurological: Negative for dizziness and headaches.   Psychiatric/Behavioral: Negative for depression and suicidal ideas.        Physical Exam  Temp:  [36.4 °C (97.6 °F)-37.3 °C (99.2 °F)] 37.1 °C (98.7 °F)  Pulse:  [] 103  Resp:  [15-20] 17  BP: (111-176)/(71-91) 143/89  SpO2:  [91 %-99 %] 95 %    Physical Exam   Constitutional: She is oriented to person, place, and time. She appears well-nourished. No distress.   HENT:   Head: Normocephalic and atraumatic.   Mouth/Throat: Oropharynx is clear and moist.   Eyes: Pupils are equal, round, and reactive to light. Conjunctivae and EOM are normal.   Neck: Normal range of motion. Neck supple. No thyromegaly present.   Cardiovascular: Normal rate and regular rhythm.    No murmur heard.  Pulmonary/Chest: Effort normal and breath sounds normal. No respiratory distress. She has no wheezes.   Abdominal: Soft. Bowel  sounds are normal. She exhibits no distension. There is no tenderness.   Musculoskeletal: She exhibits no edema.   Lymphadenopathy:     She has no cervical adenopathy.   Neurological: She is alert and oriented to person, place, and time. No cranial nerve deficit.   Skin: Skin is warm and dry. She is not diaphoretic. No erythema.   Psychiatric: She has a normal mood and affect.   Nursing note and vitals reviewed.      Fluids    Intake/Output Summary (Last 24 hours) at 05/28/19 1851  Last data filed at 05/28/19 1600   Gross per 24 hour   Intake              900 ml   Output                0 ml   Net              900 ml       Laboratory  Recent Labs      05/26/19 2038 05/27/19   0026   05/28/19   0602  05/28/19   1301  05/28/19   1733   WBC  8.0  6.9   --    --    --    --    RBC  3.61*  3.34*   --    --    --    --    HEMOGLOBIN  9.8*  9.0*   < >  9.2*  9.2*  9.6*   HEMATOCRIT  30.9*  28.6*   < >  28.2*  29.2*  29.4*   MCV  85.6  85.6   --    --    --    --    MCH  27.1  26.9*   --    --    --    --    MCHC  31.7*  31.5*   --    --    --    --    RDW  40.5  40.7   --    --    --    --    PLATELETCT  282  250   --    --    --    --    MPV  9.7  9.6   --    --    --    --     < > = values in this interval not displayed.     Recent Labs      05/26/19 2038 05/27/19   0026   SODIUM  140  141   POTASSIUM  3.4*  3.2*   CHLORIDE  106  110   CO2  25  25   GLUCOSE  108*  98   BUN  23*  23*   CREATININE  0.60  0.59   CALCIUM  8.6  8.4*     Recent Labs      05/26/19   2255   APTT  29.7   INR  1.13               Imaging  No orders to display        Assessment/Plan  * GI bleed- (present on admission)   Assessment & Plan    GI consulted  S/p colonoscopy on 5/28 showing diverticulosis, old bloods no active bleeding, per GI continue monitoring hb today possible dc in am if stable.        Essential hypertension- (present on admission)   Assessment & Plan    Continue monitoring.      Type 2 diabetes mellitus with complication,  without long-term current use of insulin (HCC)- (present on admission)   Assessment & Plan    On ssi, holding po medications.      Hypokalemia- (present on admission)   Assessment & Plan    Will f/u in am.      Acute blood loss anemia- (present on admission)   Assessment & Plan    Continue monitoring HB, s/p colonoscopy showing old blood and diverticulosis.           VTE prophylaxis: scds

## 2019-05-29 NOTE — DISCHARGE INSTRUCTIONS
Discharge Instructions    Discharged to home by car with relative. Discharged via wheelchair, hospital escort: Yes.  Special equipment needed: Not Applicable    Be sure to schedule a follow-up appointment with your primary care doctor or any specialists as instructed.     Discharge Plan:   Diet Plan: Discussed  Activity Level: Discussed  Confirmed Follow up Appointment: Appointment Scheduled  Confirmed Symptoms Management: Discussed  Medication Reconciliation Updated: Yes  Pneumococcal Vaccine Administered/Refused: Not given - Patient refused pneumococcal vaccine  Influenza Vaccine Indication: Patient Refuses    I understand that a diet low in cholesterol, fat, and sodium is recommended for good health. Unless I have been given specific instructions below for another diet, I accept this instruction as my diet prescription.     Special Instructions: None    · Is patient discharged on Warfarin / Coumadin?   No     Depression / Suicide Risk    As you are discharged from this [facility] facility, it is important to learn how to keep safe from harming yourself.    Recognize the warning signs:  · Abrupt changes in personality, positive or negative- including increase in energy   · Giving away possessions  · Change in eating patterns- significant weight changes-  positive or negative  · Change in sleeping patterns- unable to sleep or sleeping all the time   · Unwillingness or inability to communicate  · Depression  · Unusual sadness, discouragement and loneliness  · Talk of wanting to die  · Neglect of personal appearance   · Rebelliousness- reckless behavior  · Withdrawal from people/activities they love  · Confusion- inability to concentrate     If you or a loved one observes any of these behaviors or has concerns about self-harm, here's what you can do:  · Talk about it- your feelings and reasons for harming yourself  · Remove any means that you might use to hurt yourself (examples: pills, rope, extension cords,  firearm)  · Get professional help from the community (Mental Health, Substance Abuse, psychological counseling)  · Do not be alone:Call your Safe Contact- someone whom you trust who will be there for you.  · Call your local CRISIS HOTLINE 891-4155 or 404-969-1458  · Call your local Children's Mobile Crisis Response Team [place] (236) 824-6763 or www.Daqi  · Call the toll free National Suicide Prevention Hotlines   · National Suicide Prevention Lifeline 858-533-HKVI (0439)  · [facility] 800-SUICIDE (748-6713)    Gastrointestinal Bleeding  Introduction  Gastrointestinal bleeding is bleeding somewhere along the path food travels through the body (digestive tract). This path is anywhere between the mouth and the opening of the butt (anus). You may have blood in your poop (stools) or have black poop. If you throw up (vomit), there may be blood in it.  This condition can be mild, serious, or even life-threatening. If you have a lot of bleeding, you may need to stay in the hospital.  Follow these instructions at home:  · Take over-the-counter and prescription medicines only as told by your doctor.  · Eat foods that have a lot of fiber in them. These foods include whole grains, fruits, and vegetables. You can also try eating 1-3 prunes each day.  · Drink enough fluid to keep your pee (urine) clear or pale yellow.  · Keep all follow-up visits as told by your doctor. This is important.  Contact a doctor if:  · Your symptoms do not get better.  Get help right away if:  · Your bleeding gets worse.  · You feel dizzy or you pass out (faint).  · You feel weak.  · You have very bad cramps in your back or belly (abdomen).  · You pass large clumps of blood (clots) in your poop.  · Your symptoms are getting worse.  This information is not intended to replace advice given to you by your health care provider. Make sure you discuss any questions you have with your health care provider.  Document  Released: 09/26/2009 Document Revised: 05/25/2017 Document Reviewed: 06/06/2016  © 2017 Elsevier    Omeprazole capsules   What is this medicine?  OMEPRAZOLE (oh ME pray zol) prevents the production of acid in the stomach. It is used to treat gastroesophageal reflux disease (GERD), ulcers, certain bacteria in the stomach, inflammation of the esophagus, and Zollinger-Amador Syndrome. It is also used to treat other conditions that cause too much stomach acid.  This medicine may be used for other purposes; ask your health care provider or pharmacist if you have questions.  COMMON BRAND NAME(S): Prilosec  What should I tell my health care provider before I take this medicine?  They need to know if you have any of these conditions:  -liver disease  -low levels of magnesium in the blood  -lupus  -an unusual or allergic reaction to omeprazole, other medicines, foods, dyes, or preservatives  -pregnant or trying to get pregnant  -breast-feeding  How should I use this medicine?  Take this medicine by mouth with a glass of water. Follow the directions on the prescription label. Do not crush, break or chew the capsules. They can be opened and the contents sprinkled on a small amount of applesauce or yogurt, given with fruit juices, or swallowed immediately with water. This medicine works best if taken on an empty stomach 30 to 60 minutes before breakfast. Take your doses at regular intervals. Do not take your medicine more often than directed.  Talk to your pediatrician regarding the use of this medicine in children. Special care may be needed.  Overdosage: If you think you have taken too much of this medicine contact a poison control center or emergency room at once.  NOTE: This medicine is only for you. Do not share this medicine with others.  What if I miss a dose?  If you miss a dose, take it as soon as you can. If it is almost time for your next dose, take only that dose. Do not take double or extra doses.  What may  interact with this medicine?  Do not take this medicine with any of the following medications:  -atazanavir  -clopidogrel  -nelfinavir  This medicine may also interact with the following medications:  -ampicillin  -certain medicines for anxiety or sleep  -certain medicines that treat or prevent blood clots like warfarin  -cyclosporine  -diazepam  -digoxin  -disulfiram  -diuretics  -iron salts  -methotrexate  -mycophenolate mofetil  -phenytoin  -prescription medicine for fungal or yeast infection like itraconazole, ketoconazole, voriconazole  -saquinavir  -tacrolimus  This list may not describe all possible interactions. Give your health care provider a list of all the medicines, herbs, non-prescription drugs, or dietary supplements you use. Also tell them if you smoke, drink alcohol, or use illegal drugs. Some items may interact with your medicine.  What should I watch for while using this medicine?  It can take several days before your stomach pain gets better. Check with your doctor or health care professional if your condition does not start to get better, or if it gets worse.  You may need blood work done while you are taking this medicine.  What side effects may I notice from receiving this medicine?  Side effects that you should report to your doctor or health care professional as soon as possible:  -allergic reactions like skin rash, itching or hives, swelling of the face, lips, or tongue  -bone, muscle or joint pain  -breathing problems  -chest pain or chest tightness  -dark yellow or brown urine  -dizziness  -fast, irregular heartbeat  -feeling faint or lightheaded  -fever or sore throat  -muscle spasm  -palpitations  -rash on cheeks or arms that gets worse in the sun  -redness, blistering, peeling or loosening of the skin, including inside the mouth  -seizures  -tremors  -unusual bleeding or bruising  -unusually weak or tired  -yellowing of the eyes or skin  Side effects that usually do not require medical  attention (report to your doctor or health care professional if they continue or are bothersome):  -constipation  -diarrhea  -dry mouth  -headache  -nausea  This list may not describe all possible side effects. Call your doctor for medical advice about side effects. You may report side effects to FDA at 3-155-FDA-8552.  Where should I keep my medicine?  Keep out of the reach of children.  Store at room temperature between 15 and 30 degrees C (59 and 86 degrees F). Protect from light and moisture. Throw away any unused medicine after the expiration date.  NOTE: This sheet is a summary. It may not cover all possible information. If you have questions about this medicine, talk to your doctor, pharmacist, or health care provider.  © 2018 [employer] (2017-01-19 12:18:47)      Soft-Food Meal Plan  A soft-food meal plan includes foods that are safe and easy to swallow. This meal plan typically is used:  · If you are having trouble chewing or swallowing foods.  · As a transition meal plan after only having had liquid meals for a long period.  What do I need to know about the soft-food meal plan?  A soft-food meal plan includes tender foods that are soft and easy to chew and swallow. In most cases, bite-sized pieces of food are easier to swallow. A bite-sized piece is about ½ inch or smaller. Foods in this plan do not need to be ground or pureed.  Foods that are very hard, crunchy, or sticky should be avoided. Also, breads, cereals, yogurts, and desserts with nuts, seeds, or fruits should be avoided.  What foods can I eat?  Grains   Rice and wild rice. Moist bread, dressing, pasta, and noodles. Well-moistened dry or cooked cereals, such as farina (cooked wheat cereal), oatmeal, or grits. Biscuits, breads, muffins, pancakes, and waffles that have been well moistened.  Vegetables   Shredded lettuce. Cooked, tender vegetables, including potatoes without skins. Vegetable juices. Broths or creamed soups made with  vegetables that are not stringy or chewy. Strained tomatoes (without seeds).  Fruits   Canned or well-cooked fruits. Soft (ripe), peeled fresh fruits, such as peaches, nectarines, kiwi, cantaloupe, honeydew melon, and watermelon (without seeds). Soft berries with small seeds, such as strawberries. Fruit juices (without pulp).  Meats and Other Protein Sources   Moist, tender, lean beef. Mutton. Lamb. Veal. Chicken. Turkey. Liver. Ham. Fish without bones. Eggs.  Dairy   Milk, milk drinks, and cream. Plain cream cheese and cottage cheese. Plain yogurt.  Sweets/Desserts   Flavored gelatin desserts. Custard. Plain ice cream, frozen yogurt, sherbet, milk shakes, and malts. Plain cakes and cookies. Plain hard candy.  Other   Butter, margarine (without trans fat), and cooking oils. Mayonnaise. Cream sauces. Mild spices, salt, and sugar. Syrup, molasses, honey, and jelly.  The items listed above may not be a complete list of recommended foods or beverages. Contact your dietitian for more options.   What foods are not recommended?  Grains   Dry bread, toast, crackers that have not been moistened. Coarse or dry cereals, such as bran, granola, and shredded wheat. Tough or chewy crusty breads, such as [place] bread or baguettes.  Vegetables   Corn. Raw vegetables except shredded lettuce. Cooked vegetables that are tough or stringy. Tough, crisp, fried potatoes and potato skins.  Fruits   Fresh fruits with skins or seeds or both, such as apples, pears, or grapes. Stringy, high-pulp fruits, such as papaya, pineapple, coconut, or pablo. Fruit leather, fruit roll-ups, and all dried fruits.  Meats and Other Protein Sources   Sausages and hot dogs. Meats with gristle. Fish with bones. Nuts, seeds, and chunky peanut or other nut butters.  Sweets/Desserts   Cakes or cookies that are very dry or chewy.  The items listed above may not be a complete list of foods and beverages to avoid. Contact your dietitian for more information.   This  information is not intended to replace advice given to you by your health care provider. Make sure you discuss any questions you have with your health care provider.  Document Released: 03/26/2009 Document Revised: 05/25/2017 Document Reviewed: 11/14/2014  [employer] Interactive Patient Education © 2017 [employer]

## 2019-05-30 NOTE — DISCHARGE SUMMARY
Discharge Summary    CHIEF COMPLAINT ON ADMISSION  bleeding    Reason for Admission  GI Bleed     Admission Date  5/26/2019    CODE STATUS  Full code    HPI & HOSPITAL COURSE  Please see original H&P and consult note for specific information, patient was admitted due to lower GIB, GI consulted patient went for colonoscopy that showed old blood and diverticulosis, source of bleeding likely from diverticulosis no active bleeding at time of colonoscopy , patient hb stable, patient was advised about diet with fiber, patient will continue on soft diet for next 1-2 weeks needs to f/u with PCP and GI as outpatient, patient having no more bleeding she is tolerating diet feels much better she is eager to go home, patient expressed understanding of her dc plan and agreed with it. All questions answered  Hypokalemia was supplement before dc.       Therefore, she is discharged in good and stable condition to home with close outpatient follow-up.    The patient met 2-midnight criteria for an inpatient stay at the time of discharge.    Discharge Date  5/29/2019    FOLLOW UP ITEMS POST DISCHARGE  PCP in 1 week  GI in 2 weeks.     DISCHARGE DIAGNOSES  Principal Problem:    GI bleed POA: Yes  Active Problems:    Type 2 diabetes mellitus with complication, without long-term current use of insulin (HCC) POA: Yes    Essential hypertension POA: Yes    Hypokalemia POA: Yes    Acute blood loss anemia POA: Yes  Resolved Problems:    * No resolved hospital problems. *      FOLLOW UP  No future appointments.  [facility]  [address] [place] [address]  [phone]    Office will call you to schedule your follow up appointment, thank you     Malcolm Phillips MD  [address] [facility], [place] [address]  Phone: [phone]  On 7/9/2019  PLease check in at 940am for your appointment at 945am thank you       MEDICATIONS ON DISCHARGE     Medication List      START taking these medications      Instructions   omeprazole 20 MG delayed-release  capsule  Commonly known as:  PRILOSEC   Take 1 Cap by mouth 2 Times a Day.  Dose:  20 mg     senna-docusate 8.6-50 MG Tabs  Commonly known as:  PERICOLACE or SENOKOT S   Take 2 Tabs by mouth 2 Times a Day.  Dose:  2 Tab        CONTINUE taking these medications      Instructions   atorvastatin 20 MG Tabs  Commonly known as:  LIPITOR   Doctor's comments:  For diabetes.  Take 1 Tab by mouth every evening.  Dose:  20 mg     Blood Glucose Test Strips   Doctor's comments:  Or per formulary preference. ICD-10 code: E11.65 Uncontrolled type 2 Diabetes Mellitus  Use one Accucheck Guide strip to test blood sugar once daily early morning before first meal. For uncontrolled diabetes. E11.65     ferrous sulfate 325 (65 Fe) MG tablet   Doctor's comments:  For iron deficiency anemia.  Take 1 Tab by mouth every day.  Dose:  325 mg     Lancets   Doctor's comments:  Or per formulary preference. ICD-10 code: E11.65 Uncontrolled type 2 Diabetes Mellitus.  Use one fast clix lancet to test blood sugar once daily early morning before first meal. For uncontrolled diabetes. E11.65     lisinopril 10 MG Tabs  Commonly known as:  PRINIVIL   Take 10 mg by mouth every day.  Dose:  10 mg     metformin 1000 MG tablet  Commonly known as:  GLUCOPHAGE   Doctor's comments:  For diabetes.  Take 1 Tab by mouth 2 times a day, with meals.  Dose:  1000 mg     MULTIVITAMIN GUMMIES WOMENS Chew   Take 1 Tab by mouth every day.  Dose:  1 Tab     potassium chloride 20 MEQ Pack  Commonly known as:  KLOR-CON   Take 20 mEq by mouth every day.  Dose:  20 mEq        STOP taking these medications    ibuprofen 200 MG Tabs  Commonly known as:  MOTRIN     meloxicam 7.5 MG Tabs  Commonly known as:  MOBIC     naproxen 220 MG tablet  Commonly known as:  ANAPROX            Allergies  Allergies   Allergen Reactions   • Sulfa Drugs Rash     Full body rash       DIET  Soft diet then high fiber diet    ACTIVITY  As tolerated.  Weight bearing as  tolerated    CONSULTATIONS  GI    PROCEDURES  colonoscopy    LABORATORY  Lab Results   Component Value Date    SODIUM 141 05/27/2019    POTASSIUM 3.1 (L) 05/29/2019    CHLORIDE 110 05/27/2019    CO2 25 05/27/2019    GLUCOSE 98 05/27/2019    BUN 23 (H) 05/27/2019    CREATININE 0.59 05/27/2019        Lab Results   Component Value Date    WBC 6.9 05/27/2019    HEMOGLOBIN 8.7 (L) 05/29/2019    HEMATOCRIT 26.9 (L) 05/29/2019    PLATELETCT 250 05/27/2019        Total time of the discharge process exceeds 35 minutes.

## 2023-11-21 ENCOUNTER — HOSPITAL ENCOUNTER (INPATIENT)
Facility: MEDICAL CENTER | Age: 78
LOS: 8 days | DRG: 066 | End: 2023-11-29
Attending: INTERNAL MEDICINE | Admitting: HOSPITALIST
Payer: MEDICARE

## 2023-11-21 DIAGNOSIS — I10 ESSENTIAL HYPERTENSION: ICD-10-CM

## 2023-11-21 DIAGNOSIS — R56.9 SEIZURE (HCC): ICD-10-CM

## 2023-11-21 DIAGNOSIS — R25.2 LEG CRAMPS: ICD-10-CM

## 2023-11-21 DIAGNOSIS — R25.1 SPELLS OF TREMBLING: ICD-10-CM

## 2023-11-21 DIAGNOSIS — E11.8 TYPE 2 DIABETES MELLITUS WITH COMPLICATION, WITHOUT LONG-TERM CURRENT USE OF INSULIN (HCC): ICD-10-CM

## 2023-11-21 LAB
EST. AVERAGE GLUCOSE BLD GHB EST-MCNC: 364 MG/DL
GLUCOSE BLD STRIP.AUTO-MCNC: 230 MG/DL (ref 65–99)
GLUCOSE BLD STRIP.AUTO-MCNC: 278 MG/DL (ref 65–99)
HBA1C MFR BLD: 14.3 % (ref 4–5.6)

## 2023-11-21 PROCEDURE — 99223 1ST HOSP IP/OBS HIGH 75: CPT | Mod: AI | Performed by: HOSPITALIST

## 2023-11-21 PROCEDURE — 99222 1ST HOSP IP/OBS MODERATE 55: CPT | Mod: 25 | Performed by: PSYCHIATRY & NEUROLOGY

## 2023-11-21 PROCEDURE — 4A10X4Z MONITORING OF CENTRAL NERVOUS ELECTRICAL ACTIVITY, EXTERNAL APPROACH: ICD-10-PCS | Performed by: PSYCHIATRY & NEUROLOGY

## 2023-11-21 PROCEDURE — 83036 HEMOGLOBIN GLYCOSYLATED A1C: CPT

## 2023-11-21 PROCEDURE — 770006 HCHG ROOM/CARE - MED/SURG/GYN SEMI*

## 2023-11-21 PROCEDURE — 36415 COLL VENOUS BLD VENIPUNCTURE: CPT

## 2023-11-21 PROCEDURE — 95720 EEG PHY/QHP EA INCR W/VEEG: CPT | Performed by: PSYCHIATRY & NEUROLOGY

## 2023-11-21 PROCEDURE — 95700 EEG CONT REC W/VID EEG TECH: CPT | Performed by: PSYCHIATRY & NEUROLOGY

## 2023-11-21 PROCEDURE — 700102 HCHG RX REV CODE 250 W/ 637 OVERRIDE(OP): Performed by: HOSPITALIST

## 2023-11-21 PROCEDURE — 82962 GLUCOSE BLOOD TEST: CPT | Mod: 91

## 2023-11-21 PROCEDURE — 95714 VEEG EA 12-26 HR UNMNTR: CPT | Performed by: PSYCHIATRY & NEUROLOGY

## 2023-11-21 PROCEDURE — 700111 HCHG RX REV CODE 636 W/ 250 OVERRIDE (IP): Performed by: PSYCHIATRY & NEUROLOGY

## 2023-11-21 PROCEDURE — A9270 NON-COVERED ITEM OR SERVICE: HCPCS | Performed by: HOSPITALIST

## 2023-11-21 RX ORDER — LEVETIRACETAM 500 MG/5ML
1000 INJECTION, SOLUTION, CONCENTRATE INTRAVENOUS EVERY 12 HOURS
Status: DISCONTINUED | OUTPATIENT
Start: 2023-11-22 | End: 2023-11-23

## 2023-11-21 RX ORDER — BISACODYL 10 MG
10 SUPPOSITORY, RECTAL RECTAL
Status: DISCONTINUED | OUTPATIENT
Start: 2023-11-21 | End: 2023-11-29 | Stop reason: HOSPADM

## 2023-11-21 RX ORDER — ACETAMINOPHEN 325 MG/1
650 TABLET ORAL EVERY 6 HOURS PRN
Status: DISCONTINUED | OUTPATIENT
Start: 2023-11-21 | End: 2023-11-29 | Stop reason: HOSPADM

## 2023-11-21 RX ORDER — ENOXAPARIN SODIUM 100 MG/ML
40 INJECTION SUBCUTANEOUS DAILY
Status: DISCONTINUED | OUTPATIENT
Start: 2023-11-22 | End: 2023-11-29 | Stop reason: HOSPADM

## 2023-11-21 RX ORDER — ASPIRIN 81 MG/1
81 TABLET, CHEWABLE ORAL DAILY
Status: DISCONTINUED | OUTPATIENT
Start: 2023-11-21 | End: 2023-11-29 | Stop reason: HOSPADM

## 2023-11-21 RX ORDER — LEVETIRACETAM 500 MG/5ML
2000 INJECTION, SOLUTION, CONCENTRATE INTRAVENOUS ONCE
Status: COMPLETED | OUTPATIENT
Start: 2023-11-21 | End: 2023-11-21

## 2023-11-21 RX ORDER — POLYETHYLENE GLYCOL 3350 17 G/17G
1 POWDER, FOR SOLUTION ORAL
Status: DISCONTINUED | OUTPATIENT
Start: 2023-11-21 | End: 2023-11-29 | Stop reason: HOSPADM

## 2023-11-21 RX ORDER — LORAZEPAM 2 MG/ML
1 INJECTION INTRAMUSCULAR
Status: DISCONTINUED | OUTPATIENT
Start: 2023-11-21 | End: 2023-11-29 | Stop reason: HOSPADM

## 2023-11-21 RX ORDER — HYDRALAZINE HYDROCHLORIDE 20 MG/ML
10 INJECTION INTRAMUSCULAR; INTRAVENOUS EVERY 4 HOURS PRN
Status: DISCONTINUED | OUTPATIENT
Start: 2023-11-21 | End: 2023-11-29 | Stop reason: HOSPADM

## 2023-11-21 RX ORDER — AMOXICILLIN 250 MG
2 CAPSULE ORAL 2 TIMES DAILY
Status: DISCONTINUED | OUTPATIENT
Start: 2023-11-21 | End: 2023-11-29 | Stop reason: HOSPADM

## 2023-11-21 RX ORDER — ASPIRIN 300 MG/1
300 SUPPOSITORY RECTAL DAILY
Status: DISCONTINUED | OUTPATIENT
Start: 2023-11-21 | End: 2023-11-24

## 2023-11-21 RX ORDER — LORAZEPAM 2 MG/ML
2 INJECTION INTRAMUSCULAR ONCE
Status: COMPLETED | OUTPATIENT
Start: 2023-11-21 | End: 2023-11-21

## 2023-11-21 RX ORDER — ACETAMINOPHEN 325 MG/1
650 TABLET ORAL EVERY 6 HOURS PRN
Status: DISCONTINUED | OUTPATIENT
Start: 2023-11-21 | End: 2023-11-21

## 2023-11-21 RX ORDER — ONDANSETRON 2 MG/ML
4 INJECTION INTRAMUSCULAR; INTRAVENOUS EVERY 4 HOURS PRN
Status: ACTIVE | OUTPATIENT
Start: 2023-11-21 | End: 2023-11-21

## 2023-11-21 RX ADMIN — ASPIRIN 81 MG: 81 TABLET, CHEWABLE ORAL at 15:52

## 2023-11-21 RX ADMIN — INSULIN HUMAN 4 UNITS: 100 INJECTION, SOLUTION PARENTERAL at 20:13

## 2023-11-21 RX ADMIN — INSULIN HUMAN 7 UNITS: 100 INJECTION, SOLUTION PARENTERAL at 17:41

## 2023-11-21 RX ADMIN — LORAZEPAM 2 MG: 2 INJECTION INTRAMUSCULAR; INTRAVENOUS at 17:35

## 2023-11-21 RX ADMIN — LEVETIRACETAM 2000 MG: 100 INJECTION, SOLUTION, CONCENTRATE INTRAVENOUS at 17:34

## 2023-11-21 ASSESSMENT — COGNITIVE AND FUNCTIONAL STATUS - GENERAL
HELP NEEDED FOR BATHING: A LOT
MOVING FROM LYING ON BACK TO SITTING ON SIDE OF FLAT BED: A LITTLE
EATING MEALS: A LITTLE
MOBILITY SCORE: 16
DAILY ACTIVITIY SCORE: 16
PERSONAL GROOMING: A LITTLE
MOVING TO AND FROM BED TO CHAIR: A LITTLE
WALKING IN HOSPITAL ROOM: A LOT
TURNING FROM BACK TO SIDE WHILE IN FLAT BAD: A LITTLE
CLIMB 3 TO 5 STEPS WITH RAILING: A LOT
STANDING UP FROM CHAIR USING ARMS: A LITTLE
SUGGESTED CMS G CODE MODIFIER MOBILITY: CK
TOILETING: A LITTLE
DRESSING REGULAR LOWER BODY CLOTHING: A LOT
SUGGESTED CMS G CODE MODIFIER DAILY ACTIVITY: CK
DRESSING REGULAR UPPER BODY CLOTHING: A LITTLE

## 2023-11-21 ASSESSMENT — LIFESTYLE VARIABLES
EVER HAD A DRINK FIRST THING IN THE MORNING TO STEADY YOUR NERVES TO GET RID OF A HANGOVER: NO
CONSUMPTION TOTAL: NEGATIVE
TOTAL SCORE: 0
DOES PATIENT WANT TO STOP DRINKING: NO
HOW MANY TIMES IN THE PAST YEAR HAVE YOU HAD 5 OR MORE DRINKS IN A DAY: 0
HAVE PEOPLE ANNOYED YOU BY CRITICIZING YOUR DRINKING: NO
HAVE YOU EVER FELT YOU SHOULD CUT DOWN ON YOUR DRINKING: NO
AVERAGE NUMBER OF DAYS PER WEEK YOU HAVE A DRINK CONTAINING ALCOHOL: 7
EVER FELT BAD OR GUILTY ABOUT YOUR DRINKING: NO
TOTAL SCORE: 0
TOTAL SCORE: 0
ALCOHOL_USE: YES
ON A TYPICAL DAY WHEN YOU DRINK ALCOHOL HOW MANY DRINKS DO YOU HAVE: 1

## 2023-11-21 ASSESSMENT — PATIENT HEALTH QUESTIONNAIRE - PHQ9
1. LITTLE INTEREST OR PLEASURE IN DOING THINGS: NOT AT ALL
SUM OF ALL RESPONSES TO PHQ9 QUESTIONS 1 AND 2: 0
2. FEELING DOWN, DEPRESSED, IRRITABLE, OR HOPELESS: NOT AT ALL

## 2023-11-21 ASSESSMENT — PAIN DESCRIPTION - PAIN TYPE
TYPE: ACUTE PAIN

## 2023-11-21 ASSESSMENT — ENCOUNTER SYMPTOMS
SHORTNESS OF BREATH: 0
FOCAL WEAKNESS: 1
SENSORY CHANGE: 1
DIZZINESS: 1
FEVER: 0
LOSS OF CONSCIOUSNESS: 0
HEADACHES: 1
CHILLS: 0

## 2023-11-21 NOTE — PROGRESS NOTES
"4 Eyes Skin Assessment Completed by KOLE Zayas and KOLE Archibald.    Head WDL  Ears WDL  Nose WDL  Mouth WDL  Neck WDL  Breast/Chest WDL  Shoulder Blades Redness to the L side, redness, blanching, scab. Per pt \"maybe a bug bite\"  Spine Redness and Blanching  (R) Arm/Elbow/Hand WDL  (L) Arm/Elbow/Hand WDL  Abdomen Bruising  Groin WDL  Scrotum/Coccyx/Buttocks WDL  (R) Leg Redness, Blanching, and Edema  (L) Leg Redness, Blanching, and Edema  (R) Heel/Foot/Toe Redness and Blanching  (L) Heel/Foot/Toe Redness, Blanching, and Edema          Devices In Places Pulse Ox      Interventions In Place Pillows, Heels Loaded W/Pillows, and Pressure Redistribution Mattress    Possible Skin Injury No    Pictures Uploaded Into Epic N/A  Wound Consult Placed N/A  RN Wound Prevention Protocol Ordered No    "

## 2023-11-21 NOTE — ASSESSMENT & PLAN NOTE
A1c 14.3.  Patient is aware of diabetes diagnosis.  However she had been on medications but stopped them.  Lantus and Sliding scale insulin  Hypoglycemic protocol diabetes education

## 2023-11-21 NOTE — ASSESSMENT & PLAN NOTE
He has a history of hypertension and has not been taking any medications  MRI notes a subacute lacunar size infarct.   11/27: continue amlodipine and losartan. Continue to monitor    I have increased losartan dose to 50mg daily from 25mg

## 2023-11-21 NOTE — ASSESSMENT & PLAN NOTE
Has had multiple events where her left hand had a tremor as well as her left face and currently has mildly decreased strength on the left hand compared to right.  These were felt to be possible seizures though she did not lose consciousness therefore may have been focal seizures though given her current weakness this is also suspicious for TIA or stroke.  Aspirin will be given, MRI of the brain ordered as her CT of the head was negative prior to transfer.  Lipid panel will be ordered.  If MRI is positive for stroke then further work-up will be initiated.  Continues telemetry monitoring to evaluate for atrial fibrillation  EEG has been ordered  Seizure precautions  Refrain from antiepileptic medications until the EEG is back

## 2023-11-21 NOTE — CARE PLAN
The patient is Stable - Low risk of patient condition declining or worsening    Shift Goals  Clinical Goals: stable/improved neuro status, safety  Patient Goals: safety, free from seizure  Family Goals: janeth    Progress made toward(s) clinical / shift goals:    Problem: Knowledge Deficit - Standard  Goal: Patient and family/care givers will demonstrate understanding of plan of care, disease process/condition, diagnostic tests and medications  Outcome: Progressing     Problem: Optimal Care of the Stroke Patient  Goal: Optimal emergency care for the stroke patient  Outcome: Progressing  Goal: Optimal acute care for the stroke patient  Outcome: Progressing     Problem: Knowledge Deficit - Stroke Education  Goal: Patient's knowledge of stroke and risk factors will improve  Outcome: Progressing     Problem: Psychosocial - Patient Condition  Goal: Patient's ability to verbalize feelings about condition will improve  Outcome: Progressing     Problem: Discharge Planning - Stroke  Goal: Ensure Stroke Core Measures are met prior to discharge  Outcome: Progressing  Goal: Patient’s continuum of care needs will be met  Outcome: Progressing     Problem: Neuro Status  Goal: Neuro status will remain stable or improve  Outcome: Progressing     Problem: Hemodynamic Monitoring  Goal: Patient's hemodynamics, fluid balance and neurologic status will be stable or improve  Outcome: Progressing     Problem: Respiratory - Stroke Patient  Goal: Patient will achieve/maintain optimum respiratory rate/effort  Outcome: Progressing     Problem: Dysphagia  Goal: Dysphagia will improve  Outcome: Progressing     Problem: Risk for Aspiration  Goal: Patient's risk for aspiration will be absent or decrease  Outcome: Progressing       Patient is not progressing towards the following goals:

## 2023-11-21 NOTE — PROGRESS NOTES
[facility] DIRECT ADMISSION REPORT  Transferring facility: [facility]  Transferring physician: Dr. Urrutia    Chief complaint: Seizure-like activity  Pertinent history & patient course: 78-year-old female with type 2 diabetes mellitus and hypertension, medication noncompliance and took herself off all medications 2 years ago, who presented to the outlying facility with seizure-like activity.  She woke up at 5 AM to go to the bathroom and felt dizzy, and reportedly fell on the floor.  She was able to crawl and call EMS.  Per EMS report, they noted left-sided facial grimacing with tonic-clonic activity of the left arm which lasted for about few minutes.  She was generally weak.  She had nonfocal neuro exam, and head CT was negative.  His labs were unremarkable except for elevated BG's in the 300s improved with insulin and IV fluids.  She was about to get discharge when she had a witnessed spell similar to prior episodes, lasting for about 5 to 7 minutes, but reportedly she was awake and aware of the event.  After the event, she had left-sided weakness.  She is now being transferred for higher level of care and further evaluation for seizures.    Pertinent imaging & lab results: As above  Consultants called prior to transfer and pertinent input from consultants: None  Reason for Transfer: Higher level of care  Further work up or recommendations requested prior to transfer: None    Patient accepted for transfer: Yes  Accepting [facility]: [facility] to notify the Triage Coordinator in the RTOC via Voalte or Phone ext. [phone] when patient arrives to the unit. The Triage Coordinator will assign the admitting provider.    Consultants to be called upon arrival: Consider neurology  Admission status: Inpatient.   Floor requested: Neurosciences      The admitting provider is the point of contact for questions or concerns regarding patient's care.

## 2023-11-21 NOTE — H&P
"Hospital Medicine History & Physical Note    Date of Service  11/21/2023    Primary Care Physician  No primary care provider on file.    Consultants  None      Code Status  Full Code    Chief Complaint  weakness    History of Presenting Illness  Molly Fountain is a 78 y.o. female who presented 11/21/2023 with weakness.  Ms. Fountain has a past medical history of hypertension, dyslipidemia, diabetes mellitus though has not been taking any medications for quite some time that has been in her usual state of health till this morning about 5:00 in the morning she found that she was too weak to get out of bed therefore she had to crawl to get a bed and eventually crawled to the living room where she got a phone and called her neighbor who then called 911.  Apparently paramedics noted  asymmetric facial twitching which was also noted in the ER and some \"twitching\" of the left arm as well.  She did not lose consciousness during these events.  There a CT of the head was negative glucose was 355, creatinine 0.9, white blood cell count 91, RSV, flu, COVID all negative, urinalysis negative.  I discussed the plan of care with patient.    Review of Systems  Review of Systems   Constitutional:  Positive for malaise/fatigue. Negative for chills and fever.   Respiratory:  Negative for shortness of breath.    Cardiovascular:  Negative for chest pain.   Neurological:  Positive for dizziness, sensory change, focal weakness and headaches. Negative for loss of consciousness.   All other systems reviewed and are negative.      Past Medical History   has a past medical history of Essential hypertension (3/13/2019) and Type 2 diabetes mellitus with complication, without long-term current use of insulin (HCC) (3/13/2019).    Surgical History   has a past surgical history that includes colonoscopy - endo (N/A, 5/28/2019).     Family History  family history includes Heart Disease in her father; Hypertension in her father.   Family history reviewed " "with patient. There is no family history that is pertinent to the chief complaint.     Social History   reports that she has never smoked. She has never used smokeless tobacco. She reports current alcohol use. She reports that she does not use drugs.  Her daughter lives with her    Allergies  Allergies   Allergen Reactions    Sulfa Drugs Rash     Full body rash       Medications  Prior to Admission Medications   Prescriptions Last Dose Informant Patient Reported? Taking?   Blood Glucose Test Strips   No No   Sig: Use one Accucheck Guide strip to test blood sugar once daily early morning before first meal. For uncontrolled diabetes. E11.65   Lancets   No No   Sig: Use one fast clix lancet to test blood sugar once daily early morning before first meal. For uncontrolled diabetes. E11.65      Facility-Administered Medications: None       Physical Exam  Temp:  [36.9 °C (98.4 °F)] 36.9 °C (98.4 °F)  Pulse:  [104] 104  Resp:  [19] 19  BP: (167)/(107) 167/107  SpO2:  [95 %] 95 %  Blood Pressure : (!) 167/107   Temperature: 36.9 °C (98.4 °F)   Pulse: (!) 104   Respiration: 19   Pulse Oximetry: 95 %       Physical Exam  Vitals and nursing note reviewed.   Constitutional:       General: She is not in acute distress.     Appearance: She is not toxic-appearing.   Cardiovascular:      Rate and Rhythm: Normal rate and regular rhythm.      Heart sounds: No murmur heard.  Pulmonary:      Effort: Pulmonary effort is normal.      Breath sounds: Normal breath sounds.   Musculoskeletal:      Right lower leg: No edema.      Left lower leg: No edema.   Neurological:      Mental Status: She is alert and oriented to person, place, and time.      Comments: Slight left facial droop, left hand is slightly weaker than the right with decreased dexterity left hand  Speech is clear   Psychiatric:         Mood and Affect: Mood normal.         Behavior: Behavior normal.         Laboratory:          No results for input(s): \"ALTSGPT\", \"ASTSGOT\", " "\"ALKPHOSPHAT\", \"TBILIRUBIN\", \"DBILIRUBIN\", \"GAMMAGT\", \"AMYLASE\", \"LIPASE\", \"ALB\", \"PREALBUMIN\", \"GLUCOSE\" in the last 72 hours.      No results for input(s): \"NTPROBNP\" in the last 72 hours.      No results for input(s): \"TROPONINT\" in the last 72 hours.    Imaging:  OUTSIDE IMAGES-CT HEAD   Final Result      MR-BRAIN-W/O    (Results Pending)       EKG sinus rhythm    Assessment/Plan:  Justification for Admission Status  I anticipate this patient will require at least two midnights for appropriate medical management, necessitating inpatient admission because work-up for either seizures or stroke    Patient will need a Med/Surg bed on MEDICAL service .  The need is secondary to as above.    Spells of trembling- (present on admission)  Assessment & Plan  Has had multiple events where her left hand had a tremor as well as her left face and currently has mildly decreased strength on the left hand compared to right.  These were felt to be possible seizures though she did not lose consciousness therefore may have been focal seizures though given her current weakness this is also suspicious for TIA or stroke.  Aspirin will be given, MRI of the brain ordered as her CT of the head was negative prior to transfer.  Lipid panel will be ordered.  If MRI is positive for stroke then further work-up will be initiated.  Continues telemetry monitoring to evaluate for atrial fibrillation  EEG has been ordered  Seizure precautions  Refrain from antiepileptic medications until the EEG is back    Essential hypertension- (present on admission)  Assessment & Plan  He has a history of hypertension and has not been taking any medications  For now we will allow a degree of permissive hypertension as differential diagnosis includes ischemic stroke    Type 2 diabetes mellitus with complication, without long-term current use of insulin (HCC)- (present on admission)  Assessment & Plan  Glucose in [place] was 355  She had been on medications but " stopped them.  HbA1c ordered  Sliding scale insulin for now with a diabetic diet  She may benefit from oral meds and perhaps long-acting insulin if indicated.         VTE prophylaxis: enoxaparin ppx

## 2023-11-22 ENCOUNTER — APPOINTMENT (OUTPATIENT)
Dept: RADIOLOGY | Facility: MEDICAL CENTER | Age: 78
DRG: 066 | End: 2023-11-22
Attending: STUDENT IN AN ORGANIZED HEALTH CARE EDUCATION/TRAINING PROGRAM
Payer: MEDICARE

## 2023-11-22 PROBLEM — Z71.89 ADVANCE CARE PLANNING: Status: ACTIVE | Noted: 2023-11-22

## 2023-11-22 LAB
ALBUMIN SERPL BCP-MCNC: 3.4 G/DL (ref 3.2–4.9)
ALBUMIN/GLOB SERPL: 1.3 G/DL
ALP SERPL-CCNC: 104 U/L (ref 30–99)
ALT SERPL-CCNC: 10 U/L (ref 2–50)
ANION GAP SERPL CALC-SCNC: 9 MMOL/L (ref 7–16)
AST SERPL-CCNC: 12 U/L (ref 12–45)
BASOPHILS # BLD AUTO: 0.4 % (ref 0–1.8)
BASOPHILS # BLD: 0.04 K/UL (ref 0–0.12)
BILIRUB SERPL-MCNC: 0.4 MG/DL (ref 0.1–1.5)
BUN SERPL-MCNC: 6 MG/DL (ref 8–22)
CALCIUM ALBUM COR SERPL-MCNC: 8.9 MG/DL (ref 8.5–10.5)
CALCIUM SERPL-MCNC: 8.4 MG/DL (ref 8.5–10.5)
CHLORIDE SERPL-SCNC: 105 MMOL/L (ref 96–112)
CHOLEST SERPL-MCNC: 174 MG/DL (ref 100–199)
CO2 SERPL-SCNC: 27 MMOL/L (ref 20–33)
CREAT SERPL-MCNC: 0.73 MG/DL (ref 0.5–1.4)
EOSINOPHIL # BLD AUTO: 0.15 K/UL (ref 0–0.51)
EOSINOPHIL NFR BLD: 1.6 % (ref 0–6.9)
ERYTHROCYTE [DISTWIDTH] IN BLOOD BY AUTOMATED COUNT: 44.8 FL (ref 35.9–50)
GFR SERPLBLD CREATININE-BSD FMLA CKD-EPI: 83 ML/MIN/1.73 M 2
GLOBULIN SER CALC-MCNC: 2.7 G/DL (ref 1.9–3.5)
GLUCOSE BLD STRIP.AUTO-MCNC: 153 MG/DL (ref 65–99)
GLUCOSE BLD STRIP.AUTO-MCNC: 197 MG/DL (ref 65–99)
GLUCOSE BLD STRIP.AUTO-MCNC: 211 MG/DL (ref 65–99)
GLUCOSE BLD STRIP.AUTO-MCNC: 298 MG/DL (ref 65–99)
GLUCOSE SERPL-MCNC: 95 MG/DL (ref 65–99)
HCT VFR BLD AUTO: 40 % (ref 37–47)
HDLC SERPL-MCNC: 48 MG/DL
HGB BLD-MCNC: 12.5 G/DL (ref 12–16)
IMM GRANULOCYTES # BLD AUTO: 0.03 K/UL (ref 0–0.11)
IMM GRANULOCYTES NFR BLD AUTO: 0.3 % (ref 0–0.9)
LDLC SERPL CALC-MCNC: 112 MG/DL
LYMPHOCYTES # BLD AUTO: 1.71 K/UL (ref 1–4.8)
LYMPHOCYTES NFR BLD: 18.6 % (ref 22–41)
MCH RBC QN AUTO: 25.5 PG (ref 27–33)
MCHC RBC AUTO-ENTMCNC: 31.3 G/DL (ref 32.2–35.5)
MCV RBC AUTO: 81.6 FL (ref 81.4–97.8)
MONOCYTES # BLD AUTO: 0.83 K/UL (ref 0–0.85)
MONOCYTES NFR BLD AUTO: 9 % (ref 0–13.4)
NEUTROPHILS # BLD AUTO: 6.43 K/UL (ref 1.82–7.42)
NEUTROPHILS NFR BLD: 70.1 % (ref 44–72)
NRBC # BLD AUTO: 0 K/UL
NRBC BLD-RTO: 0 /100 WBC (ref 0–0.2)
PLATELET # BLD AUTO: 293 K/UL (ref 164–446)
PMV BLD AUTO: 9.9 FL (ref 9–12.9)
POTASSIUM SERPL-SCNC: 3.1 MMOL/L (ref 3.6–5.5)
PROT SERPL-MCNC: 6.1 G/DL (ref 6–8.2)
RBC # BLD AUTO: 4.9 M/UL (ref 4.2–5.4)
SODIUM SERPL-SCNC: 141 MMOL/L (ref 135–145)
TRIGL SERPL-MCNC: 70 MG/DL (ref 0–149)
WBC # BLD AUTO: 9.2 K/UL (ref 4.8–10.8)

## 2023-11-22 PROCEDURE — 700111 HCHG RX REV CODE 636 W/ 250 OVERRIDE (IP): Mod: JZ | Performed by: PSYCHIATRY & NEUROLOGY

## 2023-11-22 PROCEDURE — 85025 COMPLETE CBC W/AUTO DIFF WBC: CPT

## 2023-11-22 PROCEDURE — 99497 ADVNCD CARE PLAN 30 MIN: CPT | Performed by: STUDENT IN AN ORGANIZED HEALTH CARE EDUCATION/TRAINING PROGRAM

## 2023-11-22 PROCEDURE — 73560 X-RAY EXAM OF KNEE 1 OR 2: CPT | Mod: LT

## 2023-11-22 PROCEDURE — 97535 SELF CARE MNGMENT TRAINING: CPT

## 2023-11-22 PROCEDURE — 80053 COMPREHEN METABOLIC PANEL: CPT

## 2023-11-22 PROCEDURE — 99223 1ST HOSP IP/OBS HIGH 75: CPT | Performed by: PHYSICAL MEDICINE & REHABILITATION

## 2023-11-22 PROCEDURE — 700111 HCHG RX REV CODE 636 W/ 250 OVERRIDE (IP): Mod: JZ | Performed by: HOSPITALIST

## 2023-11-22 PROCEDURE — 770006 HCHG ROOM/CARE - MED/SURG/GYN SEMI*

## 2023-11-22 PROCEDURE — A9270 NON-COVERED ITEM OR SERVICE: HCPCS | Performed by: HOSPITALIST

## 2023-11-22 PROCEDURE — A9270 NON-COVERED ITEM OR SERVICE: HCPCS | Performed by: STUDENT IN AN ORGANIZED HEALTH CARE EDUCATION/TRAINING PROGRAM

## 2023-11-22 PROCEDURE — 700102 HCHG RX REV CODE 250 W/ 637 OVERRIDE(OP): Performed by: STUDENT IN AN ORGANIZED HEALTH CARE EDUCATION/TRAINING PROGRAM

## 2023-11-22 PROCEDURE — 700102 HCHG RX REV CODE 250 W/ 637 OVERRIDE(OP): Performed by: HOSPITALIST

## 2023-11-22 PROCEDURE — 99233 SBSQ HOSP IP/OBS HIGH 50: CPT | Mod: 25 | Performed by: STUDENT IN AN ORGANIZED HEALTH CARE EDUCATION/TRAINING PROGRAM

## 2023-11-22 PROCEDURE — 97163 PT EVAL HIGH COMPLEX 45 MIN: CPT

## 2023-11-22 PROCEDURE — 80061 LIPID PANEL: CPT

## 2023-11-22 PROCEDURE — 700101 HCHG RX REV CODE 250: Performed by: PHYSICAL MEDICINE & REHABILITATION

## 2023-11-22 PROCEDURE — 97165 OT EVAL LOW COMPLEX 30 MIN: CPT

## 2023-11-22 PROCEDURE — 82962 GLUCOSE BLOOD TEST: CPT | Mod: 91

## 2023-11-22 PROCEDURE — 36415 COLL VENOUS BLD VENIPUNCTURE: CPT

## 2023-11-22 PROCEDURE — 99232 SBSQ HOSP IP/OBS MODERATE 35: CPT | Performed by: PSYCHIATRY & NEUROLOGY

## 2023-11-22 PROCEDURE — 92610 EVALUATE SWALLOWING FUNCTION: CPT

## 2023-11-22 PROCEDURE — 51798 US URINE CAPACITY MEASURE: CPT

## 2023-11-22 RX ORDER — LORAZEPAM 2 MG/ML
1 INJECTION INTRAMUSCULAR ONCE
Status: COMPLETED | OUTPATIENT
Start: 2023-11-22 | End: 2023-11-23

## 2023-11-22 RX ORDER — TRAMADOL HYDROCHLORIDE 50 MG/1
50 TABLET ORAL EVERY 6 HOURS PRN
Status: DISCONTINUED | OUTPATIENT
Start: 2023-11-22 | End: 2023-11-29 | Stop reason: HOSPADM

## 2023-11-22 RX ORDER — POTASSIUM CHLORIDE 20 MEQ/1
40 TABLET, EXTENDED RELEASE ORAL ONCE
Status: COMPLETED | OUTPATIENT
Start: 2023-11-22 | End: 2023-11-22

## 2023-11-22 RX ADMIN — LEVETIRACETAM 1000 MG: 100 INJECTION, SOLUTION, CONCENTRATE INTRAVENOUS at 17:08

## 2023-11-22 RX ADMIN — POTASSIUM CHLORIDE 40 MEQ: 1500 TABLET, EXTENDED RELEASE ORAL at 09:18

## 2023-11-22 RX ADMIN — INSULIN GLARGINE-YFGN 10 UNITS: 100 INJECTION, SOLUTION SUBCUTANEOUS at 17:15

## 2023-11-22 RX ADMIN — DOCUSATE SODIUM 50 MG AND SENNOSIDES 8.6 MG 2 TABLET: 8.6; 5 TABLET, FILM COATED ORAL at 17:07

## 2023-11-22 RX ADMIN — INSULIN HUMAN 7 UNITS: 100 INJECTION, SOLUTION PARENTERAL at 17:14

## 2023-11-22 RX ADMIN — ENOXAPARIN SODIUM 40 MG: 100 INJECTION SUBCUTANEOUS at 17:07

## 2023-11-22 RX ADMIN — INSULIN HUMAN 3 UNITS: 100 INJECTION, SOLUTION PARENTERAL at 11:53

## 2023-11-22 RX ADMIN — INSULIN HUMAN 3 UNITS: 100 INJECTION, SOLUTION PARENTERAL at 07:59

## 2023-11-22 RX ADMIN — TRAMADOL HYDROCHLORIDE 50 MG: 50 TABLET ORAL at 15:41

## 2023-11-22 RX ADMIN — DICLOFENAC SODIUM 4 G: 10 GEL TOPICAL at 21:49

## 2023-11-22 RX ADMIN — DOCUSATE SODIUM 50 MG AND SENNOSIDES 8.6 MG 2 TABLET: 8.6; 5 TABLET, FILM COATED ORAL at 05:13

## 2023-11-22 RX ADMIN — ASPIRIN 81 MG: 81 TABLET, CHEWABLE ORAL at 05:13

## 2023-11-22 RX ADMIN — INSULIN HUMAN 4 UNITS: 100 INJECTION, SOLUTION PARENTERAL at 20:01

## 2023-11-22 RX ADMIN — LEVETIRACETAM 1000 MG: 100 INJECTION, SOLUTION, CONCENTRATE INTRAVENOUS at 05:15

## 2023-11-22 ASSESSMENT — COGNITIVE AND FUNCTIONAL STATUS - GENERAL
MOVING FROM LYING ON BACK TO SITTING ON SIDE OF FLAT BED: UNABLE
MOVING TO AND FROM BED TO CHAIR: UNABLE
TURNING FROM BACK TO SIDE WHILE IN FLAT BAD: UNABLE
DAILY ACTIVITIY SCORE: 18
EATING MEALS: A LITTLE
HELP NEEDED FOR BATHING: A LITTLE
WALKING IN HOSPITAL ROOM: A LOT
STANDING UP FROM CHAIR USING ARMS: A LITTLE
PERSONAL GROOMING: A LITTLE
MOBILITY SCORE: 9
TOILETING: A LITTLE
CLIMB 3 TO 5 STEPS WITH RAILING: TOTAL
SUGGESTED CMS G CODE MODIFIER MOBILITY: CM
DRESSING REGULAR UPPER BODY CLOTHING: A LITTLE
DRESSING REGULAR LOWER BODY CLOTHING: A LITTLE
SUGGESTED CMS G CODE MODIFIER DAILY ACTIVITY: CK

## 2023-11-22 ASSESSMENT — PAIN DESCRIPTION - PAIN TYPE
TYPE: ACUTE PAIN
TYPE: OTHER (COMMENT)

## 2023-11-22 ASSESSMENT — ENCOUNTER SYMPTOMS
WEAKNESS: 1
SENSORY CHANGE: 1
HEADACHES: 1

## 2023-11-22 ASSESSMENT — GAIT ASSESSMENTS
GAIT LEVEL OF ASSIST: MINIMAL ASSIST
DISTANCE (FEET): 3
ASSISTIVE DEVICE: FRONT WHEEL WALKER

## 2023-11-22 ASSESSMENT — ACTIVITIES OF DAILY LIVING (ADL): TOILETING: INDEPENDENT

## 2023-11-22 ASSESSMENT — FIBROSIS 4 INDEX: FIB4 SCORE: 1.02

## 2023-11-22 NOTE — DISCHARGE PLANNING
[facility] Transitional Care Coordination    Referral from:  Dr. Pollock  Insurance Provider on Facesheet:  Medicare/[employer]  Potential Rehab diagnosis:  Stroke/Other Neuro    Chart review indicates patient has ongoing medical management and may have therapy needs to possibly meet inpatient rehab facility criteria with the goal of returning to community.      D/C Support:  Stroke    Physiatry consult pended, waiting for additional information.  Work up ongoing,  MR brain pending.  PT/OT pending as clinically appropriate.  TCC will follow.  Please reach out sooner if PMR consult requested for medical management.     Last Covid test:    Thank you for the referral.

## 2023-11-22 NOTE — CARE PLAN
The patient is Watcher - Medium risk of patient condition declining or worsening    Shift Goals  Clinical Goals: monitor for neuro changes, pain mgmt, seizure precautions, mobility, skin integrity  Patient Goals: seizure precautions, rest and sleep  Family Goals: janeth    Progress made toward(s) clinical / shift goals:      Problem: Hemodynamic Monitoring  Goal: Patient's hemodynamics, fluid balance and neurologic status will be stable or improve  Outcome: Progressing       Patient is not progressing towards the following goals:      Problem: Knowledge Deficit - Standard  Goal: Patient and family/care givers will demonstrate understanding of plan of care, disease process/condition, diagnostic tests and medications  Outcome: Not Met     Continue patient education, Allow for teach back    Problem: Psychosocial - Patient Condition  Goal: Patient's ability to verbalize feelings about condition will improve  Outcome: Not Met     Patient intermittently confused.     Problem: Respiratory - Stroke Patient  Goal: Patient will achieve/maintain optimum respiratory rate/effort  Outcome: Not Met     Patient on 2L O2 via nc. Has diminished breath sounds in the bases    Problem: Self Care  Goal: Patient will have the ability to perform ADLs independently or with assistance (bathe, groom, dress, toilet and feed)  Outcome: Not Met     Max assist for ADLs

## 2023-11-22 NOTE — ASSESSMENT & PLAN NOTE
EEG notes focal seizure during the EEG study involving the left face, arm and leg. Event spontaneously resolved in about 3 minutes. No loss of awareness during the event. Post event significant for left face, arm and leg weakness. Normal extremity movement and strength prior to the event.   CT of the head was negative    Loaded with Keppra. Continue with Keppra 1000mg bid  MRI no evidence of acute hemorrhage, acute infarction, or mass lesion  No driving.  No handling weapons.  No swimming or tub bathing alone.  No working from heights.  No other high risk activity that may result in harm to self or others in the event of a seizure. Avoid sleep deprivation- discussed with patient and family at bedside   Seizure precautions

## 2023-11-22 NOTE — DISCHARGE PLANNING
Case Management Discharge Planning    Admission Date: 11/21/2023  GMLOS: 2.7  ALOS: 1    6-Clicks ADL Score: 18  6-Clicks Mobility Score: 9  PT and/or OT Eval ordered: Yes  Post-acute Referrals Ordered: Yes  Post-acute Choice Obtained: Yes  Has referral(s) been sent to post-acute provider:  Yes      Anticipated Discharge Dispo: Discharge Disposition: D/T to SNF with Medicare cert in anticipation of skilled care (03)    DME Needed: No    Action(s) Taken: Updated Provider/Nurse on Discharge Plan    Escalations Completed: None    Medically Clear: No    Next Steps: follow up with attending for medical clearance    Barriers to Discharge: Medical clearance and Pending Placement    Is the patient up for discharge tomorrow: No  SW spoke to patient and her son/daughter in law.  Patient lives with her daughter Edyta in a one level home in [facility].  Edyta works full time and some weekends.  Patient was ambulatory, drives herself, and takes care of the 4 dogs and 3 cats.      Patient is a type 2 diabetic, has used insulin in the past, then oral.  Patient stated she hasn't been on oral medications in some time.  Telling SW that metformin eats her stomach.

## 2023-11-22 NOTE — THERAPY
Physical Therapy Contact Note    Patient Name: Molly Fountain  Age:  78 y.o., Sex:  female  Medical Record #: [id number]  Today's Date: 11/22/2023    Pt receiving EEG, PT will hold evaluation until completion of EEG.

## 2023-11-22 NOTE — CONSULTS
Neurology Initial Consult H&P  Neurohospitalist Service, [facility] Neurosciences    Referring Physician: Juan Pollock M.D.    No chief complaint on file.      HPI: Molly Fountain is a 78 y.o. woman presenting for whom neurology has been consulted for seizures. History obtained from EMR and care team. Limited history from patient.    This morning reportedly unable to get out of bed due to feeling weak. EMS noted left facial twitching and left arm twitching that was also noted in the ED. No associated loss of consciousness or loss of awareness. No acute findings reported on CT head. Glucose was 355. UA and COVID were negative.    I was contacted by the EEG technician as the patient was having a focal seizure during the EEG study involving the left face, arm and leg. Event spontaneously resolved in about 3 minutes. No loss of awareness during the event. Post event significant for left face, arm and leg weakness. Normal extremity movement and strength prior to the event. Rapid response was called.     Patient has experienced 3 similar clinical events. Patient has a warning feeling abnormal sensations on the affected side prior to the twitching. No reported prior history of seizures or seizure-like activity.    Review of systems: In addition to what is detailed in the HPI above, all other systems reviewed and are negative.    Past Medical History:    has a past medical history of Essential hypertension (3/13/2019) and Type 2 diabetes mellitus with complication, without long-term current use of insulin (HCC) (3/13/2019).    She has no past medical history of Asthma, CAD (coronary artery disease), Cancer (HCC), Chronic obstructive pulmonary disease (HCC), Congestive heart failure (HCC), Infectious disease, Liver disease, Psychiatric disorder, Renal disorder, Seizure disorder ([facility]), or Stroke ([facility]).    FHx:  family history includes Heart Disease in her father; Hypertension in her father.    SHx:   reports that  she has never smoked. She has never used smokeless tobacco. She reports current alcohol use. She reports that she does not use drugs.    Allergies:  Allergies   Allergen Reactions    Sulfa Drugs Rash     Full body rash       Medications:    Current Facility-Administered Medications:     senna-docusate (Pericolace Or Senokot S) 8.6-50 MG per tablet 2 Tablet, 2 Tablet, Oral, BID **AND** polyethylene glycol/lytes (Miralax) PACKET 1 Packet, 1 Packet, Oral, QDAY PRN **AND** magnesium hydroxide (Milk Of Magnesia) suspension 30 mL, 30 mL, Oral, QDAY PRN **AND** bisacodyl (Dulcolax) suppository 10 mg, 10 mg, Rectal, QDAY PRN, Juan Pollock M.D.    acetaminophen (Tylenol) tablet 650 mg, 650 mg, Oral, Q6HRS PRN, Juan Pollock M.D.    insulin regular (HumuLIN R,NovoLIN R) injection, 3-14 Units, Subcutaneous, 4X/DAY ACHS, 7 Units at 11/21/23 1741 **AND** POC blood glucose manual result, , , Q AC AND BEDTIME(S) **AND** NOTIFY MD and PharmD, , , Once **AND** Administer 20 grams of glucose (approximately 8 ounces of fruit juice) every 15 minutes PRN FSBG less than 70 mg/dL, , , PRN **AND** dextrose 10 % BOLUS 25 g, 25 g, Intravenous, Q15 MIN PRN, Juan Plolock M.D.    aspirin (Asa) chewable tab 81 mg, 81 mg, Oral, DAILY, 81 mg at 11/21/23 1552 **OR** aspirin (Asa) suppository 300 mg, 300 mg, Rectal, DAILY, Juan Pollock M.D.    [START ON 11/22/2023] enoxaparin (Lovenox) inj 40 mg, 40 mg, Subcutaneous, DAILY AT 1800, Juan Pollock M.D.    LORazepam (Ativan) injection 1 mg, 1 mg, Intravenous, Q5 MIN PRN, Juan Pollock M.D.    hydrALAZINE (Apresoline) injection 10 mg, 10 mg, Intravenous, Q4HRS PRN, Juan Pollock M.D.    Physical Examination:     General: Patient is awake and in no acute distress  Eye: Examination of optic disks not indicated at this time given acuity of consult  Neck: There is normal range of motion  CV: tachycardia   Extremities:  clear, dry, intact, without peripheral edema    NEUROLOGICAL EXAM:     BP (!)  "166/105   Pulse (!) 109   Temp 37.1 °C (98.8 °F)   Resp 16   Ht 1.6 m (5' 2.99\")   Wt 79.1 kg (174 lb 6.1 oz)   LMP  (LMP Unknown)   SpO2 95%   BMI 30.90 kg/m²       Mental status: Awake, alert and oriented.  Attention is intact.  Answers questions appropriately. Follows commands  Language: Paucity of spontaneous speech. No clear aphasia or dysarthria.  Comprehension is intact.  Cranial nerves:    Pupils are equal, round and reactive to light  Versions are full    Mild left facial weakness   Hearing is intact to conversation  Motor: Left hemiparesis involving the face, arm > leg.  No abnormal movements or postures.   Reflexes: Slightly asymmetric DTRs more brisk on the left.  Extensor plantar response on the left.  Downgoing on the right  Sensory: Grossly intact to light-touch.   Coordination: No dysmetria RUE. Unable to assess on the left.   Gait: Unable to test.     Objective Data:    Labs:  Lab Results   Component Value Date/Time    PROTHROMBTM 14.6 05/26/2019 10:55 PM    INR 1.13 05/26/2019 10:55 PM      Lab Results   Component Value Date/Time    WBC 6.9 05/27/2019 12:26 AM    RBC 3.34 (L) 05/27/2019 12:26 AM    HEMOGLOBIN 8.7 (L) 05/29/2019 11:57 AM    HEMATOCRIT 26.9 (L) 05/29/2019 11:57 AM    MCV 85.6 05/27/2019 12:26 AM    MCH 26.9 (L) 05/27/2019 12:26 AM    MCHC 31.5 (L) 05/27/2019 12:26 AM    MPV 9.6 05/27/2019 12:26 AM    NEUTSPOLYS 62.50 05/26/2019 08:38 PM    LYMPHOCYTES 29.20 05/26/2019 08:38 PM    MONOCYTES 6.70 05/26/2019 08:38 PM    EOSINOPHILS 0.60 05/26/2019 08:38 PM    BASOPHILS 0.60 05/26/2019 08:38 PM      Lab Results   Component Value Date/Time    SODIUM 141 05/27/2019 12:26 AM    POTASSIUM 3.1 (L) 05/29/2019 12:00 AM    CHLORIDE 110 05/27/2019 12:26 AM    CO2 25 05/27/2019 12:26 AM    GLUCOSE 98 05/27/2019 12:26 AM    BUN 23 (H) 05/27/2019 12:26 AM    CREATININE 0.59 05/27/2019 12:26 AM      Lab Results   Component Value Date/Time    CHOLBOB 181 03/13/2019 07:05 PM     (H) " 03/13/2019 07:05 PM    HDL 46 03/13/2019 07:05 PM    TRIGLYCERIDE 81 03/13/2019 07:05 PM       Lab Results   Component Value Date/Time    ALKPHOSPHAT 44 05/27/2019 12:26 AM    ASTSGOT 12 05/27/2019 12:26 AM    ALTSGPT 9 05/27/2019 12:26 AM    TBILIRUBIN 0.3 05/27/2019 12:26 AM        Imaging/Testing:    I interpreted and/or reviewed the patient's neuroimaging    OUTSIDE IMAGES-CT HEAD   Final Result      MR-BRAIN-W/O    (Results Pending)       Impression and Recommendations: Molly Fountain is a 78 y.o. woman presenting for whom neurology has been consulted for seizures. Acute repetitive focal onset seizures localizing broadly to the right hemisphere. Seems to experience a sensory aura that may indicate a sensory cortex localization.  Clinically the seizures from the left face arm and leg.  Has a prominent post-ictal paresis (Martin's) involving the left face arm and leg.  Seizure etiology is unclear at this time.  Work-up thus far most significant for hyperglycemia. Is diabetic and untreated hypertension are concerning vascular risk factors.   Of note the patient has received 2 mg of Ativan and loaded with Keppra at 2000 mg IV x1.  Neurology recommendations as below.  - Please convert EEG to continuous monitoring.  Will likely be able to discontinue in the a.m. if seizures are controlled.  - Continue Keppra maintenance dosing 1000 mg twice daily.  First dose with am meds.  - MRI brain (seizure protocol) when able. Of note EEG leads are NOT compatible with MRI.  Ok to discontinue EEG when MRI is able to be completed.   - Continue outstanding medical management and supportive care.   - Seizure precautions. q4hr neuro checks.   - Ativan 1 to 2 mg as needed for additional seizure activity.    Neurology will follow along.       Kenneth Jamison MD  Neurohospitalist, Acute Care Services      The evaluation of the patient, and recommended management, was discussed with Dr. Pollock     Upon my evaluation, this patient had a  high probability of imminent or life-threatening deterioration due to seizures which required my direct attention, intervention, and personal management.  I personally provided 80 minutes of total acute neurologic care time outside of time spent on separately billable/documented procedures. Time includes: review of laboratory data, review of radiology studies, discussion with consultants, discussion with family/patient, monitoring for potential decompensation.  Interventions were performed as documented in the chart.        Please note that this dictation was created using voice recognition software.  I have made every reasonable attempt to correct obvious errors, but I expect that there are errors of grammar and possibly content that I did not discover before finalizing the note.

## 2023-11-22 NOTE — PROGRESS NOTES
"Late entry:  1708 pt had an episode of tonic clonic seizure: L face twitching, GUILLERMO convulsion, RUE stiffness, incontinent urine, and was unresponsive to assessed questions. The seizure episode lasted for 3 minutes. O2 supplement and suction provided. Notified dr. Pollock . Order received. EEG tech at bedside paged neurologist.     1717: pt had L facial droop. GUILLERMO flaccid, alert and oriented x4. NIHH scores: 10. Paged RRT for :\"code stroke\"    1725: Dr. Jamison at bedside assessing pt. Order received.     1730: RRT at bedside. Per dr. Jamison pt experienced post ictal phase. \"Code stroke\" canceled.     1734: ativan and IV keppra given per MAR. Pt resting in bed. Emotional support implemented.     "

## 2023-11-22 NOTE — DIETARY
Nutrition Services: T2DM Diet Education Consult   Day 1 of admit.  Molly Fountain is a 78 y.o. female with admitting DX of Seizures.     RD able to visit pt at bedside to provide Type 2 Diabetes Diet education. Provided handout and discussed hyper/hypoglycemia symptoms and plan for when low blood sugars occur, MyPlate method to balance plate, adequate water intake for hydration, and incorporating protein into snacks. Pt states understanding with no questions at this time.    No other education needs identified at this time. Consider referral to outpatient nutrition services for continuation of education as indicated or per pt preferences.     Please re-consult RD as indicated.

## 2023-11-22 NOTE — PROCEDURES
CONTINUOUS VIDEO ELECTROENCEPHALOGRAM REPORT    Patient Name:  Molly Fountain   MRN:  [id number]   ROOM: [place] [address]   Day of LTM: Day 1   DOS:   11/21/2023 05:00 PM to 11/22/2023  08:03 AM  TOTAL RECORDING TIME:  14 hours and 34 minutes of total recording time    INDICATION:  Molly Fountain 78 y.o. lady with generalized weakness and episodes of facial and left upper extremity twitching. cEEG ordered to rule out seizure activity and capture events.        CURRENT ANTI-SEIZURE MEDICATIONS AND OTHER RELEVANT TREATMENTS:       Current Facility-Administered Medications:     senna-docusate **AND** polyethylene glycol/lytes **AND** magnesium hydroxide **AND** bisacodyl    acetaminophen    insulin regular **AND** POC blood glucose manual result **AND** NOTIFY MD and PharmD **AND** Administer 20 grams of glucose (approximately 8 ounces of fruit juice) every 15 minutes PRN FSBG less than 70 mg/dL **AND** dextrose bolus    aspirin **OR** aspirin    [START ON 11/22/2023] enoxaparin (LOVENOX) injection    LORazepam    hydrALAZINE       TECHNIQUE:   CVEEG was set up by a Neurodiagnostic technologist who performed education to the patient and staff. A minimum of 23 electrodes and 23 channel recording was setup and performed by Neurodiagnostic technologist, in accordance with the international 10-20 system. Impedence, electrode integrity, and technical impressions were documented a minimum of every 2-24 hour period by a Neurodiagnostic Technologist and reviewed by Interpreting Physician. The study was reviewed in bipolar and referential montages. The recording examined the patient in awake, drowsy and sleep states.     DESCRIPTION OF THE RECORD:  The background activity when awake consisted of regular, reactive and symmetric 9- 10 Hz, 20-30 uV activity with a posterior dominant rhythm. During drowsiness there was frontal predominant beta and rowing eye movements seen.Features of N2 sleep including symmetric sleep spindles and vertex waves  were seen.    ICTAL AND INTERICTAL FINDINGS:   There was intermittent right fronto temporal and fronto central theta slowing noted.    One clinical seizure as described below.     ACTIVATION PROCEDURES:   Photic stimulation showed a good driving response. Hyperventilation was not attempted.    EKG: Sampling of the EKG recording did not demonstrate any abnormalities    EVENTS:    Clinical event starting at 17:06 :01 on 11/21/2023 while patient getting EEG, with left upper extremity and left facial twitching, unable to control. Patient was able to say she is having one of her events and kept saying please help me. There was left arm posturing and becoming tonic followed by clonic movements and right arm above head and flexed at elbow, and then whole body shaking movements and patient unable to respond by 17:08:40 and movements end at 17:10:01 and patient able to respond soon after.  The EEG changes started 3 seconds prior to left arm twitching started, as bifrontal 3-4 Hz sharp waves and evolving to rhythmic bifrontal  higher amplitude 3 Hz delta activity, followed by diffuse muscle artifacts in the frontal leads and theta activity in the posterior quadrants and ending abruptly at 17:10:01. There was diffuse background slowing, but the right fronto temporal and fronto central regions showed more persistent delta and theta slowing with sharper contour.    INTERPRETATION:   Abnormal continuous EEG study in awake, drowsiness and sleep due to the presence of :  1) 1 Clinical seizure on 11/21/2023 at 17:06:01 with left upper extremity shaking and facial twitching and patient able to say help me and identify it as one of her episodes, followed by spread of twitching to right upper and then lower extremity and patient continuing to talk asking for help followed by staring, whole body shaking and becoming unresponsive , with the episode lasting for almost 4 minutes. The EEG changes started 3 seconds before clinical changes as  bifrontal sharp waves, and evolution to rhythmic bifrontal sharper contour delta and diffuse theta slowing and bifrontal artifacts. These was abrupt end to the seizure with diffuse background attenuation, with right fronto temporal and fronto central delta slowing lasting for minutes following the seizure.  Even though the seizure onset on EEG was unclear, clinical onset with left upper extremity twitching , and significant post ictal slowing in right fronto temporal and fronto central regions , is most suggestive of epileptic foci from the above regions.  2) Intermittent right fronto temporal and fronto central delta slowing seen throughout the recording  Structural changes from the above regions should be ruled out.  Clinical correlation is advised.    Rober Chapa MD, MHS  Department of Neurology at [facility]  Phone: [phone]  Fax: [phone]

## 2023-11-22 NOTE — THERAPY
Speech Language Pathology   Clinical Swallow Evaluation     Patient Name: Molly Fountain  AGE:  78 y.o., SEX:  female  Medical Record #: [id number]  Date of Service: 11/22/2023      History of Present Illness    77 y/o admitted on 11/21 for seizure-like activity and weakness.      PMHx:  DM2, HTN    General Information:  Vitals  O2 (LPM): 3  O2 Delivery Device: Silicone Nasal Cannula  Level of Consciousness: Drowsy  Orientation: Oriented x 4  Follows Directives: Yes      Prior Living Situation & Level of Function:  Housing / Facility: [address]  Lives with - Patient's Self Care Capacity: Alone and Able to Care For Self  Communication: WFL  Swallowing: WFL       Oral Mechanism Evaluation:  Dentition: Good   Facial Symmetry: Equal  Labial Observations: WFL   Lingual Observations: Midline  Motor Speech: WFL            Laryngeal Function:  Secretion Management: Adequate  Voice Quality: WFL  Cough: Perceptually WNL         Subjective  Pt drowsy but woke with minimal verbal stimulation. She was oriented x4 and followed directives appropriately.        Assessment  Current Method of Nutrition: Oral diet (regular/thin liquids)  Positioning: Seymour's (60-90 degrees)  Bolus Administration: SLP, Patient  O2 (LPM): 3 O2 Delivery Device: Silicone Nasal Cannula  Factor(s) Affecting Performance: Impaired endurance         Swallowing Trials:  Swallowing Trials  Thin Liquid (TN0): WFL  Pureed (PU4): WFL  Regular (RG7): WFL      Comments: Cough x1 noted with large straw sip of thin liquids, otherwise, no other s/sx of aspiration noted with further single straw sips of thin liquids. No s/sx of aspiration noted with pudding and solids. Pt had difficulty with self feeding 2/2 UE weakness so either 1:1 feeding or hand over hand assistance provided. Mastication timely and vocal quality was clear following the swallow.      Clinical Impressions  Pt is presenting with a functional oropharyngeal swallow, however, pt had difficulty with self  feeding 2/2 UE weakness. She will benefit from 1:1 feeding at this time until weakness subsides.    Recommendations  Diet Consistency: regular/thin liquids  Instrumentation: None indicated at this time  Medication: As tolerated  Supervision: 1:1 feeding with constant supervision  Positioning: Fully upright and midline during oral intake  Risk Management : Small bites/sips  Oral Care: BID         SLP Treatment Plan  Treatment Plan: Dysphagia Treatment  SLP Frequency: 3x Per Week  Estimated Duration: Until Therapy Goals Met      Anticipated Discharge Needs  Discharge Recommendations: Anticipate that the patient will have no further speech therapy needs after discharge from the hospital   Therapy Recommendations Upon DC: Not Indicated        Patient / Family Goals  Patient / Family Goal #1: did not state  Short Term Goals  Short Term Goal # 1: Pt will consume a regular/thin liquid diet with no overt s/sx of aspiration      Nuvia Pennington, SLP

## 2023-11-22 NOTE — PROGRESS NOTES
"Hospital Medicine Daily Progress Note    Date of Service  11/22/2023    Chief Complaint  Molly Fountain is a 78 y.o. female admitted 11/21/2023 with weakness    Hospital Course  78 y.o. female with past medical history of hypertension, dyslipidemia, diabetes mellitus though has not been taking any medications, who presented 11/21/2023 with weakness.  She was too weak to get out of bed therefore she had to crawl to get a bed and eventually crawled to the living room where she got a phone and called her neighbor who then called 911.  Apparently paramedics noted  asymmetric facial twitching which was also noted in the ER and some \"twitching\" of the left arm as well.  She did not lose consciousness during these events. There a CT of the head was negative. A1c 14.3. RSV, flu, COVID all negative, urinalysis negative.   EEG notes focal seizure during the EEG study involving the left face, arm and leg. Event spontaneously resolved in about 3 minutes. No loss of awareness during the event. Post event significant for left face, arm and leg weakness. Normal extremity movement and strength prior to the event.   Neurology was consulted. Patient was started on Keppra and MRI     Interval Problem Update  Seen patient at bedside  Son at bedside  Continue Keppra  Pending MRI  A1c 14.3, not on home meds.  Ordered Lantus, continue sliding scale.  Ordered diabetes education    I have discussed this patient's plan of care and discharge plan at IDT rounds today with Case Management, Nursing, Nursing leadership, and other members of the IDT team.    Consultants/Specialty  neurology    Code Status  DNAR/DNI    Disposition  The patient is not medically cleared for discharge to home or a post-acute facility.      I have placed the appropriate orders for post-discharge needs.    Review of Systems  Review of Systems   Constitutional:  Positive for malaise/fatigue.   Neurological:  Positive for sensory change, weakness and headaches.   All other " systems reviewed and are negative.       Physical Exam  Temp:  [35.9 °C (96.6 °F)-37.1 °C (98.8 °F)] 35.9 °C (96.6 °F)  Pulse:  [] 85  Resp:  [14-19] 17  BP: (134-167)/() 147/96  SpO2:  [94 %-97 %] 97 %    Physical Exam  Vitals reviewed.   Constitutional:       Appearance: Normal appearance. She is obese. She is ill-appearing.   HENT:      Head: Normocephalic and atraumatic.      Nose: Nose normal.      Mouth/Throat:      Pharynx: Oropharynx is clear.   Eyes:      Extraocular Movements: Extraocular movements intact.      Conjunctiva/sclera: Conjunctivae normal.      Pupils: Pupils are equal, round, and reactive to light.   Cardiovascular:      Rate and Rhythm: Normal rate and regular rhythm.      Pulses: Normal pulses.      Heart sounds: Normal heart sounds.   Pulmonary:      Effort: Pulmonary effort is normal.      Breath sounds: Normal breath sounds.   Abdominal:      General: Abdomen is flat. Bowel sounds are normal.      Palpations: Abdomen is soft.   Musculoskeletal:         General: Normal range of motion.      Cervical back: Normal range of motion and neck supple.   Skin:     General: Skin is warm and dry.   Neurological:      Mental Status: She is alert and oriented to person, place, and time. Mental status is at baseline.      Comments: Left hemiparesis involving the face, arm > leg.  No abnormal movements or postures.   Psychiatric:         Mood and Affect: Mood normal.         Behavior: Behavior normal.         Fluids    Intake/Output Summary (Last 24 hours) at 11/22/2023 1442  Last data filed at 11/22/2023 1300  Gross per 24 hour   Intake 150 ml   Output --   Net 150 ml       Laboratory  Recent Labs     11/22/23  0119   WBC 9.2   RBC 4.90   HEMOGLOBIN 12.5   HEMATOCRIT 40.0   MCV 81.6   MCH 25.5*   MCHC 31.3*   RDW 44.8   PLATELETCT 293   MPV 9.9     Recent Labs     11/22/23  0119   SODIUM 141   POTASSIUM 3.1*   CHLORIDE 105   CO2 27   GLUCOSE 95   BUN 6*   CREATININE 0.73   CALCIUM 8.4*              Recent Labs     11/22/23  0119   TRIGLYCERIDE 70   HDL 48   *       Imaging  OUTSIDE IMAGES-CT HEAD   Final Result      MR-BRAIN-W/O    (Results Pending)        Assessment/Plan  * Seizure (HCC)- (present on admission)  Assessment & Plan  EEG notes focal seizure during the EEG study involving the left face, arm and leg. Event spontaneously resolved in about 3 minutes. No loss of awareness during the event. Post event significant for left face, arm and leg weakness. Normal extremity movement and strength prior to the event.   CT of the head was negative    Loaded with Keppra. Continue with Keppra 1000mg bid  MRI pending  No driving.  No handling weapons.  No swimming or tub bathing alone.  No working from heights.  No other high risk activity that may result in harm to self or others in the event of a seizure. Avoid sleep deprivation- discussed with patient and family at bedside   Seizure precautions      Advance care planning  Assessment & Plan  DNAR: I discussed code status with patient  who at time of discussion had medical decision making capacity. The patient wishes to be DNAR and under no circumstances would want life sustaining treatments in the event that he has a cardiac arrest. I also discussed intubation including temporary courses and he does not wish to be placed on a ventilator under any circumstances, including for temporary and reversible causes. This discussion was had with her family in the room.    I discussed advance care planning with the patient and family for 16 minutes, including diagnosis, prognosis, plan of care, risks and benefits of any therapies that could be offered, as well as alternatives including palliation and hospice, as appropriate.        Hypokalemia- (present on admission)  Assessment & Plan  Replace as indicated    Essential hypertension- (present on admission)  Assessment & Plan  He has a history of hypertension and has not been taking any medications  For now we  will allow a degree of permissive hypertension as differential diagnosis includes ischemic stroke    Type 2 diabetes mellitus with complication, without long-term current use of insulin (HCC)- (present on admission)  Assessment & Plan  A1c 14.3.  Patient is aware of diabetes diagnosis.  However she had been on medications but stopped them.  Lantus and Sliding scale insulin  Hypoglycemic protocol diabetes education           VTE prophylaxis:    enoxaparin ppx      I have performed a physical exam and reviewed and updated ROS and Plan today (11/22/2023). In review of yesterday's note (11/21/2023), there are no changes except as documented above.    Patient is has a high medical complexity, complex decision making and is at high risk for complication, morbidity, and mortality.    My total time spent caring for the patient on the day of the encounter was 68  minutes.   This does not include time spent on separately billable procedures/tests.

## 2023-11-22 NOTE — CODE DOCUMENTATION
Per MD, pt experienced: Post-ictal gabriela-paresis with DANY paralysis. Continuing MD orders and EEG at this time. MD performed NIH.

## 2023-11-22 NOTE — CODE DOCUMENTATION
Dr. Jamison at the bedside. States the pt had a seizure and it was caught on the EEG. He believes her current symptoms are related to that and does not wish to continue the Stroke protocol or go to CT at this time. Pt continues to improve.

## 2023-11-22 NOTE — PROGRESS NOTES
Referring Physician: Lilly Anderson M.D.    S:  No seizure recurrence. Feeling much better this afternoon. Left sided weakness has resolved. No new neurologic symptoms. Family is at the bedside.     Of note the patient recalls experiencing a first seizure event 1 week ago. Has upcoming appointment with new PCM next week.     O:  Afebrile  General: Awake and alert. Conversant. Sitting in bedside chair. Appears comfortable. No acute distress.   Mental status: Awake and alert.  Attention is intact.  Answers questions appropriately.  Language: Fluent with intact comprehension.  No dysarthria.  Cranial nerves:   Visually attends and tracks the examiner   Face appears symmetric   Hearing is intact to conversation  Motor: Normal bulk.  Moves all extremities symmetrically.  Antigravity without drift.  No abnormal movements or postures.   Reflexes: Not tested.   Sensory: Not tested.   Coordination: No dysmetria.  Gait: Not tested. Has painful left knee.     EEG INTERPRETATION:   Abnormal continuous EEG study in awake, drowsiness and sleep due to the presence of :  1) 1 Clinical seizure on 11/21/2023 at 17:06:01 with left upper extremity shaking and facial twitching and patient able to say help me and identify it as one of her episodes, followed by spread of twitching to right upper and then lower extremity and patient continuing to talk asking for help followed by staring, whole body shaking and becoming unresponsive , with the episode lasting for almost 4 minutes. The EEG changes started 3 seconds before clinical changes as bifrontal sharp waves, and evolution to rhythmic bifrontal sharper contour delta and diffuse theta slowing and bifrontal artifacts. These was abrupt end to the seizure with diffuse background attenuation, with right fronto temporal and fronto central delta slowing lasting for minutes following the seizure.  Even though the seizure onset on EEG was unclear, clinical onset with left upper extremity  twitching , and significant post ictal slowing in right fronto temporal and fronto central regions , is most suggestive of epileptic foci from the above regions.  2) Intermittent right fronto temporal and fronto central delta slowing seen throughout the recording  Structural changes from the above regions should be ruled out.  Clinical correlation is advised.    No interval neuroimaging.     Impression & Recommendations:  Molly Fountain is a 78 y.o. woman presenting for whom neurology has been consulted for seizures. Acute repetitive focal onset seizures localizing broadly to the right hemisphere. Seems to experience a sensory aura that may indicate a sensory cortex localization.  Clinically the seizures involve the left face arm and leg.  Has a prominent post-ictal paresis (Martin's) involving the left face, arm and leg; resolved.  Seizure etiology remains elusive.  Semiology not consistent with short facial brachial dystonic type seizures. Work-up thus far most significant for hyperglycemia. Is diabetic and untreated hypertension are concerning vascular risk factors. No seizure recurrence since treatment with Ativan and loaded with Keppra. Also better glycemic control.  EEG was discontinued. Neurology recommendations as below.  - Continue Keppra maintenance dosing 1000 mg twice daily.  Please transition to oral formulation.  - MRI brain (seizure protocol) when able. Will likely need Ativan for anxiety and claustrophobia.   - Seizure precautions. qshift neuro checks.   - Ativan 1 to 2 mg as needed for additional seizure activity.  - X-ray left knee as the patient has gait limiting pain.      Neurology will follow along.     I provided a total of 40 minutes of acute neurologic care for this patient encounter reviewing medical records, diagnostic studies direct face-to-face time with the patient, and family, documentation and communicating plan of care.        Kenneth Jamison MD  Neurohospitalist, Acute Care  Services          Please note that this dictation was created using voice recognition software.  I have made every reasonable attempt to correct obvious errors, but I expect that there are errors of grammar and possibly content that I did not discover before finalizing the note.

## 2023-11-22 NOTE — DISCHARGE PLANNING
[facility] Transitional [facility] Coordination    PMR consult pended.  MR brain pending.  PT/OT pending as clinically appropriate.  TCC monitoring.  Please reach out sooner if PMR consult requested for medical management.     1521 - Physiatry to consult per protocol

## 2023-11-22 NOTE — THERAPY
Physical Therapy   Daily Treatment     Patient Name: Molly Fountain  Age:  78 y.o., Sex:  female  Medical Record #: [id number]  Today's Date: 11/22/2023     Precautions  Precautions: (P) Fall Risk;Swallow Precautions    Assessment    Pt tolerated session fairly considering acute medical status. She required significantly increased time for performance of mobility due to fear of having another seizure and not feeling well. She reported multiple times feeling of vomiting however did not produce. Vitals monitored and stable throughout as below. Pt required MOD A for supine to sit. She required several minutes sitting edge of bed before further progression of mobility due to not feeling well. Pt side stepped 3' with FWW and MIN A to head of bed. She also performed stand pivot transfer to chair with MIN A and FWW. Pt will benefit from ongoing acute PT services to improve her mobility and independence.     At this time anticipate discharge to post acute placement. Pt is an excellent IPR candidate as she was independent prior to hospitalization.     Plan    Treatment Plan Status:    Type of Treatment: (P) Bed Mobility, Debridement, Equipment, Group Therapy, Gait Training, Manual Therapy, Neuro Re-Education / Balance, Self Care / Home Evaluation, Stair Training, Therapeutic Activities, Therapeutic Exercise  Treatment Frequency: (P) 4 Times per Week  Treatment Duration: (P) Until Therapy Goals Met    DC Equipment Recommendations: (P) Unable to determine at this time  Discharge Recommendations: (P) Recommend post-acute placement for additional physical therapy services prior to discharge home      Subjective    Pt reports intermittent feelings of nausea. She is very fearful of having another seizure with mobility.      Objective       11/22/23 0988   Initial Contact Note    Initial Contact Note Order Received and Verified, Physical Therapy Evaluation in Progress with Full Report to Follow.   Precautions   Precautions Fall  Risk;Swallow Precautions   Vitals   Pulse 85   Patient BP Position Sitting   Blood Pressure  (!) 147/96   Pulse Oximetry 97 %   O2 (LPM) 0   O2 Delivery Device None - Room Air   Pain   Intervention Declines   Prior Living Situation   Housing / Facility 1 Story House   Steps Into Home 4   Steps In Home 0   Equipment Owned None   Lives with - Patient's Self Care Capacity Adult Children   Prior Level of Functional Mobility   Bed Mobility Independent   Transfer Status Independent   Ambulation Independent   Ambulation Distance household   Assistive Devices Used None   Stairs Independent   Passive ROM Lower Body   Passive ROM Lower Body WDL   Active ROM Lower Body    Active ROM Lower Body  WDL   Strength Lower Body   Lower Body Strength  X   Gross Strength Generalized Weakness, Equal Bilaterally   Balance Assessment   Sitting Balance (Static) Fair   Sitting Balance (Dynamic) Fair -   Standing Balance (Static) Poor +   Standing Balance (Dynamic) Poor   Weight Shift Sitting Fair   Weight Shift Standing Poor   Bed Mobility    Supine to Sit Moderate Assist   Gait Analysis   Gait Level Of Assist Minimal Assist   Assistive Device Front Wheel Walker   Distance (Feet) 3   # of Times Distance was Traveled 1   Functional Mobility   Sit to Stand Minimal Assist   Bed, Chair, Wheelchair Transfer Minimal Assist   How much difficulty does the patient currently have...   Turning over in bed (including adjusting bedclothes, sheets and blankets)? 1   Sitting down on and standing up from a chair with arms (e.g., wheelchair, bedside commode, etc.) 1   Moving from lying on back to sitting on the side of the bed? 1   How much help from another person does the patient currently need...   Moving to and from a bed to a chair (including a wheelchair)? 3   Need to walk in a hospital room? 2   Climbing 3-5 steps with a railing? 1   6 clicks Mobility Score 9   Short Term Goals    Short Term Goal # 1 Pt will perform sit<>supine with supervision    Short Term Goal # 2 Pt will perform sit<>stand and stand pivot transfers with FWW and supervision   Short Term Goal # 3 Pt will ambulate 50' with FWW and CGA   Short Term Goal # 4 Pt will perform 4 stairs with handrails and CGA   Education Group   Education Provided Role of Physical Therapist   Role of Physical Therapist Patient Response Patient;Family;Acceptance;Explanation;Verbal Demonstration   Physical Therapy Initial Treatment Plan    Treatment Plan  Bed Mobility;Debridement;Equipment;Group Therapy;Gait Training;Manual Therapy;Neuro Re-Education / Balance;Self Care / Home Evaluation;Stair Training;Therapeutic Activities;Therapeutic Exercise   Treatment Frequency 4 Times per Week   Duration Until Therapy Goals Met   Problem List    Problems Impaired Bed Mobility;Impaired Transfers;Impaired Ambulation;Functional Strength Deficit;Impaired Balance;Decreased Activity Tolerance;Safety Awareness Deficits / Cognition   Anticipated Discharge Equipment and Recommendations   DC Equipment Recommendations Unable to determine at this time   Discharge Recommendations Recommend post-acute placement for additional physical therapy services prior to discharge home   Interdisciplinary Plan of Care Collaboration   IDT Collaboration with  Nursing;Occupational Therapist   Patient Position at End of Therapy Seated;Chair Alarm On;Family / Friend in Room;Phone within Reach;Tray Table within Reach;Call Light within Reach   Session Information   Date / Session Number  11/22-1 (1/4, 11/28)

## 2023-11-22 NOTE — CONSULTS
Physical Medicine and Rehabilitation Consultation              Date of initial consultation: 11/22/2023  Requesting provider: ordered by Juan Pollock M.D. at 11/22/23 1523   Consulting provider: Alma Earl D.O.  Reason for consultation: assess for acute inpatient rehab appropriateness  LOS: 1 Day(s)    Chief complaint: weakness     HPI: The patient is a 78 y.o.  female with a past medical history of HTN, HLD, and MD ;  who presented on 11/21/2023  2:42 PM with generalized weakness. Per documentation, patient was in her normal state of health when she went to bed on 11/20, but woke in the AM too weak to get out of bed. She crawled to the phone and activated EMS. Upon eval in the ED, patient had twitching of her legt arm. CT head was obtained which was negative for acute findings, , UA negative, and COIVD/flu/ RSV negative. Neuro consulted, and EEG ordered due to twitching. On 11/21 focal seizures seen on EEG. Patient was loaded with keppra. MRI brain ordered, results pending. Patient's hospital course has been notable for hypokalemia.Patient's BG has been elevated, she is on home dose lantus 10 units qhs. Patient is now on keppra 1000mg BID for her focal seizures, which presented with Martin's paralysis.      Patient seen and examined at bedside, patient reports she feels much better than yesterday. Reports continued knee pain and fatigue. Reports she needs to get up to go to the bathroom, but is worried she wont be able to get to the toilet because her knee is hurting her. Xrays for left knee negative for fracture, patient does have a mild effusion at knee.  Otherwise, Does not report HA, lightheadedness, SOB, CP, abdominal pain, or changes in numbness/tingling/weakness.       Social Hx:  Patient lives with adult children in a 1 story house, daughter is  and not home often, does have neighbor friend   4 REJI  At prior level of function patient was Independent with mobility and ADLs.      Tobacco: Denies   Alcohol: occasional   Drugs: Denies     THERAPY:  Restrictions: Fall Risk, Swallow Precautions   PT: Functional mobility   11/22 Min A FWW x 3 ft, Min A sit to stand, Min A transfers     OT: ADLs  11/22    Mod A lower body dressing, Min A upper body dressing     SLP:   11/22 Regular and thins     IMAGING:  MRI brain pending       PROCEDURES:  None     PMH:  Past Medical History:   Diagnosis Date    Essential hypertension 3/13/2019    Type 2 diabetes mellitus with complication, without long-term current use of insulin (HCC) 3/13/2019       PSH:  Past Surgical History:   Procedure Laterality Date    COLONOSCOPY - ENDO N/A 5/28/2019    Procedure: COLONOSCOPY;  Surgeon: Ger Nichols M.D.;  Location: SURGERY [facility];  Service: Gastroenterology       FHX:  Family History   Problem Relation Age of Onset    Heart Disease Father     Hypertension Father        Medications:  Current Facility-Administered Medications   Medication Dose    insulin GLARGINE (Lantus,Semglee) injection  10 Units    LORazepam (Ativan) injection 1 mg  1 mg    traMADol (Ultram) 50 MG tablet 50 mg  50 mg    senna-docusate (Pericolace Or Senokot S) 8.6-50 MG per tablet 2 Tablet  2 Tablet    And    polyethylene glycol/lytes (Miralax) PACKET 1 Packet  1 Packet    And    magnesium hydroxide (Milk Of Magnesia) suspension 30 mL  30 mL    And    bisacodyl (Dulcolax) suppository 10 mg  10 mg    acetaminophen (Tylenol) tablet 650 mg  650 mg    insulin regular (HumuLIN R,NovoLIN R) injection  3-14 Units    And    dextrose 10 % BOLUS 25 g  25 g    aspirin (Asa) chewable tab 81 mg  81 mg    Or    aspirin (Asa) suppository 300 mg  300 mg    enoxaparin (Lovenox) inj 40 mg  40 mg    LORazepam (Ativan) injection 1 mg  1 mg    hydrALAZINE (Apresoline) injection 10 mg  10 mg    levETIRAcetam (Keppra) injection 1,000 mg  1,000 mg       Allergies:  Allergies   Allergen Reactions    Sulfa Drugs Rash     Full body rash       Physical  "Exam:  Vitals: BP (!) 147/96   Pulse 85   Temp 35.9 °C (96.6 °F) (Temporal)   Resp 17   Ht 1.6 m (5' 2.99\")   Wt 79.1 kg (174 lb 6.1 oz)   SpO2 97%   Gen: NAD, laying comfortably in bed   Head:  NC/AT  Eyes/ Nose/ Mouth: PERRLA, moist mucous membranes  Cardio: RRR, good distal perfusion, warm extremities  Pulm: normal respiratory effort, no cyanosis   Abd: Soft NTND, negative borborygmi   Ext: No peripheral edema. No calf tenderness. No clubbing. + L knee pain TTP at infrapatellar space, + effusion     Mental status:  A&Ox4 (person, place, date, situation) answers questions appropriately follows commands  Speech: fluent, no aphasia or dysarthria    CRANIAL NERVES:  2,3: visual acuity grossly intact, PERRL  3,4,6: EOMI bilaterally, no nystagmus or diplopia  5: sensation intact to light touch bilaterally and symmetric  7: no facial asymmetry  8: hearing grossly intact    Motor:      Upper Extremity  Myotome R L   Shoulder flexion C5 5/5 5/5   Elbow flexion C5 5/5 5/5   Wrist extension C6 5/5 5/5   Elbow extension C7 5/5 5/5   Finger flexion C8 5/5 5/5   Finger abduction T1 5/5 5/5     Lower Extremity Myotome R L   Hip flexion L2 5/5 3/5   Knee extension L3 5/5 3, limited by knee pain /5   Ankle dorsiflexion L4 5/5 5/5   Toe extension L5 5/5 5/5   Ankle plantarflexion S1 5/5 5/5       Negative Pronator drift bilaterally    Sensory:   intact to light touch through out    DTRs: 2+ in bilateral  biceps  No clonus at bilateral ankles  Negative Abarca b/l     Tone: no spasticity noted, no cogwheeling noted    Coordination:   intact finger to nose bilaterally  intact fine motor with fingers bilaterally    Labs: Reviewed and significant for   Recent Labs     11/22/23 0119   RBC 4.90   HEMOGLOBIN 12.5   HEMATOCRIT 40.0   PLATELETCT 293     Recent Labs     11/22/23 0119   SODIUM 141   POTASSIUM 3.1*   CHLORIDE 105   CO2 27   GLUCOSE 95   BUN 6*   CREATININE 0.73   CALCIUM 8.4*     Recent Results (from the past 24 " hour(s))   HEMOGLOBIN A1C    Collection Time: 11/21/23  3:40 PM   Result Value Ref Range    Glycohemoglobin 14.3 (H) 4.0 - 5.6 %    Est Avg Glucose 364 mg/dL   POCT glucose device results    Collection Time: 11/21/23  5:33 PM   Result Value Ref Range    POC Glucose, Blood 278 (H) 65 - 99 mg/dL   POCT glucose device results    Collection Time: 11/21/23  8:10 PM   Result Value Ref Range    POC Glucose, Blood 230 (H) 65 - 99 mg/dL   Lipid Profile    Collection Time: 11/22/23  1:19 AM   Result Value Ref Range    Cholesterol,Tot 174 100 - 199 mg/dL    Triglycerides 70 0 - 149 mg/dL    HDL 48 >=40 mg/dL     (H) <100 mg/dL   CBC with Differential    Collection Time: 11/22/23  1:19 AM   Result Value Ref Range    WBC 9.2 4.8 - 10.8 K/uL    RBC 4.90 4.20 - 5.40 M/uL    Hemoglobin 12.5 12.0 - 16.0 g/dL    Hematocrit 40.0 37.0 - 47.0 %    MCV 81.6 81.4 - 97.8 fL    MCH 25.5 (L) 27.0 - 33.0 pg    MCHC 31.3 (L) 32.2 - 35.5 g/dL    RDW 44.8 35.9 - 50.0 fL    Platelet Count 293 164 - 446 K/uL    MPV 9.9 9.0 - 12.9 fL    Neutrophils-Polys 70.10 44.00 - 72.00 %    Lymphocytes 18.60 (L) 22.00 - 41.00 %    Monocytes 9.00 0.00 - 13.40 %    Eosinophils 1.60 0.00 - 6.90 %    Basophils 0.40 0.00 - 1.80 %    Immature Granulocytes 0.30 0.00 - 0.90 %    Nucleated RBC 0.00 0.00 - 0.20 /100 WBC    Neutrophils (Absolute) 6.43 1.82 - 7.42 K/uL    Lymphs (Absolute) 1.71 1.00 - 4.80 K/uL    Monos (Absolute) 0.83 0.00 - 0.85 K/uL    Eos (Absolute) 0.15 0.00 - 0.51 K/uL    Baso (Absolute) 0.04 0.00 - 0.12 K/uL    Immature Granulocytes (abs) 0.03 0.00 - 0.11 K/uL    NRBC (Absolute) 0.00 K/uL   Comp Metabolic Panel (CMP)    Collection Time: 11/22/23  1:19 AM   Result Value Ref Range    Sodium 141 135 - 145 mmol/L    Potassium 3.1 (L) 3.6 - 5.5 mmol/L    Chloride 105 96 - 112 mmol/L    Co2 27 20 - 33 mmol/L    Anion Gap 9.0 7.0 - 16.0    Glucose 95 65 - 99 mg/dL    Bun 6 (L) 8 - 22 mg/dL    Creatinine 0.73 0.50 - 1.40 mg/dL    Calcium 8.4 (L) 8.5  - 10.5 mg/dL    Correct Calcium 8.9 8.5 - 10.5 mg/dL    AST(SGOT) 12 12 - 45 U/L    ALT(SGPT) 10 2 - 50 U/L    Alkaline Phosphatase 104 (H) 30 - 99 U/L    Total Bilirubin 0.4 0.1 - 1.5 mg/dL    Albumin 3.4 3.2 - 4.9 g/dL    Total Protein 6.1 6.0 - 8.2 g/dL    Globulin 2.7 1.9 - 3.5 g/dL    A-G Ratio 1.3 g/dL   ESTIMATED GFR    Collection Time: 11/22/23  1:19 AM   Result Value Ref Range    GFR (CKD-EPI) 83 >60 mL/min/1.73 m 2   POCT glucose device results    Collection Time: 11/22/23  7:56 AM   Result Value Ref Range    POC Glucose, Blood 153 (H) 65 - 99 mg/dL   POCT glucose device results    Collection Time: 11/22/23 11:50 AM   Result Value Ref Range    POC Glucose, Blood 197 (H) 65 - 99 mg/dL         ASSESSMENT:  Patient is a 78 y.o. female admitted with seizure activity     [facility] Code / Diagnosis to Support: 0003.9 - Neurologic Conditions: Other Neurologic    Rehabilitation: Impaired ADLs and mobility  Patient is a good candidate for inpatient rehab based on needs for PT, OT, pending MRI brain completion. See diso details below.     Barriers to transfer include: Insurance authorization, TCCs to verify disposition, medical clearance and bed availability     Additional Recommendations:  Seizure activity   - originally presented with weakness and twitching   - Neuro consulted and EEG obtained   - patient with focal seizure activity on EEG, focal seizures present with Martin's paralysis   - now on Keppra 100mg BID   - MRI brain pending   - continue with PT/OT     DM   - blood sugars up to 300 at time of admission   - on lantus 10 units qhs   - SSI     Hypokalemia   - K 3.1, primary team repleted and monitoring  as appropriate      Left knee pain  - left knee xray negative for acute fracture   - has left knee effusion   - please utilize ice PRN   - Voltaren gel ordered     Dispo:  - patient is currently functioning below their level of baseline, recommend post acute rehab  - recommend IRF level therapy with 3hr of therapy  5 days per week  - piror to acceptance to IRF, will need insurance auth and MRI brain completed   - TCC to assist with insurance auth and DC support         Medical Complexity:  Seizure activity   DM   Hypokalemia   Impaired mobility and ADLs       DVT PPX: Lovenox       Thank you for allowing us to participate in the care of this patient.     Patient was seen for 81 minutes on unit/floor of which > 50% of time was spent on counseling and coordination of care regarding the above, including prognosis, risk reduction, benefits of treatment, and options for next stage of care.    Alma Earl D.O.   Physical Medicine and Rehabilitation     Please note that this dictation was created using voice recognition software. I have made every reasonable attempt to correct obvious errors, but there may be errors of grammar and possibly content that I did not discover before finalizing the note.

## 2023-11-22 NOTE — PROGRESS NOTES
Patient lethargic and drowsy. VSS. Patient turned q2. Easy to arouse, but remains intermittently confused of place and time. No witnessed seizure activity. Remains on continuous EEG

## 2023-11-22 NOTE — ASSESSMENT & PLAN NOTE
DNAR: I discussed code status with patient  who at time of discussion had medical decision making capacity. The patient wishes to be DNAR and under no circumstances would want life sustaining treatments in the event that he has a cardiac arrest. I also discussed intubation including temporary courses and he does not wish to be placed on a ventilator under any circumstances, including for temporary and reversible causes. This discussion was had with her family in the room.    I discussed advance care planning with the patient and family for 16 minutes, including diagnosis, prognosis, plan of care, risks and benefits of any therapies that could be offered, as well as alternatives including palliation and hospice, as appropriate.

## 2023-11-22 NOTE — THERAPY
"Occupational Therapy   Initial Evaluation     Patient Name: Molly Fountain  Age:  78 y.o., Sex:  female  Medical Record #: [id number]  Today's Date: 11/22/2023     Precautions: Fall Risk, Swallow Precautions  Comments: seizure prec    Assessment  Patient is 78 y.o. female admitted for seizure-like activity. PMhx: DM, HTN and medication non-complicance. Per chart pt stopped taking all of her medications (~2 years ago), Family also report loss of spouse ~1 year ago and functional decline since that time.   This admission pt is dx w/spells of trembling r/o seizure activity, and essential HTN. Pt is pending MRI but current demonstrating generalized weakness, delayed processing/attention w/cognition, decreased dynamic balance and decreased activity tolerance. OT will follow in this setting currently recommending post acute placement.     Plan  Occupational Therapy Initial Treatment Plan   Treatment Interventions: Self Care / Activities of Daily Living, Cognitive Skill Development, Manual Therapy Techniques, Neuro Re-Education / Balance, Therapeutic Exercises, Therapeutic Activity  Treatment Frequency: 4 Times per Week  Duration: Until Therapy Goals Met    DC Equipment Recommendations: Unable to determine at this time  Discharge Recommendations: Recommend post-acute placement for additional occupational therapy services prior to discharge home     Subjective  \"I just feel tired\"      Objective     11/22/23 1000   Charge Group   OT Evaluation OT Evaluation Low   Total Time Spent   OT Time Spent Yes   OT Evaluation (Minutes) 15   OT Total Time Spent (Calculated) 15   Initial Contact Note    Initial Contact Note Order Received and Verified, Occupational Therapy Evaluation in Progress with Full Report to Follow.   Prior Living Situation   Prior Services None   Housing / Facility 1 Story House   Bathroom Set up Walk In Shower   Equipment Owned None   Lives with - Patient's Self Care Capacity Adult Children   Comments pt lives " "w/adult dtr who is a  and is often not home   Prior Level of ADL Function   Self Feeding Independent   Grooming / Hygiene Independent   Bathing Independent   Dressing Independent   Toileting Independent   Prior Level of IADL Function   Medication Management Other (Comments)   Laundry Independent   Kitchen Mobility Independent   Finances Independent   Home Management Independent   Shopping Independent   Prior Level Of Mobility Independent Without Device in Community   Comments per chart pt stopped taking all her medications; per family pt lost her SO ~1 year ago and  has been having decline ever since   History of Falls   History of Falls Yes   Precautions   Precautions Fall Risk;Swallow Precautions   Comments seizure prec   Pain 0 - 10 Group   Location Generalized   Therapist Pain Assessment During Activity;Nurse Notified  (reports \"not feeling well\")   Cognition    Cognition / Consciousness X   Speech/ Communication Delayed Responses   Level of Consciousness Responds to voice   New Learning Impaired   Attention Impaired   Comments Cooperative but w/delay in response and processing   Passive ROM Upper Body   Passive ROM Upper Body WDL   Active ROM Upper Body   Active ROM Upper Body  WDL   Strength Upper Body   Upper Body Strength  X   Gross Strength Generalized Weakness, Equal Bilaterally.    Sensation Upper Body   Upper Extremity Sensation  Not Tested   Upper Body Muscle Tone   Upper Body Muscle Tone  WDL   Neurological Concerns   Neurological Concerns No   Coordination Upper Body   Coordination X   Comments generally delayed appears to be more so d/t cognition   Balance Assessment   Sitting Balance (Static) Fair   Sitting Balance (Dynamic) Fair   Standing Balance (Static) Fair   Standing Balance (Dynamic) Fair -   Weight Shift Sitting Fair   Weight Shift Standing Poor   Comments w/fww   Bed Mobility    Comments up EOB w/PT   ADL Assessment   Grooming Contact Guard Assist;Seated   Upper Body Dressing " Minimal Assist   Lower Body Dressing Moderate Assist   Toileting   (NT)   How much help from another person does the patient currently need...   6 Clicks Daily Activity Score 18   Functional Mobility   Sit to Stand Minimal Assist   Bed, Chair, Wheelchair Transfer Minimal Assist   Mobility EOB>sit>stand>EOB>chair   Visual Perception   Visual Perception  Not Tested   Comments intermittent c/o dizziness   Activity Tolerance   Comments poor reports fatigue/nausea and generalized discomfort   Patient / Family Goals   Patient / Family Goal #1 to get some rest   Short Term Goals   Short Term Goal # 1 pt will complete grooming standing at sink w/spv   Short Term Goal # 2 pt will complete FB dressing w/spv   Short Term Goal # 3 pt will complete toilet txf and hygiene w/spv   Education Group   Role of Occupational Therapist Patient Response Patient;Family;Acceptance;Explanation;Demonstration   Occupational Therapy Initial Treatment Plan    Treatment Interventions Self Care / Activities of Daily Living;Cognitive Skill Development;Manual Therapy Techniques;Neuro Re-Education / Balance;Therapeutic Exercises;Therapeutic Activity   Treatment Frequency 4 Times per Week   Duration Until Therapy Goals Met   Problem List   Problem List Decreased Active Daily Living Skills;Decreased Upper Extremity Strength Right;Decreased Upper Extremity Strength Left;Decreased Functional Mobility;Decreased Activity Tolerance;Safety Awareness Deficits / Cognition;Impaired Coordination Right Upper Extremity;Impaired Coordination Left Upper Extremity;Impaired Vision;Impaired Postural Control / Balance   Anticipated Discharge Equipment and Recommendations   DC Equipment Recommendations Unable to determine at this time   Discharge Recommendations Recommend post-acute placement for additional occupational therapy services prior to discharge home   Interdisciplinary Plan of Care Collaboration   IDT Collaboration with  Nursing   Patient Position at End of  Therapy Call Light within Reach;Tray Table within Reach;Phone within Reach;Seated;Chair Alarm On   Collaboration Comments RN aware of OT eval and pts efforts   Session Information   Date / Session Number  11/22 #1 (1/4, 11/28)

## 2023-11-22 NOTE — CARE PLAN
The patient is Stable - Low risk of patient condition declining or worsening    Shift Goals  Clinical Goals: stable neuro assessment/MRI  Patient Goals: MRI  Family Goals: janeth    Progress made toward(s) clinical / shift goals:  awaiting MRI, neuro assessment stable       Patient is not progressing towards the following goals:      Problem: Optimal Care of the Stroke Patient  Goal: Optimal emergency care for the stroke patient  Description: Target End Date:  End of day 1    Time of Onset    1.  Time of last known well obtained  2.  Patient and family/caregiver verbalize understanding of diagnosis, medications and testing  3.  NIHSS performed and documented, including date and time, for ischemic stroke patients prior to any acute recanalization therapy (thrombolytics or mechanical) or within 12 hours of arrival if no intervention is warranted  4.  Consults and referrals placed to appropriate departments    Medications Administration as Ordered:    1.  Implement appropriate reversal agents for INR greater than 1.5  2.  Pre-alteplase administration of antihypertensives for SBP >185 DBP >110  3.  Post-alteplase administration of antihypertensives for SBP >185, DBP >105  4.  Thrombolytic Therapy for qualifying ischemic stroke patients who arrive within 4.5 hours of time of Last Known Well. Thrombolytic therapy administered within 30 minutes or a documented reason for delay  Outcome: Not Progressing

## 2023-11-23 ENCOUNTER — APPOINTMENT (OUTPATIENT)
Dept: RADIOLOGY | Facility: MEDICAL CENTER | Age: 78
DRG: 066 | End: 2023-11-23
Attending: HOSPITALIST
Payer: MEDICARE

## 2023-11-23 LAB
GLUCOSE BLD STRIP.AUTO-MCNC: 141 MG/DL (ref 65–99)
GLUCOSE BLD STRIP.AUTO-MCNC: 161 MG/DL (ref 65–99)
GLUCOSE BLD STRIP.AUTO-MCNC: 280 MG/DL (ref 65–99)
GLUCOSE BLD STRIP.AUTO-MCNC: 286 MG/DL (ref 65–99)

## 2023-11-23 PROCEDURE — 700102 HCHG RX REV CODE 250 W/ 637 OVERRIDE(OP): Performed by: PSYCHIATRY & NEUROLOGY

## 2023-11-23 PROCEDURE — 70551 MRI BRAIN STEM W/O DYE: CPT

## 2023-11-23 PROCEDURE — 700101 HCHG RX REV CODE 250: Performed by: PHYSICAL MEDICINE & REHABILITATION

## 2023-11-23 PROCEDURE — A9270 NON-COVERED ITEM OR SERVICE: HCPCS | Performed by: PSYCHIATRY & NEUROLOGY

## 2023-11-23 PROCEDURE — 700102 HCHG RX REV CODE 250 W/ 637 OVERRIDE(OP): Performed by: HOSPITALIST

## 2023-11-23 PROCEDURE — A9270 NON-COVERED ITEM OR SERVICE: HCPCS | Performed by: STUDENT IN AN ORGANIZED HEALTH CARE EDUCATION/TRAINING PROGRAM

## 2023-11-23 PROCEDURE — 82962 GLUCOSE BLOOD TEST: CPT

## 2023-11-23 PROCEDURE — 99233 SBSQ HOSP IP/OBS HIGH 50: CPT | Performed by: PSYCHIATRY & NEUROLOGY

## 2023-11-23 PROCEDURE — 700111 HCHG RX REV CODE 636 W/ 250 OVERRIDE (IP): Mod: JZ | Performed by: PSYCHIATRY & NEUROLOGY

## 2023-11-23 PROCEDURE — 700102 HCHG RX REV CODE 250 W/ 637 OVERRIDE(OP): Performed by: STUDENT IN AN ORGANIZED HEALTH CARE EDUCATION/TRAINING PROGRAM

## 2023-11-23 PROCEDURE — 770006 HCHG ROOM/CARE - MED/SURG/GYN SEMI*

## 2023-11-23 PROCEDURE — 99233 SBSQ HOSP IP/OBS HIGH 50: CPT | Performed by: STUDENT IN AN ORGANIZED HEALTH CARE EDUCATION/TRAINING PROGRAM

## 2023-11-23 PROCEDURE — 700111 HCHG RX REV CODE 636 W/ 250 OVERRIDE (IP): Mod: JZ | Performed by: HOSPITALIST

## 2023-11-23 PROCEDURE — A9270 NON-COVERED ITEM OR SERVICE: HCPCS | Performed by: HOSPITALIST

## 2023-11-23 PROCEDURE — 700111 HCHG RX REV CODE 636 W/ 250 OVERRIDE (IP): Performed by: STUDENT IN AN ORGANIZED HEALTH CARE EDUCATION/TRAINING PROGRAM

## 2023-11-23 RX ORDER — LEVETIRACETAM 500 MG/1
1000 TABLET ORAL 2 TIMES DAILY
Status: DISCONTINUED | OUTPATIENT
Start: 2023-11-23 | End: 2023-11-29 | Stop reason: HOSPADM

## 2023-11-23 RX ORDER — AMLODIPINE BESYLATE 5 MG/1
5 TABLET ORAL
Status: DISCONTINUED | OUTPATIENT
Start: 2023-11-23 | End: 2023-11-24

## 2023-11-23 RX ADMIN — ASPIRIN 81 MG: 81 TABLET, CHEWABLE ORAL at 04:51

## 2023-11-23 RX ADMIN — ENOXAPARIN SODIUM 40 MG: 100 INJECTION SUBCUTANEOUS at 17:58

## 2023-11-23 RX ADMIN — DOCUSATE SODIUM 50 MG AND SENNOSIDES 8.6 MG 2 TABLET: 8.6; 5 TABLET, FILM COATED ORAL at 04:51

## 2023-11-23 RX ADMIN — DICLOFENAC SODIUM 4 G: 10 GEL TOPICAL at 15:36

## 2023-11-23 RX ADMIN — INSULIN HUMAN 7 UNITS: 100 INJECTION, SOLUTION PARENTERAL at 18:10

## 2023-11-23 RX ADMIN — DICLOFENAC SODIUM 4 G: 10 GEL TOPICAL at 08:47

## 2023-11-23 RX ADMIN — DOCUSATE SODIUM 50 MG AND SENNOSIDES 8.6 MG 2 TABLET: 8.6; 5 TABLET, FILM COATED ORAL at 17:57

## 2023-11-23 RX ADMIN — LEVETIRACETAM 1000 MG: 500 TABLET, FILM COATED ORAL at 17:57

## 2023-11-23 RX ADMIN — AMLODIPINE BESYLATE 5 MG: 5 TABLET ORAL at 15:35

## 2023-11-23 RX ADMIN — INSULIN HUMAN 3 UNITS: 100 INJECTION, SOLUTION PARENTERAL at 12:42

## 2023-11-23 RX ADMIN — INSULIN GLARGINE-YFGN 10 UNITS: 100 INJECTION, SOLUTION SUBCUTANEOUS at 18:09

## 2023-11-23 RX ADMIN — ACETAMINOPHEN 650 MG: 325 TABLET, FILM COATED ORAL at 15:49

## 2023-11-23 RX ADMIN — DICLOFENAC SODIUM 4 G: 10 GEL TOPICAL at 20:33

## 2023-11-23 RX ADMIN — LEVETIRACETAM 1000 MG: 100 INJECTION, SOLUTION, CONCENTRATE INTRAVENOUS at 04:50

## 2023-11-23 RX ADMIN — LORAZEPAM 1 MG: 2 INJECTION INTRAMUSCULAR; INTRAVENOUS at 07:48

## 2023-11-23 RX ADMIN — INSULIN HUMAN 7 UNITS: 100 INJECTION, SOLUTION PARENTERAL at 20:30

## 2023-11-23 ASSESSMENT — PAIN DESCRIPTION - PAIN TYPE
TYPE: ACUTE PAIN

## 2023-11-23 ASSESSMENT — ENCOUNTER SYMPTOMS
WEAKNESS: 1
HEADACHES: 1
SENSORY CHANGE: 1

## 2023-11-23 NOTE — PROGRESS NOTES
"Hospital Medicine Daily Progress Note    Date of Service  11/23/2023    Chief Complaint  Molly Fountain is a 78 y.o. female admitted 11/21/2023 with weakness    Hospital Course  78 y.o. female with past medical history of hypertension, dyslipidemia, diabetes mellitus though has not been taking any medications, who presented 11/21/2023 with weakness.  She was too weak to get out of bed therefore she had to crawl to get a bed and eventually crawled to the living room where she got a phone and called her neighbor who then called 911.  Apparently paramedics noted  asymmetric facial twitching which was also noted in the ER and some \"twitching\" of the left arm as well.  She did not lose consciousness during these events. There a CT of the head was negative. A1c 14.3. RSV, flu, COVID all negative, urinalysis negative.   EEG notes focal seizure during the EEG study involving the left face, arm and leg. Event spontaneously resolved in about 3 minutes. No loss of awareness during the event. Post event significant for left face, arm and leg weakness. Normal extremity movement and strength prior to the event.   Neurology was consulted. Patient was started on Keppra   MRI notes No evidence of acute hemorrhage, acute infarction, or mass lesion. 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    Interval Problem Update  Seen patient at bedside  Son at bedside  Continue Keppra  MRI reviewed no acute hemorrhage or infarction  PT/OT/PMR  L knee pain, xray reviewed unremarkable  A1c 14.3, not on home meds.  Ordered Lantus, continue sliding scale.  Ordered diabetes education    I have discussed this patient's plan of care and discharge plan at IDT rounds today with Case Management, Nursing, Nursing leadership, and other members of the IDT team.    Consultants/Specialty  neurology    Code Status  DNAR/DNI    Disposition  The " patient is not medically cleared for discharge to home or a post-acute facility.      I have placed the appropriate orders for post-discharge needs.    Review of Systems  Review of Systems   Constitutional:  Positive for malaise/fatigue.   Neurological:  Positive for sensory change, weakness and headaches.   All other systems reviewed and are negative.       Physical Exam  Temp:  [36.3 °C (97.3 °F)-36.7 °C (98.1 °F)] 36.6 °C (97.9 °F)  Pulse:  [83-94] 93  Resp:  [16-17] 17  BP: (138-151)/(81-91) 151/91  SpO2:  [92 %-97 %] 94 %    Physical Exam  Vitals reviewed.   Constitutional:       Appearance: Normal appearance. She is obese. She is ill-appearing.   HENT:      Head: Normocephalic and atraumatic.      Nose: Nose normal.      Mouth/Throat:      Pharynx: Oropharynx is clear.   Eyes:      Extraocular Movements: Extraocular movements intact.      Conjunctiva/sclera: Conjunctivae normal.      Pupils: Pupils are equal, round, and reactive to light.   Cardiovascular:      Rate and Rhythm: Normal rate and regular rhythm.      Pulses: Normal pulses.      Heart sounds: Normal heart sounds.   Pulmonary:      Effort: Pulmonary effort is normal.      Breath sounds: Normal breath sounds.   Abdominal:      General: Abdomen is flat. Bowel sounds are normal.      Palpations: Abdomen is soft.   Musculoskeletal:         General: Normal range of motion.      Cervical back: Normal range of motion and neck supple.   Skin:     General: Skin is warm and dry.   Neurological:      Mental Status: She is alert and oriented to person, place, and time. Mental status is at baseline.      Comments: Left hemiparesis involving the face, arm > leg.  No abnormal movements or postures.   Psychiatric:         Mood and Affect: Mood normal.         Behavior: Behavior normal.         Fluids    Intake/Output Summary (Last 24 hours) at 11/23/2023 1338  Last data filed at 11/23/2023 0900  Gross per 24 hour   Intake 320 ml   Output --   Net 320 ml          Laboratory  Recent Labs     11/22/23 0119   WBC 9.2   RBC 4.90   HEMOGLOBIN 12.5   HEMATOCRIT 40.0   MCV 81.6   MCH 25.5*   MCHC 31.3*   RDW 44.8   PLATELETCT 293   MPV 9.9       Recent Labs     11/22/23 0119   SODIUM 141   POTASSIUM 3.1*   CHLORIDE 105   CO2 27   GLUCOSE 95   BUN 6*   CREATININE 0.73   CALCIUM 8.4*               Recent Labs     11/22/23 0119   TRIGLYCERIDE 70   HDL 48   *         Imaging  MR-BRAIN-W/O   Final Result      1.  Moderate cerebral atrophy.   2.  Advanced supratentorial white matter disease most consistent with microvascular ischemic change.   3.  Minimal pontine ischemic gliosis.   4.  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   5.  No evidence of acute hemorrhage, acute infarction, or mass lesion.      DX-KNEE 2- LEFT   Final Result      1.  No fracture or dislocation of LEFT knee.   2.  Minimal degenerative changes.      OUTSIDE IMAGES-CT HEAD   Final Result           Assessment/Plan  * Seizure (HCC)- (present on admission)  Assessment & Plan  EEG notes focal seizure during the EEG study involving the left face, arm and leg. Event spontaneously resolved in about 3 minutes. No loss of awareness during the event. Post event significant for left face, arm and leg weakness. Normal extremity movement and strength prior to the event.   CT of the head was negative    Loaded with Keppra. Continue with Keppra 1000mg bid  MRI no evidence of acute hemorrhage, acute infarction, or mass lesion  No driving.  No handling weapons.  No swimming or tub bathing alone.  No working from heights.  No other high risk activity that may result in harm to self or others in the event of a seizure. Avoid sleep deprivation- discussed with patient and family at bedside   Seizure precautions      Advance care planning  Assessment & Plan  DNAR: I discussed code status with patient  who  at time of discussion had medical decision making capacity. The patient wishes to be DNAR and under no circumstances would want life sustaining treatments in the event that he has a cardiac arrest. I also discussed intubation including temporary courses and he does not wish to be placed on a ventilator under any circumstances, including for temporary and reversible causes. This discussion was had with her family in the room.    I discussed advance care planning with the patient and family for 16 minutes, including diagnosis, prognosis, plan of care, risks and benefits of any therapies that could be offered, as well as alternatives including palliation and hospice, as appropriate.        Hypokalemia- (present on admission)  Assessment & Plan  Replace as indicated    Essential hypertension- (present on admission)  Assessment & Plan  He has a history of hypertension and has not been taking any medications  MRI no acute infarction  Start amlodipine 5mg daily       Type 2 diabetes mellitus with complication, without long-term current use of insulin (HCC)- (present on admission)  Assessment & Plan  A1c 14.3.  Patient is aware of diabetes diagnosis.  However she had been on medications but stopped them.  Lantus and Sliding scale insulin  Hypoglycemic protocol diabetes education           VTE prophylaxis:    enoxaparin ppx      I have performed a physical exam and reviewed and updated ROS and Plan today (11/23/2023). In review of yesterday's note (11/22/2023), there are no changes except as documented above.    Patient is has a high medical complexity, complex decision making and is at high risk for complication, morbidity, and mortality.    My total time spent caring for the patient on the day of the encounter was 52  minutes.   This does not include time spent on separately billable procedures/tests.

## 2023-11-23 NOTE — CARE PLAN
The patient is Watcher - Medium risk of patient condition declining or worsening    Shift Goals  Clinical Goals: monitor for neuro changes, pain mgmt, skin integrity, mobility  Patient Goals: rest and sleep  Family Goals: janeth    Progress made toward(s) clinical / shift goals:      Problem: Hemodynamic Monitoring  Goal: Patient's hemodynamics, fluid balance and neurologic status will be stable or improve  Outcome: Progressing       Patient is not progressing towards the following goals:      Problem: Knowledge Deficit - Standard  Goal: Patient and family/care givers will demonstrate understanding of plan of care, disease process/condition, diagnostic tests and medications  Outcome: Not Met     Continue patient education. Allow for teach back    Problem: Respiratory - Stroke Patient  Goal: Patient will achieve/maintain optimum respiratory rate/effort  Outcome: Not Met     Remains on 2L O2 via nc. Encouraged deep breathing.    Problem: Self Care  Goal: Patient will have the ability to perform ADLs independently or with assistance (bathe, groom, dress, toilet and feed)  Outcome: Not Met     Remins mod assist with ADLs

## 2023-11-23 NOTE — PROGRESS NOTES
Referring Physician: Lilly Anderson M.D.    S:  No acute events. No seizures. Completed MRI brain earlier this morning. Sleepy following dose of Ativan. Otherwise no new neurologic symptoms. Family were at the bedside.     O:    Vitals:    11/23/23 0800   BP: (!) 151/91   Pulse: 93   Resp: 17   Temp:    SpO2: 94%      Mental status: Sleeping. Awakens easily to voice. Follows commands.   Language: Paucity of spontaneous speech.   Cranial nerves:    Pupils are equal, round and reactive to light  Visually attends and tracks the examiner   Subtle smoothing of the left nasolabial fold. Symmetric activation.   Hearing is intact to conversation  Motor: Normal bulk and tone.  Moves all extremities.  No pronator drift.  Antigravity in the bilateral lower extremities with drift slightly more noticeable on the left as the patient has significant pain with movement.  No abnormal movements or postures.   Reflexes: Not tested.   Sensory: Intact to light-touch.  Coordination: Slowed. No dysmetria.  Gait: Not tested.     MRI brain-punctate focus of restricted diffusion involving the right insula (anterior-inferior)    HbA1C 14.3    HDL 48    Impression & Recommendations:  Acute ischemic right insular infarct initially presenting with recurrent focal seizures involving the left face arm and leg with EEG correlate.  Had a prominent postictal paresis (Martin's) involving the left face arm and leg; resolved.  No recognized seizure recurrence.  Medical history significant for multiple concerning vascular/stroke risk factors (previously untreated diabetes and hypertension). I am unable to rule-out cardioembolic mechanism at this time.  - Follow-up final MRI brain report.    - Continue Keppra 1000mg twice daily. Please continue in the outpatient setting.  - Complete stroke work-up (ECHO, CTA head & neck).  - Continue ASA 81mg daily for now. May need DAPT pending additional testing.  - Goal is normoglycemia and normotension.   -  Initiate high-intensity statin.  - Telemetry. Ziopatch upon discharge.   - Seizure precautions. Q4hr neuro checks.    Neurology will follow along.       I provided a total of 50 minutes of acute neurologic care for this patient encounter reviewing medical records, diagnostic studies, direct face-to-face time with the patient and family, documentation, and communicating plan of care.      Kenneth Jamison MD  Neurohospitalist, Acute Care Services          Please note that this dictation was created using voice recognition software.  I have made every reasonable attempt to correct obvious errors, but I expect that there are errors of grammar and possibly content that I did not discover before finalizing the note.

## 2023-11-24 ENCOUNTER — APPOINTMENT (OUTPATIENT)
Dept: RADIOLOGY | Facility: MEDICAL CENTER | Age: 78
DRG: 066 | End: 2023-11-24
Attending: STUDENT IN AN ORGANIZED HEALTH CARE EDUCATION/TRAINING PROGRAM
Payer: MEDICARE

## 2023-11-24 ENCOUNTER — HOSPITAL ENCOUNTER (INPATIENT)
Facility: REHABILITATION | Age: 78
End: 2023-11-24
Attending: PHYSICAL MEDICINE & REHABILITATION | Admitting: PHYSICAL MEDICINE & REHABILITATION
Payer: MEDICARE

## 2023-11-24 ENCOUNTER — APPOINTMENT (OUTPATIENT)
Dept: CARDIOLOGY | Facility: MEDICAL CENTER | Age: 78
DRG: 066 | End: 2023-11-24
Attending: STUDENT IN AN ORGANIZED HEALTH CARE EDUCATION/TRAINING PROGRAM
Payer: MEDICARE

## 2023-11-24 PROBLEM — I63.9 CVA (CEREBRAL VASCULAR ACCIDENT) (HCC): Status: ACTIVE | Noted: 2023-11-24

## 2023-11-24 LAB
GLUCOSE BLD STRIP.AUTO-MCNC: 124 MG/DL (ref 65–99)
GLUCOSE BLD STRIP.AUTO-MCNC: 182 MG/DL (ref 65–99)
GLUCOSE BLD STRIP.AUTO-MCNC: 200 MG/DL (ref 65–99)
GLUCOSE BLD STRIP.AUTO-MCNC: 223 MG/DL (ref 65–99)
LV EJECT FRACT  99904: 60
LV EJECT FRACT MOD 2C 99903: 61.68
LV EJECT FRACT MOD 4C 99902: 56.52
LV EJECT FRACT MOD BP 99901: 60.65

## 2023-11-24 PROCEDURE — A9270 NON-COVERED ITEM OR SERVICE: HCPCS | Performed by: HOSPITALIST

## 2023-11-24 PROCEDURE — 92523 SPEECH SOUND LANG COMPREHEN: CPT

## 2023-11-24 PROCEDURE — 93880 EXTRACRANIAL BILAT STUDY: CPT

## 2023-11-24 PROCEDURE — 70498 CT ANGIOGRAPHY NECK: CPT

## 2023-11-24 PROCEDURE — 93306 TTE W/DOPPLER COMPLETE: CPT

## 2023-11-24 PROCEDURE — 700102 HCHG RX REV CODE 250 W/ 637 OVERRIDE(OP): Performed by: PSYCHIATRY & NEUROLOGY

## 2023-11-24 PROCEDURE — 700117 HCHG RX CONTRAST REV CODE 255: Performed by: STUDENT IN AN ORGANIZED HEALTH CARE EDUCATION/TRAINING PROGRAM

## 2023-11-24 PROCEDURE — A9270 NON-COVERED ITEM OR SERVICE: HCPCS | Performed by: PSYCHIATRY & NEUROLOGY

## 2023-11-24 PROCEDURE — 700102 HCHG RX REV CODE 250 W/ 637 OVERRIDE(OP): Performed by: STUDENT IN AN ORGANIZED HEALTH CARE EDUCATION/TRAINING PROGRAM

## 2023-11-24 PROCEDURE — 770006 HCHG ROOM/CARE - MED/SURG/GYN SEMI*

## 2023-11-24 PROCEDURE — 700111 HCHG RX REV CODE 636 W/ 250 OVERRIDE (IP): Mod: JZ | Performed by: HOSPITALIST

## 2023-11-24 PROCEDURE — 93306 TTE W/DOPPLER COMPLETE: CPT | Mod: 26 | Performed by: INTERNAL MEDICINE

## 2023-11-24 PROCEDURE — A9270 NON-COVERED ITEM OR SERVICE: HCPCS | Performed by: STUDENT IN AN ORGANIZED HEALTH CARE EDUCATION/TRAINING PROGRAM

## 2023-11-24 PROCEDURE — 700102 HCHG RX REV CODE 250 W/ 637 OVERRIDE(OP): Performed by: HOSPITALIST

## 2023-11-24 PROCEDURE — 99232 SBSQ HOSP IP/OBS MODERATE 35: CPT | Performed by: PSYCHIATRY & NEUROLOGY

## 2023-11-24 PROCEDURE — 93880 EXTRACRANIAL BILAT STUDY: CPT | Mod: 26 | Performed by: INTERNAL MEDICINE

## 2023-11-24 PROCEDURE — 99233 SBSQ HOSP IP/OBS HIGH 50: CPT | Performed by: STUDENT IN AN ORGANIZED HEALTH CARE EDUCATION/TRAINING PROGRAM

## 2023-11-24 PROCEDURE — 82962 GLUCOSE BLOOD TEST: CPT | Mod: 91

## 2023-11-24 PROCEDURE — 70496 CT ANGIOGRAPHY HEAD: CPT

## 2023-11-24 RX ORDER — AMLODIPINE BESYLATE 5 MG/1
10 TABLET ORAL
Status: DISCONTINUED | OUTPATIENT
Start: 2023-11-25 | End: 2023-11-29 | Stop reason: HOSPADM

## 2023-11-24 RX ORDER — ATORVASTATIN CALCIUM 80 MG/1
80 TABLET, FILM COATED ORAL EVERY EVENING
Status: DISCONTINUED | OUTPATIENT
Start: 2023-11-24 | End: 2023-11-29 | Stop reason: HOSPADM

## 2023-11-24 RX ADMIN — LEVETIRACETAM 1000 MG: 500 TABLET, FILM COATED ORAL at 05:31

## 2023-11-24 RX ADMIN — ASPIRIN 81 MG: 81 TABLET, CHEWABLE ORAL at 05:31

## 2023-11-24 RX ADMIN — TRAMADOL HYDROCHLORIDE 50 MG: 50 TABLET ORAL at 19:59

## 2023-11-24 RX ADMIN — INSULIN HUMAN 4 UNITS: 100 INJECTION, SOLUTION PARENTERAL at 17:58

## 2023-11-24 RX ADMIN — DOCUSATE SODIUM 50 MG AND SENNOSIDES 8.6 MG 2 TABLET: 8.6; 5 TABLET, FILM COATED ORAL at 05:31

## 2023-11-24 RX ADMIN — TRAMADOL HYDROCHLORIDE 50 MG: 50 TABLET ORAL at 03:09

## 2023-11-24 RX ADMIN — POLYETHYLENE GLYCOL 3350 1 PACKET: 17 POWDER, FOR SOLUTION ORAL at 12:09

## 2023-11-24 RX ADMIN — AMLODIPINE BESYLATE 5 MG: 5 TABLET ORAL at 05:31

## 2023-11-24 RX ADMIN — INSULIN HUMAN 3 UNITS: 100 INJECTION, SOLUTION PARENTERAL at 12:18

## 2023-11-24 RX ADMIN — ATORVASTATIN CALCIUM 80 MG: 80 TABLET, FILM COATED ORAL at 17:48

## 2023-11-24 RX ADMIN — DICLOFENAC SODIUM 4 G: 10 GEL TOPICAL at 20:04

## 2023-11-24 RX ADMIN — LEVETIRACETAM 1000 MG: 500 TABLET, FILM COATED ORAL at 17:48

## 2023-11-24 RX ADMIN — TRAMADOL HYDROCHLORIDE 50 MG: 50 TABLET ORAL at 12:08

## 2023-11-24 RX ADMIN — ENOXAPARIN SODIUM 40 MG: 100 INJECTION SUBCUTANEOUS at 17:48

## 2023-11-24 RX ADMIN — INSULIN HUMAN 3 UNITS: 100 INJECTION, SOLUTION PARENTERAL at 20:04

## 2023-11-24 RX ADMIN — IOHEXOL 80 ML: 350 INJECTION, SOLUTION INTRAVENOUS at 08:50

## 2023-11-24 RX ADMIN — DICLOFENAC SODIUM 4 G: 10 GEL TOPICAL at 09:19

## 2023-11-24 RX ADMIN — DICLOFENAC SODIUM 4 G: 10 GEL TOPICAL at 17:47

## 2023-11-24 RX ADMIN — INSULIN GLARGINE-YFGN 10 UNITS: 100 INJECTION, SOLUTION SUBCUTANEOUS at 17:57

## 2023-11-24 ASSESSMENT — PAIN DESCRIPTION - PAIN TYPE
TYPE: ACUTE PAIN

## 2023-11-24 ASSESSMENT — ENCOUNTER SYMPTOMS
HEADACHES: 1
WEAKNESS: 1
SENSORY CHANGE: 1

## 2023-11-24 NOTE — ASSESSMENT & PLAN NOTE
MRI notes a subacute lacunar size infarct. No mass effect or surrounding edema in this vicinity. No evidence of acute hemorrhage, or mass lesion. 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   CTA neck Attenuated flow involving the right common carotid artery at the origin and extending along the course of several centimeters within the lower cervical/upper thoracic region. Artifact?   Echo   Ordered carotid US: Torturous right carotid artery  Continue ASA and statin  A1c 14, optimized DM managements   , start high-intensity statin.   Zio patch upon discharge  [facility] outpatient follow-up

## 2023-11-24 NOTE — PROGRESS NOTES
Referring Physician: Lilly Anderson M.D.    S:  No acute events. No seizures. No new neurologic symptoms. Completed CTA head and neck. ECHO is in progress.     O:    Vitals:    11/24/23 0717   BP: (!) 155/88   Pulse: 92   Resp: 18   Temp: 36.7 °C (98.1 °F)   SpO2: 90%     Mental status: Awake, alert and interactive. More conversant this morning.    Language: Language is fluent with intact comprehension.  No dysarthria.  Cranial nerves:               Pupils are equal, round and reactive to light  Versions are intact              Face appears symmetric              Hearing is intact to conversation  Motor: Normal bulk and tone.  Moves all extremities symmetrically. Antigravity. No drift. No abnormal movements or postures.   Reflexes: Not tested.   Sensory: Intact to light-touch.  Coordination: No dysmetria.  Gait: Not tested.      MRI brain-punctate focus of bright diffusion involving the right insula (anterior-inferior)    CTA HEAD IMPRESSION:  1.  No large vessel occlusion.  2.  Mild irregularity and narrowing of the proximal posterior cerebral arteries.  3.  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    CTA NECK IMPRESSION:  1.  Attenuated flow involving the right common carotid artery at the origin and extending along the course of several centimeters within the lower cervical/upper thoracic region. There appears to be some blurring of the vessel in that area as well which may represent a component of motion artifact. This may represent significant stenosis at the common carotid artery origin versus motion artifact.     2.  Mild atherosclerotic plaque involving the carotid bifurcations bilaterally resulting in less than 50% diameter narrowing.     3.  Patent vertebral arteries bilaterally.     HbA1C 14.3    HDL 48    Impression & Recommendations:  Sub-acute ischemic right insular infarct initially presenting with recurrent focal seizures involving the left face arm and leg with EEG  correlate.  Had a prominent postictal paresis (Martin's) involving the left face arm and leg; resolved.  No recognized seizure recurrence.  Medical history significant for multiple concerning vascular/stroke risk factors (previously untreated diabetes and hypertension). I am unable to rule-out cardioembolic mechanism at this time. Additionally there may be underlying cerebral amyloid angiopathy based on MRI findings.  Interval CT head there was no large vessel occlusion.  Mild stenosis of the proximal PCA and small 3 mm saccular NILSON aneurysm was identified.  There is possible narrowing at the right common carotid as described versus motion artifact.  Clinically the patient seems to be doing well without any new neurologic symptoms.  - ECHO is pending.  - Continue Keppra 1000mg twice daily. Please continue in the outpatient setting.  - Continue ASA 81mg daily for now.   - Goal is normoglycemia and normotension.   - Initiate high-intensity statin.  - Telemetry. Ziopatch upon discharge.   - Carotid ultrasound if able.  - Will need referral to [facility] upon discharge and repeat MRI brain and neurovascular imaging in  3 months.   - Seizure precautions. Q4hr neuro checks.     Neurology will follow along.      I provided total 35 minutes of acute neurologic care for this patient, reviewing medical records, diagnostic studies, direct face-to-face time with the patient, documentation and communicating plan of care.    Kenneth Jamison MD  Neurohospitalist, Acute Care Services          Please note that this dictation was created using voice recognition software.  I have made every reasonable attempt to correct obvious errors, but I expect that there are errors of grammar and possibly content that I did not discover before finalizing the note.

## 2023-11-24 NOTE — CARE PLAN
The patient is Stable - Low risk of patient condition declining or worsening    Shift Goals  Clinical Goals: Monitor neuro status, Mobility  Patient Goals: Rest  Family Goals: janeth    Progress made toward(s) clinical / shift goals:    Problem: Knowledge Deficit - Stroke Education  Goal: Patient's knowledge of stroke and risk factors will improve  Outcome: Progressing  Note: Pt verbalizes understanding of plan of care and asks appropriate questions.      Problem: Neuro Status  Goal: Neuro status will remain stable or improve  Outcome: Progressing  Note: Neuro status improving, numbness and tingling to L side has resolved.      Problem: Mobility - Stroke  Goal: Patient's capacity to carry out activities will improve  Outcome: Progressing  Note: Pt able to ambulate x1 FWW

## 2023-11-24 NOTE — CARE PLAN
The patient is Watcher - Medium risk of patient condition declining or worsening    Shift Goals  Clinical Goals: monoitor neuro changes, mobility, skin integrity, pain mgmt  Patient Goals: pain control, rest and sleep  Family Goals: janeth    Progress made toward(s) clinical / shift goals:      Problem: Hemodynamic Monitoring  Goal: Patient's hemodynamics, fluid balance and neurologic status will be stable or improve  Outcome: Progressing       Patient is not progressing towards the following goals:      Problem: Knowledge Deficit - Standard  Goal: Patient and family/care givers will demonstrate understanding of plan of care, disease process/condition, diagnostic tests and medications  Outcome: Not Met     Continue patient education    Problem: Respiratory - Stroke Patient  Goal: Patient will achieve/maintain optimum respiratory rate/effort  Outcome: Not Met     Remains on 2L O2 via nc    Problem: Self Care  Goal: Patient will have the ability to perform ADLs independently or with assistance (bathe, groom, dress, toilet and feed)  Outcome: Not Met    Remains mod assist with ADLs

## 2023-11-24 NOTE — PROGRESS NOTES
"Hospital Medicine Daily Progress Note    Date of Service  11/24/2023    Chief Complaint  Molly Fountain is a 78 y.o. female admitted 11/21/2023 with weakness    Hospital Course  78 y.o. female with past medical history of hypertension, dyslipidemia, diabetes mellitus though has not been taking any medications, who presented 11/21/2023 with weakness.  She was too weak to get out of bed therefore she had to crawl to get a bed and eventually crawled to the living room where she got a phone and called her neighbor who then called 911.  Apparently paramedics noted  asymmetric facial twitching which was also noted in the ER and some \"twitching\" of the left arm as well.  She did not lose consciousness during these events. There a CT of the head was negative. A1c 14.3. RSV, flu, COVID all negative, urinalysis negative.   EEG notes focal seizure during the EEG study involving the left face, arm and leg. Event spontaneously resolved in about 3 minutes. No loss of awareness during the event. Post event significant for left face, arm and leg weakness. Normal extremity movement and strength prior to the event.   Neurology was consulted. Patient was started on Keppra   MRI notes a subacute lacunar size infarct. No mass effect or surrounding edema in this vicinity. No evidence of acute hemorrhage, or mass lesion. 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   CTA neck Attenuated flow involving the right common carotid artery at the origin and extending along the course of several centimeters within the lower cervical/upper thoracic region. Artifact?   Echo     Interval Problem Update  Seen patient at bedside  Daughter  at bedside  Continue Keppra  MRI reviewed subacute lacunar infarct  CTA head and neck reviewed  Echo reviewed  Ordered carotid " ultrasound  PT/OT/PMR  L knee pain, xray reviewed unremarkable  A1c 14.3, not on home meds.  Ordered Lantus, continue sliding scale.  Ordered diabetes education  Discussed with daughter and patient,now agree IPR    I have discussed this patient's plan of care and discharge plan at IDT rounds today with Case Management, Nursing, Nursing leadership, and other members of the IDT team.    Consultants/Specialty  neurology    Code Status  DNAR/DNI    Disposition  The patient is not medically cleared for discharge to home or a post-acute facility.      I have placed the appropriate orders for post-discharge needs.    Review of Systems  Review of Systems   Constitutional:  Positive for malaise/fatigue.   Neurological:  Positive for sensory change, weakness and headaches.   All other systems reviewed and are negative.       Physical Exam  Temp:  [36.6 °C (97.8 °F)-36.7 °C (98.1 °F)] 36.7 °C (98.1 °F)  Pulse:  [] 92  Resp:  [17-20] 18  BP: (146-155)/(76-90) 155/88  SpO2:  [90 %-98 %] 90 %    Physical Exam  Vitals reviewed.   Constitutional:       Appearance: Normal appearance. She is obese. She is ill-appearing.   HENT:      Head: Normocephalic and atraumatic.      Nose: Nose normal.      Mouth/Throat:      Pharynx: Oropharynx is clear.   Eyes:      Extraocular Movements: Extraocular movements intact.      Conjunctiva/sclera: Conjunctivae normal.      Pupils: Pupils are equal, round, and reactive to light.   Cardiovascular:      Rate and Rhythm: Normal rate and regular rhythm.      Pulses: Normal pulses.      Heart sounds: Normal heart sounds.   Pulmonary:      Effort: Pulmonary effort is normal.      Breath sounds: Normal breath sounds.   Abdominal:      General: Abdomen is flat. Bowel sounds are normal.      Palpations: Abdomen is soft.   Musculoskeletal:         General: Normal range of motion.      Cervical back: Normal range of motion and neck supple.   Skin:     General: Skin is warm and dry.   Neurological:       Mental Status: She is alert and oriented to person, place, and time. Mental status is at baseline.      Comments: Left hemiparesis involving the face, arm > leg.  No abnormal movements or postures.   Psychiatric:         Mood and Affect: Mood normal.         Behavior: Behavior normal.         Fluids    Intake/Output Summary (Last 24 hours) at 11/24/2023 1318  Last data filed at 11/24/2023 1000  Gross per 24 hour   Intake 660 ml   Output --   Net 660 ml         Laboratory  Recent Labs     11/22/23 0119   WBC 9.2   RBC 4.90   HEMOGLOBIN 12.5   HEMATOCRIT 40.0   MCV 81.6   MCH 25.5*   MCHC 31.3*   RDW 44.8   PLATELETCT 293   MPV 9.9       Recent Labs     11/22/23 0119   SODIUM 141   POTASSIUM 3.1*   CHLORIDE 105   CO2 27   GLUCOSE 95   BUN 6*   CREATININE 0.73   CALCIUM 8.4*               Recent Labs     11/22/23 0119   TRIGLYCERIDE 70   HDL 48   *         Imaging  EC-ECHOCARDIOGRAM COMPLETE W/ CONT   Final Result      CT-CTA NECK WITH & W/O-POST PROCESSING   Final Result      1.  Attenuated flow involving the right common carotid artery at the origin and extending along the course of several centimeters within the lower cervical/upper thoracic region. There appears to be some blurring of the vessel in that area as well which    may represent a component of motion artifact. This may represent significant stenosis at the common carotid artery origin versus motion artifact.      2.  Mild atherosclerotic plaque involving the carotid bifurcations bilaterally resulting in less than 50% diameter narrowing.      3.  Patent vertebral arteries bilaterally.      CT-CTA HEAD WITH & W/O-POST PROCESS   Final Result      1.  No large vessel occlusion.   2.  Mild irregularity and narrowing of the proximal posterior cerebral arteries.   3.  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      MR-BRAIN-W/O   Final Result   Addendum (preliminary) 1 of 1   ADDENDUM:      Upon further review with Dr. Jamison on  11/24/2023, one notes a tiny    punctate focus of bright diffusion signal in the right insula    (diffusion-weighted axial image 17, series 4). No corresponding restricted    diffusion/hypointensity is evident on the ADC map.    Nonspecific finding most likely representing a subacute lacunar size    infarct. No mass effect or surrounding edema in this vicinity.            Final      1.  Moderate cerebral atrophy.   2.  Advanced supratentorial white matter disease most consistent with microvascular ischemic change.   3.  Minimal pontine ischemic gliosis.   4.  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   5.  No evidence of acute hemorrhage, acute infarction, or mass lesion.      DX-KNEE 2- LEFT   Final Result      1.  No fracture or dislocation of LEFT knee.   2.  Minimal degenerative changes.      OUTSIDE IMAGES-CT HEAD   Final Result      US-CAROTID DOPPLER BILAT    (Results Pending)        Assessment/Plan  * Seizure (HCC)- (present on admission)  Assessment & Plan  EEG notes focal seizure during the EEG study involving the left face, arm and leg. Event spontaneously resolved in about 3 minutes. No loss of awareness during the event. Post event significant for left face, arm and leg weakness. Normal extremity movement and strength prior to the event.   CT of the head was negative    Loaded with Keppra. Continue with Keppra 1000mg bid  MRI no evidence of acute hemorrhage, acute infarction, or mass lesion  No driving.  No handling weapons.  No swimming or tub bathing alone.  No working from heights.  No other high risk activity that may result in harm to self or others in the event of a seizure. Avoid sleep deprivation- discussed with patient and family at bedside   Seizure precautions      CVA (cerebral vascular accident) ([facility])  Assessment & Plan  MRI notes a subacute lacunar size infarct. No mass effect  or surrounding edema in this vicinity. No evidence of acute hemorrhage, or mass lesion. 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   CTA neck Attenuated flow involving the right common carotid artery at the origin and extending along the course of several centimeters within the lower cervical/upper thoracic region. Artifact?   Echo   Ordered carotid US  Continue ASA and statin  A1c 14, optimized DM managements   , start high-intensity statin.   Zio patch upon discharge  [facility] outpatient follow-up    Advance care planning  Assessment & Plan  DNAR: I discussed code status with patient  who at time of discussion had medical decision making capacity. The patient wishes to be DNAR and under no circumstances would want life sustaining treatments in the event that he has a cardiac arrest. I also discussed intubation including temporary courses and he does not wish to be placed on a ventilator under any circumstances, including for temporary and reversible causes. This discussion was had with her family in the room.    I discussed advance care planning with the patient and family for 16 minutes, including diagnosis, prognosis, plan of care, risks and benefits of any therapies that could be offered, as well as alternatives including palliation and hospice, as appropriate.        Hypokalemia- (present on admission)  Assessment & Plan  Replace as indicated    Essential hypertension- (present on admission)  Assessment & Plan  He has a history of hypertension and has not been taking any medications  MRI no acute infarction  Start amlodipine 5mg daily   11/24 blood pressure still elevated, increase amlodipine to 10 mg daily      Type 2 diabetes mellitus with complication, without long-term current use of insulin (HCC)- (present  on admission)  Assessment & Plan  A1c 14.3.  Patient is aware of diabetes diagnosis.  However she had been on medications but stopped them.  Lantus and Sliding scale insulin  Hypoglycemic protocol diabetes education           VTE prophylaxis:    enoxaparin ppx      I have performed a physical exam and reviewed and updated ROS and Plan today (11/24/2023). In review of yesterday's note (11/23/2023), there are no changes except as documented above.    Patient is has a high medical complexity, complex decision making and is at high risk for complication, morbidity, and mortality.    My total time spent caring for the patient on the day of the encounter was 53  minutes.   This does not include time spent on separately billable procedures/tests.

## 2023-11-24 NOTE — DISCHARGE PLANNING
[facility] Transitional Care Coordination    Physiatry consult complete.  Dr. Earl recommending -     Patient is a good candidate for inpatient rehab based on needs for PT, OT, pending MRI brain completion. See diso details below.     Dispo:  - patient is currently functioning below their level of baseline, recommend post acute rehab  - recommend IRF level therapy with 3hr of therapy 5 days per week  - piror to acceptance to IRF, will need insurance auth and MRI brain completed   - TCC to assist with insurance auth and DC support     MRI brain-punctate focus of restricted diffusion involving the right insula (anterior-inferior)     TCC to verify discharge support.        1347 - [facility] Medicare HMO out of network for [facility].  Due to Thanksgiving holiday, unable to verify possible benefit [facility] until Monday at the earliest.

## 2023-11-24 NOTE — DISCHARGE PLANNING
Case Management Discharge Planning    Admission Date: 11/21/2023  GMLOS: 2.7  ALOS: 3    6-Clicks ADL Score: 18  6-Clicks Mobility Score: 9  PT and/or OT Eval ordered: Yes  Post-acute Referrals Ordered: Yes  Post-acute Choice Obtained: No  Has referral(s) been sent to post-acute provider:  Yes      Anticipated Discharge Dispo: Discharge Disposition: Disch to  rehab facility or distinct part unit ()    DME Needed: No    Action(s) Taken: OTHER    LSW placed call to pt daughter to discuss the pt DC plan and receive IRF choice. No answer, LSW left  for call back.     LSW received call back from pt daughter Almita. LSW discussed the pt DC plan with the pt daughter and the options for Acute rehab in [facility]. LSW answered all Almita's questions. Almita stated she would like to call her siblings to discuss choice before making a decision. Almita stated she will call this LSW back after discussing choice with her siblings.     Escalations Completed: None    Medically Clear: No    Next Steps:  f/u with pt and medical team to discuss dc needs and barriers.     Barriers to Discharge: Medical clearance and Pending Placement

## 2023-11-24 NOTE — THERAPY
Speech Language Pathology   Cognitive Evaluation     Patient Name: Molly Fountain  AGE:  78 y.o., SEX:  female  Medical Record #: [id number]  Date of Service: 11/24/2023      History of Present Illness  79 y/o admitted on 11/21 for seizure-like activity and weakness.    PMHx: DM2, HTN    CTA Head:  1.  No large vessel occlusion.  2.  Mild irregularity and narrowing of the proximal posterior cerebral arteries.  3.  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    CTA Neck:  1.  Attenuated flow involving the right common carotid artery at the origin and extending along the course of several centimeters within the lower cervical/upper thoracic region. There appears to be some blurring of the vessel in that area as well which may represent a component of motion artifact. This may represent significant stenosis at the common carotid artery origin versus motion artifact.  2.  Mild atherosclerotic plaque involving the carotid bifurcations bilaterally resulting in less than 50% diameter narrowing.  3.  Patent vertebral arteries bilaterally.    MRI:  1.  Moderate cerebral atrophy.  2.  Advanced supratentorial white matter disease most consistent with microvascular ischemic change.  3.  Minimal pontine ischemic gliosis.  4.  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  5.  No evidence of acute hemorrhage, acute infarction, or mass lesion.      General Information  Vitals  O2 Delivery Device: None - Room Air  Level of Consciousness: Alert, Awake  Orientation: Self, General place, Current month, Current year  Follows Directives: Yes      Prior Living Situation & Level of Function  Housing / Facility: 1 Story House  Lives with - Patient's Self Care Capacity: Adult Children  Communication: WFL  Swallowing: WFL       Oral Mechanism Evaluation  Dentition: Good  Facial Symmetry: Equal  Labial Observations:  "WFL  Lingual Observations: Midline  Motor Speech: WFL      Laryngeal Function Exam  Secretion Management: Adequate  Voice Quality: WFL  Cough: Perceptually WNL      Subjective  Patient received awake, alert, daughter at bedside. Pt laying flat in bed; politely declined to be raised to an upright position citing pain in left hip. When queried if any changes in cognition/language since hospital admission, pt looked to daughter for response who was unable to provide an answer as only recently arriving to hospital. Pt agreeable to SP tasks.         Assessment  The patient was seen this date for a cognitive evaluation. Portions of the COGNISTAT (The Neurobehavioral Cognitive Status Examination), as well as non-standardized assessments were utilized. Results are as follows:       Cognistat  Orientation: Average  Attention: Average  Repetition: Average  Naming: Average  Memory: Mild-moderate   - Pt independently recalled 1/4 words following provided delayed. Accuracy improved to 2/4 with category     prompt; improved to 4/4 with list prompt.   Calculations: Mild   - Pt answered questions with 50% accuracy (2/4). Endorsed \"I used to be good with numbers.\"   Similarities: Average  Judgement: Average      Medication Management  Medication Management: Presented medication prompt and questions. Pt required multiple repetition of instructions. Answered question regarding dosage accurately; however unable to answer temporal reasoning question. Daughter at bedside cued pt to use fingers to help count; pt held up fingers but made no attempt to utilize them for problem solving.      Observations:  Pt attended to conversation and structured tasks without redirection required. Slower processing and response time appreciated throughout tasks. Fluent speech with no perseveration, paraphasias, or anomia. Pt frequently looking to daughter for assistance with questions.        Clinical Impressions  Patient presents with mild-moderate " cognitive deficits. Standardized assessment revealed deficits in memory and calculations. Pt demo'd difficulty with medication management task; thus indicating that pt will need assistance with iADLS. Pt reported performance on evaluation not consistent with baseline; daughter endorsed pt was independent with iADLS prior to hospital admission. Pt would benefit from skilled speech therapy in the acute setting and at next level of care.       NOTE: It is not within the scope of practice of Speech-Language Pathologists to determine patient capacity. Please defer to the physician or psych to complete this assessment.       Recommendations  Supervision Needs Upons Discharge: Intermittent supervision throughout the day and direct assistance with IADLs (see below)  IADLs: Medication management, Financial management, Appointment management, Cooking, Household chores       SLP Treatment Plan  Treatment Plan: Dysphagia Treatment, Cognitive Treatment  SLP Frequency: 3x Per Week  Estimated Duration: Until Therapy Goals Met      Anticipated Discharge Needs  Discharge Recommendations: Recommend outpatient speech therapy services (Pt would benefit from continued speech therapy at next level of care)  Therapy Recommendations Upon DC: Cognitive-Linguistic Training, Patient / Family / Caregiver Education      Patient / Family Goals  Patient / Family Goal #1: did not state  Short Term Goal # 1: Pt will consume a regular/thin liquid diet with no overt s/sx of aspiration  Short Term Goal # 2: Pt will recall salient/functional information with 90% accuracy and min cues  Short Term Goal # 3: Pt will complete medication management tasks with 90% accuracy and min cues      Ghassan Morgan, SLP

## 2023-11-25 LAB
ANION GAP SERPL CALC-SCNC: 10 MMOL/L (ref 7–16)
BUN SERPL-MCNC: 9 MG/DL (ref 8–22)
CALCIUM SERPL-MCNC: 8.5 MG/DL (ref 8.5–10.5)
CHLORIDE SERPL-SCNC: 101 MMOL/L (ref 96–112)
CO2 SERPL-SCNC: 26 MMOL/L (ref 20–33)
CREAT SERPL-MCNC: 0.72 MG/DL (ref 0.5–1.4)
ERYTHROCYTE [DISTWIDTH] IN BLOOD BY AUTOMATED COUNT: 44.6 FL (ref 35.9–50)
GFR SERPLBLD CREATININE-BSD FMLA CKD-EPI: 85 ML/MIN/1.73 M 2
GLUCOSE BLD STRIP.AUTO-MCNC: 110 MG/DL (ref 65–99)
GLUCOSE BLD STRIP.AUTO-MCNC: 188 MG/DL (ref 65–99)
GLUCOSE BLD STRIP.AUTO-MCNC: 198 MG/DL (ref 65–99)
GLUCOSE BLD STRIP.AUTO-MCNC: 242 MG/DL (ref 65–99)
GLUCOSE SERPL-MCNC: 84 MG/DL (ref 65–99)
HCT VFR BLD AUTO: 37.9 % (ref 37–47)
HGB BLD-MCNC: 11.6 G/DL (ref 12–16)
MAGNESIUM SERPL-MCNC: 1.8 MG/DL (ref 1.5–2.5)
MCH RBC QN AUTO: 24.9 PG (ref 27–33)
MCHC RBC AUTO-ENTMCNC: 30.6 G/DL (ref 32.2–35.5)
MCV RBC AUTO: 81.3 FL (ref 81.4–97.8)
PHOSPHATE SERPL-MCNC: 3.4 MG/DL (ref 2.5–4.5)
PLATELET # BLD AUTO: 282 K/UL (ref 164–446)
PMV BLD AUTO: 9.9 FL (ref 9–12.9)
POTASSIUM SERPL-SCNC: 3.8 MMOL/L (ref 3.6–5.5)
RBC # BLD AUTO: 4.66 M/UL (ref 4.2–5.4)
SODIUM SERPL-SCNC: 137 MMOL/L (ref 135–145)
WBC # BLD AUTO: 8 K/UL (ref 4.8–10.8)

## 2023-11-25 PROCEDURE — 83735 ASSAY OF MAGNESIUM: CPT

## 2023-11-25 PROCEDURE — 36415 COLL VENOUS BLD VENIPUNCTURE: CPT

## 2023-11-25 PROCEDURE — 82962 GLUCOSE BLOOD TEST: CPT

## 2023-11-25 PROCEDURE — 85027 COMPLETE CBC AUTOMATED: CPT

## 2023-11-25 PROCEDURE — 84100 ASSAY OF PHOSPHORUS: CPT

## 2023-11-25 PROCEDURE — A9270 NON-COVERED ITEM OR SERVICE: HCPCS | Performed by: HOSPITALIST

## 2023-11-25 PROCEDURE — 80048 BASIC METABOLIC PNL TOTAL CA: CPT

## 2023-11-25 PROCEDURE — 700102 HCHG RX REV CODE 250 W/ 637 OVERRIDE(OP): Performed by: STUDENT IN AN ORGANIZED HEALTH CARE EDUCATION/TRAINING PROGRAM

## 2023-11-25 PROCEDURE — 700102 HCHG RX REV CODE 250 W/ 637 OVERRIDE(OP): Performed by: HOSPITALIST

## 2023-11-25 PROCEDURE — 770006 HCHG ROOM/CARE - MED/SURG/GYN SEMI*

## 2023-11-25 PROCEDURE — 99232 SBSQ HOSP IP/OBS MODERATE 35: CPT | Performed by: PSYCHIATRY & NEUROLOGY

## 2023-11-25 PROCEDURE — A9270 NON-COVERED ITEM OR SERVICE: HCPCS | Performed by: STUDENT IN AN ORGANIZED HEALTH CARE EDUCATION/TRAINING PROGRAM

## 2023-11-25 PROCEDURE — 99232 SBSQ HOSP IP/OBS MODERATE 35: CPT | Performed by: STUDENT IN AN ORGANIZED HEALTH CARE EDUCATION/TRAINING PROGRAM

## 2023-11-25 PROCEDURE — 700102 HCHG RX REV CODE 250 W/ 637 OVERRIDE(OP): Performed by: PSYCHIATRY & NEUROLOGY

## 2023-11-25 PROCEDURE — A9270 NON-COVERED ITEM OR SERVICE: HCPCS | Performed by: PSYCHIATRY & NEUROLOGY

## 2023-11-25 RX ORDER — LOSARTAN POTASSIUM 50 MG/1
25 TABLET ORAL
Status: DISCONTINUED | OUTPATIENT
Start: 2023-11-25 | End: 2023-11-28

## 2023-11-25 RX ADMIN — ASPIRIN 81 MG: 81 TABLET, CHEWABLE ORAL at 05:24

## 2023-11-25 RX ADMIN — AMLODIPINE BESYLATE 10 MG: 5 TABLET ORAL at 05:24

## 2023-11-25 RX ADMIN — INSULIN HUMAN 3 UNITS: 100 INJECTION, SOLUTION PARENTERAL at 12:27

## 2023-11-25 RX ADMIN — LEVETIRACETAM 1000 MG: 500 TABLET, FILM COATED ORAL at 17:16

## 2023-11-25 RX ADMIN — INSULIN HUMAN 3 UNITS: 100 INJECTION, SOLUTION PARENTERAL at 21:18

## 2023-11-25 RX ADMIN — DICLOFENAC SODIUM 4 G: 10 GEL TOPICAL at 15:54

## 2023-11-25 RX ADMIN — DICLOFENAC SODIUM 4 G: 10 GEL TOPICAL at 09:43

## 2023-11-25 RX ADMIN — TRAMADOL HYDROCHLORIDE 50 MG: 50 TABLET ORAL at 05:37

## 2023-11-25 RX ADMIN — DICLOFENAC SODIUM 4 G: 10 GEL TOPICAL at 21:14

## 2023-11-25 RX ADMIN — ATORVASTATIN CALCIUM 80 MG: 80 TABLET, FILM COATED ORAL at 17:16

## 2023-11-25 RX ADMIN — DOCUSATE SODIUM 50 MG AND SENNOSIDES 8.6 MG 2 TABLET: 8.6; 5 TABLET, FILM COATED ORAL at 17:16

## 2023-11-25 RX ADMIN — INSULIN GLARGINE-YFGN 10 UNITS: 100 INJECTION, SOLUTION SUBCUTANEOUS at 17:18

## 2023-11-25 RX ADMIN — LOSARTAN POTASSIUM 25 MG: 50 TABLET, FILM COATED ORAL at 15:53

## 2023-11-25 RX ADMIN — LEVETIRACETAM 1000 MG: 500 TABLET, FILM COATED ORAL at 05:24

## 2023-11-25 RX ADMIN — INSULIN HUMAN 4 UNITS: 100 INJECTION, SOLUTION PARENTERAL at 17:18

## 2023-11-25 ASSESSMENT — PAIN DESCRIPTION - PAIN TYPE
TYPE: ACUTE PAIN

## 2023-11-25 ASSESSMENT — ENCOUNTER SYMPTOMS
WEAKNESS: 1
HEADACHES: 1
SENSORY CHANGE: 1

## 2023-11-25 NOTE — PROGRESS NOTES
Referring Physician: Lilly Anderson M.D.    S:  No acute events. No seizures. No new neurologic symptoms. ECHO and carotid ultrasound were completed. Sitting up in bedside chair eating breakfast. Ambulates with assistance to the restroom.     O:    Vitals:    11/25/23 0808   BP: (!) 155/97   Pulse: 76   Resp: 18   Temp: 36.4 °C (97.5 °F)   SpO2: 93%     Mental status: Awake, alert and interactive. Conversant. Mood is good.     Language: Language is fluent with intact comprehension.  No dysarthria.  Cranial nerves:               Pupils are equal, round and reactive to light  Versions are intact              Face appears symmetric              Hearing is intact to conversation  Motor: Normal bulk and tone.  Moves all extremities symmetrically. Antigravity. No drift. No abnormal movements or postures.   Reflexes: Not tested.   Sensory: Intact to light-touch.  Coordination: No dysmetria.  Gait: Not tested.      MRI brain-punctate focus of bright diffusion involving the right insula (anterior-inferior)    CTA HEAD IMPRESSION:  1.  No large vessel occlusion.  2.  Mild irregularity and narrowing of the proximal posterior cerebral arteries.  3.  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    CTA NECK IMPRESSION:  1.  Attenuated flow involving the right common carotid artery at the origin and extending along the course of several centimeters within the lower cervical/upper thoracic region. There appears to be some blurring of the vessel in that area as well which may represent a component of motion artifact. This may represent significant stenosis at the common carotid artery origin versus motion artifact.     2.  Mild atherosclerotic plaque involving the carotid bifurcations bilaterally resulting in less than 50% diameter narrowing.     3.  Patent vertebral arteries bilaterally.    CAROTID ULTRASOUND CONCLUSIONS:  Mild stenosis of the left internal carotid artery (<50%).   Tortuous right carotid exam.    Elevated velocities of the right subclavian artery in comparison to right   side with some turbulence noted which may represent stenosis.    ECHO CONCLUSIONS:  No prior study is available for comparison.      Asymmetric septal hypertrophy.  Normal left ventricular systolic function.  The left ventricular ejection fraction is visually estimated to be 60%.  The right ventricle is normal in size and systolic function.  Mild mitral annular calcification.  Mild tricuspid regurgitation.     HbA1C 14.3    HDL 48    Impression & Recommendations:  Sub-acute ischemic right insular infarct initially presenting with recurrent focal seizures involving the left face arm and leg with EEG correlate.  Had a prominent postictal paresis (Martin's) involving the left face arm and leg; resolved.  No recognized seizure recurrence.  Medical history significant for multiple concerning vascular/stroke risk factors (previously untreated diabetes and hypertension). I am unable to rule-out cardioembolic mechanism at this time. Possible underlying cerebral amyloid angiopathy based on MRI findings.  Interval CTA head there was no large vessel occlusion.  Mild stenosis of the proximal PCA and small 3 mm saccular NILSON aneurysm was identified.  Tortuous right carotid. No significant flow-limiting stenosis. Unrevealing echocardiogram with preserved EF. No thrombus identified. Clinically the patient seems to be doing well without any new neurologic symptoms.  - Continue Keppra 1000mg twice daily. Please continue in the outpatient setting.  - Continue ASA 81mg daily.   - Goal is normoglycemia and normotension.   - Continue high-intensity statin.  - Telemetry. Ziopatch upon discharge.   - Will need referral to stroke-bridge clinic upon discharge and repeat MRI brain and neurovascular imaging in 3 months.   - Has upcoming scheduled follow-up with new PCM.   - Seizure precautions. qshift neuro checks.     Neurology will sign-off. Please reach out  with any questions.      I provided a total of 40 minutes acute neurologic care for this patient, reviewing medical records, diagnostic studies, direct face-to-face time with the patient, documentation, and communicating plan of care.     Kenneth Jamison MD  Neurohospitalist, Acute Care Services          Please note that this dictation was created using voice recognition software.  I have made every reasonable attempt to correct obvious errors, but I expect that there are errors of grammar and possibly content that I did not discover before finalizing the note.

## 2023-11-25 NOTE — CARE PLAN
The patient is Stable - Low risk of patient condition declining or worsening    Shift Goals  Clinical Goals: Monitor Neuro Status/Titrate O2/Pain Management  Patient Goals: Pain Control/Sleep  Family Goals: VIPIN    Progress made toward(s) clinical / shift goals: Assumed care of patient at 1915. Patient is A&Ox4, Q4hr neuro checks are being completed. SBP < 170. Fall/Seizure/Aspiration precautions are in place. Bed is low and locked, bed alarm is on, call light is within reach, hourly rounding continues.     Problem: Neuro Status  Goal: Neuro status will remain stable or improve  Outcome: Progressing  Note: Q4hr neuro checks - stable     Problem: Pain - Standard  Goal: Alleviation of pain or a reduction in pain to the patient’s comfort goal  Outcome: Progressing  Note: PRN Tramadol 50mg given to alleviate pain, see MAR.      Problem: Optimal Care of the Stroke Patient  Goal: Optimal acute care for the stroke patient  Outcome: Progressing     Patient is not progressing towards the following goals:    Problem: Respiratory - Stroke Patient  Goal: Patient will achieve/maintain optimum respiratory rate/effort  Outcome: Not Progressing  Note: Unable to titrate O2,  in place, patient has failed trials. Patient is maintaining adequate O2 saturation on 2L of O2 via NC. Encouraging IS.

## 2023-11-25 NOTE — CARE PLAN
The patient is Stable - Low risk of patient condition declining or worsening    Shift Goals  Clinical Goals: Monitor neuro status, Mobilization  Patient Goals: Pain control, Rest  Family Goals: janeth    Progress made toward(s) clinical / shift goals:    Problem: Knowledge Deficit - Stroke Education  Goal: Patient's knowledge of stroke and risk factors will improve  Outcome: Progressing  Note: Pt verbalizes understanding of plan of care and asks appropriate questions.      Problem: Neuro Status  Goal: Neuro status will remain stable or improve  Outcome: Progressing  Note: Neuro status remains stable.      Problem: Mobility - Stroke  Goal: Patient's capacity to carry out activities will improve  Outcome: Progressing  Note: Pt able to ambulate x1 FWW, tires quickly, shuffling gait.

## 2023-11-25 NOTE — CARE PLAN
The patient is Stable - Low risk of patient condition declining or worsening    Shift Goals  Clinical Goals: Monitor neuro status, titrate O2, pain management  Patient Goals: Pain control/rest  Family Goals: VIPIN    Progress made toward(s) clinical / shift goals:     Problem: Neuro Status  Goal: Neuro status will remain stable or improve  Outcome: Progressing  Note: Q4 hour neuro checks in place. Patient is alert and oriented x4. Neuro status remained stable.      Problem: Mobility - Stroke  Goal: Patient's capacity to carry out activities will improve  Outcome: Progressing  Note: Patient ambulated up to the bathroom as a x1 assist with a FWW.     Problem: Pain - Standard  Goal: Alleviation of pain or a reduction in pain to the patient’s comfort goal  Outcome: Progressing  Note: Assessed pain using a 0-10 pain scale. Administered topical pain medication.      Problem: Discharge Planning - Stroke  Goal: Patient’s continuum of care needs will be met  Outcome: Progressing  Note: Colaborated with case management to ensure patient is going to a rehab facility after hospital admission.        Patient is not progressing towards the following goals: N/A

## 2023-11-25 NOTE — PROGRESS NOTES
"Hospital Medicine Daily Progress Note    Date of Service  11/25/2023    Chief Complaint  Molly Fountain is a 78 y.o. female admitted 11/21/2023 with weakness    Hospital Course  78 y.o. female with past medical history of hypertension, dyslipidemia, diabetes mellitus though has not been taking any medications, who presented 11/21/2023 with weakness.  She was too weak to get out of bed therefore she had to crawl to get a bed and eventually crawled to the living room where she got a phone and called her neighbor who then called 911.  Apparently paramedics noted  asymmetric facial twitching which was also noted in the ER and some \"twitching\" of the left arm as well.  She did not lose consciousness during these events. There a CT of the head was negative. A1c 14.3. RSV, flu, COVID all negative, urinalysis negative.   EEG notes focal seizure during the EEG study involving the left face, arm and leg. Event spontaneously resolved in about 3 minutes. No loss of awareness during the event. Post event significant for left face, arm and leg weakness. Normal extremity movement and strength prior to the event.   Neurology was consulted. Patient was started on Keppra   MRI notes a subacute lacunar size infarct. No mass effect or surrounding edema in this vicinity. No evidence of acute hemorrhage, or mass lesion. 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   CTA neck Attenuated flow involving the right common carotid artery at the origin and extending along the course of several centimeters within the lower cervical/upper thoracic region. Artifact?   Echo LVEF 60%    Interval Problem Update  Seen patient at bedside  Continue Keppra  MRI reviewed subacute lacunar infarct  CTA head and neck reviewed  Echo reviewed  Carotid ultrasound, noted tortuous " R carotid artery.   PT/OT/PMR  Pending IPR  Discussed with neurology  Koki upon discharge  Blood pressure still elevated, continue amlodipine 10 mg daily.  Add losartan 25 mg daily    I have discussed this patient's plan of care and discharge plan at IDT rounds today with Case Management, Nursing, Nursing leadership, and other members of the IDT team.    Consultants/Specialty  neurology    Code Status  DNAR/DNI    Disposition  Medically Cleared  I have placed the appropriate orders for post-discharge needs.    Review of Systems  Review of Systems   Constitutional:  Positive for malaise/fatigue.   Neurological:  Positive for sensory change, weakness and headaches.   All other systems reviewed and are negative.       Physical Exam  Temp:  [36.4 °C (97.5 °F)-36.7 °C (98.1 °F)] 36.4 °C (97.5 °F)  Pulse:  [] 76  Resp:  [15-18] 18  BP: (148-155)/(88-97) 155/97  SpO2:  [91 %-96 %] 93 %    Physical Exam  Vitals reviewed.   Constitutional:       Appearance: Normal appearance. She is obese. She is ill-appearing.   HENT:      Head: Normocephalic and atraumatic.      Nose: Nose normal.      Mouth/Throat:      Pharynx: Oropharynx is clear.   Eyes:      Extraocular Movements: Extraocular movements intact.      Conjunctiva/sclera: Conjunctivae normal.      Pupils: Pupils are equal, round, and reactive to light.   Cardiovascular:      Rate and Rhythm: Normal rate and regular rhythm.      Pulses: Normal pulses.      Heart sounds: Normal heart sounds.   Pulmonary:      Effort: Pulmonary effort is normal.      Breath sounds: Normal breath sounds.   Abdominal:      General: Abdomen is flat. Bowel sounds are normal.      Palpations: Abdomen is soft.   Musculoskeletal:         General: Normal range of motion.      Cervical back: Normal range of motion and neck supple.   Skin:     General: Skin is warm and dry.   Neurological:      Mental Status: She is alert and oriented to person, place, and time. Mental status is at baseline.       Comments: Left hemiparesis involving the face, arm > leg.  No abnormal movements or postures.   Psychiatric:         Mood and Affect: Mood normal.         Behavior: Behavior normal.         Fluids    Intake/Output Summary (Last 24 hours) at 11/25/2023 1447  Last data filed at 11/25/2023 0800  Gross per 24 hour   Intake 380 ml   Output --   Net 380 ml         Laboratory  Recent Labs     11/25/23  0109   WBC 8.0   RBC 4.66   HEMOGLOBIN 11.6*   HEMATOCRIT 37.9   MCV 81.3*   MCH 24.9*   MCHC 30.6*   RDW 44.6   PLATELETCT 282   MPV 9.9       Recent Labs     11/25/23  0109   SODIUM 137   POTASSIUM 3.8   CHLORIDE 101   CO2 26   GLUCOSE 84   BUN 9   CREATININE 0.72   CALCIUM 8.5                       Imaging  US-CAROTID DOPPLER BILAT   Final Result      EC-ECHOCARDIOGRAM COMPLETE W/ CONT   Final Result      CT-CTA NECK WITH & W/O-POST PROCESSING   Final Result      1.  Attenuated flow involving the right common carotid artery at the origin and extending along the course of several centimeters within the lower cervical/upper thoracic region. There appears to be some blurring of the vessel in that area as well which    may represent a component of motion artifact. This may represent significant stenosis at the common carotid artery origin versus motion artifact.      2.  Mild atherosclerotic plaque involving the carotid bifurcations bilaterally resulting in less than 50% diameter narrowing.      3.  Patent vertebral arteries bilaterally.      CT-CTA HEAD WITH & W/O-POST PROCESS   Final Result      1.  No large vessel occlusion.   2.  Mild irregularity and narrowing of the proximal posterior cerebral arteries.   3.  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      MR-BRAIN-W/O   Final Result   Addendum (preliminary) 1 of 1   ADDENDUM:      Upon further review with Dr. Jamison on 11/24/2023, one notes a tiny    punctate focus of bright diffusion signal in the right insula    (diffusion-weighted axial image  17, series 4). No corresponding restricted    diffusion/hypointensity is evident on the ADC map.    Nonspecific finding most likely representing a subacute lacunar size    infarct. No mass effect or surrounding edema in this vicinity.            Final      1.  Moderate cerebral atrophy.   2.  Advanced supratentorial white matter disease most consistent with microvascular ischemic change.   3.  Minimal pontine ischemic gliosis.   4.  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   5.  No evidence of acute hemorrhage, acute infarction, or mass lesion.      DX-KNEE 2- LEFT   Final Result      1.  No fracture or dislocation of LEFT knee.   2.  Minimal degenerative changes.      OUTSIDE IMAGES-CT HEAD   Final Result           Assessment/Plan  * Seizure (HCC)- (present on admission)  Assessment & Plan  EEG notes focal seizure during the EEG study involving the left face, arm and leg. Event spontaneously resolved in about 3 minutes. No loss of awareness during the event. Post event significant for left face, arm and leg weakness. Normal extremity movement and strength prior to the event.   CT of the head was negative    Loaded with Keppra. Continue with Keppra 1000mg bid  MRI no evidence of acute hemorrhage, acute infarction, or mass lesion  No driving.  No handling weapons.  No swimming or tub bathing alone.  No working from heights.  No other high risk activity that may result in harm to self or others in the event of a seizure. Avoid sleep deprivation- discussed with patient and family at bedside   Seizure precautions      CVA (cerebral vascular accident) (HCC)  Assessment & Plan  MRI notes a subacute lacunar size infarct. No mass effect or surrounding edema in this vicinity. No evidence of acute hemorrhage, or mass lesion. Numerous punctate foci of old microhemorrhage in both cerebral hemispheres most  consistent with old hypertensive microhemorrhage versus amyloid angiopathy.  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   CTA neck Attenuated flow involving the right common carotid artery at the origin and extending along the course of several centimeters within the lower cervical/upper thoracic region. Artifact?   Echo   Ordered carotid US: Torturous right carotid artery  Continue ASA and statin  A1c 14, optimized DM managements   , start high-intensity statin.   Zio patch upon discharge  [facility] follow-up    Advance care planning  Assessment & Plan  DNAR: I discussed code status with patient  who at time of discussion had medical decision making capacity. The patient wishes to be DNAR and under no circumstances would want life sustaining treatments in the event that he has a cardiac arrest. I also discussed intubation including temporary courses and he does not wish to be placed on a ventilator under any circumstances, including for temporary and reversible causes. This discussion was had with her family in the room.    I discussed advance care planning with the patient and family for 16 minutes, including diagnosis, prognosis, plan of care, risks and benefits of any therapies that could be offered, as well as alternatives including palliation and hospice, as appropriate.        Hypokalemia- (present on admission)  Assessment & Plan  Replace as indicated    Essential hypertension- (present on admission)  Assessment & Plan  He has a history of hypertension and has not been taking any medications  MRI no acute infarction  Start amlodipine   11/25: Continue amlodipine 10 mg daily, added losartan 25mg daily      Type 2 diabetes mellitus with complication, without long-term current use of insulin (HCC)- (present on admission)  Assessment & Plan  A1c 14.3.  Patient is aware of diabetes diagnosis.  However she had been on medications but stopped them.  Lantus and  Sliding scale insulin  Hypoglycemic protocol diabetes education           VTE prophylaxis:    enoxaparin ppx      I have performed a physical exam and reviewed and updated ROS and Plan today (11/25/2023). In review of yesterday's note (11/24/2023), there are no changes except as documented above.

## 2023-11-26 LAB
GLUCOSE BLD STRIP.AUTO-MCNC: 108 MG/DL (ref 65–99)
GLUCOSE BLD STRIP.AUTO-MCNC: 193 MG/DL (ref 65–99)
GLUCOSE BLD STRIP.AUTO-MCNC: 206 MG/DL (ref 65–99)
GLUCOSE BLD STRIP.AUTO-MCNC: 253 MG/DL (ref 65–99)

## 2023-11-26 PROCEDURE — A9270 NON-COVERED ITEM OR SERVICE: HCPCS | Performed by: STUDENT IN AN ORGANIZED HEALTH CARE EDUCATION/TRAINING PROGRAM

## 2023-11-26 PROCEDURE — A9270 NON-COVERED ITEM OR SERVICE: HCPCS | Performed by: PSYCHIATRY & NEUROLOGY

## 2023-11-26 PROCEDURE — 700102 HCHG RX REV CODE 250 W/ 637 OVERRIDE(OP): Performed by: STUDENT IN AN ORGANIZED HEALTH CARE EDUCATION/TRAINING PROGRAM

## 2023-11-26 PROCEDURE — 700102 HCHG RX REV CODE 250 W/ 637 OVERRIDE(OP): Performed by: HOSPITALIST

## 2023-11-26 PROCEDURE — 770006 HCHG ROOM/CARE - MED/SURG/GYN SEMI*

## 2023-11-26 PROCEDURE — 82962 GLUCOSE BLOOD TEST: CPT

## 2023-11-26 PROCEDURE — 700111 HCHG RX REV CODE 636 W/ 250 OVERRIDE (IP): Mod: JZ | Performed by: HOSPITALIST

## 2023-11-26 PROCEDURE — A9270 NON-COVERED ITEM OR SERVICE: HCPCS | Performed by: HOSPITALIST

## 2023-11-26 PROCEDURE — 99232 SBSQ HOSP IP/OBS MODERATE 35: CPT | Performed by: STUDENT IN AN ORGANIZED HEALTH CARE EDUCATION/TRAINING PROGRAM

## 2023-11-26 PROCEDURE — 700102 HCHG RX REV CODE 250 W/ 637 OVERRIDE(OP): Performed by: PSYCHIATRY & NEUROLOGY

## 2023-11-26 RX ADMIN — LEVETIRACETAM 1000 MG: 500 TABLET, FILM COATED ORAL at 16:40

## 2023-11-26 RX ADMIN — TRAMADOL HYDROCHLORIDE 50 MG: 50 TABLET ORAL at 21:04

## 2023-11-26 RX ADMIN — LEVETIRACETAM 1000 MG: 500 TABLET, FILM COATED ORAL at 04:30

## 2023-11-26 RX ADMIN — DICLOFENAC SODIUM 4 G: 10 GEL TOPICAL at 20:52

## 2023-11-26 RX ADMIN — INSULIN HUMAN 4 UNITS: 100 INJECTION, SOLUTION PARENTERAL at 16:40

## 2023-11-26 RX ADMIN — DICLOFENAC SODIUM 4 G: 10 GEL TOPICAL at 10:32

## 2023-11-26 RX ADMIN — ENOXAPARIN SODIUM 40 MG: 100 INJECTION SUBCUTANEOUS at 16:40

## 2023-11-26 RX ADMIN — LOSARTAN POTASSIUM 25 MG: 50 TABLET, FILM COATED ORAL at 04:30

## 2023-11-26 RX ADMIN — ASPIRIN 81 MG: 81 TABLET, CHEWABLE ORAL at 04:30

## 2023-11-26 RX ADMIN — ACETAMINOPHEN 650 MG: 325 TABLET, FILM COATED ORAL at 16:45

## 2023-11-26 RX ADMIN — INSULIN GLARGINE-YFGN 10 UNITS: 100 INJECTION, SOLUTION SUBCUTANEOUS at 16:41

## 2023-11-26 RX ADMIN — ATORVASTATIN CALCIUM 80 MG: 80 TABLET, FILM COATED ORAL at 16:40

## 2023-11-26 RX ADMIN — INSULIN HUMAN 3 UNITS: 100 INJECTION, SOLUTION PARENTERAL at 12:00

## 2023-11-26 RX ADMIN — AMLODIPINE BESYLATE 10 MG: 5 TABLET ORAL at 04:31

## 2023-11-26 RX ADMIN — DICLOFENAC SODIUM 4 G: 10 GEL TOPICAL at 16:40

## 2023-11-26 RX ADMIN — INSULIN HUMAN 7 UNITS: 100 INJECTION, SOLUTION PARENTERAL at 21:03

## 2023-11-26 ASSESSMENT — ENCOUNTER SYMPTOMS
WEAKNESS: 1
HEADACHES: 1
SENSORY CHANGE: 1

## 2023-11-26 ASSESSMENT — PAIN DESCRIPTION - PAIN TYPE: TYPE: ACUTE PAIN

## 2023-11-26 ASSESSMENT — FIBROSIS 4 INDEX: FIB4 SCORE: 1.06

## 2023-11-26 NOTE — CARE PLAN
The patient is Stable - Low risk of patient condition declining or worsening    Shift Goals  Clinical Goals: stable neuro exams, mobilize as able  Patient Goals: rest, up to chair for meals  Family Goals: n/a    Progress made toward(s) clinical / shift goals:      Problem: Knowledge Deficit - Standard  Goal: Patient and family/care givers will demonstrate understanding of plan of care, disease process/condition, diagnostic tests and medications  Outcome: Progressing  Note: Pt and family at bedside updated on POC for the day. Plan to be up to chair for meals and work towards discharge planning.      Problem: Neuro Status  Goal: Neuro status will remain stable or improve  Outcome: Progressing  Note: Q4h neuro checks in place.      Problem: Self Care  Goal: Patient will have the ability to perform ADLs independently or with assistance (bathe, groom, dress, toilet and feed)  Outcome: Progressing       Patient is not progressing towards the following goals: n/a

## 2023-11-26 NOTE — PROGRESS NOTES
"Hospital Medicine Daily Progress Note    Date of Service  11/26/2023    Chief Complaint  Molly Fountain is a 78 y.o. female admitted 11/21/2023 with weakness    Hospital Course  78 y.o. female with past medical history of hypertension, dyslipidemia, diabetes mellitus though has not been taking any medications, who presented 11/21/2023 with weakness.  She was too weak to get out of bed therefore she had to crawl to get a bed and eventually crawled to the living room where she got a phone and called her neighbor who then called 911.  Apparently paramedics noted  asymmetric facial twitching which was also noted in the ER and some \"twitching\" of the left arm as well.  She did not lose consciousness during these events. There a CT of the head was negative. A1c 14.3. RSV, flu, COVID all negative, urinalysis negative.   EEG notes focal seizure during the EEG study involving the left face, arm and leg. Event spontaneously resolved in about 3 minutes. No loss of awareness during the event. Post event significant for left face, arm and leg weakness. Normal extremity movement and strength prior to the event.   Neurology was consulted. Patient was started on Keppra   MRI notes a subacute lacunar size infarct. No mass effect or surrounding edema in this vicinity. No evidence of acute hemorrhage, or mass lesion. 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   CTA neck Attenuated flow involving the right common carotid artery at the origin and extending along the course of several centimeters within the lower cervical/upper thoracic region. Artifact?   Echo LVEF 60%    Interval Problem Update  Seen patient at bedside  Continue Keppra  MRI reviewed subacute lacunar infarct  CTA head and neck reviewed  Echo reviewed  Carotid ultrasound, noted tortuous " R carotid artery.   PT/OT/PMR  Pending IPR  Discussed with neurology  Koki upon discharge  Continue monitoring Bp. Continue amlodipine and losartan    I have discussed this patient's plan of care and discharge plan at IDT rounds today with Case Management, Nursing, Nursing leadership, and other members of the IDT team.    Consultants/Specialty  neurology    Code Status  DNAR/DNI    Disposition  Medically Cleared  I have placed the appropriate orders for post-discharge needs.    Review of Systems  Review of Systems   Constitutional:  Positive for malaise/fatigue.   Neurological:  Positive for sensory change, weakness and headaches.   All other systems reviewed and are negative.       Physical Exam  Temp:  [36.2 °C (97.2 °F)-37.1 °C (98.8 °F)] 36.2 °C (97.2 °F)  Pulse:  [81-90] 81  Resp:  [16-18] 16  BP: (140-145)/(82-92) 141/82  SpO2:  [95 %-98 %] 97 %    Physical Exam  Vitals reviewed.   Constitutional:       Appearance: Normal appearance. She is obese. She is ill-appearing.   HENT:      Head: Normocephalic and atraumatic.      Nose: Nose normal.      Mouth/Throat:      Pharynx: Oropharynx is clear.   Eyes:      Extraocular Movements: Extraocular movements intact.      Conjunctiva/sclera: Conjunctivae normal.      Pupils: Pupils are equal, round, and reactive to light.   Cardiovascular:      Rate and Rhythm: Normal rate and regular rhythm.      Pulses: Normal pulses.      Heart sounds: Normal heart sounds.   Pulmonary:      Effort: Pulmonary effort is normal.      Breath sounds: Normal breath sounds.   Abdominal:      General: Abdomen is flat. Bowel sounds are normal.      Palpations: Abdomen is soft.   Musculoskeletal:         General: Normal range of motion.      Cervical back: Normal range of motion and neck supple.   Skin:     General: Skin is warm and dry.   Neurological:      Mental Status: She is alert and oriented to person, place, and time. Mental status is at baseline.      Comments: Left hemiparesis  involving the face, arm > leg.  No abnormal movements or postures.   Psychiatric:         Mood and Affect: Mood normal.         Behavior: Behavior normal.         Fluids    Intake/Output Summary (Last 24 hours) at 11/26/2023 1108  Last data filed at 11/25/2023 1600  Gross per 24 hour   Intake 220 ml   Output --   Net 220 ml         Laboratory  Recent Labs     11/25/23  0109   WBC 8.0   RBC 4.66   HEMOGLOBIN 11.6*   HEMATOCRIT 37.9   MCV 81.3*   MCH 24.9*   MCHC 30.6*   RDW 44.6   PLATELETCT 282   MPV 9.9       Recent Labs     11/25/23  0109   SODIUM 137   POTASSIUM 3.8   CHLORIDE 101   CO2 26   GLUCOSE 84   BUN 9   CREATININE 0.72   CALCIUM 8.5                       Imaging  US-CAROTID DOPPLER BILAT   Final Result      EC-ECHOCARDIOGRAM COMPLETE W/ CONT   Final Result      CT-CTA NECK WITH & W/O-POST PROCESSING   Final Result      1.  Attenuated flow involving the right common carotid artery at the origin and extending along the course of several centimeters within the lower cervical/upper thoracic region. There appears to be some blurring of the vessel in that area as well which    may represent a component of motion artifact. This may represent significant stenosis at the common carotid artery origin versus motion artifact.      2.  Mild atherosclerotic plaque involving the carotid bifurcations bilaterally resulting in less than 50% diameter narrowing.      3.  Patent vertebral arteries bilaterally.      CT-CTA HEAD WITH & W/O-POST PROCESS   Final Result      1.  No large vessel occlusion.   2.  Mild irregularity and narrowing of the proximal posterior cerebral arteries.   3.  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      MR-BRAIN-W/O   Final Result   Addendum (preliminary) 1 of 1   ADDENDUM:      Upon further review with Dr. Jamison on 11/24/2023, one notes a tiny    punctate focus of bright diffusion signal in the right insula    (diffusion-weighted axial image 17, series 4). No corresponding  restricted    diffusion/hypointensity is evident on the ADC map.    Nonspecific finding most likely representing a subacute lacunar size    infarct. No mass effect or surrounding edema in this vicinity.            Final      1.  Moderate cerebral atrophy.   2.  Advanced supratentorial white matter disease most consistent with microvascular ischemic change.   3.  Minimal pontine ischemic gliosis.   4.  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   5.  No evidence of acute hemorrhage, acute infarction, or mass lesion.      DX-KNEE 2- LEFT   Final Result      1.  No fracture or dislocation of LEFT knee.   2.  Minimal degenerative changes.      OUTSIDE IMAGES-CT HEAD   Final Result           Assessment/Plan  * Seizure (HCC)- (present on admission)  Assessment & Plan  EEG notes focal seizure during the EEG study involving the left face, arm and leg. Event spontaneously resolved in about 3 minutes. No loss of awareness during the event. Post event significant for left face, arm and leg weakness. Normal extremity movement and strength prior to the event.   CT of the head was negative    Loaded with Keppra. Continue with Keppra 1000mg bid  MRI no evidence of acute hemorrhage, acute infarction, or mass lesion  No driving.  No handling weapons.  No swimming or tub bathing alone.  No working from heights.  No other high risk activity that may result in harm to self or others in the event of a seizure. Avoid sleep deprivation- discussed with patient and family at bedside   Seizure precautions      CVA (cerebral vascular accident) (HCC)  Assessment & Plan  MRI notes a subacute lacunar size infarct. No mass effect or surrounding edema in this vicinity. No evidence of acute hemorrhage, or mass lesion. Numerous punctate foci of old microhemorrhage in both cerebral hemispheres most consistent with old hypertensive  microhemorrhage versus amyloid angiopathy.  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   CTA neck Attenuated flow involving the right common carotid artery at the origin and extending along the course of several centimeters within the lower cervical/upper thoracic region. Artifact?   Echo   Ordered carotid US: Torturous right carotid artery  Continue ASA and statin  A1c 14, optimized DM managements   , start high-intensity statin.   Zio patch upon discharge  [facility] outpatient follow-up    Advance care planning  Assessment & Plan  DNAR: I discussed code status with patient  who at time of discussion had medical decision making capacity. The patient wishes to be DNAR and under no circumstances would want life sustaining treatments in the event that he has a cardiac arrest. I also discussed intubation including temporary courses and he does not wish to be placed on a ventilator under any circumstances, including for temporary and reversible causes. This discussion was had with her family in the room.    I discussed advance care planning with the patient and family for 16 minutes, including diagnosis, prognosis, plan of care, risks and benefits of any therapies that could be offered, as well as alternatives including palliation and hospice, as appropriate.        Hypokalemia- (present on admission)  Assessment & Plan  Replace as indicated    Essential hypertension- (present on admission)  Assessment & Plan  He has a history of hypertension and has not been taking any medications  MRI no acute infarction  Start amlodipine     11/26: Continue amlodipine and losartan.  Monitoring blood pressure      Type 2 diabetes mellitus with complication, without long-term current use of insulin (HCC)- (present on admission)  Assessment & Plan  A1c 14.3.  Patient is aware of diabetes diagnosis.  However she had been on medications but stopped them.  Lantus and Sliding scale  insulin  Hypoglycemic protocol diabetes education           VTE prophylaxis:    enoxaparin ppx      I have performed a physical exam and reviewed and updated ROS and Plan today (11/26/2023). In review of yesterday's note (11/25/2023), there are no changes except as documented above.

## 2023-11-26 NOTE — CARE PLAN
The patient is Stable - Low risk of patient condition declining or worsening    Shift Goals  Clinical Goals: Monitor neuro status, monitor for seizures  Patient Goals: Rest  Family Goals: VIPIN    Progress made toward(s) clinical / shift goals:  Patient is A&Ox4. Educated patient on POC. Patient ambulates x1 w/ FWW. Patient denied pain, other than left knee. Bed is low and locked. Bed alarm on. Call light within reach. Hourly rounding continues.       Problem: Knowledge Deficit - Stroke Education  Goal: Patient's knowledge of stroke and risk factors will improve  Outcome: Progressing     Problem: Neuro Status  Goal: Neuro status will remain stable or improve  Outcome: Progressing     Problem: Pain - Standard  Goal: Alleviation of pain or a reduction in pain to the patient’s comfort goal  Outcome: Progressing       Patient is not progressing towards the following goals:

## 2023-11-27 LAB
GLUCOSE BLD STRIP.AUTO-MCNC: 135 MG/DL (ref 65–99)
GLUCOSE BLD STRIP.AUTO-MCNC: 176 MG/DL (ref 65–99)
GLUCOSE BLD STRIP.AUTO-MCNC: 211 MG/DL (ref 65–99)

## 2023-11-27 PROCEDURE — 700102 HCHG RX REV CODE 250 W/ 637 OVERRIDE(OP): Performed by: STUDENT IN AN ORGANIZED HEALTH CARE EDUCATION/TRAINING PROGRAM

## 2023-11-27 PROCEDURE — 99232 SBSQ HOSP IP/OBS MODERATE 35: CPT | Performed by: STUDENT IN AN ORGANIZED HEALTH CARE EDUCATION/TRAINING PROGRAM

## 2023-11-27 PROCEDURE — A9270 NON-COVERED ITEM OR SERVICE: HCPCS | Performed by: STUDENT IN AN ORGANIZED HEALTH CARE EDUCATION/TRAINING PROGRAM

## 2023-11-27 PROCEDURE — A9270 NON-COVERED ITEM OR SERVICE: HCPCS | Performed by: PSYCHIATRY & NEUROLOGY

## 2023-11-27 PROCEDURE — 700102 HCHG RX REV CODE 250 W/ 637 OVERRIDE(OP): Performed by: HOSPITALIST

## 2023-11-27 PROCEDURE — 700102 HCHG RX REV CODE 250 W/ 637 OVERRIDE(OP): Performed by: PSYCHIATRY & NEUROLOGY

## 2023-11-27 PROCEDURE — 97535 SELF CARE MNGMENT TRAINING: CPT

## 2023-11-27 PROCEDURE — 97530 THERAPEUTIC ACTIVITIES: CPT

## 2023-11-27 PROCEDURE — 82962 GLUCOSE BLOOD TEST: CPT | Mod: 91

## 2023-11-27 PROCEDURE — A9270 NON-COVERED ITEM OR SERVICE: HCPCS | Performed by: HOSPITALIST

## 2023-11-27 PROCEDURE — 700111 HCHG RX REV CODE 636 W/ 250 OVERRIDE (IP): Mod: JZ | Performed by: HOSPITALIST

## 2023-11-27 PROCEDURE — 770006 HCHG ROOM/CARE - MED/SURG/GYN SEMI*

## 2023-11-27 RX ADMIN — INSULIN GLARGINE-YFGN 10 UNITS: 100 INJECTION, SOLUTION SUBCUTANEOUS at 17:19

## 2023-11-27 RX ADMIN — TRAMADOL HYDROCHLORIDE 50 MG: 50 TABLET ORAL at 09:57

## 2023-11-27 RX ADMIN — DICLOFENAC SODIUM 4 G: 10 GEL TOPICAL at 15:05

## 2023-11-27 RX ADMIN — ATORVASTATIN CALCIUM 80 MG: 80 TABLET, FILM COATED ORAL at 17:18

## 2023-11-27 RX ADMIN — DICLOFENAC SODIUM 4 G: 10 GEL TOPICAL at 09:57

## 2023-11-27 RX ADMIN — ENOXAPARIN SODIUM 40 MG: 100 INJECTION SUBCUTANEOUS at 17:18

## 2023-11-27 RX ADMIN — DOCUSATE SODIUM 50 MG AND SENNOSIDES 8.6 MG 2 TABLET: 8.6; 5 TABLET, FILM COATED ORAL at 05:00

## 2023-11-27 RX ADMIN — INSULIN HUMAN 10 UNITS: 100 INJECTION, SOLUTION PARENTERAL at 20:07

## 2023-11-27 RX ADMIN — AMLODIPINE BESYLATE 10 MG: 5 TABLET ORAL at 05:00

## 2023-11-27 RX ADMIN — LOSARTAN POTASSIUM 25 MG: 50 TABLET, FILM COATED ORAL at 05:00

## 2023-11-27 RX ADMIN — ASPIRIN 81 MG: 81 TABLET, CHEWABLE ORAL at 05:00

## 2023-11-27 RX ADMIN — INSULIN HUMAN 4 UNITS: 100 INJECTION, SOLUTION PARENTERAL at 17:18

## 2023-11-27 RX ADMIN — DICLOFENAC SODIUM 4 G: 10 GEL TOPICAL at 20:08

## 2023-11-27 RX ADMIN — INSULIN HUMAN 3 UNITS: 100 INJECTION, SOLUTION PARENTERAL at 12:24

## 2023-11-27 RX ADMIN — LEVETIRACETAM 1000 MG: 500 TABLET, FILM COATED ORAL at 17:18

## 2023-11-27 RX ADMIN — LEVETIRACETAM 1000 MG: 500 TABLET, FILM COATED ORAL at 05:00

## 2023-11-27 ASSESSMENT — PAIN DESCRIPTION - PAIN TYPE
TYPE: ACUTE PAIN

## 2023-11-27 ASSESSMENT — ENCOUNTER SYMPTOMS
WEAKNESS: 1
SENSORY CHANGE: 1
HEADACHES: 1

## 2023-11-27 ASSESSMENT — COGNITIVE AND FUNCTIONAL STATUS - GENERAL
MOBILITY SCORE: 18
WALKING IN HOSPITAL ROOM: A LITTLE
TOILETING: A LITTLE
DAILY ACTIVITIY SCORE: 21
MOVING TO AND FROM BED TO CHAIR: A LITTLE
MOVING FROM LYING ON BACK TO SITTING ON SIDE OF FLAT BED: A LITTLE
HELP NEEDED FOR BATHING: A LITTLE
STANDING UP FROM CHAIR USING ARMS: A LITTLE
CLIMB 3 TO 5 STEPS WITH RAILING: A LITTLE
SUGGESTED CMS G CODE MODIFIER MOBILITY: CK
DRESSING REGULAR LOWER BODY CLOTHING: A LITTLE
SUGGESTED CMS G CODE MODIFIER DAILY ACTIVITY: CJ
TURNING FROM BACK TO SIDE WHILE IN FLAT BAD: A LITTLE

## 2023-11-27 ASSESSMENT — GAIT ASSESSMENTS
DISTANCE (FEET): 100
ASSISTIVE DEVICE: HAND HELD ASSIST
GAIT LEVEL OF ASSIST: CONTACT GUARD ASSIST

## 2023-11-27 NOTE — CARE PLAN
The patient is Stable - Low risk of patient condition declining or worsening    Shift Goals  Clinical Goals: pain control, mobilization, discharge planning  Patient Goals: discharge  Family Goals: n/a    Progress made toward(s) clinical / shift goals:      Problem: Knowledge Deficit - Standard  Goal: Patient and family/care givers will demonstrate understanding of plan of care, disease process/condition, diagnostic tests and medications  Outcome: Progressing  Note: Pt updated on POC for the day. Plan to work with therapies and get up to chair for meals.      Problem: Neuro Status  Goal: Neuro status will remain stable or improve  Outcome: Progressing  Note: Q4h neuro checks in place.      Problem: Pain - Standard  Goal: Alleviation of pain or a reduction in pain to the patient’s comfort goal  Outcome: Progressing  Note: Pt medicated per MAR for pain control. Heat and mobilization used as non-pharmacological adjuncts.        Patient is not progressing towards the following goals: n/a

## 2023-11-27 NOTE — THERAPY
Physical Therapy   Daily Treatment     Patient Name: Molly Fountain  Age:  78 y.o., Sex:  female  Medical Record #: [id number]  Today's Date: 11/27/2023     Precautions  Precautions: Fall Risk;Swallow Precautions  Comments: seizure precautions    Assessment    Patient progressing with functional mobility. She mobilized as detailed below. She reached out for support from handrail as available, recommend FWW during ambulation to improve independence. Will continue to follow.    Plan    Treatment Plan Status: Continue Current Treatment Plan  Type of Treatment: Bed Mobility, Debridement, Equipment, Group Therapy, Gait Training, Manual Therapy, Neuro Re-Education / Balance, Self Care / Home Evaluation, Stair Training, Therapeutic Activities, Therapeutic Exercise  Treatment Frequency: 4 Times per Week  Treatment Duration: Until Therapy Goals Met    DC Equipment Recommendations: Unable to determine at this time  Discharge Recommendations: Recommend post-acute placement for additional physical therapy services prior to discharge home (if family able to provide assist home may be an option)      Subjective    RN cleared patient for therapy, received in chair, agreeable and eager to mobilize.     Objective       11/27/23 0936   Charge Group   Charges  Yes   PT Therapeutic Activities (Units) 2   Total Time Spent   PT Total Time Yes   PT Therapeutic Activities Time Spent (Mins) 27   PT Total Time Spent (Calculated) 27   Precautions   Precautions Fall Risk;Swallow Precautions   Comments seizure precautions   Vitals   O2 (LPM) 0   O2 Delivery Device None - Room Air   Pain 0 - 10 Group   Location Knee   Location Orientation Left   Therapist Pain Assessment During Activity;Nurse Notified   Cognition    Cognition / Consciousness WDL   Level of Consciousness Alert   Comments appears to be near baseline. pleasant, cooperative. somewhat fearful/self limiting   Strength Lower Body   Comments WFL for mobility, reported L knee pain limited  "tolerance   Balance   Sitting Balance (Static) Fair   Sitting Balance (Dynamic) Fair   Standing Balance (Static) Fair   Standing Balance (Dynamic) Fair   Weight Shift Sitting Fair   Weight Shift Standing Fair   Skilled Intervention Verbal Cuing;Compensatory Strategies;Facilitation;Sequencing;Postural Facilitation   Comments no overt LOB. used HHA and handrail during ambulation   Bed Mobility    Comments in chair pre and post   Gait Analysis   Gait Level Of Assist Contact Guard Assist   Assistive Device Hand Held Assist   Distance (Feet) 100   # of Times Distance was Traveled 1   Deviation   (decreased ellyn, step length)   # of Stairs Climbed 0   Weight Bearing Status no restrictions   Vision Deficits Impacting Mobility NT   Skilled Intervention Verbal Cuing;Compensatory Strategies;Facilitation;Sequencing   Comments patient repeated \"I don't want to get dizzy\" throughout ambulation but less focused on fear when distracted by conversation   Functional Mobility   Sit to Stand Contact Guard Assist   Bed, Chair, Wheelchair Transfer Contact Guard Assist   Transfer Method Stand Step   How much difficulty does the patient currently have...   Turning over in bed (including adjusting bedclothes, sheets and blankets)? 3   Sitting down on and standing up from a chair with arms (e.g., wheelchair, bedside commode, etc.) 3   Moving from lying on back to sitting on the side of the bed? 3   How much help from another person does the patient currently need...   Moving to and from a bed to a chair (including a wheelchair)? 3   Need to walk in a hospital room? 3   Climbing 3-5 steps with a railing? 3   6 clicks Mobility Score 18   Patient / Family Goals    Patient / Family Goal #1 none stated   Short Term Goals    Short Term Goal # 1 Pt will perform sit<>supine with supervision within 6tx in order to get in/out of bed   Goal Outcome # 1   (not observed this session)   Short Term Goal # 2 Pt will perform sit<>stand and stand pivot " transfers with FWW and supervision within 6tx in order to initiate transfers and gait   Goal Outcome # 2 Progressing as expected   Short Term Goal # 3 Pt will ambulate 50' with FWW and CGA within 6tx in order to access environment   Goal Outcome # 3 Progressing as expected   Short Term Goal # 4 Pt will perform 4 stairs with handrails and CGA within 6tx in order to enter/exit home   Goal Outcome # 4   (not attempted this session)   Physical Therapy Treatment Plan   Physical Therapy Treatment Plan Continue Current Treatment Plan   Anticipated Discharge Equipment and Recommendations   DC Equipment Recommendations Unable to determine at this time   Discharge Recommendations Recommend post-acute placement for additional physical therapy services prior to discharge home  (if family able to provide assist home may be an option)   Interdisciplinary Plan of Care Collaboration   IDT Collaboration with  Nursing   Patient Position at End of Therapy Seated;Chair Alarm On;Tray Table within Reach;Call Light within Reach;Phone within Reach   Collaboration Comments RN aware of visit, response   Session Information   Date / Session Number  11/27-2 (2/4, 11/28)

## 2023-11-27 NOTE — PROGRESS NOTES
"Hospital Medicine Daily Progress Note    Date of Service  11/27/2023    Chief Complaint  Molly Fountain is a 78 y.o. female admitted 11/21/2023 with weakness    Hospital Course  78 y.o. female with past medical history of hypertension, dyslipidemia, diabetes mellitus though has not been taking any medications, who presented 11/21/2023 with weakness.  She was too weak to get out of bed therefore she had to crawl to get a bed and eventually crawled to the living room where she got a phone and called her neighbor who then called 911.  Apparently paramedics noted  asymmetric facial twitching which was also noted in the ER and some \"twitching\" of the left arm as well.  She did not lose consciousness during these events. There a CT of the head was negative. A1c 14.3. RSV, flu, COVID all negative, urinalysis negative.   EEG notes focal seizure during the EEG study involving the left face, arm and leg. Event spontaneously resolved in about 3 minutes. No loss of awareness during the event. Post event significant for left face, arm and leg weakness. Normal extremity movement and strength prior to the event.   Neurology was consulted. Patient was started on Keppra   MRI notes a subacute lacunar size infarct. No mass effect or surrounding edema in this vicinity. No evidence of acute hemorrhage, or mass lesion. 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   CTA neck Attenuated flow involving the right common carotid artery at the origin and extending along the course of several centimeters within the lower cervical/upper thoracic region. Artifact?   Echo LVEF 60%    Interval Problem Update  Seen patient at bedside  Continue Keppra  PT/OT/PMR  Pending IPR  Discussed with neurology  Koki upon discharge  Continue amlodipine and losartan. " Continue monitoring Bp    I have discussed this patient's plan of care and discharge plan at IDT rounds today with Case Management, Nursing, Nursing leadership, and other members of the IDT team.    Consultants/Specialty  neurology    Code Status  DNAR/DNI    Disposition  Medically Cleared  I have placed the appropriate orders for post-discharge needs.    Review of Systems  Review of Systems   Constitutional:  Positive for malaise/fatigue.   Neurological:  Positive for sensory change, weakness and headaches.   All other systems reviewed and are negative.       Physical Exam  Temp:  [36.4 °C (97.5 °F)-36.9 °C (98.4 °F)] 36.7 °C (98.1 °F)  Pulse:  [84-95] 85  Resp:  [16-17] 17  BP: ()/(66-96) 149/83  SpO2:  [95 %-97 %] 97 %    Physical Exam  Vitals reviewed.   Constitutional:       Appearance: Normal appearance. She is obese. She is ill-appearing.   HENT:      Head: Normocephalic and atraumatic.      Nose: Nose normal.      Mouth/Throat:      Pharynx: Oropharynx is clear.   Eyes:      Extraocular Movements: Extraocular movements intact.      Conjunctiva/sclera: Conjunctivae normal.      Pupils: Pupils are equal, round, and reactive to light.   Cardiovascular:      Rate and Rhythm: Normal rate and regular rhythm.      Pulses: Normal pulses.      Heart sounds: Normal heart sounds.   Pulmonary:      Effort: Pulmonary effort is normal.      Breath sounds: Normal breath sounds.   Abdominal:      General: Abdomen is flat. Bowel sounds are normal.      Palpations: Abdomen is soft.   Musculoskeletal:         General: Normal range of motion.      Cervical back: Normal range of motion and neck supple.   Skin:     General: Skin is warm and dry.   Neurological:      Mental Status: She is alert and oriented to person, place, and time. Mental status is at baseline.      Comments: Left hemiparesis involving the face, arm > leg.  No abnormal movements or postures.   Psychiatric:         Mood and Affect: Mood normal.          Behavior: Behavior normal.         Fluids  No intake or output data in the 24 hours ending 11/27/23 1510      Laboratory  Recent Labs     11/25/23  0109   WBC 8.0   RBC 4.66   HEMOGLOBIN 11.6*   HEMATOCRIT 37.9   MCV 81.3*   MCH 24.9*   MCHC 30.6*   RDW 44.6   PLATELETCT 282   MPV 9.9       Recent Labs     11/25/23  0109   SODIUM 137   POTASSIUM 3.8   CHLORIDE 101   CO2 26   GLUCOSE 84   BUN 9   CREATININE 0.72   CALCIUM 8.5                       Imaging  EC-ECHOCARDIOGRAM COMPLETE W/O CONT   Final Result      US-CAROTID DOPPLER BILAT   Final Result      CT-CTA NECK WITH & W/O-POST PROCESSING   Final Result      1.  Attenuated flow involving the right common carotid artery at the origin and extending along the course of several centimeters within the lower cervical/upper thoracic region. There appears to be some blurring of the vessel in that area as well which    may represent a component of motion artifact. This may represent significant stenosis at the common carotid artery origin versus motion artifact.      2.  Mild atherosclerotic plaque involving the carotid bifurcations bilaterally resulting in less than 50% diameter narrowing.      3.  Patent vertebral arteries bilaterally.      CT-CTA HEAD WITH & W/O-POST PROCESS   Final Result      1.  No large vessel occlusion.   2.  Mild irregularity and narrowing of the proximal posterior cerebral arteries.   3.  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      MR-BRAIN-W/O   Final Result   Addendum (preliminary) 1 of 1   ADDENDUM:      Upon further review with Dr. Jamison on 11/24/2023, one notes a tiny    punctate focus of bright diffusion signal in the right insula    (diffusion-weighted axial image 17, series 4). No corresponding restricted    diffusion/hypointensity is evident on the ADC map.    Nonspecific finding most likely representing a subacute lacunar size    infarct. No mass effect or surrounding edema in this vicinity.            Final       1.  Moderate cerebral atrophy.   2.  Advanced supratentorial white matter disease most consistent with microvascular ischemic change.   3.  Minimal pontine ischemic gliosis.   4.  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   5.  No evidence of acute hemorrhage, acute infarction, or mass lesion.      DX-KNEE 2- LEFT   Final Result      1.  No fracture or dislocation of LEFT knee.   2.  Minimal degenerative changes.      OUTSIDE IMAGES-CT HEAD   Final Result           Assessment/Plan  * Seizure (HCC)- (present on admission)  Assessment & Plan  EEG notes focal seizure during the EEG study involving the left face, arm and leg. Event spontaneously resolved in about 3 minutes. No loss of awareness during the event. Post event significant for left face, arm and leg weakness. Normal extremity movement and strength prior to the event.   CT of the head was negative    Loaded with Keppra. Continue with Keppra 1000mg bid  MRI no evidence of acute hemorrhage, acute infarction, or mass lesion  No driving.  No handling weapons.  No swimming or tub bathing alone.  No working from heights.  No other high risk activity that may result in harm to self or others in the event of a seizure. Avoid sleep deprivation- discussed with patient and family at bedside   Seizure precautions      CVA (cerebral vascular accident) (HCC)  Assessment & Plan  MRI notes a subacute lacunar size infarct. No mass effect or surrounding edema in this vicinity. No evidence of acute hemorrhage, or mass lesion. 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   CTA neck Attenuated flow involving the right common carotid artery at the origin and extending along the course of  several centimeters within the lower cervical/upper thoracic region. Artifact?   Echo   Ordered carotid US: Torturous right carotid artery  Continue ASA and statin  A1c 14, optimized DM managements   , start high-intensity statin.   Zio patch upon discharge  [facility]-up    Advance care planning  Assessment & Plan  DNAR: I discussed code status with patient  who at time of discussion had medical decision making capacity. The patient wishes to be DNAR and under no circumstances would want life sustaining treatments in the event that he has a cardiac arrest. I also discussed intubation including temporary courses and he does not wish to be placed on a ventilator under any circumstances, including for temporary and reversible causes. This discussion was had with her family in the room.    I discussed advance care planning with the patient and family for 16 minutes, including diagnosis, prognosis, plan of care, risks and benefits of any therapies that could be offered, as well as alternatives including palliation and hospice, as appropriate.        Hypokalemia- (present on admission)  Assessment & Plan  Replace as indicated    Essential hypertension- (present on admission)  Assessment & Plan  He has a history of hypertension and has not been taking any medications  MRI notes a subacute lacunar size infarct.   11/27: continue amlodipine and losartan. Continue to monitor      Type 2 diabetes mellitus with complication, without long-term current use of insulin (HCC)- (present on admission)  Assessment & Plan  A1c 14.3.  Patient is aware of diabetes diagnosis.  However she had been on medications but stopped them.  Lantus and Sliding scale insulin  Hypoglycemic protocol diabetes education           VTE prophylaxis:    enoxaparin ppx      I have performed a physical exam and reviewed and updated ROS and Plan today (11/27/2023). In review of yesterday's note (11/26/2023), there are no  changes except as documented above.

## 2023-11-27 NOTE — CARE PLAN
The patient is Stable - Low risk of patient condition declining or worsening    Shift Goals  Clinical Goals: Pain manangement, monitor neuro status  Patient Goals: pain relief, rest  Family Goals: VIPIN    Progress made toward(s) clinical / shift goals:  Patient is A&Ox4. Educated patient on POC. Medicated patient per MAR for pain. Patient ambulated with FWW. Bed is low and locked. Bed alarm on. Call light within reach. Hourly rounding continues.       Problem: Knowledge Deficit - Standard  Goal: Patient and family/care givers will demonstrate understanding of plan of care, disease process/condition, diagnostic tests and medications  Outcome: Progressing     Problem: Discharge Planning - Stroke  Goal: Ensure Stroke Core Measures are met prior to discharge  Outcome: Progressing     Problem: Pain - Standard  Goal: Alleviation of pain or a reduction in pain to the patient’s comfort goal  Outcome: Progressing       Patient is not progressing towards the following goals:

## 2023-11-27 NOTE — DISCHARGE PLANNING
Received Choice form @: [id number]  Agency/Facility Name: [facility] referrals  Referral sent per Choice form @: [phone]

## 2023-11-28 LAB
GLUCOSE BLD STRIP.AUTO-MCNC: 129 MG/DL (ref 65–99)
GLUCOSE BLD STRIP.AUTO-MCNC: 186 MG/DL (ref 65–99)
GLUCOSE BLD STRIP.AUTO-MCNC: 193 MG/DL (ref 65–99)
GLUCOSE BLD STRIP.AUTO-MCNC: 217 MG/DL (ref 65–99)
GLUCOSE BLD STRIP.AUTO-MCNC: 314 MG/DL (ref 65–99)

## 2023-11-28 PROCEDURE — 700111 HCHG RX REV CODE 636 W/ 250 OVERRIDE (IP): Mod: JZ | Performed by: HOSPITALIST

## 2023-11-28 PROCEDURE — 700102 HCHG RX REV CODE 250 W/ 637 OVERRIDE(OP): Performed by: PSYCHIATRY & NEUROLOGY

## 2023-11-28 PROCEDURE — 700102 HCHG RX REV CODE 250 W/ 637 OVERRIDE(OP): Performed by: STUDENT IN AN ORGANIZED HEALTH CARE EDUCATION/TRAINING PROGRAM

## 2023-11-28 PROCEDURE — 770006 HCHG ROOM/CARE - MED/SURG/GYN SEMI*

## 2023-11-28 PROCEDURE — A9270 NON-COVERED ITEM OR SERVICE: HCPCS | Performed by: HOSPITALIST

## 2023-11-28 PROCEDURE — 99232 SBSQ HOSP IP/OBS MODERATE 35: CPT | Performed by: INTERNAL MEDICINE

## 2023-11-28 PROCEDURE — 700102 HCHG RX REV CODE 250 W/ 637 OVERRIDE(OP): Performed by: HOSPITALIST

## 2023-11-28 PROCEDURE — A9270 NON-COVERED ITEM OR SERVICE: HCPCS | Performed by: PSYCHIATRY & NEUROLOGY

## 2023-11-28 PROCEDURE — 82962 GLUCOSE BLOOD TEST: CPT | Mod: 91

## 2023-11-28 PROCEDURE — A9270 NON-COVERED ITEM OR SERVICE: HCPCS | Performed by: STUDENT IN AN ORGANIZED HEALTH CARE EDUCATION/TRAINING PROGRAM

## 2023-11-28 RX ORDER — LOSARTAN POTASSIUM 50 MG/1
50 TABLET ORAL
Status: DISCONTINUED | OUTPATIENT
Start: 2023-11-29 | End: 2023-11-29 | Stop reason: HOSPADM

## 2023-11-28 RX ADMIN — AMLODIPINE BESYLATE 10 MG: 5 TABLET ORAL at 05:00

## 2023-11-28 RX ADMIN — DICLOFENAC SODIUM 4 G: 10 GEL TOPICAL at 10:30

## 2023-11-28 RX ADMIN — LEVETIRACETAM 1000 MG: 500 TABLET, FILM COATED ORAL at 05:00

## 2023-11-28 RX ADMIN — TRAMADOL HYDROCHLORIDE 50 MG: 50 TABLET ORAL at 05:07

## 2023-11-28 RX ADMIN — DOCUSATE SODIUM 50 MG AND SENNOSIDES 8.6 MG 2 TABLET: 8.6; 5 TABLET, FILM COATED ORAL at 05:01

## 2023-11-28 RX ADMIN — INSULIN HUMAN 4 UNITS: 100 INJECTION, SOLUTION PARENTERAL at 23:03

## 2023-11-28 RX ADMIN — ATORVASTATIN CALCIUM 80 MG: 80 TABLET, FILM COATED ORAL at 16:25

## 2023-11-28 RX ADMIN — DICLOFENAC SODIUM 4 G: 10 GEL TOPICAL at 23:01

## 2023-11-28 RX ADMIN — INSULIN HUMAN 3 UNITS: 100 INJECTION, SOLUTION PARENTERAL at 16:26

## 2023-11-28 RX ADMIN — LOSARTAN POTASSIUM 25 MG: 50 TABLET, FILM COATED ORAL at 05:08

## 2023-11-28 RX ADMIN — INSULIN GLARGINE-YFGN 10 UNITS: 100 INJECTION, SOLUTION SUBCUTANEOUS at 16:26

## 2023-11-28 RX ADMIN — ENOXAPARIN SODIUM 40 MG: 100 INJECTION SUBCUTANEOUS at 16:32

## 2023-11-28 RX ADMIN — LEVETIRACETAM 1000 MG: 500 TABLET, FILM COATED ORAL at 16:25

## 2023-11-28 RX ADMIN — DICLOFENAC SODIUM 4 G: 10 GEL TOPICAL at 16:25

## 2023-11-28 RX ADMIN — TRAMADOL HYDROCHLORIDE 50 MG: 50 TABLET ORAL at 16:25

## 2023-11-28 RX ADMIN — ASPIRIN 81 MG: 81 TABLET, CHEWABLE ORAL at 05:01

## 2023-11-28 RX ADMIN — INSULIN HUMAN 3 UNITS: 100 INJECTION, SOLUTION PARENTERAL at 12:36

## 2023-11-28 ASSESSMENT — ENCOUNTER SYMPTOMS
SENSORY CHANGE: 1
WEAKNESS: 1
COUGH: 0
SORE THROAT: 0
FALLS: 0
SHORTNESS OF BREATH: 0
DOUBLE VISION: 0
BACK PAIN: 0
NAUSEA: 0
VOMITING: 0
HEADACHES: 1
PALPITATIONS: 0
HALLUCINATIONS: 0
BLURRED VISION: 0

## 2023-11-28 ASSESSMENT — LIFESTYLE VARIABLES: SUBSTANCE_ABUSE: 0

## 2023-11-28 ASSESSMENT — PAIN DESCRIPTION - PAIN TYPE
TYPE: ACUTE PAIN

## 2023-11-28 NOTE — CARE PLAN
The patient is Stable - Low risk of patient condition declining or worsening    Shift Goals  Clinical Goals: stable neuro exams, pain control, discharge planning  Patient Goals: go to rehab, less  knee pain  Family Goals: n/a    Progress made toward(s) clinical / shift goals:      Problem: Knowledge Deficit - Standard  Goal: Patient and family/care givers will demonstrate understanding of plan of care, disease process/condition, diagnostic tests and medications  Outcome: Progressing  Note: Pt updated on POC for the day. Plan to monitor blood glucose levels, mobilize and work on discharge planning.     Problem: Pain - Standard  Goal: Alleviation of pain or a reduction in pain to the patient’s comfort goal  Outcome: Progressing  Note: Pt medicated per MAR for pain control. Heat used as non-pharmacological adjunct.        Patient is not progressing towards the following goals: n/a

## 2023-11-28 NOTE — DISCHARGE PLANNING
Case Management Discharge Planning    Admission Date: 11/21/2023  GMLOS: 1.9  ALOS: 7    6-Clicks ADL Score: 21  6-Clicks Mobility Score: 18      Anticipated Discharge Dispo: Discharge Disposition: D/T to SNF with Medicare cert in anticipation of skilled care (03)    DME Needed: No    Action(s) Taken: Updated Provider/Nurse on Discharge Plan    Escalations Completed: None    Medically Clear: Yes    Next Steps: follow up with [facility] for insurance authorization     Barriers to Discharge: Pending Insurance Authorization    Is the patient up for discharge tomorrow: No  SW spoke to patient about her discharge plan, patient is orientated to place and name only at this time.  SW asked if he may speak to her daughter about this, SW given permission.    PC to Monica [phone];  OMKAR went over cost involved in going to Rehab, patient has been accepted by three other facilities.  SW given permission to follow up with [facility].      PC to [facility], OMKAR spoke Charleen who will run authorization for acceptance.

## 2023-11-28 NOTE — DISCHARGE PLANNING
Molly has [employer], [facility] is OON.  MOOP [id number].  No secondary.  Recommend a referral to [facility].  Pasquale SWANSON is aware.  [facility] will no longer follow.  Please reach out to myself with any questions.

## 2023-11-28 NOTE — CARE PLAN
The patient is Stable - Low risk of patient condition declining or worsening    Shift Goals  Clinical Goals: monitor for neuro changes, pain mgmt, mobility, skin integrity, seizure precautions  Patient Goals: pain control, mobility, rest and comfort  Family Goals: n/a    Progress made toward(s) clinical / shift goals:      Problem: Hemodynamic Monitoring  Goal: Patient's hemodynamics, fluid balance and neurologic status will be stable or improve  Outcome: Progressing       Patient is not progressing towards the following goals:      Problem: Knowledge Deficit - Stroke Education  Goal: Patient's knowledge of stroke and risk factors will improve  Outcome: Not Met     Continue patient education    Problem: Risk for Aspiration  Goal: Patient's risk for aspiration will be absent or decrease  Outcome: Not Met     1:1 meal supervision. Sit upright to swallow    Problem: Self Care  Goal: Patient will have the ability to perform ADLs independently or with assistance (bathe, groom, dress, toilet and feed)  Outcome: Not Met     Remains mod assist with ADLs

## 2023-11-28 NOTE — PROGRESS NOTES
"Hospital Medicine Daily Progress Note    Date of Service  11/28/2023    Chief Complaint  Molly Fountain is a 78 y.o. female admitted 11/21/2023 with weakness    Hospital Course  78 y.o. female with past medical history of hypertension, dyslipidemia, diabetes mellitus though has not been taking any medications, who presented 11/21/2023 with weakness.  She was too weak to get out of bed therefore she had to crawl to get a bed and eventually crawled to the living room where she got a phone and called her neighbor who then called 911.  Apparently paramedics noted  asymmetric facial twitching which was also noted in the ER and some \"twitching\" of the left arm as well.  She did not lose consciousness during these events. There a CT of the head was negative. A1c 14.3. RSV, flu, COVID all negative, urinalysis negative.   EEG notes focal seizure during the EEG study involving the left face, arm and leg. Event spontaneously resolved in about 3 minutes. No loss of awareness during the event. Post event significant for left face, arm and leg weakness. Normal extremity movement and strength prior to the event.   Neurology was consulted. Patient was started on Keppra   MRI notes a subacute lacunar size infarct. No mass effect or surrounding edema in this vicinity. No evidence of acute hemorrhage, or mass lesion. 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   CTA neck Attenuated flow involving the right common carotid artery at the origin and extending along the course of several centimeters within the lower cervical/upper thoracic region. Artifact?   Echo LVEF 60%    Interval Problem Update  Patient was seen and examined at bedside.  No acute events overnight. Patient is resting comfortably in bed and in no acute distress.     Continue " Keppra  PT/OT/PMR  Anticipate discharge to [facility]    I have discussed this patient's plan of care and discharge plan at IDT rounds today with Case Management, Nursing, Nursing leadership, and other members of the IDT team.    Consultants/Specialty  neurology    Code Status  DNAR/DNI    Disposition  The patient is medically cleared for discharge to home or a post-acute facility.  Anticipate discharge to: an inpatient rehabilitation hospital    I have placed the appropriate orders for post-discharge needs.    Review of Systems  Review of Systems   Constitutional:  Positive for malaise/fatigue.   HENT:  Negative for congestion and sore throat.    Eyes:  Negative for blurred vision and double vision.   Respiratory:  Negative for cough and shortness of breath.    Cardiovascular:  Negative for chest pain and palpitations.   Gastrointestinal:  Negative for nausea and vomiting.   Genitourinary:  Negative for dysuria and frequency.   Musculoskeletal:  Negative for back pain and falls.   Neurological:  Positive for sensory change, weakness and headaches.   Psychiatric/Behavioral:  Negative for hallucinations and substance abuse.    All other systems reviewed and are negative.       Physical Exam  Temp:  [36.5 °C (97.7 °F)-36.7 °C (98.1 °F)] 36.6 °C (97.9 °F)  Pulse:  [86-93] 86  Resp:  [13-18] 16  BP: (141-163)/(79-94) 155/92  SpO2:  [92 %-95 %] 92 %    Physical Exam  Vitals reviewed.   Constitutional:       General: She is not in acute distress.     Appearance: Normal appearance. She is obese.   HENT:      Head: Normocephalic and atraumatic.      Right Ear: External ear normal.      Left Ear: External ear normal.      Nose: Nose normal. No congestion.      Mouth/Throat:      Pharynx: Oropharynx is clear. No oropharyngeal exudate.   Eyes:      General: No scleral icterus.     Extraocular Movements: Extraocular movements intact.      Pupils: Pupils are equal, round, and reactive to light.   Cardiovascular:       Rate and Rhythm: Normal rate and regular rhythm.      Pulses: Normal pulses.      Heart sounds: Normal heart sounds. No murmur heard.  Pulmonary:      Effort: Pulmonary effort is normal.      Breath sounds: Normal breath sounds. No wheezing.   Abdominal:      General: Abdomen is flat.      Palpations: Abdomen is soft.      Tenderness: There is no abdominal tenderness. There is no guarding or rebound.   Musculoskeletal:         General: No swelling or deformity. Normal range of motion.      Cervical back: Normal range of motion and neck supple.   Skin:     General: Skin is warm and dry.      Coloration: Skin is not jaundiced.      Findings: No bruising.   Neurological:      Mental Status: She is alert and oriented to person, place, and time. Mental status is at baseline.      Comments: Left hemiparesis involving the face, arm > leg.  No abnormal movements or postures.   Psychiatric:         Mood and Affect: Mood normal.         Behavior: Behavior normal.         Fluids    Intake/Output Summary (Last 24 hours) at 11/28/2023 1542  Last data filed at 11/28/2023 0400  Gross per 24 hour   Intake 150 ml   Output --   Net 150 ml       Laboratory                              Imaging  EC-ECHOCARDIOGRAM COMPLETE W/O CONT   Final Result      US-CAROTID DOPPLER BILAT   Final Result      CT-CTA NECK WITH & W/O-POST PROCESSING   Final Result      1.  Attenuated flow involving the right common carotid artery at the origin and extending along the course of several centimeters within the lower cervical/upper thoracic region. There appears to be some blurring of the vessel in that area as well which    may represent a component of motion artifact. This may represent significant stenosis at the common carotid artery origin versus motion artifact.      2.  Mild atherosclerotic plaque involving the carotid bifurcations bilaterally resulting in less than 50% diameter narrowing.      3.  Patent vertebral arteries bilaterally.      CT-CTA  HEAD WITH & W/O-POST PROCESS   Final Result      1.  No large vessel occlusion.   2.  Mild irregularity and narrowing of the proximal posterior cerebral arteries.   3.  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      MR-BRAIN-W/O   Final Result   Addendum (preliminary) 1 of 1   ADDENDUM:      Upon further review with Dr. Jamison on 11/24/2023, one notes a tiny    punctate focus of bright diffusion signal in the right insula    (diffusion-weighted axial image 17, series 4). No corresponding restricted    diffusion/hypointensity is evident on the ADC map.    Nonspecific finding most likely representing a subacute lacunar size    infarct. No mass effect or surrounding edema in this vicinity.            Final      1.  Moderate cerebral atrophy.   2.  Advanced supratentorial white matter disease most consistent with microvascular ischemic change.   3.  Minimal pontine ischemic gliosis.   4.  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   5.  No evidence of acute hemorrhage, acute infarction, or mass lesion.      DX-KNEE 2- LEFT   Final Result      1.  No fracture or dislocation of LEFT knee.   2.  Minimal degenerative changes.      OUTSIDE IMAGES-CT HEAD   Final Result           Assessment/Plan  * Seizure (HCC)- (present on admission)  Assessment & Plan  EEG notes focal seizure during the EEG study involving the left face, arm and leg. Event spontaneously resolved in about 3 minutes. No loss of awareness during the event. Post event significant for left face, arm and leg weakness. Normal extremity movement and strength prior to the event.   CT of the head was negative    Loaded with Keppra. Continue with Keppra 1000mg bid  MRI no evidence of acute hemorrhage, acute infarction, or mass lesion  No driving.  No handling weapons.  No swimming or tub bathing alone.  No working from heights.  No other  high risk activity that may result in harm to self or others in the event of a seizure. Avoid sleep deprivation- discussed with patient and family at bedside   Seizure precautions      CVA (cerebral vascular accident) (HCC)  Assessment & Plan  MRI notes a subacute lacunar size infarct. No mass effect or surrounding edema in this vicinity. No evidence of acute hemorrhage, or mass lesion. 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   CTA neck Attenuated flow involving the right common carotid artery at the origin and extending along the course of several centimeters within the lower cervical/upper thoracic region. Artifact?   Echo   Ordered carotid US: Torturous right carotid artery  Continue ASA and statin  A1c 14, optimized DM managements   , start high-intensity statin.   Zio patch upon discharge  [facility]    Essential hypertension- (present on admission)  Assessment & Plan  He has a history of hypertension and has not been taking any medications  MRI notes a subacute lacunar size infarct.   11/27: continue amlodipine and losartan. Continue to monitor    I have increased losartan dose to 50mg daily from 25mg      Type 2 diabetes mellitus with complication, without long-term current use of insulin (HCC)- (present on admission)  Assessment & Plan  A1c 14.3.  Patient is aware of diabetes diagnosis.  However she had been on medications but stopped them.  Lantus and Sliding scale insulin  Hypoglycemic protocol diabetes education      Advance care planning  Assessment & Plan  DNAR: I discussed code status with patient  who at time of discussion had medical decision making capacity. The patient wishes to be DNAR and under no circumstances would want life sustaining treatments in the event that he has a  cardiac arrest. I also discussed intubation including temporary courses and he does not wish to be placed on a ventilator under any circumstances, including for temporary and reversible causes. This discussion was had with her family in the room.    I discussed advance care planning with the patient and family for 16 minutes, including diagnosis, prognosis, plan of care, risks and benefits of any therapies that could be offered, as well as alternatives including palliation and hospice, as appropriate.        Hypokalemia- (present on admission)  Assessment & Plan  Replace as indicated         VTE prophylaxis:    enoxaparin ppx      I have performed a physical exam and reviewed and updated ROS and Plan today (11/28/2023). In review of yesterday's note (11/27/2023), there are no changes except as documented above.

## 2023-11-28 NOTE — DISCHARGE PLANNING
Received Choice form @: [id number]  Agency/Facility Name: [facility]   Referral sent per Choice form @: 0073

## 2023-11-28 NOTE — THERAPY
"Occupational Therapy  Daily Treatment     Patient Name: Molly Fountain  Age:  78 y.o., Sex:  female  Medical Record #: [id number]  Today's Date: 11/27/2023       Precautions: Fall Risk, Swallow Precautions  Comments: seizures    Assessment    Pt seen for OT tx.  Pt is making good progress towards increased independence towards her ADL's.  Pt tolerated 24 min OOB ADL's standing.  Pt did need encouragement to engage in ADL's, but reported pain in her left knee.  Pt would benefit from Post Acute services prior to D/C home as she was independent PTA.    Plan    Treatment Plan Status: Continue Current Treatment Plan  Type of Treatment: Self Care / Activities of Daily Living, Cognitive Skill Development, Manual Therapy Techniques, Neuro Re-Education / Balance, Therapeutic Exercises, Therapeutic Activity  Treatment Frequency: 4 Times per Week  Treatment Duration: Until Therapy Goals Met    DC Equipment Recommendations: Unable to determine at this time  Discharge Recommendations: Recommend post-acute placement for additional occupational therapy services prior to discharge home    Subjective    \"My left knee is really hurting today\"     Objective      Precautions   Precautions Fall Risk;Swallow Precautions   Comments seizures   Vitals   O2 Delivery Device None - Room Air   Pain   Pain Scales 0 to 10 Scale    Intervention Ambulation / Increased Activity;Heat Applied   Pain 0 - 10 Group   Location Knee   Location Orientation Left   Description Sharp;Sore   Therapist Pain Assessment During Activity;Nurse Notified;4   Cognition    Cognition / Consciousness WDL   Level of Consciousness Alert   Comments Pt did well with encouragement, a bit self limiting & reported knee pain today   Strength Upper Body   Upper Body Strength  WDL   Other Treatments   Other Treatments Provided Pt encouraged to be OOB for meals, amb to bathroom with staff & take a more active role in her care.  Pt stated prior to admit she's been indpendent with her " ADL's   Balance   Sitting Balance (Static) Fair +   Sitting Balance (Dynamic) Fair   Standing Balance (Static) Fair   Standing Balance (Dynamic) Fair -   Weight Shift Sitting Fair   Weight Shift Standing Fair   Skilled Intervention Compensatory Strategies;Sequencing;Verbal Cuing   Comments limited by fear & left knee pain   Activities of Daily Living   Eating Modified Independent   Grooming Standby Assist;Standing   Upper Body Dressing Supervision   Lower Body Dressing Minimal Assist   Toileting Contact Guard Assist   Skilled Intervention Compensatory Strategies;Postural Facilitation;Sequencing;Tactile Cuing;Verbal Cuing   Functional Mobility   Sit to Stand Contact Guard Assist   Bed, Chair, Wheelchair Transfer Contact Guard Assist   Toilet Transfers Contact Guard Assist   Transfer Method Stand Step   Mobility pt amb with FWW & CGA ot bathroom   Skilled Intervention Compensatory Strategies;Postural Facilitation;Sequencing;Tactile Cuing;Verbal Cuing   Activity Tolerance   Sitting in Chair 35   Standing 12   Patient / Family Goals   Patient / Family Goal #1 to get some rest   Goal #1 Outcome Progressing as expected   Short Term Goals   Short Term Goal # 1 pt will complete grooming standing at sink w/spv   Goal Outcome # 1 Progressing as expected   Short Term Goal # 2 pt will complete FB dressing w/spv   Goal Outcome # 2 Progressing as expected   Short Term Goal # 3 pt will complete toilet txf and hygiene w/spv   Goal Outcome # 3 Progressing as expected   Education Group   Education Provided Activities of Daily Living   Role of Occupational Therapist Patient Response Patient;Acceptance;Explanation;Verbal Demonstration   ADL Patient Response Patient;Acceptance;Explanation;Demonstration;Action Demonstration;Verbal Demonstration   Occupational Therapy Treatment Plan    O.T. Treatment Plan Continue Current Treatment Plan   Anticipated Discharge Equipment and Recommendations   DC Equipment Recommendations Unable to  determine at this time   Discharge Recommendations Recommend post-acute placement for additional occupational therapy services prior to discharge home   Interdisciplinary Plan of Care Collaboration   IDT Collaboration with  Nursing   Patient Position at End of Therapy Seated;Chair Alarm On;Phone within Reach;Tray Table within Reach;Call Light within Reach   Collaboration Comments nsg notified of OT findings   Session Information   Date / Session Number  11/27 #2 (2/4, 11/28)

## 2023-11-29 ENCOUNTER — PATIENT OUTREACH (OUTPATIENT)
Dept: SCHEDULING | Facility: IMAGING CENTER | Age: 78
End: 2023-11-29
Payer: MEDICARE

## 2023-11-29 ENCOUNTER — NON-PROVIDER VISIT (OUTPATIENT)
Dept: CARDIOLOGY | Facility: MEDICAL CENTER | Age: 78
End: 2023-11-29
Payer: MEDICARE

## 2023-11-29 VITALS
RESPIRATION RATE: 18 BRPM | DIASTOLIC BLOOD PRESSURE: 82 MMHG | OXYGEN SATURATION: 96 % | TEMPERATURE: 98.1 F | BODY MASS INDEX: 31.37 KG/M2 | SYSTOLIC BLOOD PRESSURE: 138 MMHG | HEART RATE: 91 BPM | HEIGHT: 63 IN | WEIGHT: 177.03 LBS

## 2023-11-29 DIAGNOSIS — I47.10 SVT (SUPRAVENTRICULAR TACHYCARDIA) (HCC): ICD-10-CM

## 2023-11-29 DIAGNOSIS — I49.3 PVC'S (PREMATURE VENTRICULAR CONTRACTIONS): ICD-10-CM

## 2023-11-29 DIAGNOSIS — I63.9 CEREBRAL INFARCTION, UNSPECIFIED MECHANISM (HCC): ICD-10-CM

## 2023-11-29 DIAGNOSIS — I49.1 APC (ATRIAL PREMATURE CONTRACTIONS): ICD-10-CM

## 2023-11-29 PROBLEM — E66.01 SEVERE OBESITY (HCC): Status: ACTIVE | Noted: 2023-11-29

## 2023-11-29 LAB
GLUCOSE BLD STRIP.AUTO-MCNC: 121 MG/DL (ref 65–99)
GLUCOSE BLD STRIP.AUTO-MCNC: 258 MG/DL (ref 65–99)

## 2023-11-29 PROCEDURE — A9270 NON-COVERED ITEM OR SERVICE: HCPCS | Performed by: HOSPITALIST

## 2023-11-29 PROCEDURE — 99239 HOSP IP/OBS DSCHRG MGMT >30: CPT | Performed by: INTERNAL MEDICINE

## 2023-11-29 PROCEDURE — 700102 HCHG RX REV CODE 250 W/ 637 OVERRIDE(OP): Performed by: STUDENT IN AN ORGANIZED HEALTH CARE EDUCATION/TRAINING PROGRAM

## 2023-11-29 PROCEDURE — 82962 GLUCOSE BLOOD TEST: CPT

## 2023-11-29 PROCEDURE — 700102 HCHG RX REV CODE 250 W/ 637 OVERRIDE(OP): Performed by: HOSPITALIST

## 2023-11-29 PROCEDURE — 700102 HCHG RX REV CODE 250 W/ 637 OVERRIDE(OP): Performed by: INTERNAL MEDICINE

## 2023-11-29 PROCEDURE — A9270 NON-COVERED ITEM OR SERVICE: HCPCS | Performed by: PSYCHIATRY & NEUROLOGY

## 2023-11-29 PROCEDURE — A9270 NON-COVERED ITEM OR SERVICE: HCPCS | Performed by: STUDENT IN AN ORGANIZED HEALTH CARE EDUCATION/TRAINING PROGRAM

## 2023-11-29 PROCEDURE — A9270 NON-COVERED ITEM OR SERVICE: HCPCS | Performed by: INTERNAL MEDICINE

## 2023-11-29 PROCEDURE — 97530 THERAPEUTIC ACTIVITIES: CPT

## 2023-11-29 PROCEDURE — 700102 HCHG RX REV CODE 250 W/ 637 OVERRIDE(OP): Performed by: PSYCHIATRY & NEUROLOGY

## 2023-11-29 PROCEDURE — 97110 THERAPEUTIC EXERCISES: CPT

## 2023-11-29 RX ORDER — ASPIRIN 81 MG/1
81 TABLET, CHEWABLE ORAL DAILY
Qty: 100 TABLET | Status: SHIPPED
Start: 2023-11-30 | End: 2023-12-08

## 2023-11-29 RX ORDER — TRAMADOL HYDROCHLORIDE 50 MG/1
50 TABLET ORAL EVERY 8 HOURS PRN
Qty: 6 TABLET | Refills: 0 | Status: SHIPPED | OUTPATIENT
Start: 2023-11-29 | End: 2023-11-29 | Stop reason: SDUPTHER

## 2023-11-29 RX ORDER — TRAMADOL HYDROCHLORIDE 50 MG/1
50 TABLET ORAL EVERY 8 HOURS PRN
Qty: 6 TABLET | Refills: 0 | Status: SHIPPED | OUTPATIENT
Start: 2023-11-29 | End: 2023-11-29

## 2023-11-29 RX ORDER — AMLODIPINE BESYLATE 10 MG/1
10 TABLET ORAL DAILY
Qty: 30 TABLET | Status: ON HOLD
Start: 2023-11-30 | End: 2023-12-07

## 2023-11-29 RX ORDER — ATORVASTATIN CALCIUM 80 MG/1
80 TABLET, FILM COATED ORAL EVERY EVENING
Qty: 30 TABLET | Status: SHIPPED
Start: 2023-11-29 | End: 2023-12-08

## 2023-11-29 RX ORDER — LEVETIRACETAM 1000 MG/1
1000 TABLET ORAL 2 TIMES DAILY
Qty: 60 TABLET | Status: SHIPPED
Start: 2023-11-29 | End: 2023-12-08

## 2023-11-29 RX ORDER — LOSARTAN POTASSIUM 50 MG/1
50 TABLET ORAL DAILY
Qty: 30 TABLET | Status: SHIPPED
Start: 2023-11-30 | End: 2023-12-08

## 2023-11-29 RX ADMIN — LOSARTAN POTASSIUM 50 MG: 50 TABLET, FILM COATED ORAL at 06:14

## 2023-11-29 RX ADMIN — TRAMADOL HYDROCHLORIDE 50 MG: 50 TABLET ORAL at 06:13

## 2023-11-29 RX ADMIN — DICLOFENAC SODIUM 4 G: 10 GEL TOPICAL at 15:34

## 2023-11-29 RX ADMIN — INSULIN HUMAN 7 UNITS: 100 INJECTION, SOLUTION PARENTERAL at 12:29

## 2023-11-29 RX ADMIN — ASPIRIN 81 MG: 81 TABLET, CHEWABLE ORAL at 06:14

## 2023-11-29 RX ADMIN — AMLODIPINE BESYLATE 10 MG: 5 TABLET ORAL at 06:13

## 2023-11-29 RX ADMIN — DICLOFENAC SODIUM 4 G: 10 GEL TOPICAL at 09:54

## 2023-11-29 RX ADMIN — LEVETIRACETAM 1000 MG: 500 TABLET, FILM COATED ORAL at 06:14

## 2023-11-29 ASSESSMENT — GAIT ASSESSMENTS
ASSISTIVE DEVICE: HAND HELD ASSIST
GAIT LEVEL OF ASSIST: CONTACT GUARD ASSIST
DISTANCE (FEET): 200

## 2023-11-29 ASSESSMENT — COGNITIVE AND FUNCTIONAL STATUS - GENERAL
MOVING FROM LYING ON BACK TO SITTING ON SIDE OF FLAT BED: UNABLE
MOVING TO AND FROM BED TO CHAIR: UNABLE
STANDING UP FROM CHAIR USING ARMS: A LITTLE
MOBILITY SCORE: 15
WALKING IN HOSPITAL ROOM: A LITTLE
SUGGESTED CMS G CODE MODIFIER MOBILITY: CK
CLIMB 3 TO 5 STEPS WITH RAILING: A LITTLE

## 2023-11-29 NOTE — CONSULTS
Diabetes education: Pt has a hx of diabetes, on no medications for diabetes, but possible meter ( listed in med rec and dc med hx). Pt was admitted with blood sugar of 95 and Hga1c of  14.3 %. Pt  was on Glargine 10 units, with regular insulin per sliding scale coverage ac and hs. Blood sugars were 211 ( 4 units), 314 (10 units),129, 186 (3 units), and 193 (3 units).   Pt will need to go to SNF. Unclear how appropriate for education pt is.  Plan: CDE to follow up.

## 2023-11-29 NOTE — CARE PLAN
The patient is Stable - Low risk of patient condition declining or worsening    Shift Goals  Clinical Goals: stable neuro  Patient Goals: comfort and sleep  Family Goals: n/a    Progress made toward(s) clinical / shift goals:    Problem: Knowledge Deficit - Standard  Goal: Patient and family/care givers will demonstrate understanding of plan of care, disease process/condition, diagnostic tests and medications  Outcome: Progressing     Problem: Optimal Care of the Stroke Patient  Goal: Optimal acute care for the stroke patient  Outcome: Progressing     Problem: Neuro Status  Goal: Neuro status will remain stable or improve  Outcome: Progressing  Flowsheets (Taken 11/29/2023 0335)  Level of Consciousness: Alert       Patient is not progressing towards the following goals:

## 2023-11-29 NOTE — DIETARY
Nutrition Services: Brief Update    Pt is on diabetic diet. Consult received for diabetes diet education. I saw pt previously on 11/22 and did T2DM diet education with pt. Discussed with RN, meant for CDE consult.     No further nutrition intervention needed at this time. RD will monitor per department policy.

## 2023-11-29 NOTE — DISCHARGE SUMMARY
Discharge Summary    CHIEF COMPLAINT ON ADMISSION  No chief complaint on file.      Reason for Admission  seizure like activity     Admission Date  11/21/2023    CODE STATUS  DNAR/DNI    HPI & HOSPITAL COURSE  78 y.o. female with past medical history of hypertension, dyslipidemia, diabetes mellitus though has not been taking any medications, who presented 11/21/2023 with weakness. CT of the head was negative. A1c 14.3. RSV, flu, COVID all negative, urinalysis negative. EEG notes focal seizure during the EEG study involving the left face, arm and leg. Event spontaneously resolved in about 3 minutes. No loss of awareness during the event. Post event significant for left face, arm and leg weakness. Normal extremity movement and strength prior to the event. Neurology was consulted. Patient was started on Keppra. MRI brain notes a subacute lacunar size infarct. No mass effect or surrounding edema in this vicinity. No evidence of acute hemorrhage, or mass lesion. 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   CTA neck attenuated flow involving the right common carotid artery at the origin and extending along the course of several centimeters within the lower cervical/upper thoracic region. Echo LVEF 60%. Patient was optimized medically. PT/OT recommended post acute placement which case management assisted in organizing. Patient was determined satisfactory for discharge with appropriate follow up.    Therefore, she is discharged in fair and stable condition to skilled nursing facility.    The patient met 2-midnight criteria for an inpatient stay at the time of discharge.    Discharge Date  11/29/2023    FOLLOW UP ITEMS POST DISCHARGE  Please follow up with PCP in 3-5 days for post hospitalization follow up and medication reconciliation.      DISCHARGE DIAGNOSES  Principal Problem:    Seizure (HCC) (POA: Yes)  Active Problems:    CVA (cerebral vascular accident) (HCC) (POA: Unknown)    Type 2 diabetes mellitus with complication, without long-term current use of insulin (HCC) (POA: Yes)    Essential hypertension (POA: Yes)    Hypokalemia (POA: Yes)    Advance care planning (POA: Unknown)    Severe obesity (HCC) (POA: Unknown)  Resolved Problems:    * No resolved hospital problems. *      FOLLOW UP  No future appointments.  primary care provider    Follow up  Please call your primary care provider to schedule a hospital follow up.  Thank you      MEDICATIONS ON DISCHARGE     Medication List        START taking these medications        Instructions   amLODIPine 10 MG Tabs  Start taking on: November 30, 2023  Commonly known as: Norvasc   Take 1 Tablet by mouth every day.  Dose: 10 mg     aspirin 81 MG Chew chewable tablet  Start taking on: November 30, 2023  Commonly known as: Asa   Chew 1 Tablet every day.  Dose: 81 mg     atorvastatin 80 MG tablet  Commonly known as: Lipitor   Take 1 Tablet by mouth every evening.  Dose: 80 mg     insulin GLARGINE 100 UNIT/ML Soln  Commonly known as: Lantus,Semglee   Inject 10 Units under the skin every evening.  Dose: 10 Units     insulin regular 100 Unit/mL Soln  Commonly known as: Humulin R   Inject 3-14 Units under the skin 4 Times a Day,Before Meals and at Bedtime.  Dose: 3-14 Units     levetiracetam 1000 MG tablet  Commonly known as: Keppra   Take 1 Tablet by mouth 2 times a day.  Dose: 1,000 mg     losartan 50 MG Tabs  Start taking on: November 30, 2023  Commonly known as: Cozaar   Take 1 Tablet by mouth every day.  Dose: 50 mg            CONTINUE taking these medications        Instructions   Blood Glucose Test Strips   Doctor's comments: Or per formulary preference. ICD-10 code: E11.65 Uncontrolled type 2 Diabetes Mellitus  Use one Accucheck Guide strip to test blood sugar once daily early morning before first  meal. For uncontrolled diabetes. E11.65     Lancets   Doctor's comments: Or per formulary preference. ICD-10 code: E11.65 Uncontrolled type 2 Diabetes Mellitus.  Use one fast clix lancet to test blood sugar once daily early morning before first meal. For uncontrolled diabetes. E11.65              Allergies  Allergies   Allergen Reactions    Sulfa Drugs Rash     Full body rash       DIET  Orders Placed This Encounter   Procedures    Diet Order Diet: Consistent CHO (Diabetic)     Standing Status:   Standing     Number of Occurrences:   1     Order Specific Question:   Diet:     Answer:   Consistent CHO (Diabetic) [4]       ACTIVITY  As tolerated.  Weight bearing as tolerated    CONSULTATIONS  Neurology      LABORATORY  Lab Results   Component Value Date    SODIUM 137 11/25/2023    POTASSIUM 3.8 11/25/2023    CHLORIDE 101 11/25/2023    CO2 26 11/25/2023    GLUCOSE 84 11/25/2023    BUN 9 11/25/2023    CREATININE 0.72 11/25/2023        Lab Results   Component Value Date    WBC 8.0 11/25/2023    HEMOGLOBIN 11.6 (L) 11/25/2023    HEMATOCRIT 37.9 11/25/2023    PLATELETCT 282 11/25/2023        Total time of the discharge process exceeds 38 minutes.

## 2023-11-29 NOTE — DISCHARGE PLANNING
Case Management Discharge Planning    Admission Date: 11/21/2023  GMLOS: 1.9  ALOS: 8    6-Clicks ADL Score: 21  6-Clicks Mobility Score: 18      Anticipated Discharge Dispo: Discharge Disposition: D/T to SNF with Medicare cert in anticipation of skilled care (03)    DME Needed: No    Action(s) Taken: Updated Provider/Nurse on Discharge Plan    Escalations Completed: None    Medically Clear: Yes    Next Steps: n/a    Barriers to Discharge: None    Is the patient up for discharge tomorrow: No  Patient is medically clear for discharge, [facility] has insurance authorization.    PC to Monica, patient's daughter [phone]  OMKAR provided an update.   OMKAR was told that the patient left a message with her saying therapy saw her and she is doing well and doesn't need a skilled.     OMKAR spoke to therapy, stated patient has made great progress. When she walked with patient only needed contact assist.  OMKAR was asked patient's home situation.  OMKAR stated patient would be alone and due to not having a primary doctor couldn't get H/H.  Therapy agreed with марина for a short time    PC to Monica, OMKAR provide an update, OMKAR given permission to send to [facility].  OMKAR was asked how long placement would be, OMKAR stated strictly based on therapy notes/statements a short time, a week a little more maybe.     PC to [facility] spoke to Toya, can take patient anytime.    OMKAR faxed transport communication form to [facility] for a tentative 4:00 transport time to [facility].

## 2023-11-29 NOTE — DISCHARGE PLANNING
DC Transport Scheduled    Received request at: 11/29/2023 at 1411    Transport Company Scheduled:  [employer]    Scheduled Date: 11/29/2023  Scheduled Time: 1630    Destination: [facility] at [address] [facility] [address]     Notified care team of scheduled transport via [employer].     If there are any changes needed to the DC transportation scheduled, please contact [facility] at ext. [phone] between the hours of 1817-9104 Mon-Fri. If outside those hours, contact the ED Case Manager at ext. [phone].

## 2023-11-30 NOTE — PROGRESS NOTES
Report called to Radha at [place]. Discharge instructions given to pt, all questions answered, IV removed, ziopatch applied.

## 2023-11-30 NOTE — THERAPY
Physical Therapy   Daily Treatment     Patient Name: Molly Fountain  Age:  78 y.o., Sex:  female  Medical Record #: [id number]  Today's Date: 11/29/2023     Precautions  Precautions: Fall Risk;Swallow Precautions  Comments: seizures    Assessment    Pt with improving activity tolerance, dynamic balance and strength; able to walk short distances without device; very motivated and participatory; anticipate patient is nearing a point where she could return directly home, however given home health services are not available to her due to no PCP; will likely best be served in short term SNF closer to her home; will follow to continue to progress;     Plan    Treatment Plan Status: Continue Current Treatment Plan  Type of Treatment: Bed Mobility, Debridement, Equipment, Group Therapy, Gait Training, Manual Therapy, Neuro Re-Education / Balance, Self Care / Home Evaluation, Stair Training, Therapeutic Activities, Therapeutic Exercise  Treatment Frequency: 4 Times per Week  Treatment Duration: Until Therapy Goals Met    DC Equipment Recommendations: Unable to determine at this time  Discharge Recommendations: SNF unless family can provide IADL support for outpatient therapy follow up; per pt, they are attempting to move her house to be closer to them but has not occurred yet;       Abridged Subjective/Objective     11/29/23 1145   Cognition    Cognition / Consciousness WDL   Level of Consciousness Alert   Comments pleasant and cooperative; cognition not tested past conversation   Passive ROM Lower Body   Passive ROM Lower Body WDL   Strength Lower Body   Lower Body Strength  X   Comments left knee 3+/5, pain   Sensation Lower Body   Lower Extremity Sensation   WDL   Sitting Lower Body Exercises   Sitting Lower Body Exercises Yes   Long Arc Quad 1 set of 10;Bilateral   Hamstring Curl 1 set of 10;Bilateral   Comments pre mobility to assist with pain   Balance   Sitting Balance (Static) Good   Sitting Balance (Dynamic) Fair +    Standing Balance (Static) Fair   Standing Balance (Dynamic) Fair -   Weight Shift Sitting Good   Weight Shift Standing Fair   Skilled Intervention Postural Facilitation;Sequencing;Tactile Cuing;Verbal Cuing   Comments B Ue support in sitting/standing; can remove for short distances; no arm swing, appropriate fear of falling noted   Bed Mobility    Supine to Sit   (Nt, sitting in chair pre/post)   Gait Analysis   Gait Level Of Assist Contact Guard Assist   Assistive Device Hand Held Assist   Distance (Feet) 200   # of Times Distance was Traveled 1   Weight Bearing Status no restrictions   Skilled Intervention Sequencing;Tactile Cuing;Verbal Cuing   Comments appropriately aware of fall risk; distance limited by reduced gait speed and reduced responsiveness, BP/02 appriopriate on seated check, pt just repetitively stating her legs feel heavy;   Functional Mobility   Sit to Stand Standby Assist  (x 5 reps, progressing to no walker use last 3 reps)   Bed, Chair, Wheelchair Transfer Standby Assist   Comments pericare by self   Short Term Goals    Short Term Goal # 1 Pt will perform sit<>supine with supervision within 6tx in order to get in/out of bed   Goal Outcome # 1 Other (see comments)  (not observed)   Short Term Goal # 2 Pt will perform sit<>stand and stand pivot transfers with no device and supervision within 6tx in order to initiate transfers and gait   Goal Outcome # 2 Goal not met   Short Term Goal # 3 Pt will ambulate 150 with no device and supervision within 6tx in order to access environment   Goal Outcome # 3 Goal not met   Short Term Goal # 4 Pt will perform 4 stairs with handrails and CGA within 6tx in order to enter/exit home   Goal Outcome # 4 Goal not met   Education Group   Role of Physical Therapist Patient Response Patient;Acceptance;Explanation;Verbal Demonstration   Additional Comments rehab vs SNF vs home health

## 2023-11-30 NOTE — PROGRESS NOTES
Diabetes education: Spoke with Tripp RN and confirmed pt was appropriate for education. It was thought ( pt and RN), pt was to dc home not SNF. CDE sent text to SW who spoke with pt regarding need for short time admit to SNF to increase strength etc.   Pt agreed to learn to use insulin pens, practiced with saline pens and practice device. Pt has given insulin years ago but with vial and syringe. Pt had given insulin with nursing. Handouts on pens and vials given. CDE wrote list of supplies needed ( to include meter and supplies as she does not know where her old meter was), for discharge from SNF.  CDE reviewed diabetes, insulin , low blood sugar and answered questions all before pt was discharged.

## 2023-12-01 ENCOUNTER — ANESTHESIA EVENT (OUTPATIENT)
Dept: SURGERY | Facility: MEDICAL CENTER | Age: 78
DRG: 394 | End: 2023-12-01
Payer: MEDICARE

## 2023-12-01 ENCOUNTER — APPOINTMENT (OUTPATIENT)
Dept: RADIOLOGY | Facility: MEDICAL CENTER | Age: 78
DRG: 394 | End: 2023-12-01
Attending: STUDENT IN AN ORGANIZED HEALTH CARE EDUCATION/TRAINING PROGRAM
Payer: MEDICARE

## 2023-12-01 ENCOUNTER — HOSPITAL ENCOUNTER (INPATIENT)
Facility: MEDICAL CENTER | Age: 78
LOS: 6 days | DRG: 394 | End: 2023-12-07
Attending: EMERGENCY MEDICINE | Admitting: STUDENT IN AN ORGANIZED HEALTH CARE EDUCATION/TRAINING PROGRAM
Payer: MEDICARE

## 2023-12-01 DIAGNOSIS — I10 ESSENTIAL HYPERTENSION: ICD-10-CM

## 2023-12-01 DIAGNOSIS — R56.9 SEIZURE (HCC): ICD-10-CM

## 2023-12-01 DIAGNOSIS — K92.2 LOWER GI BLEED: ICD-10-CM

## 2023-12-01 DIAGNOSIS — K92.2 ACUTE GI BLEEDING: ICD-10-CM

## 2023-12-01 DIAGNOSIS — K51.00 PANCOLITIS (HCC): ICD-10-CM

## 2023-12-01 DIAGNOSIS — Z86.69 HX OF TONIC-CLONIC SEIZURES: ICD-10-CM

## 2023-12-01 DIAGNOSIS — R25.1 SPELLS OF TREMBLING: ICD-10-CM

## 2023-12-01 DIAGNOSIS — E11.8 TYPE 2 DIABETES MELLITUS WITH COMPLICATION, WITHOUT LONG-TERM CURRENT USE OF INSULIN (HCC): ICD-10-CM

## 2023-12-01 DIAGNOSIS — D50.8 OTHER IRON DEFICIENCY ANEMIA: ICD-10-CM

## 2023-12-01 PROBLEM — D50.9 IRON DEFICIENCY ANEMIA: Status: ACTIVE | Noted: 2023-12-01

## 2023-12-01 PROBLEM — E66.811 CLASS 1 OBESITY IN ADULT: Status: ACTIVE | Noted: 2023-11-29

## 2023-12-01 PROBLEM — E66.9 CLASS 1 OBESITY IN ADULT: Status: ACTIVE | Noted: 2023-11-29

## 2023-12-01 LAB
ABO + RH BLD: NORMAL
ABO GROUP BLD: NORMAL
ALBUMIN SERPL BCP-MCNC: 3.7 G/DL (ref 3.2–4.9)
ALBUMIN/GLOB SERPL: 1.1 G/DL
ALP SERPL-CCNC: 114 U/L (ref 30–99)
ALT SERPL-CCNC: 20 U/L (ref 2–50)
ANION GAP SERPL CALC-SCNC: 12 MMOL/L (ref 7–16)
APTT PPP: 27.9 SEC (ref 24.7–36)
AST SERPL-CCNC: 27 U/L (ref 12–45)
BARCODED ABORH UBTYP: 5100
BARCODED ABORH UBTYP: 9500
BARCODED PRD CODE UBPRD: NORMAL
BARCODED PRD CODE UBPRD: NORMAL
BARCODED UNIT NUM UBUNT: NORMAL
BARCODED UNIT NUM UBUNT: NORMAL
BASOPHILS # BLD AUTO: 0.5 % (ref 0–1.8)
BASOPHILS # BLD AUTO: 0.5 % (ref 0–1.8)
BASOPHILS # BLD: 0.04 K/UL (ref 0–0.12)
BASOPHILS # BLD: 0.04 K/UL (ref 0–0.12)
BILIRUB SERPL-MCNC: 0.3 MG/DL (ref 0.1–1.5)
BLD GP AB SCN SERPL QL: NORMAL
BUN SERPL-MCNC: 14 MG/DL (ref 8–22)
CALCIUM ALBUM COR SERPL-MCNC: 9.4 MG/DL (ref 8.5–10.5)
CALCIUM SERPL-MCNC: 9.2 MG/DL (ref 8.5–10.5)
CFT BLD TEG: 4.8 MIN (ref 4.6–9.1)
CFT P HPASE BLD TEG: 4.9 MIN (ref 4.3–8.3)
CHLORIDE SERPL-SCNC: 106 MMOL/L (ref 96–112)
CLOT ANGLE BLD TEG: 79.8 DEGREES (ref 63–78)
CLOT LYSIS 30M P MA LENFR BLD TEG: 0.1 % (ref 0–2.6)
CO2 SERPL-SCNC: 24 MMOL/L (ref 20–33)
COMPONENT R 8504R: NORMAL
COMPONENT R 8504R: NORMAL
CREAT SERPL-MCNC: 0.79 MG/DL (ref 0.5–1.4)
CRP SERPL HS-MCNC: <0.3 MG/DL (ref 0–0.75)
CT.EXTRINSIC BLD ROTEM: 0.8 MIN (ref 0.8–2.1)
EOSINOPHIL # BLD AUTO: 0.12 K/UL (ref 0–0.51)
EOSINOPHIL # BLD AUTO: 0.16 K/UL (ref 0–0.51)
EOSINOPHIL NFR BLD: 1.6 % (ref 0–6.9)
EOSINOPHIL NFR BLD: 2 % (ref 0–6.9)
ERYTHROCYTE [DISTWIDTH] IN BLOOD BY AUTOMATED COUNT: 44.4 FL (ref 35.9–50)
ERYTHROCYTE [DISTWIDTH] IN BLOOD BY AUTOMATED COUNT: 44.4 FL (ref 35.9–50)
ERYTHROCYTE [SEDIMENTATION RATE] IN BLOOD BY WESTERGREN METHOD: 20 MM/HOUR (ref 0–25)
FERRITIN SERPL-MCNC: 17.7 NG/ML (ref 10–291)
FOLATE SERPL-MCNC: 17.5 NG/ML
GFR SERPLBLD CREATININE-BSD FMLA CKD-EPI: 76 ML/MIN/1.73 M 2
GLOBULIN SER CALC-MCNC: 3.3 G/DL (ref 1.9–3.5)
GLUCOSE BLD STRIP.AUTO-MCNC: 109 MG/DL (ref 65–99)
GLUCOSE BLD STRIP.AUTO-MCNC: 118 MG/DL (ref 65–99)
GLUCOSE BLD STRIP.AUTO-MCNC: 228 MG/DL (ref 65–99)
GLUCOSE SERPL-MCNC: 176 MG/DL (ref 65–99)
HCT VFR BLD AUTO: 19.2 % (ref 37–47)
HCT VFR BLD AUTO: 31.7 % (ref 37–47)
HCT VFR BLD AUTO: 32.4 % (ref 37–47)
HCT VFR BLD AUTO: 41.3 % (ref 37–47)
HGB BLD-MCNC: 10.1 G/DL (ref 12–16)
HGB BLD-MCNC: 12.9 G/DL (ref 12–16)
HGB BLD-MCNC: 5.9 G/DL (ref 12–16)
HGB BLD-MCNC: 9.7 G/DL (ref 12–16)
HGB RETIC QN AUTO: 26.9 PG/CELL (ref 29–35)
IMM GRANULOCYTES # BLD AUTO: 0.04 K/UL (ref 0–0.11)
IMM GRANULOCYTES # BLD AUTO: 0.05 K/UL (ref 0–0.11)
IMM GRANULOCYTES NFR BLD AUTO: 0.5 % (ref 0–0.9)
IMM GRANULOCYTES NFR BLD AUTO: 0.6 % (ref 0–0.9)
IMM RETICS NFR: 12.2 % (ref 2.6–16.1)
INR PPP: 0.97 (ref 0.87–1.13)
IRON SATN MFR SERPL: 14 % (ref 15–55)
IRON SERPL-MCNC: 35 UG/DL (ref 40–170)
LACTATE SERPL-SCNC: 1.1 MMOL/L (ref 0.5–2)
LDH SERPL L TO P-CCNC: 153 U/L (ref 107–266)
LYMPHOCYTES # BLD AUTO: 1.61 K/UL (ref 1–4.8)
LYMPHOCYTES # BLD AUTO: 1.76 K/UL (ref 1–4.8)
LYMPHOCYTES NFR BLD: 21.2 % (ref 22–41)
LYMPHOCYTES NFR BLD: 21.8 % (ref 22–41)
MAGNESIUM SERPL-MCNC: 1.7 MG/DL (ref 1.5–2.5)
MCF BLD TEG: 68.9 MM (ref 52–69)
MCF.PLATELET INHIB BLD ROTEM: 35.5 MM (ref 15–32)
MCH RBC QN AUTO: 24.9 PG (ref 27–33)
MCH RBC QN AUTO: 25.3 PG (ref 27–33)
MCHC RBC AUTO-ENTMCNC: 30.6 G/DL (ref 32.2–35.5)
MCHC RBC AUTO-ENTMCNC: 31.2 G/DL (ref 32.2–35.5)
MCV RBC AUTO: 81.1 FL (ref 81.4–97.8)
MCV RBC AUTO: 81.5 FL (ref 81.4–97.8)
MONOCYTES # BLD AUTO: 0.41 K/UL (ref 0–0.85)
MONOCYTES # BLD AUTO: 0.5 K/UL (ref 0–0.85)
MONOCYTES NFR BLD AUTO: 5.4 % (ref 0–13.4)
MONOCYTES NFR BLD AUTO: 6.2 % (ref 0–13.4)
NEUTROPHILS # BLD AUTO: 5.36 K/UL (ref 1.82–7.42)
NEUTROPHILS # BLD AUTO: 5.58 K/UL (ref 1.82–7.42)
NEUTROPHILS NFR BLD: 68.9 % (ref 44–72)
NEUTROPHILS NFR BLD: 70.8 % (ref 44–72)
NRBC # BLD AUTO: 0 K/UL
NRBC # BLD AUTO: 0 K/UL
NRBC BLD-RTO: 0 /100 WBC (ref 0–0.2)
NRBC BLD-RTO: 0 /100 WBC (ref 0–0.2)
PA AA BLD-ACNC: 27.9 % (ref 0–11)
PA ADP BLD-ACNC: 2.5 % (ref 0–17)
PLATELET # BLD AUTO: 317 K/UL (ref 164–446)
PLATELET # BLD AUTO: 372 K/UL (ref 164–446)
PMV BLD AUTO: 9.5 FL (ref 9–12.9)
PMV BLD AUTO: 9.6 FL (ref 9–12.9)
POTASSIUM SERPL-SCNC: 3.5 MMOL/L (ref 3.6–5.5)
PRODUCT TYPE UPROD: NORMAL
PRODUCT TYPE UPROD: NORMAL
PROT SERPL-MCNC: 7 G/DL (ref 6–8.2)
PROTHROMBIN TIME: 13 SEC (ref 12–14.6)
RBC # BLD AUTO: 3.89 M/UL (ref 4.2–5.4)
RBC # BLD AUTO: 5.09 M/UL (ref 4.2–5.4)
RETICS # AUTO: 0.04 M/UL (ref 0.04–0.12)
RETICS/RBC NFR: 1.8 % (ref 0.8–2.6)
RH BLD: NORMAL
SODIUM SERPL-SCNC: 142 MMOL/L (ref 135–145)
TEG ALGORITHM TGALG: ABNORMAL
TIBC SERPL-MCNC: 255 UG/DL (ref 250–450)
UIBC SERPL-MCNC: 220 UG/DL (ref 110–370)
UNIT STATUS USTAT: NORMAL
UNIT STATUS USTAT: NORMAL
VIT B12 SERPL-MCNC: 273 PG/ML (ref 211–911)
WBC # BLD AUTO: 7.6 K/UL (ref 4.8–10.8)
WBC # BLD AUTO: 8.1 K/UL (ref 4.8–10.8)

## 2023-12-01 PROCEDURE — 99291 CRITICAL CARE FIRST HOUR: CPT | Mod: AI | Performed by: STUDENT IN AN ORGANIZED HEALTH CARE EDUCATION/TRAINING PROGRAM

## 2023-12-01 PROCEDURE — 700101 HCHG RX REV CODE 250: Performed by: SPECIALIST

## 2023-12-01 PROCEDURE — 86900 BLOOD TYPING SEROLOGIC ABO: CPT

## 2023-12-01 PROCEDURE — 83010 ASSAY OF HAPTOGLOBIN QUANT: CPT

## 2023-12-01 PROCEDURE — 82746 ASSAY OF FOLIC ACID SERUM: CPT

## 2023-12-01 PROCEDURE — 80053 COMPREHEN METABOLIC PANEL: CPT

## 2023-12-01 PROCEDURE — 94760 N-INVAS EAR/PLS OXIMETRY 1: CPT

## 2023-12-01 PROCEDURE — 86850 RBC ANTIBODY SCREEN: CPT

## 2023-12-01 PROCEDURE — 86901 BLOOD TYPING SEROLOGIC RH(D): CPT

## 2023-12-01 PROCEDURE — 83735 ASSAY OF MAGNESIUM: CPT

## 2023-12-01 PROCEDURE — 85610 PROTHROMBIN TIME: CPT

## 2023-12-01 PROCEDURE — 85347 COAGULATION TIME ACTIVATED: CPT

## 2023-12-01 PROCEDURE — 700111 HCHG RX REV CODE 636 W/ 250 OVERRIDE (IP): Performed by: HOSPITALIST

## 2023-12-01 PROCEDURE — 85018 HEMOGLOBIN: CPT

## 2023-12-01 PROCEDURE — 83605 ASSAY OF LACTIC ACID: CPT

## 2023-12-01 PROCEDURE — 82728 ASSAY OF FERRITIN: CPT

## 2023-12-01 PROCEDURE — 99222 1ST HOSP IP/OBS MODERATE 55: CPT | Performed by: SPECIALIST

## 2023-12-01 PROCEDURE — 99285 EMERGENCY DEPT VISIT HI MDM: CPT

## 2023-12-01 PROCEDURE — 85014 HEMATOCRIT: CPT

## 2023-12-01 PROCEDURE — 85576 BLOOD PLATELET AGGREGATION: CPT | Mod: 91

## 2023-12-01 PROCEDURE — 85652 RBC SED RATE AUTOMATED: CPT

## 2023-12-01 PROCEDURE — 700105 HCHG RX REV CODE 258: Performed by: STUDENT IN AN ORGANIZED HEALTH CARE EDUCATION/TRAINING PROGRAM

## 2023-12-01 PROCEDURE — 85046 RETICYTE/HGB CONCENTRATE: CPT

## 2023-12-01 PROCEDURE — 83550 IRON BINDING TEST: CPT

## 2023-12-01 PROCEDURE — 86923 COMPATIBILITY TEST ELECTRIC: CPT

## 2023-12-01 PROCEDURE — 83540 ASSAY OF IRON: CPT

## 2023-12-01 PROCEDURE — 770000 HCHG ROOM/CARE - INTERMEDIATE ICU *

## 2023-12-01 PROCEDURE — 83615 LACTATE (LD) (LDH) ENZYME: CPT

## 2023-12-01 PROCEDURE — A9270 NON-COVERED ITEM OR SERVICE: HCPCS | Performed by: STUDENT IN AN ORGANIZED HEALTH CARE EDUCATION/TRAINING PROGRAM

## 2023-12-01 PROCEDURE — 36415 COLL VENOUS BLD VENIPUNCTURE: CPT

## 2023-12-01 PROCEDURE — 36430 TRANSFUSION BLD/BLD COMPNT: CPT

## 2023-12-01 PROCEDURE — C9113 INJ PANTOPRAZOLE SODIUM, VIA: HCPCS | Performed by: STUDENT IN AN ORGANIZED HEALTH CARE EDUCATION/TRAINING PROGRAM

## 2023-12-01 PROCEDURE — 85730 THROMBOPLASTIN TIME PARTIAL: CPT

## 2023-12-01 PROCEDURE — 85025 COMPLETE CBC W/AUTO DIFF WBC: CPT

## 2023-12-01 PROCEDURE — 700102 HCHG RX REV CODE 250 W/ 637 OVERRIDE(OP): Performed by: STUDENT IN AN ORGANIZED HEALTH CARE EDUCATION/TRAINING PROGRAM

## 2023-12-01 PROCEDURE — 74174 CTA ABD&PLVS W/CONTRAST: CPT

## 2023-12-01 PROCEDURE — 82962 GLUCOSE BLOOD TEST: CPT | Mod: 91

## 2023-12-01 PROCEDURE — 86140 C-REACTIVE PROTEIN: CPT

## 2023-12-01 PROCEDURE — 82607 VITAMIN B-12: CPT

## 2023-12-01 PROCEDURE — P9016 RBC LEUKOCYTES REDUCED: HCPCS

## 2023-12-01 PROCEDURE — 30233N1 TRANSFUSION OF NONAUTOLOGOUS RED BLOOD CELLS INTO PERIPHERAL VEIN, PERCUTANEOUS APPROACH: ICD-10-PCS | Performed by: HOSPITALIST

## 2023-12-01 PROCEDURE — 700111 HCHG RX REV CODE 636 W/ 250 OVERRIDE (IP): Performed by: STUDENT IN AN ORGANIZED HEALTH CARE EDUCATION/TRAINING PROGRAM

## 2023-12-01 PROCEDURE — 700117 HCHG RX CONTRAST REV CODE 255: Performed by: STUDENT IN AN ORGANIZED HEALTH CARE EDUCATION/TRAINING PROGRAM

## 2023-12-01 PROCEDURE — 85384 FIBRINOGEN ACTIVITY: CPT

## 2023-12-01 RX ORDER — DIPHENHYDRAMINE HYDROCHLORIDE 50 MG/ML
50 INJECTION INTRAMUSCULAR; INTRAVENOUS ONCE
Status: COMPLETED | OUTPATIENT
Start: 2023-12-01 | End: 2023-12-01

## 2023-12-01 RX ORDER — ACETAMINOPHEN 325 MG/1
650 TABLET ORAL EVERY 4 HOURS PRN
COMMUNITY

## 2023-12-01 RX ORDER — SODIUM CHLORIDE, SODIUM LACTATE, POTASSIUM CHLORIDE, AND CALCIUM CHLORIDE .6; .31; .03; .02 G/100ML; G/100ML; G/100ML; G/100ML
500 INJECTION, SOLUTION INTRAVENOUS
Status: DISCONTINUED | OUTPATIENT
Start: 2023-12-01 | End: 2023-12-07 | Stop reason: HOSPADM

## 2023-12-01 RX ORDER — POTASSIUM CHLORIDE 20 MEQ/1
40 TABLET, EXTENDED RELEASE ORAL ONCE
Status: DISCONTINUED | OUTPATIENT
Start: 2023-12-01 | End: 2023-12-01

## 2023-12-01 RX ORDER — SODIUM CHLORIDE 9 MG/ML
1000 INJECTION, SOLUTION INTRAVENOUS ONCE
Status: COMPLETED | OUTPATIENT
Start: 2023-12-01 | End: 2023-12-01

## 2023-12-01 RX ORDER — AMOXICILLIN 250 MG
2 CAPSULE ORAL 2 TIMES DAILY
Status: DISCONTINUED | OUTPATIENT
Start: 2023-12-01 | End: 2023-12-01

## 2023-12-01 RX ORDER — INSULIN LISPRO 100 [IU]/ML
2-9 INJECTION, SOLUTION INTRAVENOUS; SUBCUTANEOUS EVERY 6 HOURS
Status: DISCONTINUED | OUTPATIENT
Start: 2023-12-01 | End: 2023-12-03

## 2023-12-01 RX ORDER — SODIUM CHLORIDE, SODIUM LACTATE, POTASSIUM CHLORIDE, CALCIUM CHLORIDE 600; 310; 30; 20 MG/100ML; MG/100ML; MG/100ML; MG/100ML
INJECTION, SOLUTION INTRAVENOUS CONTINUOUS
Status: DISCONTINUED | OUTPATIENT
Start: 2023-12-01 | End: 2023-12-02

## 2023-12-01 RX ORDER — ACETAMINOPHEN 325 MG/1
650 TABLET ORAL EVERY 6 HOURS PRN
Status: DISCONTINUED | OUTPATIENT
Start: 2023-12-01 | End: 2023-12-07 | Stop reason: HOSPADM

## 2023-12-01 RX ORDER — LEVETIRACETAM 500 MG/5ML
1000 INJECTION, SOLUTION, CONCENTRATE INTRAVENOUS EVERY 12 HOURS
Status: DISCONTINUED | OUTPATIENT
Start: 2023-12-01 | End: 2023-12-02

## 2023-12-01 RX ORDER — MAGNESIUM SULFATE HEPTAHYDRATE 40 MG/ML
2 INJECTION, SOLUTION INTRAVENOUS ONCE
Status: COMPLETED | OUTPATIENT
Start: 2023-12-01 | End: 2023-12-01

## 2023-12-01 RX ORDER — ONDANSETRON 4 MG/1
4 TABLET, ORALLY DISINTEGRATING ORAL EVERY 4 HOURS PRN
Status: DISCONTINUED | OUTPATIENT
Start: 2023-12-01 | End: 2023-12-07 | Stop reason: HOSPADM

## 2023-12-01 RX ORDER — ONDANSETRON 2 MG/ML
4 INJECTION INTRAMUSCULAR; INTRAVENOUS EVERY 4 HOURS PRN
Status: DISCONTINUED | OUTPATIENT
Start: 2023-12-01 | End: 2023-12-07 | Stop reason: HOSPADM

## 2023-12-01 RX ORDER — IPRATROPIUM BROMIDE AND ALBUTEROL SULFATE 2.5; .5 MG/3ML; MG/3ML
3 SOLUTION RESPIRATORY (INHALATION)
Status: DISCONTINUED | OUTPATIENT
Start: 2023-12-01 | End: 2023-12-07 | Stop reason: HOSPADM

## 2023-12-01 RX ORDER — POLYETHYLENE GLYCOL 3350 17 G/17G
1 POWDER, FOR SOLUTION ORAL
Status: DISCONTINUED | OUTPATIENT
Start: 2023-12-01 | End: 2023-12-01

## 2023-12-01 RX ORDER — PANTOPRAZOLE SODIUM 40 MG/10ML
40 INJECTION, POWDER, LYOPHILIZED, FOR SOLUTION INTRAVENOUS 2 TIMES DAILY
Status: DISCONTINUED | OUTPATIENT
Start: 2023-12-01 | End: 2023-12-02

## 2023-12-01 RX ORDER — SODIUM CHLORIDE, SODIUM LACTATE, POTASSIUM CHLORIDE, AND CALCIUM CHLORIDE .6; .31; .03; .02 G/100ML; G/100ML; G/100ML; G/100ML
1000 INJECTION, SOLUTION INTRAVENOUS ONCE
Status: ACTIVE | OUTPATIENT
Start: 2023-12-01 | End: 2023-12-02

## 2023-12-01 RX ORDER — LABETALOL HYDROCHLORIDE 5 MG/ML
10 INJECTION, SOLUTION INTRAVENOUS EVERY 4 HOURS PRN
Status: DISCONTINUED | OUTPATIENT
Start: 2023-12-01 | End: 2023-12-07 | Stop reason: HOSPADM

## 2023-12-01 RX ORDER — LEVETIRACETAM 500 MG/1
1000 TABLET ORAL 2 TIMES DAILY
Status: DISCONTINUED | OUTPATIENT
Start: 2023-12-01 | End: 2023-12-01

## 2023-12-01 RX ORDER — ATORVASTATIN CALCIUM 80 MG/1
80 TABLET, FILM COATED ORAL EVERY EVENING
Status: DISCONTINUED | OUTPATIENT
Start: 2023-12-01 | End: 2023-12-07 | Stop reason: HOSPADM

## 2023-12-01 RX ORDER — INSULIN GLARGINE 100 [IU]/ML
10 INJECTION, SOLUTION SUBCUTANEOUS EVERY EVENING
COMMUNITY
End: 2023-12-08

## 2023-12-01 RX ORDER — BISACODYL 10 MG
10 SUPPOSITORY, RECTAL RECTAL
Status: DISCONTINUED | OUTPATIENT
Start: 2023-12-01 | End: 2023-12-01

## 2023-12-01 RX ORDER — POTASSIUM CHLORIDE 7.45 MG/ML
10 INJECTION INTRAVENOUS
Status: COMPLETED | OUTPATIENT
Start: 2023-12-01 | End: 2023-12-01

## 2023-12-01 RX ADMIN — POTASSIUM CHLORIDE 10 MEQ: 7.46 INJECTION, SOLUTION INTRAVENOUS at 20:37

## 2023-12-01 RX ADMIN — PANTOPRAZOLE SODIUM 40 MG: 40 INJECTION, POWDER, FOR SOLUTION INTRAVENOUS at 17:09

## 2023-12-01 RX ADMIN — POLYETHYLENE GLYCOL-3350 AND ELECTROLYTES 4 L: 236; 6.74; 5.86; 2.97; 22.74 POWDER, FOR SOLUTION ORAL at 16:46

## 2023-12-01 RX ADMIN — SODIUM CHLORIDE 1000 ML: 9 INJECTION, SOLUTION INTRAVENOUS at 05:55

## 2023-12-01 RX ADMIN — SODIUM CHLORIDE, POTASSIUM CHLORIDE, SODIUM LACTATE AND CALCIUM CHLORIDE: 600; 310; 30; 20 INJECTION, SOLUTION INTRAVENOUS at 07:40

## 2023-12-01 RX ADMIN — MAGNESIUM SULFATE HEPTAHYDRATE 2 G: 2 INJECTION, SOLUTION INTRAVENOUS at 07:59

## 2023-12-01 RX ADMIN — SODIUM CHLORIDE, POTASSIUM CHLORIDE, SODIUM LACTATE AND CALCIUM CHLORIDE: 600; 310; 30; 20 INJECTION, SOLUTION INTRAVENOUS at 16:45

## 2023-12-01 RX ADMIN — DIPHENHYDRAMINE HYDROCHLORIDE 50 MG: 50 INJECTION, SOLUTION INTRAMUSCULAR; INTRAVENOUS at 07:06

## 2023-12-01 RX ADMIN — POTASSIUM CHLORIDE 10 MEQ: 7.46 INJECTION, SOLUTION INTRAVENOUS at 19:00

## 2023-12-01 RX ADMIN — LEVETIRACETAM 1000 MG: 100 INJECTION, SOLUTION, CONCENTRATE INTRAVENOUS at 18:00

## 2023-12-01 RX ADMIN — INSULIN GLARGINE-YFGN 10 UNITS: 100 INJECTION, SOLUTION SUBCUTANEOUS at 17:10

## 2023-12-01 RX ADMIN — POTASSIUM CHLORIDE 10 MEQ: 7.46 INJECTION, SOLUTION INTRAVENOUS at 17:49

## 2023-12-01 RX ADMIN — PANTOPRAZOLE SODIUM 40 MG: 40 INJECTION, POWDER, FOR SOLUTION INTRAVENOUS at 06:09

## 2023-12-01 RX ADMIN — LEVETIRACETAM 1000 MG: 100 INJECTION, SOLUTION, CONCENTRATE INTRAVENOUS at 07:59

## 2023-12-01 RX ADMIN — INSULIN LISPRO 3 UNITS: 100 INJECTION, SOLUTION INTRAVENOUS; SUBCUTANEOUS at 06:13

## 2023-12-01 RX ADMIN — IOHEXOL 80 ML: 350 INJECTION, SOLUTION INTRAVENOUS at 08:00

## 2023-12-01 RX ADMIN — POTASSIUM CHLORIDE 10 MEQ: 7.46 INJECTION, SOLUTION INTRAVENOUS at 16:45

## 2023-12-01 RX ADMIN — ATORVASTATIN CALCIUM 80 MG: 80 TABLET, FILM COATED ORAL at 17:08

## 2023-12-01 ASSESSMENT — LIFESTYLE VARIABLES: SUBSTANCE_ABUSE: 0

## 2023-12-01 ASSESSMENT — ENCOUNTER SYMPTOMS
MYALGIAS: 0
ABDOMINAL PAIN: 0
CONSTIPATION: 0
DIZZINESS: 1
HEADACHES: 0
PALPITATIONS: 0
WEAKNESS: 1
NAUSEA: 0
SHORTNESS OF BREATH: 0
SPEECH CHANGE: 0
SEIZURES: 0
DEPRESSION: 0
BLURRED VISION: 0
DOUBLE VISION: 0
FOCAL WEAKNESS: 0
BRUISES/BLEEDS EASILY: 1
HEARTBURN: 0
CHILLS: 0
VOMITING: 0
FEVER: 0
BLOOD IN STOOL: 1
COUGH: 0

## 2023-12-01 ASSESSMENT — PATIENT HEALTH QUESTIONNAIRE - PHQ9
1. LITTLE INTEREST OR PLEASURE IN DOING THINGS: NOT AT ALL
2. FEELING DOWN, DEPRESSED, IRRITABLE, OR HOPELESS: NOT AT ALL
SUM OF ALL RESPONSES TO PHQ9 QUESTIONS 1 AND 2: 0

## 2023-12-01 ASSESSMENT — PAIN DESCRIPTION - PAIN TYPE: TYPE: ACUTE PAIN

## 2023-12-01 ASSESSMENT — FIBROSIS 4 INDEX: FIB4 SCORE: 1.06

## 2023-12-01 NOTE — ASSESSMENT & PLAN NOTE
Hold meds for now in setting for GIB concern  Resume amlodipine 2.5 mg  Avoid hypotension especially patient has ischemic colitis

## 2023-12-01 NOTE — PROGRESS NOTES
Called by Dr Hinton for IMCU upgrade for lower GI bleed. She has history of diverticulosis 4 yrs ago and colonoscopy at that time. She brought in for bright red blood from rectum x1 at home with hg of 12.9 on admit takes daily aspirin for prior CVA. She then had another 300ml of rectal bleeding this am I was called for evaluation.     She is awake alert no abdominal pain soft abdomen, she is has pale conjunctiva vitals re-assuring but with repeat hg of 9.7. Recommend CT abdomen w/ contrast bleeding protocol, give a dose of DDAVP. Serial monitor Hg and reasonable to upgrade due to her pallor. RTOC notified.     Roberto Zhong MD  Critical Care Medicine

## 2023-12-01 NOTE — CONSULTS
GASTROENTEROLOGY CONSULTATION    PATIENT NAME: Molly Fountain  : 1945  CSN: [id number]  MRN:  [id number]     CONSULTATION DATE:  2023    PRIMARY CARE PROVIDER:  Pcp Pt States None      REASON FOR CONSULT:  Hematochezia. Consult requested by Dr Reyes    HISTORY OF PRESENT ILLNESS:  Molly Fountain is a 78 y.o. female who was recently admitted for a CVA in . She also has a history of hypertension, dyslipidemia and diabetes and had been off her medication. 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 She was at rehab getting ready to go home and she had an episode of rectal bleeding. She has continued to have episodes of hematochezia and feels very lightheaded and week. Her hemoglobin is 5. She is waiting to receive blood. She had a bleed in  that was likely from diverticulosis. It was not this severe. She does not have nay pain or any other GI complaints     HPI/ROS    PAST MEDICAL HISTORY:  Past Medical History:   Diagnosis Date    CVA (cerebral vascular accident) (HCC) 2023    Essential hypertension 3/13/2019    Type 2 diabetes mellitus with complication, without long-term current use of insulin (HCC) 3/13/2019       PAST SURGICAL HISTORY:  Past Surgical History:   Procedure Laterality Date    COLONOSCOPY - ENDO N/A 2019    Procedure: COLONOSCOPY;  Surgeon: Ger Nichols M.D.;  Location: SURGERY [facility];  Service: Gastroenterology        CURRENT MEDS:  Current Facility-Administered Medications   Medication Dose Route Frequency Provider Last Rate Last Admin    lactated ringers infusion (BOLUS)  500 mL Intravenous Once PRN Brandon Hinton M.D.        acetaminophen (Tylenol) tablet 650 mg  650 mg Oral Q6HRS PRN Brandon Hinton M.D.        labetalol (Normodyne/Trandate) injection 10 mg  10 mg Intravenous Q4HRS PRN Brandon Hinton M.D.        ondansetron (Zofran) syringe/vial injection 4 mg  4 mg Intravenous  Q4HRS PRN Brandon Hinton M.D.        ondansetron (Zofran ODT) dispertab 4 mg  4 mg Oral Q4HRS PRN Brandon Hinton M.D.        potassium chloride SA (Kdur) tablet 40 mEq  40 mEq Oral Once Brandon Hinton M.D.        pantoprazole (Protonix) injection 40 mg  40 mg Intravenous BID Brandon Hinton M.D.   40 mg at 12/01/23 0609    atorvastatin (Lipitor) tablet 80 mg  80 mg Oral Q EVENING Brandon Hinton M.D.        lactated ringers infusion   Intravenous Continuous Brandon Hinton M.D. 125 mL/hr at 12/01/23 0740 New Bag at 12/01/23 0740    ipratropium-albuterol (DUONEB) nebulizer solution  3 mL Nebulization Q4H PRN (RT) Brandon Hinton M.D.        insulin GLARGINE (Lantus,Semglee) injection  10 Units Subcutaneous Q EVENING Brandon Hinton M.D.        And    insulin lispro (HumaLOG,AdmeLOG) injection  2-9 Units Subcutaneous Q6HRS Brandon Hinton M.D.   3 Units at 12/01/23 0613    And    dextrose 10 % BOLUS 25 g  25 g Intravenous Q15 MIN PRN Brandon Hinton M.D.        levETIRAcetam (Keppra) injection 1,000 mg  1,000 mg Intravenous Q12HRS Brandon Hinton M.D.   1,000 mg at 12/01/23 0759    lactated ringers infusion (BOLUS)  1,000 mL Intravenous Once Brandon Hinotn M.D.        magnesium sulfate IVPB premix 2 g  2 g Intravenous Once Brandon Hinton M.D. 25 mL/hr at 12/01/23 0759 2 g at 12/01/23 0759    MD Alert...Total Body Iron Replacement per Pharmacy   Other PHARMACY TO DOSE Brandon Hinton M.D.        desmopressin PF (Ddavp) 24.092 mcg in NS 50 mL ivpb  0.3 mcg/kg Intravenous Once Brandon Hinton M.D.            ALLERGIES:  Allergies   Allergen Reactions    Sulfa Drugs Rash     Full body rash    Shellfish-Derived Products Rash     Full body rash       SOCIAL HISTORY:  Social History     Socioeconomic History    Marital status:      Spouse name: Not on file    Number of children: Not on file    Years of education: Not on file    Highest education level: Not on file   Occupational History    Not on file   Tobacco Use    Smoking status: Never     Smokeless tobacco: Never   Substance and Sexual Activity    Alcohol use: Yes     Comment: occasionally    Drug use: No    Sexual activity: Not on file   Other Topics Concern    Not on file   Social History Narrative    Not on file     Social Determinants of Health     Financial Resource Strain: Not on file   Food Insecurity: Not on file   Transportation Needs: Not on file   Physical Activity: Not on file   Stress: Not on file   Social Connections: Not on file   Intimate Partner Violence: Not on file   Housing Stability: Not on file       FAMILY HISTORY:  Family History   Problem Relation Age of Onset    Heart Disease Father     Hypertension Father         REVIEW OF SYSTEMS:  General ROS: Negative for - chills, fever, night sweats or weight loss.  HEENT ROS: Negative  Respiratory ROS: Negative for - cough or shortness of breath.  Cardiovascular ROS:  Negative for - chest pain or palpitations.  Gastrointestinal ROS: As per the history of present illness.  Genito-Urinary ROS: Negative  Musculoskeletal ROS: Negative.  Neurological ROS: Negative  Skin ROS: negative  Hematology ROS: negative  Endocrinology ROS: Negative        PHYSICAL EXAM:  VITALS: /63   Pulse 97   Temp 36.7 °C (98 °F) (Temporal)   Resp (!) 21   Wt 80.3 kg (177 lb 0.5 oz)   LMP  (LMP Unknown)   SpO2 98%   BMI 31.37 kg/m²   GEN:  Molly Fountain is a 78 y.o. female in no acute distress.  HEENT: Mucous membranes pink and moist.  Sclera anicteric.    NECK:    Neck supple without lymphadenopathy or thyromegaly.  LUNGS: Clear to auscultation posteriorly.  HEART: Regular rate and rhythm. S1 and S2 normal. No murmurs, gallops  ABD:  + BD nt/nd -hsm  RECTAL: Not done at this time.  EXT:  Without cyanosis, deformity or pitting edema.  SKIN:  Pink, warm, dry.  NEURO: Grossly intact, A/OR.    LABS:  Recent Labs     12/01/23  0117 12/01/23  0537   WBC 8.1 7.6   MCV 81.1* 81.5     Recent Labs     12/01/23  0117   GLUCOSE 176*   BUN 14   CO2 24     Lab  "Results   Component Value Date    INR 0.97 12/01/2023    INR 1.13 05/26/2019     No components found for: \"ALT\", \"AST\", \"GGT\", \"ALKPHOS\"  No results found for: \"BILINEO\"      @LASTIMGCAT([id number])@     @LASTIMGCAT(VD5797)@       IMPRESSION/PLAN:  Hematochezia  Diverticulosis  Recent CVA  Recent seizure  Anemia due to blood loss    We can prep patient for tomorrow, but has to make sure she is capable. At this time she has not gotten blood and she is weak. She needs wither rectal tube or bedside commode after at least two units given.     Colonoscopy   Prep after blood given if patient can tolerate  NPO after midnight  Trend H/H. We like it at 8 for procedure and anesthesia       Daina Venegas M.D.  Gastroenterology      "

## 2023-12-01 NOTE — ASSESSMENT & PLAN NOTE
Came with low hemoglobin 5.9 and received multiple red blood cell transfusion  Likely related to ischemic colitis  Colonoscopy showed pancolitis with no mass  Biopsy showed likely ischemic colitis and no signs of inflammation  GI on board and vascular surgeon was consulted  Continue monitoring hemoglobin every 8 hours and blood transfusion if needed

## 2023-12-01 NOTE — PROGRESS NOTES
4 Eyes Skin Assessment Completed by KOLE Keene and KOLE Andrea.    Head WDL  Ears WDL  Nose WDL  Mouth WDL  Neck WDL  Breast/Chest Redness Left under breast   Shoulder Blades WDL  Spine WDL  (R) Arm/Elbow/Hand Bruising   (L) Arm/Elbow/Hand Bruising  Abdomen Bruising  Groin WDL  Scrotum/Coccyx/Buttocks WDL  (R) Leg WDL  (L) Leg WDL  (R) Heel/Foot/Toe WDL  (L) Heel/Foot/Toe WDL          Devices In Places Blood Pressure Cuff, Pulse Ox, and SCD's      Interventions In Place Pressure Redistribution Mattress, pillows for repositioning,     Possible Skin Injury No    Pictures Uploaded Into Epic N/A  Wound Consult Placed N/A  RN Wound Prevention Protocol Ordered No

## 2023-12-01 NOTE — ED NOTES
Med rec complete per MAR and call to [facility].  Per [facility] pt just admitted there 11/29/23.   In historical, [facility] pharmacy had filled 30 days of Glipizide 10 mg QD and Lisinopril 30 mg QD on 11/21/23. Per Pt , she never started these at home because she was never home. Per [facility], pt was not on these meds there.  Allergies per [facility].

## 2023-12-01 NOTE — H&P
Hospital Medicine History & Physical Note    Date of Service  12/1/2023    Primary Care Physician  Pcp Pt States None    Consultants  GI (Dr. Venegas)  ICU (Dr. Zhong) - to [facility], [place]    Code Status  DNAR/DNI    Chief Complaint  Chief Complaint   Patient presents with    Rectal Bleeding     BIB [facility] from [facility] for patient reports of large bright red stool about 1 hr ago.        History of Presenting Illness  Molly Fountain is a 78 y.o. female with history of recent CVA on ASA, hypertension, hyperlipidemia, diabetes, seizure disorder who presented 12/1/2023 with evaluation for rectal bleeding.  Patient was just discharged from hospital 11/29/2023 after being admitted for CVA and seizure.  Earlier today, reported to have rectal bleeding at SNF, referred to ER for evaluation.  In ER, reportedly had positive guaiac, initial hemoglobin 12.9.  Admission requested by ERP.  Admitted to medicine service for further evaluation and treatment.    After being admitted, noted to have profuse bleeding. Seen and evaluated with rapid response team. Admit to IMCU for close hemodynamic monitoring.    I discussed the plan of care with patient, bedside RN, and pharmacy.    Review of Systems  Review of Systems   Constitutional:  Positive for malaise/fatigue. Negative for chills and fever.   HENT:  Negative for hearing loss and tinnitus.    Eyes:  Negative for blurred vision and double vision.   Respiratory:  Negative for cough and shortness of breath.    Cardiovascular:  Negative for chest pain and palpitations.   Gastrointestinal:  Positive for blood in stool and melena. Negative for abdominal pain, constipation, heartburn, nausea and vomiting.   Genitourinary:  Negative for dysuria and hematuria.   Musculoskeletal:  Negative for joint pain and myalgias.   Skin:  Negative for itching and rash.   Neurological:  Positive for dizziness and weakness. Negative for speech change, focal weakness, seizures and headaches.   Endo/Heme/Allergies:   Negative for environmental allergies. Bruises/bleeds easily.   Psychiatric/Behavioral:  Negative for depression and substance abuse.    All other systems reviewed and are negative.      Past Medical History   has a past medical history of CVA (cerebral vascular accident) (HCC) (11/24/2023), Essential hypertension (3/13/2019), and Type 2 diabetes mellitus with complication, without long-term current use of insulin ([facility]) (3/13/2019).    Surgical History   has a past surgical history that includes colonoscopy - endo (N/A, 5/28/2019).     Family History  family history includes Heart Disease in her father; Hypertension in her father.   Family history reviewed with patient. There is no family history that is pertinent to the chief complaint.     Social History   reports that she has never smoked. She has never used smokeless tobacco. She reports current alcohol use. She reports that she does not use drugs.    Allergies  Allergies   Allergen Reactions    Sulfa Drugs Rash     Full body rash    Shellfish-Derived Products Rash     Full body rash       Medications  Prior to Admission Medications   Prescriptions Last Dose Informant Patient Reported? Taking?   Blood Glucose Test Strips   No No   Sig: Use one Accucheck Guide strip to test blood sugar once daily early morning before first meal. For uncontrolled diabetes. E11.65   Lancets   No No   Sig: Use one fast clix lancet to test blood sugar once daily early morning before first meal. For uncontrolled diabetes. E11.65   amLODIPine (NORVASC) 10 MG Tab   No No   Sig: Take 1 Tablet by mouth every day.   aspirin (ASA) 81 MG Chew Tab chewable tablet   No No   Sig: Chew 1 Tablet every day.   atorvastatin (LIPITOR) 80 MG tablet   No No   Sig: Take 1 Tablet by mouth every evening.   insulin GLARGINE (LANTUS,SEMGLEE) 100 UNIT/ML Solution   No No   Sig: Inject 10 Units under the skin every evening.   insulin regular (HUMULIN R) 100 Unit/mL Solution   No No   Sig: Inject 3-14 Units  under the skin 4 Times a Day,Before Meals and at Bedtime.   levETIRAcetam (KEPPRA) 1000 MG tablet   No No   Sig: Take 1 Tablet by mouth 2 times a day.   losartan (COZAAR) 50 MG Tab   No No   Sig: Take 1 Tablet by mouth every day.      Facility-Administered Medications: None       Physical Exam  Temp:  [36.3 °C (97.3 °F)-36.6 °C (97.9 °F)] 36.3 °C (97.3 °F)  Pulse:  [] 94  Resp:  [16-20] 18  BP: ()/(42-69) 110/69  SpO2:  [91 %-99 %] 94 %  Blood Pressure : 106/63   Temperature: 36.5 °C (97.7 °F)   Pulse: 100   Respiration: 16   Pulse Oximetry: 92 %       Physical Exam  Vitals and nursing note reviewed.   Constitutional:       General: She is not in acute distress.     Appearance: She is obese.   HENT:      Head: Normocephalic and atraumatic.      Nose: Nose normal.      Mouth/Throat:      Mouth: Mucous membranes are dry.      Pharynx: Oropharynx is clear.   Eyes:      General: No scleral icterus.     Extraocular Movements: Extraocular movements intact.   Cardiovascular:      Rate and Rhythm: Regular rhythm. Tachycardia present.      Pulses: Normal pulses.      Heart sounds:      No friction rub.   Pulmonary:      Effort: No respiratory distress.      Breath sounds: No wheezing or rales.   Chest:      Chest wall: No tenderness.   Abdominal:      General: There is distension.      Tenderness: There is no abdominal tenderness. There is no guarding or rebound.   Musculoskeletal:         General: No tenderness.      Cervical back: Neck supple. No tenderness.      Right lower leg: No edema.      Left lower leg: No edema.   Skin:     General: Skin is dry.      Capillary Refill: Capillary refill takes less than 2 seconds.      Coloration: Skin is pale.   Neurological:      General: No focal deficit present.      Mental Status: She is alert and oriented to person, place, and time.   Psychiatric:         Mood and Affect: Mood normal.         Laboratory:  Recent Labs     12/01/23  0117 12/01/23  0537   WBC 8.1 7.6  "  RBC 5.09 3.89*   HEMOGLOBIN 12.9 9.7*   HEMATOCRIT 41.3 31.7*   MCV 81.1* 81.5   MCH 25.3* 24.9*   MCHC 31.2* 30.6*   RDW 44.4 44.4   PLATELETCT 372 317   MPV 9.5 9.6     Recent Labs     12/01/23 0117   SODIUM 142   POTASSIUM 3.5*   CHLORIDE 106   CO2 24   GLUCOSE 176*   BUN 14   CREATININE 0.79   CALCIUM 9.2     Recent Labs     12/01/23 0117   ALTSGPT 20   ASTSGOT 27   ALKPHOSPHAT 114*   TBILIRUBIN 0.3   GLUCOSE 176*     Recent Labs     12/01/23 0117   APTT 27.9   INR 0.97     No results for input(s): \"NTPROBNP\" in the last 72 hours.      No results for input(s): \"TROPONINT\" in the last 72 hours.    Imaging:  CTA ABDOMEN PELVIS W & W/O POST PROCESS    (Results Pending)       no X-Ray or EKG requiring interpretation    Assessment/Plan:  Justification for Admission Status  I anticipate this patient will require at least 2 midnights hospitalization, therefore appropriate for inpatient status.        * Acute GI bleeding- (present on admission)  Assessment & Plan  Rectal bleeding  On aspirin-held  Transfuse as needed  IV Protonix for now    Profuse bleeding upon arrival to floor  SBP 90s    D/w ICU attending for IMCU transfer  --ordered CTA AP for possible IR embolization evaluation  --DDAVP x1 given ASA intake  --1L IVF bolus  --transfer to IMCU for close hemodynamic monitoring    GI consulted, f/u appreciated    Acute blood loss anemia- (present on admission)  Assessment & Plan  Due to above  Anemia [id number]-[id number] iron/B12/folate as needed  Trend H&H  Transfuse for hemoglobin less than 7 or hemodynamic instability    Iron deficiency anemia  Assessment & Plan  Likely secondary to GIB  IV iron infusion    CVA (cerebral vascular accident) (HCC)- (present on admission)  Assessment & Plan  Recent CVA  Resume aspirin when appropriate  Continue statin    Seizure (HCC)- (present on admission)  Assessment & Plan  Continue Keppra    Hypokalemia- (present on admission)  Assessment & Plan  Replace    Type 2 diabetes " mellitus with complication, without long-term current use of insulin (HCC)- (present on admission)  Assessment & Plan  Lantus, ISS  Statin    Class 1 obesity in adult- (present on admission)  Assessment & Plan  Diet and lifestyle modification  Body mass index is 31.37 kg/m².      ACP (advance care planning)- (present on admission)  Assessment & Plan  Goal of care discussed with patient in ER.  She stated she does not wish for aggressive/heroic life-sustaining measures-no CPR/defibrillation/intubation or mechanical ventilation.  She is however agreeable for any other noninvasive medical management, including possible GI endoscopic evaluation as clinically warranted.  Diagnosis, prognosis, questions and concerns addressed.  DNR/DNI status confirmed.  ACP: 16 minutes    Essential hypertension- (present on admission)  Assessment & Plan  Hold meds for now in setting for GIB concern  Resume when appropriate        VTE prophylaxis: SCDs/TEDs    Critical care time: 60 minutes spent in chart review, medical management, coordinate care with patient/response team/medical floor staff/IMCU staff/RN/pharmacy/consulting providers

## 2023-12-01 NOTE — ED TRIAGE NOTES
Chief Complaint   Patient presents with    Rectal Bleeding     BIB [facility] from [facility] for patient reports of large bright red stool about 1 hr ago.      Vitals:    12/01/23 0050   BP: 119/61   Pulse: 96   Resp: 16   Temp: 36.5 °C (97.7 °F)   SpO2: 93%

## 2023-12-01 NOTE — ED PROVIDER NOTES
ER Provider Note    Scribed for Xavier Waddell Ii, M.d. by Deep Mock. 12/1/2023  1:00 AM    Primary Care Provider: Pcp Pt States None    CHIEF COMPLAINT  Chief Complaint   Patient presents with    Rectal Bleeding     BIB [facility] from [place] for patient reports of large bright red stool about 1 hr ago.      EXTERNAL RECORDS REVIEWED  Inpatient Notes The patient was hospitalized here on 5/26/2019 for a GI bleed. Colonoscopy from this visit reveals the patient's bleeding is likely due to diverticulosis. Review of labs reveals the patient's hemoglobin was stable. 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    HPI/ROS  LIMITATION TO HISTORY   Select: : None  OUTSIDE HISTORIAN(S):  None    Molly Fountain is a 78 y.o. female with a history of cerebrovascular accident and Type II Diabetes who presents to the ED for evaluation of a rectal bleed onset 1 hour prior to arrival. The patient reports no associated symptoms, but denies abdominal pain. The patient reports she lives in [place] for rehabilitation after she was hospitalized on 11/21 for seizures. She denies taking blood thinners. The patient denies any recent rectal bleeds. There are no known alleviating or exacerbating factors.      PAST MEDICAL HISTORY  Past Medical History:   Diagnosis Date    CVA (cerebral vascular accident) (HCC) 11/24/2023    Essential hypertension 3/13/2019    Type 2 diabetes mellitus with complication, without long-term current use of insulin (HCC) 3/13/2019     SURGICAL HISTORY  Past Surgical History:   Procedure Laterality Date    COLONOSCOPY - ENDO N/A 5/28/2019    Procedure: COLONOSCOPY;  Surgeon: Ger Nichols M.D.;  Location: SURGERY [facility];  Service: Gastroenterology     FAMILY HISTORY  Family History   Problem Relation Age of Onset    Heart Disease Father     Hypertension Father      SOCIAL HISTORY   reports  that she has never smoked. She has never used smokeless tobacco. She reports current alcohol use. She reports that she does not use drugs.    CURRENT MEDICATIONS  Current Discharge Medication List        CONTINUE these medications which have NOT CHANGED    Details   insulin glargine (LANTUS) 100 UNIT/ML SC SOLN Inject 10 Units under the skin every evening.      diclofenac sodium (VOLTAREN) 1 % Gel Apply  topically 2 times a day. 1 application to left knee      insulin regular (HUMULIN R) 100 Unit/mL Solution Inject 2-10 Units under the skin 3 times a day before meals. If -200=2 units  201-250=4 units  251-300=6 units  301-350=8 units  351-400=10 units   >400=call MD      acetaminophen (TYLENOL) 325 MG Tab Take 650 mg by mouth every four hours as needed for Mild Pain.      amLODIPine (NORVASC) 10 MG Tab Take 1 Tablet by mouth every day.  Qty: 30 Tablet    Associated Diagnoses: Essential hypertension      aspirin (ASA) 81 MG Chew Tab chewable tablet Chew 1 Tablet every day.  Qty: 100 Tablet    Associated Diagnoses: Spells of trembling      atorvastatin (LIPITOR) 80 MG tablet Take 1 Tablet by mouth every evening.  Qty: 30 Tablet    Associated Diagnoses: Spells of trembling      levETIRAcetam (KEPPRA) 1000 MG tablet Take 1 Tablet by mouth 2 times a day.  Qty: 60 Tablet    Associated Diagnoses: Seizure (HCC)      losartan (COZAAR) 50 MG Tab Take 1 Tablet by mouth every day.  Qty: 30 Tablet    Associated Diagnoses: Essential hypertension      Blood Glucose Test Strips Use one Accucheck Guide strip to test blood sugar once daily early morning before first meal. For uncontrolled diabetes. E11.65  Qty: 100 Strip, Refills: 1    Comments: Or per formulary preference. ICD-10 code: E11.65 Uncontrolled type 2 Diabetes Mellitus      Lancets Use one fast clix lancet to test blood sugar once daily early morning before first meal. For uncontrolled diabetes. E11.65  Qty: 100 Each, Refills: 1    Comments: Or per formulary  preference. ICD-10 code: E11.65 Uncontrolled type 2 Diabetes Mellitus.           ALLERGIES  Sulfa drugs    PHYSICAL EXAM  /61   Pulse 96   Temp 36.5 °C (97.7 °F) (Temporal)   Resp 16   Wt 80.3 kg (177 lb 0.5 oz)   LMP  (LMP Unknown)   SpO2 93%   BMI 31.37 kg/m²   Physical Exam  Vitals and nursing note reviewed.   HENT:      Head: Normocephalic.      Mouth/Throat:      Mouth: Mucous membranes are moist.   Eyes:      Pupils: Pupils are equal, round, and reactive to light.   Cardiovascular:      Rate and Rhythm: Normal rate and regular rhythm.   Pulmonary:      Effort: Pulmonary effort is normal.      Breath sounds: Normal breath sounds.   Abdominal:      General: There is no distension.      Tenderness: There is no abdominal tenderness.   Musculoskeletal:      Cervical back: Normal range of motion.   Skin:     General: Skin is warm.   Neurological:      Mental Status: She is alert.      Comments: Generalized weakness   Psychiatric:         Mood and Affect: Mood normal.          DIAGNOSTIC STUDIES    Labs:   Results for orders placed or performed during the hospital encounter of 12/01/23   CBC WITH DIFFERENTIAL   Result Value Ref Range    WBC 8.1 4.8 - 10.8 K/uL    RBC 5.09 4.20 - 5.40 M/uL    Hemoglobin 12.9 12.0 - 16.0 g/dL    Hematocrit 41.3 37.0 - 47.0 %    MCV 81.1 (L) 81.4 - 97.8 fL    MCH 25.3 (L) 27.0 - 33.0 pg    MCHC 31.2 (L) 32.2 - 35.5 g/dL    RDW 44.4 35.9 - 50.0 fL    Platelet Count 372 164 - 446 K/uL    MPV 9.5 9.0 - 12.9 fL    Neutrophils-Polys 68.90 44.00 - 72.00 %    Lymphocytes 21.80 (L) 22.00 - 41.00 %    Monocytes 6.20 0.00 - 13.40 %    Eosinophils 2.00 0.00 - 6.90 %    Basophils 0.50 0.00 - 1.80 %    Immature Granulocytes 0.60 0.00 - 0.90 %    Nucleated RBC 0.00 0.00 - 0.20 /100 WBC    Neutrophils (Absolute) 5.58 1.82 - 7.42 K/uL    Lymphs (Absolute) 1.76 1.00 - 4.80 K/uL    Monos (Absolute) 0.50 0.00 - 0.85 K/uL    Eos (Absolute) 0.16 0.00 - 0.51 K/uL    Baso (Absolute) 0.04 0.00 -  0.12 K/uL    Immature Granulocytes (abs) 0.05 0.00 - 0.11 K/uL    NRBC (Absolute) 0.00 K/uL   COMP METABOLIC PANEL   Result Value Ref Range    Sodium 142 135 - 145 mmol/L    Potassium 3.5 (L) 3.6 - 5.5 mmol/L    Chloride 106 96 - 112 mmol/L    Co2 24 20 - 33 mmol/L    Anion Gap 12.0 7.0 - 16.0    Glucose 176 (H) 65 - 99 mg/dL    Bun 14 8 - 22 mg/dL    Creatinine 0.79 0.50 - 1.40 mg/dL    Calcium 9.2 8.5 - 10.5 mg/dL    Correct Calcium 9.4 8.5 - 10.5 mg/dL    AST(SGOT) 27 12 - 45 U/L    ALT(SGPT) 20 2 - 50 U/L    Alkaline Phosphatase 114 (H) 30 - 99 U/L    Total Bilirubin 0.3 0.1 - 1.5 mg/dL    Albumin 3.7 3.2 - 4.9 g/dL    Total Protein 7.0 6.0 - 8.2 g/dL    Globulin 3.3 1.9 - 3.5 g/dL    A-G Ratio 1.1 g/dL   APTT   Result Value Ref Range    APTT 27.9 24.7 - 36.0 sec   PROTHROMBIN TIME (INR)   Result Value Ref Range    PT 13.0 12.0 - 14.6 sec    INR 0.97 0.87 - 1.13   COD (ADULT)   Result Value Ref Range    ABO Grouping Only O     Rh Grouping Only POS     Antibody Screen-Cod NEG    LACTIC ACID   Result Value Ref Range    Lactic Acid 1.1 0.5 - 2.0 mmol/L   ABO Rh Confirm   Result Value Ref Range    ABO Rh Confirm O POS    ESTIMATED GFR   Result Value Ref Range    GFR (CKD-EPI) 76 >60 mL/min/1.73 m 2   VITAMIN B12   Result Value Ref Range    Vitamin B12 -True Cobalamin 273 211 - 911 pg/mL   FOLATE   Result Value Ref Range    Folate -Folic Acid 17.5 >4.0 ng/mL   FERRITIN   Result Value Ref Range    Ferritin 17.7 10.0 - 291.0 ng/mL   IRON/TOTAL IRON BIND   Result Value Ref Range    Iron 35 (L) 40 - 170 ug/dL    Total Iron Binding 255 250 - 450 ug/dL    Unsat Iron Binding 220 110 - 370 ug/dL    % Saturation 14 (L) 15 - 55 %   LDH   Result Value Ref Range    LDH Total 153 107 - 266 U/L   CRP QUANTITIVE (NON-CARDIAC)   Result Value Ref Range    Stat C-Reactive Protein <0.30 0.00 - 0.75 mg/dL   MAGNESIUM   Result Value Ref Range    Magnesium 1.7 1.5 - 2.5 mg/dL   CBC WITH DIFFERENTIAL   Result Value Ref Range    WBC 7.6 4.8  - 10.8 K/uL    RBC 3.89 (L) 4.20 - 5.40 M/uL    Hemoglobin 9.7 (L) 12.0 - 16.0 g/dL    Hematocrit 31.7 (L) 37.0 - 47.0 %    MCV 81.5 81.4 - 97.8 fL    MCH 24.9 (L) 27.0 - 33.0 pg    MCHC 30.6 (L) 32.2 - 35.5 g/dL    RDW 44.4 35.9 - 50.0 fL    Platelet Count 317 164 - 446 K/uL    MPV 9.6 9.0 - 12.9 fL    Neutrophils-Polys 70.80 44.00 - 72.00 %    Lymphocytes 21.20 (L) 22.00 - 41.00 %    Monocytes 5.40 0.00 - 13.40 %    Eosinophils 1.60 0.00 - 6.90 %    Basophils 0.50 0.00 - 1.80 %    Immature Granulocytes 0.50 0.00 - 0.90 %    Nucleated RBC 0.00 0.00 - 0.20 /100 WBC    Neutrophils (Absolute) 5.36 1.82 - 7.42 K/uL    Lymphs (Absolute) 1.61 1.00 - 4.80 K/uL    Monos (Absolute) 0.41 0.00 - 0.85 K/uL    Eos (Absolute) 0.12 0.00 - 0.51 K/uL    Baso (Absolute) 0.04 0.00 - 0.12 K/uL    Immature Granulocytes (abs) 0.04 0.00 - 0.11 K/uL    NRBC (Absolute) 0.00 K/uL   POCT glucose device results   Result Value Ref Range    POC Glucose, Blood 228 (H) 65 - 99 mg/dL     COURSE & MEDICAL DECISION MAKING     ED Observation Status? Yes; I am placing the patient in to an observation status due to a diagnostic uncertainty as well as therapeutic intensity. Patient placed in observation status at 1:08 AM, 12/1/2023.     Observation plan is as follows: We will manage their symptoms, evaluate with diagnostic testing, and then reassess after results are reviewed.      Upon Reevaluation, the patient's condition has: not improved; and will be escalated to hospitalization.    Patient discharged from ED Observation status at 3:31 AM on 12/1/2023     INITIAL ASSESSMENT, COURSE AND PLAN  Care Narrative:   1:08 AM - Patient seen and examined at bedside. The patient is a 78 year old female who presents to the ED for evaluation of rectal bleeding onset 1 hour prior to arrival. The patient denies abdominal pain. The patient was recently discharged to [facility] after a hospitalization on 11/21/2023 for a seizure. Discussed  plan of care, including diagnostic workup to evaluate patient symptoms. Patient agrees to the plan of care. Ordered for CBC with diff, CMP, APPT. Prothrombin INR, COD, and lactic acid to evaluate her symptoms.  She is not on any anticoagulants.  Reviewed records and she has had a colonoscopy 4 years ago that showed some bleeding by diverticula.    3:13 AM  - Review of labs reveals a hemoglobin of 12.9 now compared to 11.6 five days ago.     3:29 AM - Rectal examination performed at this time with female chaperone present which revealed maroon blood. Discussed my plan for hospitalization to medicine.     3:35 AM I discussed the patient's case and the above findings with Dr. Hinton (Hospitalist) who agreed to evaluate the patient for hospitalization.      PROBLEM LIST  #Lower GI bleed   -Hemoglobin stable from labs last week.   -Not hypotensive   -Admitted for continued observation, work-up.  Unlikely she would do well at SNF with this amount of GI bleeding.    DISPOSITION AND DISCUSSIONS  I have discussed management of the patient with the following physicians and KAIA's:   (Hospitalist)    Discussion of management with other [facility] [place] or appropriate source(s): None     Barriers to care at this time, including but not limited to: Patient does not have established PCP.       DISPOSITION:  Patient will be hospitalized by Dr. Hinton in guarded condition.     FINAL DIANGOSIS  1. Lower GI bleed    2. Hx of tonic-clonic seizures         Deep WELLS (Sadaf), am scribing for, and in the presence of, JACQUELINE Haile II.    Electronically signed by: Deep Contreras), 12/1/2023    Xavier WELLS II, M* personally performed the services described in this documentation, as scribed by Deep Mock in my presence, and it is both accurate and complete.     The note accurately reflects work and decisions made by me.  Xavier Waddell II, M.D.  12/1/2023  8:44 AM

## 2023-12-01 NOTE — CODE DOCUMENTATION
JAYASHREE Tran contacted to come bedside. JAYASHREE replied that MD Hinton is primary provider and is aware of the rapid response in progress.

## 2023-12-01 NOTE — PROGRESS NOTES
Patient having multiple large watery stool with abrahan blood only. Patient used the bathroom 6-10 times in an hour, all with abrahan blood. Patient became more and more unsteady on her feet, and was also extremely pale and clammy. Blood pressures dropped from 110s to 70s very quickly, remained tachycardic in 100s-110s. Rapid response called. Lab then called with results of Hgb = 12.9 to 9.7 over the span of about 4 hours. Intensivist at bedside after being evaluated by admitting doctor. Patient then transferred to [facility] for hloc r/t GIB. Report called to KOLE Keene.

## 2023-12-01 NOTE — ASSESSMENT & PLAN NOTE
With hemoglobin 5.9 on admission  Continue trending hemoglobin and blood transfusion if needed

## 2023-12-01 NOTE — CODE DOCUMENTATION
MD Hinton contacted, and agreed to liter NS bolus ordered and in progress. Per MD consult to Intensivist will be placed.

## 2023-12-01 NOTE — ASSESSMENT & PLAN NOTE
Recent CVA  Resume aspirin when appropriate, patient still having active bleeding  Continue statin

## 2023-12-01 NOTE — ASSESSMENT & PLAN NOTE
Uncontrolled and A1c around 14  Continue Lantus 15 units  Diabetic diet  Monitor accuchecks and cover with SSI.  Hypoglycemic protocol  Statin  Continue monitoring

## 2023-12-02 ENCOUNTER — ANESTHESIA (OUTPATIENT)
Dept: SURGERY | Facility: MEDICAL CENTER | Age: 78
DRG: 394 | End: 2023-12-02
Payer: MEDICARE

## 2023-12-02 PROBLEM — K51.00 PANCOLITIS (HCC): Status: ACTIVE | Noted: 2023-12-02

## 2023-12-02 LAB
EKG IMPRESSION: NORMAL
GLUCOSE BLD STRIP.AUTO-MCNC: 108 MG/DL (ref 65–99)
GLUCOSE BLD STRIP.AUTO-MCNC: 215 MG/DL (ref 65–99)
GLUCOSE BLD STRIP.AUTO-MCNC: 75 MG/DL (ref 65–99)
GLUCOSE BLD STRIP.AUTO-MCNC: 79 MG/DL (ref 65–99)
GLUCOSE BLD STRIP.AUTO-MCNC: 97 MG/DL (ref 65–99)
HCT VFR BLD AUTO: 29.1 % (ref 37–47)
HCT VFR BLD AUTO: 31.7 % (ref 37–47)
HCT VFR BLD AUTO: 32.2 % (ref 37–47)
HGB BLD-MCNC: 10.1 G/DL (ref 12–16)
HGB BLD-MCNC: 9.4 G/DL (ref 12–16)
HGB BLD-MCNC: 9.8 G/DL (ref 12–16)

## 2023-12-02 PROCEDURE — 700101 HCHG RX REV CODE 250: Performed by: ANESTHESIOLOGY

## 2023-12-02 PROCEDURE — 0DBG8ZX EXCISION OF LEFT LARGE INTESTINE, VIA NATURAL OR ARTIFICIAL OPENING ENDOSCOPIC, DIAGNOSTIC: ICD-10-PCS | Performed by: SPECIALIST

## 2023-12-02 PROCEDURE — 700111 HCHG RX REV CODE 636 W/ 250 OVERRIDE (IP): Performed by: STUDENT IN AN ORGANIZED HEALTH CARE EDUCATION/TRAINING PROGRAM

## 2023-12-02 PROCEDURE — 88305 TISSUE EXAM BY PATHOLOGIST: CPT

## 2023-12-02 PROCEDURE — 700111 HCHG RX REV CODE 636 W/ 250 OVERRIDE (IP): Performed by: ANESTHESIOLOGY

## 2023-12-02 PROCEDURE — 160048 HCHG OR STATISTICAL LEVEL 1-5: Performed by: SPECIALIST

## 2023-12-02 PROCEDURE — 700105 HCHG RX REV CODE 258: Performed by: STUDENT IN AN ORGANIZED HEALTH CARE EDUCATION/TRAINING PROGRAM

## 2023-12-02 PROCEDURE — 99233 SBSQ HOSP IP/OBS HIGH 50: CPT | Performed by: HOSPITALIST

## 2023-12-02 PROCEDURE — 85018 HEMOGLOBIN: CPT | Mod: 91

## 2023-12-02 PROCEDURE — 160208 HCHG ENDO MINUTES - EA ADDL 1 MIN LEVEL 4: Performed by: SPECIALIST

## 2023-12-02 PROCEDURE — 93005 ELECTROCARDIOGRAM TRACING: CPT | Performed by: SPECIALIST

## 2023-12-02 PROCEDURE — 160203 HCHG ENDO MINUTES - 1ST 30 MINS LEVEL 4: Performed by: SPECIALIST

## 2023-12-02 PROCEDURE — A9270 NON-COVERED ITEM OR SERVICE: HCPCS | Performed by: STUDENT IN AN ORGANIZED HEALTH CARE EDUCATION/TRAINING PROGRAM

## 2023-12-02 PROCEDURE — 45380 COLONOSCOPY AND BIOPSY: CPT | Performed by: SPECIALIST

## 2023-12-02 PROCEDURE — 82962 GLUCOSE BLOOD TEST: CPT

## 2023-12-02 PROCEDURE — 160009 HCHG ANES TIME/MIN: Performed by: SPECIALIST

## 2023-12-02 PROCEDURE — 85014 HEMATOCRIT: CPT

## 2023-12-02 PROCEDURE — 700102 HCHG RX REV CODE 250 W/ 637 OVERRIDE(OP): Performed by: STUDENT IN AN ORGANIZED HEALTH CARE EDUCATION/TRAINING PROGRAM

## 2023-12-02 PROCEDURE — 93010 ELECTROCARDIOGRAM REPORT: CPT | Performed by: INTERNAL MEDICINE

## 2023-12-02 PROCEDURE — 160002 HCHG RECOVERY MINUTES (STAT): Performed by: SPECIALIST

## 2023-12-02 PROCEDURE — 770006 HCHG ROOM/CARE - MED/SURG/GYN SEMI*

## 2023-12-02 PROCEDURE — 0DBF8ZX EXCISION OF RIGHT LARGE INTESTINE, VIA NATURAL OR ARTIFICIAL OPENING ENDOSCOPIC, DIAGNOSTIC: ICD-10-PCS | Performed by: SPECIALIST

## 2023-12-02 PROCEDURE — C9113 INJ PANTOPRAZOLE SODIUM, VIA: HCPCS | Performed by: STUDENT IN AN ORGANIZED HEALTH CARE EDUCATION/TRAINING PROGRAM

## 2023-12-02 PROCEDURE — 160035 HCHG PACU - 1ST 60 MINS PHASE I: Performed by: SPECIALIST

## 2023-12-02 RX ORDER — LEVETIRACETAM 500 MG/1
1000 TABLET ORAL 2 TIMES DAILY
Status: DISCONTINUED | OUTPATIENT
Start: 2023-12-03 | End: 2023-12-07 | Stop reason: HOSPADM

## 2023-12-02 RX ORDER — OMEPRAZOLE 20 MG/1
20 CAPSULE, DELAYED RELEASE ORAL DAILY
Status: DISCONTINUED | OUTPATIENT
Start: 2023-12-03 | End: 2023-12-07 | Stop reason: HOSPADM

## 2023-12-02 RX ORDER — HYDROMORPHONE HYDROCHLORIDE 1 MG/ML
0.2 INJECTION, SOLUTION INTRAMUSCULAR; INTRAVENOUS; SUBCUTANEOUS
Status: DISCONTINUED | OUTPATIENT
Start: 2023-12-02 | End: 2023-12-02 | Stop reason: HOSPADM

## 2023-12-02 RX ORDER — IPRATROPIUM BROMIDE AND ALBUTEROL SULFATE 2.5; .5 MG/3ML; MG/3ML
3 SOLUTION RESPIRATORY (INHALATION)
Status: DISCONTINUED | OUTPATIENT
Start: 2023-12-02 | End: 2023-12-02 | Stop reason: HOSPADM

## 2023-12-02 RX ORDER — MEPERIDINE HYDROCHLORIDE 25 MG/ML
12.5 INJECTION INTRAMUSCULAR; INTRAVENOUS; SUBCUTANEOUS
Status: DISCONTINUED | OUTPATIENT
Start: 2023-12-02 | End: 2023-12-02 | Stop reason: HOSPADM

## 2023-12-02 RX ORDER — HALOPERIDOL 5 MG/ML
1 INJECTION INTRAMUSCULAR
Status: DISCONTINUED | OUTPATIENT
Start: 2023-12-02 | End: 2023-12-02 | Stop reason: HOSPADM

## 2023-12-02 RX ORDER — OXYCODONE HCL 5 MG/5 ML
5 SOLUTION, ORAL ORAL
Status: DISCONTINUED | OUTPATIENT
Start: 2023-12-02 | End: 2023-12-02 | Stop reason: HOSPADM

## 2023-12-02 RX ORDER — ONDANSETRON 2 MG/ML
4 INJECTION INTRAMUSCULAR; INTRAVENOUS
Status: DISCONTINUED | OUTPATIENT
Start: 2023-12-02 | End: 2023-12-02 | Stop reason: HOSPADM

## 2023-12-02 RX ORDER — ONDANSETRON 2 MG/ML
INJECTION INTRAMUSCULAR; INTRAVENOUS PRN
Status: DISCONTINUED | OUTPATIENT
Start: 2023-12-02 | End: 2023-12-02 | Stop reason: SURG

## 2023-12-02 RX ORDER — LIDOCAINE HYDROCHLORIDE 20 MG/ML
INJECTION, SOLUTION EPIDURAL; INFILTRATION; INTRACAUDAL; PERINEURAL PRN
Status: DISCONTINUED | OUTPATIENT
Start: 2023-12-02 | End: 2023-12-02 | Stop reason: SURG

## 2023-12-02 RX ORDER — HYDROMORPHONE HYDROCHLORIDE 1 MG/ML
0.4 INJECTION, SOLUTION INTRAMUSCULAR; INTRAVENOUS; SUBCUTANEOUS
Status: DISCONTINUED | OUTPATIENT
Start: 2023-12-02 | End: 2023-12-02 | Stop reason: HOSPADM

## 2023-12-02 RX ORDER — OXYCODONE HCL 5 MG/5 ML
10 SOLUTION, ORAL ORAL
Status: DISCONTINUED | OUTPATIENT
Start: 2023-12-02 | End: 2023-12-02 | Stop reason: HOSPADM

## 2023-12-02 RX ORDER — SODIUM CHLORIDE, SODIUM GLUCONATE, SODIUM ACETATE, POTASSIUM CHLORIDE AND MAGNESIUM CHLORIDE 526; 502; 368; 37; 30 MG/100ML; MG/100ML; MG/100ML; MG/100ML; MG/100ML
500 INJECTION, SOLUTION INTRAVENOUS CONTINUOUS
Status: DISCONTINUED | OUTPATIENT
Start: 2023-12-02 | End: 2023-12-02 | Stop reason: HOSPADM

## 2023-12-02 RX ORDER — MIDAZOLAM HYDROCHLORIDE 1 MG/ML
1 INJECTION INTRAMUSCULAR; INTRAVENOUS
Status: DISCONTINUED | OUTPATIENT
Start: 2023-12-02 | End: 2023-12-02 | Stop reason: HOSPADM

## 2023-12-02 RX ORDER — KETOROLAC TROMETHAMINE 30 MG/ML
INJECTION, SOLUTION INTRAMUSCULAR; INTRAVENOUS PRN
Status: DISCONTINUED | OUTPATIENT
Start: 2023-12-02 | End: 2023-12-02 | Stop reason: SURG

## 2023-12-02 RX ORDER — LIDOCAINE HYDROCHLORIDE 40 MG/ML
SOLUTION TOPICAL PRN
Status: DISCONTINUED | OUTPATIENT
Start: 2023-12-02 | End: 2023-12-02 | Stop reason: SURG

## 2023-12-02 RX ORDER — DIPHENHYDRAMINE HYDROCHLORIDE 50 MG/ML
12.5 INJECTION INTRAMUSCULAR; INTRAVENOUS
Status: DISCONTINUED | OUTPATIENT
Start: 2023-12-02 | End: 2023-12-02 | Stop reason: HOSPADM

## 2023-12-02 RX ORDER — HYDROMORPHONE HYDROCHLORIDE 1 MG/ML
1 INJECTION, SOLUTION INTRAMUSCULAR; INTRAVENOUS; SUBCUTANEOUS
Status: DISCONTINUED | OUTPATIENT
Start: 2023-12-02 | End: 2023-12-02 | Stop reason: HOSPADM

## 2023-12-02 RX ADMIN — ATORVASTATIN CALCIUM 80 MG: 80 TABLET, FILM COATED ORAL at 17:05

## 2023-12-02 RX ADMIN — LIDOCAINE HYDROCHLORIDE 40 MG: 20 INJECTION, SOLUTION EPIDURAL; INFILTRATION; INTRACAUDAL at 11:35

## 2023-12-02 RX ADMIN — INSULIN LISPRO 3 UNITS: 100 INJECTION, SOLUTION INTRAVENOUS; SUBCUTANEOUS at 17:14

## 2023-12-02 RX ADMIN — SUGAMMADEX 200 MG: 100 INJECTION, SOLUTION INTRAVENOUS at 11:56

## 2023-12-02 RX ADMIN — PROPOFOL 150 MG: 10 INJECTION, EMULSION INTRAVENOUS at 11:35

## 2023-12-02 RX ADMIN — ONDANSETRON 4 MG: 2 INJECTION INTRAMUSCULAR; INTRAVENOUS at 11:56

## 2023-12-02 RX ADMIN — KETOROLAC TROMETHAMINE 15 MG: 30 INJECTION, SOLUTION INTRAMUSCULAR; INTRAVENOUS at 11:56

## 2023-12-02 RX ADMIN — LIDOCAINE HYDROCHLORIDE 4 ML: 40 SOLUTION TOPICAL at 11:35

## 2023-12-02 RX ADMIN — INSULIN GLARGINE-YFGN 10 UNITS: 100 INJECTION, SOLUTION SUBCUTANEOUS at 17:14

## 2023-12-02 RX ADMIN — SODIUM CHLORIDE, POTASSIUM CHLORIDE, SODIUM LACTATE AND CALCIUM CHLORIDE: 600; 310; 30; 20 INJECTION, SOLUTION INTRAVENOUS at 08:01

## 2023-12-02 RX ADMIN — PANTOPRAZOLE SODIUM 40 MG: 40 INJECTION, POWDER, FOR SOLUTION INTRAVENOUS at 05:30

## 2023-12-02 RX ADMIN — LEVETIRACETAM 1000 MG: 100 INJECTION, SOLUTION, CONCENTRATE INTRAVENOUS at 17:05

## 2023-12-02 RX ADMIN — LEVETIRACETAM 1000 MG: 100 INJECTION, SOLUTION, CONCENTRATE INTRAVENOUS at 05:30

## 2023-12-02 RX ADMIN — PANTOPRAZOLE SODIUM 40 MG: 40 INJECTION, POWDER, FOR SOLUTION INTRAVENOUS at 17:05

## 2023-12-02 RX ADMIN — ROCURONIUM BROMIDE 50 MG: 10 INJECTION, SOLUTION INTRAVENOUS at 11:35

## 2023-12-02 ASSESSMENT — ENCOUNTER SYMPTOMS
DIARRHEA: 0
STRIDOR: 0
HEADACHES: 0
NAUSEA: 0
SPEECH CHANGE: 0
CHILLS: 0
DIZZINESS: 0
ABDOMINAL PAIN: 0
NERVOUS/ANXIOUS: 0
EYE DISCHARGE: 0
COUGH: 0
SHORTNESS OF BREATH: 0
FEVER: 0
BACK PAIN: 0
PALPITATIONS: 0
BLOOD IN STOOL: 0

## 2023-12-02 ASSESSMENT — PAIN SCALES - GENERAL: PAIN_LEVEL: 0

## 2023-12-02 ASSESSMENT — FIBROSIS 4 INDEX: FIB4 SCORE: 1.5

## 2023-12-02 ASSESSMENT — PAIN DESCRIPTION - PAIN TYPE
TYPE: ACUTE PAIN

## 2023-12-02 NOTE — CARE PLAN
Problem: Knowledge Deficit - Standard  Goal: Patient and family/care givers will demonstrate understanding of plan of care, disease process/condition, diagnostic tests and medications  Description: Target End Date:  1-3 days or as soon as patient condition allows    Document in Patient Education    1.  Patient and family/caregiver oriented to unit, equipment, visitation policy and means for communicating concern  2.  Complete/review Learning Assessment  3.  Assess knowledge level of disease process/condition, treatment plan, diagnostic tests and medications  4.  Explain disease process/condition, treatment plan, diagnostic tests and medications  Outcome: Progressing     Problem: Hemodynamics  Goal: Patient's hemodynamics, fluid balance and neurologic status will be stable or improve  Description: Target End Date:  Prior to discharge or change in level of care    Document on Assessment and I/O flowsheet templates    1.  Monitor vital signs, pulse oximetry and cardiac monitor per provider order and/or policy  2.  Maintain blood pressure per provider order  3.  Hemodynamic monitoring per provider order  4.  Manage IV fluids and IV infusions  5.  Monitor intake and output  6.  Daily weights per unit policy or provider order  7.  Assess peripheral pulses and capillary refill  8.  Assess color and body temperature  9.  Position patient for maximum circulation/cardiac output  10. Monitor for signs/symptoms of excessive bleeding  11. Assess mental status, restlessness and changes in level of consciousness  12. Monitor temperature and report fever or hypothermia to provider immediately. Consideration of targeted temperature management.  Outcome: Progressing     Problem: Fluid Volume  Goal: Fluid volume balance will be maintained  Description: Target End Date:  Prior to discharge or change in level of care    Document on I/O flowsheet    1.  Monitor intake and output as ordered  2.  Promote oral intake as appropriate  3.   Report inadequate intake or output to physician  4.  Administer IV therapy as ordered  5.  Weights per provider order  6.  Assess for signs and symptoms of bleeding  7.  Monitor for signs of fluid overload (respiratory changes, edema, weight gain, increased abdominal girth)  8.  Monitor of signs for inadequate fluid volume (poor skin turgor, dry mucous membranes)  9.  Instruct patient on adherence to fluid restrictions  Outcome: Progressing     Problem: Urinary - Renal Perfusion  Goal: Ability to achieve and maintain adequate renal perfusion and functioning will improve  Description: Target End Date:  Prior to discharge or change in level of care    Document on I/O and Assessment flowsheet    1.  Urine output will remain greater than 0.5ml/Kg/HR  2.  Monitor amount and/or characteristics of urine per order/policy. Specific gravity per order/policy  3.  Assess signs and symptoms of renal dysfunction  Outcome: Progressing     Problem: Respiratory  Goal: Patient will achieve/maintain optimum respiratory ventilation and gas exchange  Description: Target End Date:  Prior to discharge or change in level of care    Document on Assessment flowsheet    1.  Assess and monitor rate, rhythm, depth and effort of respiration  2.  Breath sounds assessed qshift and/or as needed  3.  Assess O2 saturation, administer/titrate oxygen as ordered  4.  Position patient for maximum ventilatory efficiency  5.  Turn, cough, and deep breath with splinting to improve effectiveness  6.  Collaborate with RT to administer medication/treatments per order  7.  Encourage use of incentive spirometer and encourage patient to cough after use and utilize splinting techniques if applicable  8.  Airway suctioning  9.  Monitor sputum production for changes in color, consistency and frequency  10. Perform frequent oral hygiene  11. Alternate physical activity with rest periods  Outcome: Progressing     Problem: Mechanical Ventilation  Goal: Safe management  of artificial airway and ventilation  Description: Target End Date:  when vent discontinued    Document on VAP flowsheet and Airway LDA    1.  Daily awakening trials per provider order/policy  2.  Suctioning and care of ET/Trach tube (document on LDA)  3.  Collaborate with RT to administer medications/treatments  4.  Ambu bag at bedside and available for transport  5.  Trach patient - replacement trach at bedside  6.  Provide communication tools if applicable  Outcome: Progressing  Goal: Successful weaning off mechanical ventilator, spontaneously maintains adequate gas exchange  Description: Target End Date:  when vent discontinued    1.  Follow universal weaning protocol for patients on mechanical ventilation per order  2.  Review contraindication list.  Obtain provider order to wean in presence of contraindication.  3.  Obtain Damir Sedation-Agitation Score  4.  Damir Score 1-2:  sedation vacation  5.  Damir Score 3-4:  Collaborate with provider and/or RT to determine readiness for trial  6.  Begin 2 hour trial of weaning following protocol  7.  Evaluate for fatigue parameters per protocol  8.  Fatigue parameters triggered:  Stop wean and return to previous ASV% setting or increase % minute volume to offset work or breathing  Outcome: Progressing  Goal: Patient will be able to express needs and understand communication  Description: Target End Date:  when vent discontinued    1.  Assess ability to communicate and understand  2.  Provide communication tools  3.  Collaborate with Speech Therapy for [facility]  Outcome: Progressing     Problem: Physical Regulation  Goal: Diagnostic test results will improve  Description: Target End Date:  Prior to discharge or change in level of care    1.  Monitor lactic acid levels  2.  Monitor ABG's  3.  Monitor diagnostic test results  Outcome: Progressing  Goal: Signs and symptoms of infection will decrease  Description: Target End Date:  Prior to discharge or change in level of  care    1.  Remove potential routes of infection, such as central lines and urinary catheter  2.  Follow facility protocol for changing IV tubing and sites  3.  Collaborate with Infectious Disease  4.  Antibiotic therapy per provider order  5.  Note drug effects and monitor for antibiotic toxicity  Outcome: Progressing     Problem: Fall Risk  Goal: Patient will remain free from falls  Description: Target End Date:  Prior to discharge or change in level of care    Document interventions on the Anh Araiza Fall Risk Assessment    1.  Assess for fall risk factors  2.  Implement fall precautions  Outcome: Progressing   The patient is Stable - Low risk of patient condition declining or worsening    Shift Goals  Clinical Goals: colonoscopy prep, stable hgb  Patient Goals: prep for colonoscopy  Family Goals: VIPIN    Progress made toward(s) clinical / shift goals:  patient denies pain, tolerating blood products, tolerated golytely and has had multiple BMs    Patient is not progressing towards the following goals:  N/A

## 2023-12-02 NOTE — ANESTHESIA POSTPROCEDURE EVALUATION
Patient: Molly Fountain    Procedure Summary       Date: 12/02/23 Room / Location: [facility] 07 / SURGERY [facility]    Anesthesia Start: 1130 Anesthesia Stop: 1212    Procedures:       COLONOSCOPY with biopsy (Anus)      COLONOSCOPY, WITH BIOPSY (Anus) Diagnosis: (pan colitis)    Surgeons: Daina Venegas M.D. Responsible Provider: Tonny Centeno III, M.D.    Anesthesia Type: general ASA Status: 3            Final Anesthesia Type: general  Last vitals  BP   Blood Pressure : 138/63    Temp   37 °C (98.6 °F)    Pulse   81   Resp   20    SpO2   99 %      Anesthesia Post Evaluation    Patient location during evaluation: PACU  Patient participation: complete - patient participated  Level of consciousness: awake and alert  Pain score: 0    Airway patency: patent  Anesthetic complications: no  Cardiovascular status: hemodynamically stable  Respiratory status: acceptable  Hydration status: euvolemic    PONV: none          No notable events documented.     Nurse Pain Score: 0 (NPRS)           Patient declines to take lactulose, he is having bowel movements frequently, taking metamucil and half cap miralax. Patient states he has abdominal spasms, at times cant walk. Patient states the bentyl 20mg QID doesn't seem to be working for him anymore.

## 2023-12-02 NOTE — OR NURSING
Pt arrived via bed from OR w/ RN and anesthesia. VSS. Report received. Orders reviewed and initiated.

## 2023-12-02 NOTE — CARE PLAN
The patient is Watcher - Medium risk of patient condition declining or worsening         Progress made toward(s) clinical / shift goals:    Problem: Knowledge Deficit - Standard  Goal: Patient and family/care givers will demonstrate understanding of plan of care, disease process/condition, diagnostic tests and medications  Outcome: Progressing     Problem: Hemodynamics  Goal: Patient's hemodynamics, fluid balance and neurologic status will be stable or improve  Outcome: Progressing       Patient is not progressing towards the following goals:      Problem: Fall Risk  Goal: Patient will remain free from falls  Outcome: Not Progressing  Note: Fall precautions in place. Bed in lowest position. Non-skid socks in place. Personal possessions within reach. Mobility sign on door. Bed-alarm on. Call light within reach. Pt educated regarding fall prevention and states understanding.

## 2023-12-02 NOTE — PROGRESS NOTES
Hospital Medicine Daily Progress Note    Date of Service  12/2/2023    Chief Complaint  Rectal bleeding    Hospital Course  Molly Fountain is a 78 y.o. female with hx of stroke, HTN, DLD, DM, seizure d/o admitted 12/1/2023 with rectal fleed with bright red blood.   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  While at SNF it was noted she was having rectal bleed.  In the ER an initial Hgb:12.9. After admit she was noted to have more significant rectal bleeding and placed in the IMCU.  A repeat Hgb was 5.9 and she has been given transfusions. A prior 2019 bleed was felt to be due to diverticulosis.  GI has consulted and plans for colonoscopy 12/2.    Interval Problem Update  12/2: Hgb:9.4.  Colonoscopy today showed pancolitis with possible ischemia and ulcerative colitis.  Biopsies were taken.  She is alert and denies any bowel movement.      I have discussed this patient's plan of care and discharge plan at IDT rounds today with Case Management, Nursing, Nursing leadership, and other members of the IDT team.    Consultants/Specialty  GI    Code Status  DNAR/DNI    Disposition  The patient is not medically cleared for discharge to home or a post-acute facility.      I have placed the appropriate orders for post-discharge needs.    Review of Systems  Review of Systems   Constitutional:  Negative for chills and fever.   Eyes:  Negative for discharge.   Respiratory:  Negative for cough, shortness of breath and stridor.    Cardiovascular:  Negative for chest pain, palpitations and leg swelling.   Gastrointestinal:  Negative for abdominal pain, blood in stool, diarrhea, melena and nausea.   Genitourinary:  Negative for dysuria and hematuria.   Musculoskeletal:  Negative for back pain and joint pain.   Skin:  Negative for rash.   Neurological:  Negative for dizziness, speech change and headaches.    Psychiatric/Behavioral:  The patient is not nervous/anxious.         Physical Exam  Temp:  [35.8 °C (96.4 °F)-37 °C (98.6 °F)] 36.7 °C (98.1 °F)  Pulse:  [] 86  Resp:  [13-30] 17  BP: (103-161)/(57-72) 103/61  SpO2:  [91 %-100 %] 92 %    Physical Exam  Vitals reviewed.   Constitutional:       Appearance: Normal appearance. She is obese. She is not diaphoretic.   HENT:      Head: Normocephalic and atraumatic.      Nose: Nose normal.      Mouth/Throat:      Mouth: Mucous membranes are moist.      Pharynx: No oropharyngeal exudate.   Eyes:      General: No scleral icterus.        Right eye: No discharge.         Left eye: No discharge.      Extraocular Movements: Extraocular movements intact.      Conjunctiva/sclera: Conjunctivae normal.   Cardiovascular:      Rate and Rhythm: Normal rate and regular rhythm.      Pulses:           Radial pulses are 2+ on the right side and 2+ on the left side.        Dorsalis pedis pulses are 2+ on the right side and 2+ on the left side.      Heart sounds: No murmur heard.  Pulmonary:      Effort: Pulmonary effort is normal. No respiratory distress.      Breath sounds: Normal breath sounds. No wheezing or rales.   Abdominal:      General: Bowel sounds are normal. There is no distension.      Palpations: Abdomen is soft.      Tenderness: There is no abdominal tenderness.   Musculoskeletal:         General: No swelling or tenderness.      Cervical back: Neck supple. No muscular tenderness.      Right lower leg: No edema.      Left lower leg: No edema.   Lymphadenopathy:      Cervical: No cervical adenopathy.   Skin:     Coloration: Skin is not jaundiced or pale.   Neurological:      General: No focal deficit present.      Mental Status: She is alert and oriented to person, place, and time. Mental status is at baseline.      Cranial Nerves: No cranial nerve deficit.   Psychiatric:         Mood and Affect: Mood normal.         Behavior: Behavior normal.          Fluids    Intake/Output Summary (Last 24 hours) at 12/2/2023 1915  Last data filed at 12/2/2023 1500  Gross per 24 hour   Intake 810.56 ml   Output 1 ml   Net 809.56 ml       Laboratory  Recent Labs     12/01/23  0117 12/01/23  0537 12/01/23  0904 12/02/23  0045 12/02/23  0535 12/02/23  1322   WBC 8.1 7.6  --   --   --   --    RBC 5.09 3.89*  --   --   --   --    HEMOGLOBIN 12.9 9.7*   < > 10.1* 9.4* 9.8*   HEMATOCRIT 41.3 31.7*   < > 32.2* 29.1* 31.7*   MCV 81.1* 81.5  --   --   --   --    MCH 25.3* 24.9*  --   --   --   --    MCHC 31.2* 30.6*  --   --   --   --    RDW 44.4 44.4  --   --   --   --    PLATELETCT 372 317  --   --   --   --    MPV 9.5 9.6  --   --   --   --     < > = values in this interval not displayed.     Recent Labs     12/01/23 0117   SODIUM 142   POTASSIUM 3.5*   CHLORIDE 106   CO2 24   GLUCOSE 176*   BUN 14   CREATININE 0.79   CALCIUM 9.2     Recent Labs     12/01/23 0117   APTT 27.9   INR 0.97               Imaging  CTA ABDOMEN PELVIS W & W/O POST PROCESS   Final Result         1.  No visualized aneurysm or dissection.   2.  No intraluminal contrast identified to indicate site of GI bleed   3.  Diverticulosis   4.  Small fat-containing bilateral inguinal hernias   5.  Atherosclerosis           Assessment/Plan  * Acute GI bleeding- (present on admission)  Assessment & Plan  Rectal bleeding  On aspirin-held  Transfuse as needed  IV Protonix change to oral  Profuse bleeding upon arrival to floor  SBP 90s    D/w ICU attending for IMCU transfer  --ordered CTA AP for possible IR embolization evaluation  --DDAVP x1 given ASA intake  --1L IVF bolus  --transfer to IMCU for close hemodynamic monitoring    GI consulted, f/u appreciated    Pancolitis (HCC)- (present on admission)  Assessment & Plan  Await colonoscopy biopsy result  Ischemic concerns and will discuss with radiology if CTA needed  Await pathology read on biopsy    Iron deficiency anemia  Assessment & Plan  Likely secondary to  GIB  IV iron infusion    Class 1 obesity in adult- (present on admission)  Assessment & Plan  Diet and lifestyle modification  Body mass index is 32.3 kg/m².      CVA (cerebral vascular accident) (HCC)- (present on admission)  Assessment & Plan  Recent CVA  Resume aspirin when appropriate  Continue statin    ACP (advance care planning)- (present on admission)  Assessment & Plan  Goal of care discussed with patient in ER.  She stated she does not wish for aggressive/heroic life-sustaining measures-no CPR/defibrillation/intubation or mechanical ventilation.  She is however agreeable for any other noninvasive medical management, including possible GI endoscopic evaluation as clinically warranted.  Diagnosis, prognosis, questions and concerns addressed.  DNR/DNI status confirmed.  ACP: 16 minutes    Seizure (HCC)- (present on admission)  Assessment & Plan  Continue Keppra 1000mg BID for prophylaxis    Acute blood loss anemia- (present on admission)  Assessment & Plan  Due to above  Anemia [id number] iron/B12/folate as needed  12/2 Hgb:9.4  Trend H&H  Transfuse for hemoglobin less than 7 or hemodynamic instability    Hypokalemia- (present on admission)  Assessment & Plan  Replace  12/1 K:3.5    Essential hypertension- (present on admission)  Assessment & Plan  Hold meds for now in setting for GIB concern  Amlodipine 10mg and losartan 50mg qd  Monitor vitals.    Type 2 diabetes mellitus with complication, without long-term current use of insulin ([facility])- (present on admission)  Assessment & Plan  Lantus 10 units q HS  Diabetic diet  Monitor accuchecks and cover with SSI.  Hypoglycemic protocol  Statin         VTE prophylaxis:   SCDs/TEDs      I have performed a physical exam and reviewed and updated ROS and Plan today (12/2/2023). In review of yesterday's note (12/1/2023), there are no changes except as documented above.

## 2023-12-02 NOTE — OR NURSING
Report called to receiving RN Yecenia. Pt taken via bed to T602 w/ RN, CNA and monitor.. VSS. No distress. Daughter updated.

## 2023-12-02 NOTE — CARE PLAN
The patient is Stable - Low risk of patient condition declining or worsening    Shift Goals  Clinical Goals: colonoscopy prep, stable hgb  Patient Goals: prep for colonoscopy  Family Goals: VIPIN    Progress made toward(s) clinical / shift goals:  patient mobilizing and sitting up in the chair, bowels are moving, no signs of bleeding at this time, updated on plan of care.     Patient is not progressing towards the following goals:  Patient needs a respiratory therapy consult for CPAP at home. Has some sleep apnea     Problem: Respiratory  Goal: Patient will achieve/maintain optimum respiratory ventilation and gas exchange  Description: Target End Date:  Prior to discharge or change in level of care    Document on Assessment flowsheet    1.  Assess and monitor rate, rhythm, depth and effort of respiration  2.  Breath sounds assessed qshift and/or as needed  3.  Assess O2 saturation, administer/titrate oxygen as ordered  4.  Position patient for maximum ventilatory efficiency  5.  Turn, cough, and deep breath with splinting to improve effectiveness  6.  Collaborate with RT to administer medication/treatments per order  7.  Encourage use of incentive spirometer and encourage patient to cough after use and utilize splinting techniques if applicable  8.  Airway suctioning  9.  Monitor sputum production for changes in color, consistency and frequency  10. Perform frequent oral hygiene  11. Alternate physical activity with rest periods  12/2/2023 1422 by Yecenia Robles, R.N.  Outcome: Not Progressing  12/2/2023 1421 by Yecenia Robles, R.N.  Outcome: Not Progressing  12/2/2023 0656 by Yecenia Robles, R.N.  Outcome: Progressing     Problem: Knowledge Deficit - Standard  Goal: Patient and family/care givers will demonstrate understanding of plan of care, disease process/condition, diagnostic tests and medications  Description: Target End Date:  1-3 days or as soon as patient condition allows    Document in Patient Education    1.   Patient and family/caregiver oriented to unit, equipment, visitation policy and means for communicating concern  2.  Complete/review Learning Assessment  3.  Assess knowledge level of disease process/condition, treatment plan, diagnostic tests and medications  4.  Explain disease process/condition, treatment plan, diagnostic tests and medications  12/2/2023 1422 by Yecenia Robles R.N.  Outcome: Progressing  12/2/2023 1421 by Yecenia Robles R.N.  Outcome: Progressing  12/2/2023 0656 by Yecenia Robles R.N.  Outcome: Progressing     Problem: Hemodynamics  Goal: Patient's hemodynamics, fluid balance and neurologic status will be stable or improve  Description: Target End Date:  Prior to discharge or change in level of care    Document on Assessment and I/O flowsheet templates    1.  Monitor vital signs, pulse oximetry and cardiac monitor per provider order and/or policy  2.  Maintain blood pressure per provider order  3.  Hemodynamic monitoring per provider order  4.  Manage IV fluids and IV infusions  5.  Monitor intake and output  6.  Daily weights per unit policy or provider order  7.  Assess peripheral pulses and capillary refill  8.  Assess color and body temperature  9.  Position patient for maximum circulation/cardiac output  10. Monitor for signs/symptoms of excessive bleeding  11. Assess mental status, restlessness and changes in level of consciousness  12. Monitor temperature and report fever or hypothermia to provider immediately. Consideration of targeted temperature management.  12/2/2023 1422 by Yecenia Robles R.N.  Outcome: Progressing  12/2/2023 1421 by Yecenia Robles R.N.  Outcome: Progressing  12/2/2023 0656 by Yecenia Robles R.N.  Outcome: Progressing     Problem: Fluid Volume  Goal: Fluid volume balance will be maintained  Description: Target End Date:  Prior to discharge or change in level of care    Document on I/O flowsheet    1.  Monitor intake and output as ordered  2.  Promote oral intake as  appropriate  3.  Report inadequate intake or output to physician  4.  Administer IV therapy as ordered  5.  Weights per provider order  6.  Assess for signs and symptoms of bleeding  7.  Monitor for signs of fluid overload (respiratory changes, edema, weight gain, increased abdominal girth)  8.  Monitor of signs for inadequate fluid volume (poor skin turgor, dry mucous membranes)  9.  Instruct patient on adherence to fluid restrictions  12/2/2023 1422 by Yecenia Robles R.N.  Outcome: Progressing  12/2/2023 1421 by Yecenia Robles R.N.  Outcome: Progressing  12/2/2023 0656 by Yecenia Robles R.N.  Outcome: Progressing     Problem: Urinary - Renal Perfusion  Goal: Ability to achieve and maintain adequate renal perfusion and functioning will improve  Description: Target End Date:  Prior to discharge or change in level of care    Document on I/O and Assessment flowsheet    1.  Urine output will remain greater than 0.5ml/Kg/HR  2.  Monitor amount and/or characteristics of urine per order/policy. Specific gravity per order/policy  3.  Assess signs and symptoms of renal dysfunction  12/2/2023 1422 by Yecenia Robles R.N.  Outcome: Progressing  12/2/2023 0656 by Yecenia Robles R.N.  Outcome: Progressing     Problem: Mechanical Ventilation  Goal: Safe management of artificial airway and ventilation  Description: Target End Date:  when vent discontinued    Document on VAP flowsheet and Airway LDA    1.  Daily awakening trials per provider order/policy  2.  Suctioning and care of ET/Trach tube (document on LDA)  3.  Collaborate with RT to administer medications/treatments  4.  Ambu bag at bedside and available for transport  5.  Trach patient - replacement trach at bedside  6.  Provide communication tools if applicable  Outcome: Progressing  Goal: Successful weaning off mechanical ventilator, spontaneously maintains adequate gas exchange  Description: Target End Date:  when vent discontinued    1.  Follow universal weaning  protocol for patients on mechanical ventilation per order  2.  Review contraindication list.  Obtain provider order to wean in presence of contraindication.  3.  Obtain Damir Sedation-Agitation Score  4.  Damir Score 1-2:  sedation vacation  5.  Damir Score 3-4:  Collaborate with provider and/or RT to determine readiness for trial  6.  Begin 2 hour trial of weaning following protocol  7.  Evaluate for fatigue parameters per protocol  8.  Fatigue parameters triggered:  Stop wean and return to previous ASV% setting or increase % minute volume to offset work or breathing  Outcome: Progressing  Goal: Patient will be able to express needs and understand communication  Description: Target End Date:  when vent discontinued    1.  Assess ability to communicate and understand  2.  Provide communication tools  3.  Collaborate with Speech Therapy for PSMV  Outcome: Progressing     Problem: Physical Regulation  Goal: Diagnostic test results will improve  Description: Target End Date:  Prior to discharge or change in level of care    1.  Monitor lactic acid levels  2.  Monitor ABG's  3.  Monitor diagnostic test results  Outcome: Progressing  Goal: Signs and symptoms of infection will decrease  Description: Target End Date:  Prior to discharge or change in level of care    1.  Remove potential routes of infection, such as central lines and urinary catheter  2.  Follow facility protocol for changing IV tubing and sites  3.  Collaborate with Infectious Disease  4.  Antibiotic therapy per provider order  5.  Note drug effects and monitor for antibiotic toxicity  Outcome: Progressing     Problem: Fall Risk  Goal: Patient will remain free from falls  Description: Target End Date:  Prior to discharge or change in level of care    Document interventions on the Kamaragretchen Araiza Fall Risk Assessment    1.  Assess for fall risk factors  2.  Implement fall precautions  12/2/2023 1422 by Yecenia Robles R.N.  Outcome: Progressing  12/2/2023 0656  by Yecenia Robles R.N.  Outcome: Progressing     Problem: Nutrition  Goal: Patient's nutritional and fluid intake will be adequate or improve  Description: Target End Date:  Prior to discharge or change in level of care    Document on I/O flowsheet    1.  Monitor nutritional intake  2.  Monitor weight per provider order  3.  Assess patient's ability to take oral nutrition  4.  Collaborate with Speech Therapy, Dietitian and interdisciplinary team for appropriate feeding and fluid intake  5.  Assist with feeding  Outcome: Progressing  Goal: Enteral nutrition will be maintained or improve  Description: Target End Date:  Prior to discharge or change in level of care    1. Enteral access to be obtained or modified  2. Advance to goal rate per protocol  3. Collaborate with Clinical Dietitian for signs/symptoms of intolerance  4. Weights per provider order  5. Consider Speech Therapy consult for swallowing difficulties  Outcome: Progressing  Goal: Enteral nutrition will be maintained or improve  Description: Target End Date:  Prior to discharge or change in level of care    1.  Fingerstick glucose every 8 hours  2.  Notify provider for fever or elevated glucose  3.  TPN tubing and port changes every 24 hours.  Turn TPN through dedicated line. (Document on LDA)  4.  TPN dressing changes 24 hours after insertion and then every Saturday  (Document on LDA)  5.  Daily weights  6.  Monitor TPN lab results  7.  Collaborate with Clinical Dietician  8.  Collaborate with Pharmacist  Outcome: Progressing     Problem: Bowel Elimination  Goal: Establish and maintain regular bowel function  Description: Target End Date:  Prior to discharge or change in level of care    1.   Note date of last BM  2.   Educate about diet, fluid intake, medication and activity to promote bowel function  3.   Educate signs and symptoms of constipation and interventions to implement  4.   Pharmacologic bowel management per provider order  5.   Regular  toileting schedule  6.   Upright position for toileting  7.   High fiber diet  8.   Encourage hydration  9.   Collaborate with Clinical Dietician  10. Care and maintenance of ostomy if applicable  Outcome: Progressing     Problem: Mobility  Goal: Patient's capacity to carry out activities will improve  Description: Target End Date:  Prior to discharge or change in level of care    1.  Assess for barriers to mobility/activity  2.  Implement activity per interdisciplinary team recommendations  3.  Target activity level identified and patient/family/caregiver aware of goal  4.  Provide assistive devices  5.  Instruct patient/caregiver on proper use of assistive/adaptive devices  6.  Schedule activities and rest periods to decrease effects of fatigue  7.  Encourage mobilization to extent of ability  8.  Maintain proper body alignment  9.  Provide adequate pain management to allow progressive mobilization  10. Implement pace maker precautions as needed  Outcome: Progressing     Problem: Self Care  Goal: Patient will have the ability to perform ADLs independently or with assistance (bathe, groom, dress, toilet and feed)  Description: Target End Date:  Prior to discharge or change in level of care    Document on ADL flowsheet    1.  Assess the capability and level of deficiency to perform ADLs  2.  Encourage family/care giver involvement  3.  Provide assistive devices  4.  Consider PT/OT evaluations  5.  Maintain support, give positive feedback, encourage self-care allowing extra time and verbal cuing as needed  6.  Avoid doing something for patients they can do themselves, but provide assistance as needed  7.  Assist in anticipating/planning individual needs  8.  Collaborate with Case Management and  to meet discharge needs  Outcome: Progressing

## 2023-12-02 NOTE — ANESTHESIA PROCEDURE NOTES
Airway    Date/Time: 12/2/2023 11:35 AM    Performed by: Tonny Centeno III, M.D.  Authorized by: Tonny Centeno III, M.D.    Location:  OR  Urgency:  Elective  Difficult Airway: No    Indications for Airway Management:  Anesthesia      Spontaneous Ventilation: absent    Sedation Level:  Deep  Preoxygenated: Yes    Patient Position:  Sniffing  Final Airway Type:  Endotracheal airway  Final Endotracheal Airway:  ETT  Cuffed: Yes    Technique Used for Successful ETT Placement:  Direct laryngoscopy    Insertion Site:  Oral  Blade Type:  Mock  Laryngoscope Blade/Videolaryngoscope Blade Size:  2  ETT Size (mm):  7.5  Measured from:  Teeth  ETT to Teeth (cm):  21  Placement Verified by: auscultation and capnometry    Cormack-Lehane Classification:  Grade IV - neither glottis nor epiglottis seen  Number of Attempts at Approach:  1

## 2023-12-02 NOTE — ANESTHESIA PREPROCEDURE EVALUATION
Case: [id number] Date/Time: 12/02/23 0957    Procedure: COLONOSCOPY (Anus)    Anesthesia type: MAC    Pre-op diagnosis: hematochezia    Location: [place] [address] / SURGERY [facility]    Surgeons: Daina Venegas M.D.            Relevant Problems   NEURO   (positive) CVA (cerebral vascular accident) (HCC)   (positive) Seizure (HCC)      CARDIAC   (positive) Essential hypertension      ENDO   (positive) Type 2 diabetes mellitus with complication, without long-term current use of insulin (HCC)       Physical Exam    Airway   Mallampati: IV  TM distance: >3 FB  Neck ROM: limited       Cardiovascular - normal exam  Rhythm: regular  Rate: normal  (-) murmur     Dental - normal exam           Pulmonary - normal exam  Breath sounds clear to auscultation     Abdominal   (+) obese     Neurological - normal exam         Other findings: Presumptive CHRIS              Anesthesia Plan

## 2023-12-02 NOTE — PROGRESS NOTES
GASTROENTEROLOGY CONSULTATION     PATIENT NAME: Molly Fountain  :   1945  CSN:   [id number]  MRN:  [id number]      CONSULTATION DATE:  2023     PRIMARY CARE PROVIDER:  Pcp Pt States None                  REASON FOR CONSULT:  hematochezia     HISTORY OF PRESENT ILLNESS:  Molly Fountain is a 78 y.o. female who was recently admitted for a CVA in . She also has a history of hypertension, dyslipidemia and diabetes and had been off her medication. 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 She was at rehab getting ready to go home and she had an episode of rectal bleeding. She has continued to have episodes of hematochezia and feels very lightheaded and week. Her hemoglobin is 5. She is waiting to receive blood. She had a bleed in 2019 that was likely from diverticulosis. It was not this severe. She does not have nay pain or any other GI complaints     HPI/ROS     PAST MEDICAL HISTORY:  Past Medical History        Past Medical History:   Diagnosis Date    CVA (cerebral vascular accident) ([facility]) 2023    Essential hypertension 3/13/2019    Type 2 diabetes mellitus with complication, without long-term current use of insulin ([facility]) 3/13/2019            PAST SURGICAL HISTORY:  Past Surgical History         Past Surgical History:   Procedure Laterality Date    COLONOSCOPY - ENDO N/A 2019     Procedure: COLONOSCOPY;  Surgeon: Ger Nichols M.D.;  Location: SURGERY [facility];  Service: Gastroenterology            CURRENT MEDS:  Current Medications             Current Facility-Administered Medications   Medication Dose Route Frequency Provider Last Rate Last Admin    lactated ringers infusion (BOLUS)  500 mL Intravenous Once PRN Brandon Hinton M.D.        acetaminophen (Tylenol) tablet 650 mg  650 mg Oral Q6HRS PRN Brandon Hinton M.D.        labetalol (Normodyne/Trandate) injection 10 mg  10 mg Intravenous Q4HRS PRN Brandon  TIMMY Hinton        ondansetron (Zofran) syringe/vial injection 4 mg  4 mg Intravenous Q4HRS PRN Brandon Hinton M.D.        ondansetron (Zofran ODT) dispertab 4 mg  4 mg Oral Q4HRS PRN Brandon Hinton M.D.        potassium chloride SA (Kdur) tablet 40 mEq  40 mEq Oral Once Brandon Hinton M.D.        pantoprazole (Protonix) injection 40 mg  40 mg Intravenous BID Brandon Hinton M.D.   40 mg at 12/01/23 0609    atorvastatin (Lipitor) tablet 80 mg  80 mg Oral Q EVENING Brandon Hinton M.D.        lactated ringers infusion   Intravenous Continuous Brandon Hinton M.D. 125 mL/hr at 12/01/23 0740 New Bag at 12/01/23 0740    ipratropium-albuterol (DUONEB) nebulizer solution  3 mL Nebulization Q4H PRN (RT) Brandon Hinton M.D.        insulin GLARGINE (Lantus,Semglee) injection  10 Units Subcutaneous Q EVENING Brandon Hinton M.D.         And    insulin lispro (HumaLOG,AdmeLOG) injection  2-9 Units Subcutaneous Q6HRS Brandon Hinton M.D.   3 Units at 12/01/23 0613     And    dextrose 10 % BOLUS 25 g  25 g Intravenous Q15 MIN PRN Brandon Hinton M.D.        levETIRAcetam (Keppra) injection 1,000 mg  1,000 mg Intravenous Q12HRS Brandon Hinton M.D.   1,000 mg at 12/01/23 0759    lactated ringers infusion (BOLUS)  1,000 mL Intravenous Once Brandon Hinton M.D.        magnesium sulfate IVPB premix 2 g  2 g Intravenous Once Brandon Hinton M.D. 25 mL/hr at 12/01/23 0759 2 g at 12/01/23 0759    MD Alert...Total Body Iron Replacement per Pharmacy   Other PHARMACY TO DOSE Brandon Hinton M.D.        desmopressin PF (Ddavp) 24.092 mcg in NS 50 mL ivpb  0.3 mcg/kg Intravenous Once Brandon Hinton M.D.                ALLERGIES:        Allergies   Allergen Reactions    Sulfa Drugs Rash       Full body rash    Shellfish-Derived Products Rash       Full body rash         SOCIAL HISTORY:  Social History               Socioeconomic History    Marital status:        Spouse name: Not on file    Number of children: Not on file    Years of education: Not on file     Highest education level: Not on file   Occupational History    Not on file   Tobacco Use    Smoking status: Never    Smokeless tobacco: Never   Substance and Sexual Activity    Alcohol use: Yes       Comment: occasionally    Drug use: No    Sexual activity: Not on file   Other Topics Concern    Not on file   Social History Narrative    Not on file      Social Determinants of Health      Financial Resource Strain: Not on file   Food Insecurity: Not on file   Transportation Needs: Not on file   Physical Activity: Not on file   Stress: Not on file   Social Connections: Not on file   Intimate Partner Violence: Not on file   Housing Stability: Not on file            FAMILY HISTORY:  Family History         Family History   Problem Relation Age of Onset    Heart Disease Father      Hypertension Father              REVIEW OF SYSTEMS:  General ROS: Negative for - chills, fever, night sweats or weight loss.  HEENT ROS: Negative  Respiratory ROS: Negative for - cough or shortness of breath.  Cardiovascular ROS:  Negative for - chest pain or palpitations.  Gastrointestinal ROS: As per the history of present illness.  Genito-Urinary ROS: Negative  Musculoskeletal ROS: Negative.  Neurological ROS: Negative  Skin ROS: negative  Hematology ROS: negative  Endocrinology ROS: Negative           PHYSICAL EXAM:  VITALS:          /63   Pulse 97   Temp 36.7 °C (98 °F) (Temporal)   Resp (!) 21   Wt 80.3 kg (177 lb 0.5 oz)   LMP  (LMP Unknown)   SpO2 98%   BMI 31.37 kg/m²   GEN:               Molly Fountain is a 78 y.o. female in no acute distress.  HEENT:           Mucous membranes pink and moist.  Sclera anicteric.    NECK:             Neck supple without lymphadenopathy or thyromegaly.  LUNGS:          Clear to auscultation posteriorly.  HEART:          Regular rate and rhythm. S1 and S2 normal. No murmurs, gallops  ABD:               + BD nt/nd -hsm  RECTAL:        Not done at this time.  EXT:                Without  "cyanosis, deformity or pitting edema.  SKIN:              Pink, warm, dry.  NEURO:          Grossly intact, A/OR.     LABS:       Recent Labs     12/01/23 0117 12/01/23  0537   WBC 8.1 7.6   MCV 81.1* 81.5          Recent Labs     12/01/23  0117   GLUCOSE 176*   BUN 14   CO2 24            Lab Results   Component Value Date     INR 0.97 12/01/2023     INR 1.13 05/26/2019      No components found for: \"ALT\", \"AST\", \"GGT\", \"ALKPHOS\"  No results found for: \"BILINEO\"        @LASTIMGCAT(PJ6837)@      @LASTIMGCAT(NW0303)@         IMPRESSION/PLAN:  Hematochezia  Diverticulosis  Recent CVA  Recent seizure  Anemia due to blood loss     We can prep patient for tomorrow, but has to make sure she is capable. At this time she has not gotten blood and she is weak. She needs wither rectal tube or bedside commode after at least two units given.      Colonoscopy   Prep after blood given if patient can tolerate  NPO after midnight  Trend H/H. We like it at 8 for procedure and anesthesia         Daina Venegas M.D.  Gastroenterology      Pt stable and prepped for procedure           "

## 2023-12-02 NOTE — PROGRESS NOTES
EKG Interpretation-HR is SR 80-90s, normal EKG, normal sinus rhythm, unchanged from previous tracings   No ectopy noted

## 2023-12-02 NOTE — ANESTHESIA PREPROCEDURE EVALUATION
Case: [id number] Date/Time: 12/02/23 0957    Procedure: COLONOSCOPY (Anus)    Anesthesia type: MAC    Pre-op diagnosis: hematochezia    Location: [facility] OR  / SURGERY [facility]    Surgeons: Daina Venegas M.D.            Relevant Problems   NEURO   (positive) CVA (cerebral vascular accident) (HCC)   (positive) Seizure (HCC)      CARDIAC   (positive) Essential hypertension      ENDO   (positive) Type 2 diabetes mellitus with complication, without long-term current use of insulin (HCC)       Physical Exam    Anesthesia Plan    ASA 3       Plan - general       Airway plan will be ETT          Induction: intravenous    Postoperative Plan: Postoperative administration of opioids is intended.    Pertinent diagnostic labs and testing reviewed    Informed Consent:    Anesthetic plan and risks discussed with patient.    Use of blood products discussed with: patient whom consented to blood products.

## 2023-12-02 NOTE — ANESTHESIA TIME REPORT
Anesthesia Start and Stop Event Times       Date Time Event    12/2/2023 1020 Ready for Procedure     1130 Anesthesia Start     1212 Anesthesia Stop          Responsible Staff  12/02/23      Name Role Begin End    Tonny Centeno III, M.D. Anesth 1130 1212          Overtime Reason:  no overtime (within assigned shift)    Comments:

## 2023-12-02 NOTE — PROCEDURES
OPERATIVE REPORT        PATIENT: Molly Fountain  1945      PREOPERATIVE DIAGNOSIS/INDICATION: Hematochezia    POSTOPERATIVE DIAGNOSES: pan colitis, left side worse    PROCEDURE: COLONOSCOPY with biopsies    PHYSICIAN: Daina Venegas MD    CONSENT:  OBTAINED. The risks, benefits and alternatives of the procedure were discussed in details. The risks include and are not limited to bleeding, infection, perforation, missed lesions, and sedations risks (cardiopulmonary compromise and allergic reaction to medications).    ANESTHESIA:  Per anesthesiologist.    LOCATION: [facility]    DESCRIPTION:  The patient presented to the procedure room.  After routine checkup was performed, patient was brought into endoscopy suite.  Patient was placed on his left lateral decubitus position.  Patient was sedated by anesthesia. Vital signs were monitored throughout procedure.  Oxygenation support was provided throughout procedure. Digital rectal examination was performed.  Then, a colonoscope was inserted into patient's anus, advanced to the cecum under direct visualization.  Once the site was reached and examined, the colonoscope was withdrawn and procedure was terminated. Withdrawal time was at least 6 minutes to ensure adequate examination.    The patient tolerated the procedure well.  There were no immediate complications.    The quality of the bowel preparation was  adequate.      FINDINGS:  The left side of the colon was significantly edematous, erythematous and ulcerated. Biopsied. The right side of the colon had erythema and fine granularity, without ulceration. Biopsied separately. Diverticulosis on the left side of the colon. Retroflexion shows small internal hemorrhoids.     RECOMMENDATIONS:  Pan colitis - left side worse that right with ulceration. The left side visually looked like ischemia and the right side like UC, so biopsies will be helpful for diagnosis  Await pathology results for treatment recommendations    This  note has been transcribed with digital voice recognition software and although it has been reviewed may contain grammatical or word errors

## 2023-12-02 NOTE — PROGRESS NOTES
Patient report given to KOLE Bunch Kiki 6, no questions or concerns at this time. Patient and grandson updated on transport

## 2023-12-03 LAB
ANION GAP SERPL CALC-SCNC: 11 MMOL/L (ref 7–16)
BUN SERPL-MCNC: 8 MG/DL (ref 8–22)
CALCIUM SERPL-MCNC: 8.3 MG/DL (ref 8.5–10.5)
CHLORIDE SERPL-SCNC: 106 MMOL/L (ref 96–112)
CO2 SERPL-SCNC: 23 MMOL/L (ref 20–33)
CREAT SERPL-MCNC: 0.59 MG/DL (ref 0.5–1.4)
ERYTHROCYTE [DISTWIDTH] IN BLOOD BY AUTOMATED COUNT: 44.1 FL (ref 35.9–50)
GFR SERPLBLD CREATININE-BSD FMLA CKD-EPI: 91 ML/MIN/1.73 M 2
GLUCOSE BLD STRIP.AUTO-MCNC: 201 MG/DL (ref 65–99)
GLUCOSE BLD STRIP.AUTO-MCNC: 217 MG/DL (ref 65–99)
GLUCOSE BLD STRIP.AUTO-MCNC: 235 MG/DL (ref 65–99)
GLUCOSE BLD STRIP.AUTO-MCNC: 237 MG/DL (ref 65–99)
GLUCOSE BLD STRIP.AUTO-MCNC: 300 MG/DL (ref 65–99)
GLUCOSE SERPL-MCNC: 232 MG/DL (ref 65–99)
HCT VFR BLD AUTO: 28.8 % (ref 37–47)
HCT VFR BLD AUTO: 29.9 % (ref 37–47)
HGB BLD-MCNC: 9.1 G/DL (ref 12–16)
HGB BLD-MCNC: 9.5 G/DL (ref 12–16)
MAGNESIUM SERPL-MCNC: 1.9 MG/DL (ref 1.5–2.5)
MCH RBC QN AUTO: 26.1 PG (ref 27–33)
MCHC RBC AUTO-ENTMCNC: 31.8 G/DL (ref 32.2–35.5)
MCV RBC AUTO: 82.1 FL (ref 81.4–97.8)
PLATELET # BLD AUTO: 273 K/UL (ref 164–446)
PMV BLD AUTO: 9.8 FL (ref 9–12.9)
POTASSIUM SERPL-SCNC: 3.5 MMOL/L (ref 3.6–5.5)
RBC # BLD AUTO: 3.64 M/UL (ref 4.2–5.4)
SODIUM SERPL-SCNC: 140 MMOL/L (ref 135–145)
WBC # BLD AUTO: 7.4 K/UL (ref 4.8–10.8)

## 2023-12-03 PROCEDURE — 770006 HCHG ROOM/CARE - MED/SURG/GYN SEMI*

## 2023-12-03 PROCEDURE — 700102 HCHG RX REV CODE 250 W/ 637 OVERRIDE(OP): Performed by: HOSPITALIST

## 2023-12-03 PROCEDURE — 85018 HEMOGLOBIN: CPT

## 2023-12-03 PROCEDURE — A9270 NON-COVERED ITEM OR SERVICE: HCPCS | Performed by: HOSPITALIST

## 2023-12-03 PROCEDURE — 82962 GLUCOSE BLOOD TEST: CPT | Mod: 91

## 2023-12-03 PROCEDURE — 85014 HEMATOCRIT: CPT

## 2023-12-03 PROCEDURE — 36415 COLL VENOUS BLD VENIPUNCTURE: CPT

## 2023-12-03 PROCEDURE — 83735 ASSAY OF MAGNESIUM: CPT

## 2023-12-03 PROCEDURE — 99233 SBSQ HOSP IP/OBS HIGH 50: CPT | Performed by: HOSPITALIST

## 2023-12-03 PROCEDURE — 99232 SBSQ HOSP IP/OBS MODERATE 35: CPT | Performed by: SPECIALIST

## 2023-12-03 PROCEDURE — 85027 COMPLETE CBC AUTOMATED: CPT

## 2023-12-03 PROCEDURE — A9270 NON-COVERED ITEM OR SERVICE: HCPCS | Performed by: STUDENT IN AN ORGANIZED HEALTH CARE EDUCATION/TRAINING PROGRAM

## 2023-12-03 PROCEDURE — 80177 DRUG SCRN QUAN LEVETIRACETAM: CPT

## 2023-12-03 PROCEDURE — 80048 BASIC METABOLIC PNL TOTAL CA: CPT

## 2023-12-03 PROCEDURE — 700102 HCHG RX REV CODE 250 W/ 637 OVERRIDE(OP): Performed by: STUDENT IN AN ORGANIZED HEALTH CARE EDUCATION/TRAINING PROGRAM

## 2023-12-03 RX ORDER — DIAZEPAM 5 MG/ML
5 INJECTION, SOLUTION INTRAMUSCULAR; INTRAVENOUS EVERY 4 HOURS PRN
Status: DISCONTINUED | OUTPATIENT
Start: 2023-12-03 | End: 2023-12-07 | Stop reason: HOSPADM

## 2023-12-03 RX ORDER — LANOLIN ALCOHOL/MO/W.PET/CERES
400 CREAM (GRAM) TOPICAL DAILY
Status: DISCONTINUED | OUTPATIENT
Start: 2023-12-03 | End: 2023-12-07 | Stop reason: HOSPADM

## 2023-12-03 RX ORDER — POTASSIUM CHLORIDE 20 MEQ/1
20 TABLET, EXTENDED RELEASE ORAL ONCE
Status: COMPLETED | OUTPATIENT
Start: 2023-12-03 | End: 2023-12-03

## 2023-12-03 RX ORDER — LORAZEPAM 2 MG/ML
1 INJECTION INTRAMUSCULAR EVERY 4 HOURS PRN
Status: DISCONTINUED | OUTPATIENT
Start: 2023-12-03 | End: 2023-12-03

## 2023-12-03 RX ORDER — INSULIN LISPRO 100 [IU]/ML
2-9 INJECTION, SOLUTION INTRAVENOUS; SUBCUTANEOUS EVERY 6 HOURS
Status: DISCONTINUED | OUTPATIENT
Start: 2023-12-03 | End: 2023-12-07 | Stop reason: HOSPADM

## 2023-12-03 RX ADMIN — INSULIN LISPRO 5 UNITS: 100 INJECTION, SOLUTION INTRAVENOUS; SUBCUTANEOUS at 00:25

## 2023-12-03 RX ADMIN — LEVETIRACETAM 1000 MG: 500 TABLET, FILM COATED ORAL at 05:45

## 2023-12-03 RX ADMIN — Medication 400 MG: at 16:48

## 2023-12-03 RX ADMIN — POTASSIUM CHLORIDE 20 MEQ: 1500 TABLET, EXTENDED RELEASE ORAL at 08:27

## 2023-12-03 RX ADMIN — INSULIN LISPRO 3 UNITS: 100 INJECTION, SOLUTION INTRAVENOUS; SUBCUTANEOUS at 16:41

## 2023-12-03 RX ADMIN — ATORVASTATIN CALCIUM 80 MG: 80 TABLET, FILM COATED ORAL at 16:48

## 2023-12-03 RX ADMIN — INSULIN LISPRO 3 UNITS: 100 INJECTION, SOLUTION INTRAVENOUS; SUBCUTANEOUS at 23:22

## 2023-12-03 RX ADMIN — LEVETIRACETAM 1000 MG: 500 TABLET, FILM COATED ORAL at 16:59

## 2023-12-03 RX ADMIN — OMEPRAZOLE 20 MG: 20 CAPSULE, DELAYED RELEASE ORAL at 05:45

## 2023-12-03 RX ADMIN — INSULIN LISPRO 3 UNITS: 100 INJECTION, SOLUTION INTRAVENOUS; SUBCUTANEOUS at 12:51

## 2023-12-03 RX ADMIN — INSULIN LISPRO 3 UNITS: 100 INJECTION, SOLUTION INTRAVENOUS; SUBCUTANEOUS at 05:50

## 2023-12-03 ASSESSMENT — ENCOUNTER SYMPTOMS
PALPITATIONS: 0
STRIDOR: 0
HEADACHES: 0
BLOOD IN STOOL: 0
EYE DISCHARGE: 0
NAUSEA: 0
SPEECH CHANGE: 0
NERVOUS/ANXIOUS: 0
SHORTNESS OF BREATH: 0
DIZZINESS: 0
FEVER: 0
CHILLS: 0
BACK PAIN: 0
COUGH: 0
ABDOMINAL PAIN: 0
DIARRHEA: 0

## 2023-12-03 ASSESSMENT — COGNITIVE AND FUNCTIONAL STATUS - GENERAL
DAILY ACTIVITIY SCORE: 20
DRESSING REGULAR LOWER BODY CLOTHING: A LITTLE
MOVING FROM LYING ON BACK TO SITTING ON SIDE OF FLAT BED: A LITTLE
TOILETING: A LITTLE
WALKING IN HOSPITAL ROOM: A LITTLE
MOBILITY SCORE: 19
SUGGESTED CMS G CODE MODIFIER DAILY ACTIVITY: CJ
PERSONAL GROOMING: A LITTLE
MOVING TO AND FROM BED TO CHAIR: A LITTLE
STANDING UP FROM CHAIR USING ARMS: A LITTLE
DRESSING REGULAR UPPER BODY CLOTHING: A LITTLE
SUGGESTED CMS G CODE MODIFIER MOBILITY: CK
CLIMB 3 TO 5 STEPS WITH RAILING: A LITTLE

## 2023-12-03 ASSESSMENT — PAIN DESCRIPTION - PAIN TYPE
TYPE: ACUTE PAIN
TYPE: ACUTE PAIN

## 2023-12-03 ASSESSMENT — FIBROSIS 4 INDEX: FIB4 SCORE: 1.75

## 2023-12-03 NOTE — PROGRESS NOTES
"1145: Alerted by another RN that patient had L sided facial twitching and had asked when her last Keppra dose was. Upon entering room, patient was concerned she was going to have another seizure, stating that her left eye \"swelled up\" and face was tingling the first time she had a seizure. Charge made aware. MD contacted. Patient received last dose of Keppra at 0545, BID, per MAR.   No other neuro or physical symptoms present other than patient stating left facial and eye twitching. Patient was anxious and crying, stating \"I don't want to have another seizure.\" Patient reassured of safety and calmed.   Vital signs documented: Temp 97.5, Pulse 95, Respirations 20, /84. Will recheck vitals in 15 minutes.   MD to check Keppra levels.   "

## 2023-12-03 NOTE — PROGRESS NOTES
4 Eyes Skin Assessment Completed by KOLE Noonan and KOLE John.    Head WDL  Ears WDL  Nose WDL  Mouth WDL  Neck WDL  Breast/Chest WDL, ziopatch left upper chest   Shoulder Blades WDL  Spine WDL  (R) Arm/Elbow/Hand WDL  (L) Arm/Elbow/Hand WDL  Abdomen WDL  Groin WDL  Scrotum/Coccyx/Buttocks WDL  (R) Leg WDL  (L) Leg WDL  (R) Heel/Foot/Toe WDL  (L) Heel/Foot/Toe WDL          Devices In Places ziopatch        Interventions In Place Pillows and Pressure Redistribution Mattress    Possible Skin Injury No    Pictures Uploaded Into Epic No, needs to be completed  Wound Consult Placed N/A  RN Wound Prevention Protocol Ordered No

## 2023-12-03 NOTE — ASSESSMENT & PLAN NOTE
Likely ischemia   Colonoscopy showed pancolitis  Biopsies did not show signs of acute inflammation colitis  CTA was done and did not show significant stenosis  IR was consulted and patient did not need embolization  GI on board and vascular surgeon was consulted  Continue IV fluid and continue statin  Vascular surgeon and general surgeon did not recommend any intervention, continue supportive treatment  Avoid hypotension   PT/PTA met face to face to discuss pt's treatment plan and progress towards established goals. Pt will be seen by a physical therapist minimally every 6th visit or every 30 days.        Cj Gonzalez PTA

## 2023-12-03 NOTE — CARE PLAN
The patient is Watcher - Medium risk of patient condition declining or worsening    Shift Goals  Clinical Goals: monitor HGB and bowel movements  Patient Goals: rest  Family Goals: janeth    Progress made toward(s) clinical / shift goals:  patient is a&o x4, alert. Oxygen saturations maintained on room air. Patient mobilizes to the bathroom x1 assist hand held. Patient calls appropriately. IV's are patent and saline locked. No reports of pain at this time. Vitals stable. All needs addressed at this time.    Patient is not progressing towards the following goals:

## 2023-12-03 NOTE — PROGRESS NOTES
1215: vitals rechecked. Temp 97.4, pulse 94, respirations 19, blood pressure 126/91. Patient still reports facial tingling, but not worsening. No new symptoms otherwise. Seizure precautions in place. Call light at bedside. Hourly rounding in place.

## 2023-12-03 NOTE — PROGRESS NOTES
Gastroenterology Progress Note:    Daina Venegas M.D.  Date & Time note created:    12/3/2023   1:24 PM     Patient ID:  Name:             Molly Fountain    YOB: 1945  Age:                 78 y.o.  female  MRN:               [id number]    Referring MD:  Dr. Marroquin                                                             Chief Complaint(s):      hematochezia    History of Present Illness:    This is a very pleasant 78 y.o. female who had hematochezia. She states she has not had a bowel movement since yesterday. We did a colonoscopy yesterday that showed significant colitis. Her biopsies are still pending   She does not have pain and feels great. She has a new primary care doctor and is looking forward to going home.     Review of Systems:      Constitutional: Denies fevers, weight changes  Eyes: Denies changes in vision, jaundice  Ears/Nose/Throat/Mouth: Denies nasal congestion or sore throat   Cardiovascular: Denies chest pain or palpitations   Respiratory: Denies shortness of breath, denies cough  Gastrointestinal/Hepatic:  abdominal pain, nausea, vomiting, diarrhea, constipation or GI bleeding   Genitourinary: Denies dysuria or frequency  Musculoskeletal/Rheum: Denies  joint pain and swelling, edema  Skin: Denies rash  Neurological: Denies headache, confusion, memory loss or focal weakness/parasthesias  Psychiatric: denies mood disorder   Endocrine: Vale thyroid problems  Heme/Oncology/Lymph Nodes: Denies enlarged lymph nodes, denies brusing or known bleeding disorder  All other systems were reviewed and are negative (AMA/CMS criteria)            ROS   Past Medical History:   Past Medical History:   Diagnosis Date    CVA (cerebral vascular accident) ([facility]) 11/24/2023    Essential hypertension 3/13/2019    Type 2 diabetes mellitus with complication, without long-term current use of insulin ([facility]) 3/13/2019     Active Hospital Problems    Diagnosis     Pancolitis ([facility]) [K51.00]     Acute  GI bleeding [K92.2]     Iron deficiency anemia [D50.9]     Class 1 obesity in adult [E66.9]     CVA (cerebral vascular accident) (H[facility]) [I63.9]     ACP (advance care planning) [Z71.89]     Seizure (HCC) [R56.9]     Acute blood loss anemia [D62]     Type 2 diabetes mellitus with complication, without long-term current use of insulin (HCC) [E11.8]     Hypokalemia [E87.6]     Essential hypertension [I10]        Past Surgical History:  Past Surgical History:   Procedure Laterality Date    COLONOSCOPY - ENDO N/A 5/28/2019    Procedure: COLONOSCOPY;  Surgeon: Ger Nichols M.D.;  Location: SURGERY [facility];  Service: Gastroenterology       Hospital Medications:  Current Facility-Administered Medications   Medication Dose Frequency Provider Last Rate Last Admin    insulin GLARGINE (Lantus,Semglee) injection  15 Units Q EVENING Michele Reyes M.D.        And    insulin lispro (HumaLOG,AdmeLOG) injection  2-9 Units Q6HRS Michele Reyes M.D.   3 Units at 12/03/23 1251    And    dextrose 10 % BOLUS 25 g  25 g Q15 MIN PRN Michele Reyes M.D.        diazePAM (Valium) injection 5 mg  5 mg Q4HRS PRN Michele Reyes M.D.        levETIRAcetam (Keppra) tablet 1,000 mg  1,000 mg BID Tesfaye Mcmullen DSteffanieO.   1,000 mg at 12/03/23 0545    omeprazole (PriLOSEC) capsule 20 mg  20 mg DAILY Tesfaye Mcmullen DSteffanieO.   20 mg at 12/03/23 0545    lactated ringers infusion (BOLUS)  500 mL Once PRN Brandon Hinton M.D.        acetaminophen (Tylenol) tablet 650 mg  650 mg Q6HRS PRN Brandon Hinton M.D.        labetalol (Normodyne/Trandate) injection 10 mg  10 mg Q4HRS PRN Brandon Hinton M.D.        ondansetron (Zofran) syringe/vial injection 4 mg  4 mg Q4HRS PRN Brandon Hinton M.D.        ondansetron (Zofran ODT) dispertab 4 mg  4 mg Q4HRS PRN Brandon Hinton M.D.        atorvastatin (Lipitor) tablet 80 mg  80 mg Q EVENING Brandon Hinton M.D.   80 mg at 12/02/23 1705    ipratropium-albuterol (DUONEB) nebulizer solution  3 mL  Q4H PRN (RT) Brandon Hinton M.D.       Last reviewed on 12/2/2023 11:02 AM by Xavier Fleming R.N.     Current Outpatient Medications:  Medications Prior to Admission   Medication Sig Dispense Refill Last Dose    insulin glargine (LANTUS) 100 UNIT/ML SC SOLN Inject 10 Units under the skin every evening.   11/29/2023 at 2248    diclofenac sodium (VOLTAREN) 1 % Gel Apply  topically 2 times a day. 1 application to left knee   new at 2020    insulin regular (HUMULIN R) 100 Unit/mL Solution Inject 2-10 Units under the skin 3 times a day before meals. If -200=2 units  201-250=4 units  251-300=6 units  301-350=8 units  351-400=10 units   >400=call MD   unk at unk    acetaminophen (TYLENOL) 325 MG Tab Take 650 mg by mouth every four hours as needed for Mild Pain.   11/30/2023 at 2122    amLODIPine (NORVASC) 10 MG Tab Take 1 Tablet by mouth every day. (Patient taking differently: Take 10 mg by mouth every day. Hold if SBB<110) 30 Tablet  unk at unk    aspirin (ASA) 81 MG Chew Tab chewable tablet Chew 1 Tablet every day. 100 Tablet  11/30/2023 at 0700    atorvastatin (LIPITOR) 80 MG tablet Take 1 Tablet by mouth every evening. 30 Tablet  11/29/2023 at 2020    levETIRAcetam (KEPPRA) 1000 MG tablet Take 1 Tablet by mouth 2 times a day. 60 Tablet  11/29/2023 at 2020    losartan (COZAAR) 50 MG Tab Take 1 Tablet by mouth every day. 30 Tablet  11/30/2023 at 0700    Blood Glucose Test Strips Use one Accucheck Guide strip to test blood sugar once daily early morning before first meal. For uncontrolled diabetes. E11.65 100 Strip 1 supply    Lancets Use one fast clix lancet to test blood sugar once daily early morning before first meal. For uncontrolled diabetes. E11.65 100 Each 1 supply       Medication Allergy:  Allergies   Allergen Reactions    Sulfa Drugs Rash     Full body rash    Shellfish-Derived Products Rash     Full body rash       Physical Exam:  Weight/BMI: Body mass index is 32.3 kg/m².  /61   Pulse 94    Temp 36.3 °C (97.4 °F) (Temporal)   Resp 19   Wt 82.7 kg (182 lb 5.1 oz)   SpO2 94%   Vitals:    12/03/23 0437 12/03/23 0805 12/03/23 1155 12/03/23 1227   BP: (!) 148/80 (!) 147/78 (!) 159/84 126/61   Pulse: 95 87 95 94   Resp: 20 18 20 19   Temp: 36.9 °C (98.4 °F) 36 °C (96.8 °F) 36.4 °C (97.5 °F) 36.3 °C (97.4 °F)   TempSrc: Temporal Temporal Temporal Temporal   SpO2: 93% 95% 97% 94%   Weight:         Oxygen Therapy:  Pulse Oximetry: 94 %, O2 (LPM): 0, O2 Delivery Device: None - Room Air    Intake/Output Summary (Last 24 hours) at 12/3/2023 1324  Last data filed at 12/3/2023 1000  Gross per 24 hour   Intake 720 ml   Output --   Net 720 ml       Constitutional:   Well developed, well nourished, no acute distress  HEENT:  Normocephalic, Atraumatic, Conjunctiva not pale, Sclera not icteric, Oropharynx moist mucous membranes, No oral exudates, Nose normal.  No thyromegaly.  Neck:  Normal range of motion, No cervical tenderness,  no JVD.  Chest/Lungs:  Symmetric expansion, no spider angioma, breath sounds clear to auscultation bilaterally,  no crackles, no wheezing.   Cardiovascular:  Normal heart rate, Normal rhythm, No murmurs, No rubs, No gallops.    Abdomen: Bowel sounds normal, Soft, No tenderness, No guarding, No rebound, No masses, No hepatosplenomegaly.  Extremities: No cyanosis/clubbing/edema/palmar erythema/flapping tremor  Skin: Warm, Dry, No erythema, No rash, no induration.      MDM (Data Review):     Records reviewed and summarized in current documentation    Lab Data Review:  Recent Results (from the past 24 hour(s))   POCT glucose device results    Collection Time: 12/02/23  4:54 PM   Result Value Ref Range    POC Glucose, Blood 215 (H) 65 - 99 mg/dL   POCT glucose device results    Collection Time: 12/03/23 12:23 AM   Result Value Ref Range    POC Glucose, Blood 300 (H) 65 - 99 mg/dL   CBC WITHOUT DIFFERENTIAL    Collection Time: 12/03/23  5:38 AM   Result Value Ref Range    WBC 7.4 4.8 - 10.8 K/uL     RBC 3.64 (L) 4.20 - 5.40 M/uL    Hemoglobin 9.5 (L) 12.0 - 16.0 g/dL    Hematocrit 29.9 (L) 37.0 - 47.0 %    MCV 82.1 81.4 - 97.8 fL    MCH 26.1 (L) 27.0 - 33.0 pg    MCHC 31.8 (L) 32.2 - 35.5 g/dL    RDW 44.1 35.9 - 50.0 fL    Platelet Count 273 164 - 446 K/uL    MPV 9.8 9.0 - 12.9 fL   Basic Metabolic Panel    Collection Time: 12/03/23  5:38 AM   Result Value Ref Range    Sodium 140 135 - 145 mmol/L    Potassium 3.5 (L) 3.6 - 5.5 mmol/L    Chloride 106 96 - 112 mmol/L    Co2 23 20 - 33 mmol/L    Glucose 232 (H) 65 - 99 mg/dL    Bun 8 8 - 22 mg/dL    Creatinine 0.59 0.50 - 1.40 mg/dL    Calcium 8.3 (L) 8.5 - 10.5 mg/dL    Anion Gap 11.0 7.0 - 16.0   ESTIMATED GFR    Collection Time: 12/03/23  5:38 AM   Result Value Ref Range    GFR (CKD-EPI) 91 >60 mL/min/1.73 m 2   MAGNESIUM    Collection Time: 12/03/23  5:38 AM   Result Value Ref Range    Magnesium 1.9 1.5 - 2.5 mg/dL   POCT glucose device results    Collection Time: 12/03/23  5:44 AM   Result Value Ref Range    POC Glucose, Blood 237 (H) 65 - 99 mg/dL   POCT glucose device results    Collection Time: 12/03/23 12:46 PM   Result Value Ref Range    POC Glucose, Blood 235 (H) 65 - 99 mg/dL       MDM (Assessment and Plan):     Active Hospital Problems    Diagnosis     Pancolitis (HCC) [K51.00]     Acute GI bleeding [K92.2]     Iron deficiency anemia [D50.9]     Class 1 obesity in adult [E66.9]     CVA (cerebral vascular accident) (HCC) [I63.9]     ACP (advance care planning) [Z71.89]     Seizure (HCC) [R56.9]     Acute blood loss anemia [D62]     Type 2 diabetes mellitus with complication, without long-term current use of insulin (HCC) [E11.8]     Hypokalemia [E87.6]     Essential hypertension [I10]        Imaging/Procedures Review:    CTA ABDOMEN PELVIS W & W/O POST PROCESS   Final Result         1.  No visualized aneurysm or dissection.   2.  No intraluminal contrast identified to indicate site of GI bleed   3.  Diverticulosis   4.  Small fat-containing  bilateral inguinal hernias   5.  Atherosclerosis          Assessment/Plan:  Colitis - pan  Await biopsy. If she is discharged I will call her with results and if treatment is needed, help her with this  2. Anemia due to GI bleed - no further bleeding  3. Diverticulosis        Thank you very much for allowing me to participate in the care of your patient.  Please feel free to contact me anytime at

## 2023-12-03 NOTE — CARE PLAN
The patient is Watcher - Medium risk of patient condition declining or worsening    Shift Goals  Clinical Goals: monitor HGB and bloody stool  Patient Goals: rest  Family Goals: NA    Progress made toward(s) clinical / shift goals:    Problem: Knowledge Deficit - Standard  Goal: Patient and family/care givers will demonstrate understanding of plan of care, disease process/condition, diagnostic tests and medications  Outcome: Progressing     Problem: Hemodynamics  Goal: Patient's hemodynamics, fluid balance and neurologic status will be stable or improve  Outcome: Progressing     Problem: Fall Risk  Goal: Patient will remain free from falls  Outcome: Progressing       Patient is not progressing towards the following goals:

## 2023-12-03 NOTE — PROGRESS NOTES
Hospital Medicine Daily Progress Note    Date of Service  12/3/2023    Chief Complaint  Rectal bleeding    Hospital Course  Molly Fountain is a 78 y.o. female with hx of stroke, HTN, DLD, DM, seizure d/o admitted 12/1/2023 with rectal fleed with bright red blood.   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  While at SNF it was noted she was having rectal bleed.  In the ER an initial Hgb:12.9. After admit she was noted to have more significant rectal bleeding and placed in the IMCU.  A repeat Hgb was 5.9 and she has been given transfusions. A prior 2019 bleed was felt to be due to diverticulosis.  GI has consulted and plans for colonoscopy 12/2.    Interval Problem Update  12/2: Hgb:9.4.  Colonoscopy today showed pancolitis with possible ischemia and ulcerative colitis.  Biopsies were taken.  She is alert and denies any bowel movement.    12/3 in bed, feels well, no nausea or vomiting, minimal abdominal pain, tolerating diet. Later today nurse called to inform patient was having facial tremors, no other signs of possible seizures, he K was low today replaced, checking Mg and keppra levels she is on 1g bid. Added ativan prn for seizures.     I have discussed this patient's plan of care and discharge plan at IDT rounds today with Case Management, Nursing, Nursing leadership, and other members of the IDT team.    Consultants/Specialty  GI    Code Status  DNAR/DNI    Disposition  The patient is not medically cleared for discharge to home or a post-acute facility.      I have placed the appropriate orders for post-discharge needs.    Review of Systems  Review of Systems   Constitutional:  Negative for chills and fever.   Eyes:  Negative for discharge.   Respiratory:  Negative for cough, shortness of breath and stridor.    Cardiovascular:  Negative for chest pain, palpitations and leg swelling.    Gastrointestinal:  Negative for abdominal pain, blood in stool, diarrhea, melena and nausea.   Genitourinary:  Negative for dysuria and hematuria.   Musculoskeletal:  Negative for back pain and joint pain.   Skin:  Negative for rash.   Neurological:  Negative for dizziness, speech change and headaches.   Psychiatric/Behavioral:  The patient is not nervous/anxious.         Physical Exam  Temp:  [35.8 °C (96.4 °F)-37 °C (98.6 °F)] 36.4 °C (97.5 °F)  Pulse:  [78-95] 95  Resp:  [14-30] 20  BP: (103-159)/(57-84) 159/84  SpO2:  [91 %-99 %] 97 %    Physical Exam  Vitals reviewed.   Constitutional:       Appearance: Normal appearance. She is obese. She is not diaphoretic.   HENT:      Head: Normocephalic and atraumatic.      Nose: Nose normal.      Mouth/Throat:      Mouth: Mucous membranes are moist.      Pharynx: No oropharyngeal exudate.   Eyes:      General: No scleral icterus.        Right eye: No discharge.         Left eye: No discharge.      Extraocular Movements: Extraocular movements intact.      Conjunctiva/sclera: Conjunctivae normal.   Cardiovascular:      Rate and Rhythm: Normal rate and regular rhythm.      Pulses:           Radial pulses are 2+ on the right side and 2+ on the left side.        Dorsalis pedis pulses are 2+ on the right side and 2+ on the left side.      Heart sounds: No murmur heard.  Pulmonary:      Effort: Pulmonary effort is normal. No respiratory distress.      Breath sounds: Normal breath sounds. No wheezing or rales.   Abdominal:      General: Bowel sounds are normal. There is no distension.      Palpations: Abdomen is soft.      Tenderness: There is no abdominal tenderness.   Musculoskeletal:         General: No swelling or tenderness.      Cervical back: Neck supple. No muscular tenderness.      Right lower leg: No edema.      Left lower leg: No edema.   Lymphadenopathy:      Cervical: No cervical adenopathy.   Skin:     Coloration: Skin is not jaundiced or pale.   Neurological:       General: No focal deficit present.      Mental Status: She is alert and oriented to person, place, and time. Mental status is at baseline.      Cranial Nerves: No cranial nerve deficit.   Psychiatric:         Mood and Affect: Mood normal.         Behavior: Behavior normal.         Fluids    Intake/Output Summary (Last 24 hours) at 12/3/2023 1206  Last data filed at 12/3/2023 1000  Gross per 24 hour   Intake 1020 ml   Output 1 ml   Net 1019 ml         Laboratory  Recent Labs     12/01/23  0117 12/01/23  0537 12/01/23  0904 12/02/23  0535 12/02/23  1322 12/03/23  0538   WBC 8.1 7.6  --   --   --  7.4   RBC 5.09 3.89*  --   --   --  3.64*   HEMOGLOBIN 12.9 9.7*   < > 9.4* 9.8* 9.5*   HEMATOCRIT 41.3 31.7*   < > 29.1* 31.7* 29.9*   MCV 81.1* 81.5  --   --   --  82.1   MCH 25.3* 24.9*  --   --   --  26.1*   MCHC 31.2* 30.6*  --   --   --  31.8*   RDW 44.4 44.4  --   --   --  44.1   PLATELETCT 372 317  --   --   --  273   MPV 9.5 9.6  --   --   --  9.8    < > = values in this interval not displayed.       Recent Labs     12/01/23 0117 12/03/23  0538   SODIUM 142 140   POTASSIUM 3.5* 3.5*   CHLORIDE 106 106   CO2 24 23   GLUCOSE 176* 232*   BUN 14 8   CREATININE 0.79 0.59   CALCIUM 9.2 8.3*       Recent Labs     12/01/23 0117   APTT 27.9   INR 0.97                 Imaging  CTA ABDOMEN PELVIS W & W/O POST PROCESS   Final Result         1.  No visualized aneurysm or dissection.   2.  No intraluminal contrast identified to indicate site of GI bleed   3.  Diverticulosis   4.  Small fat-containing bilateral inguinal hernias   5.  Atherosclerosis           Assessment/Plan  * Acute GI bleeding- (present on admission)  Assessment & Plan  Rectal bleeding  On aspirin-held  Transfuse as needed  IV Protonix change to oral  Profuse bleeding upon arrival to floor  SBP 90s    D/w ICU attending for IMCU transfer  --ordered CTA AP for possible IR embolization evaluation  --DDAVP x1 given ASA intake  --1L IVF bolus  --transfer to CU  for close hemodynamic monitoring    GI consulted, f/u appreciated    S/p colonoscopy pancolitis f/u biopsy.     Pancolitis (HCC)- (present on admission)  Assessment & Plan  Await colonoscopy biopsy result  Ischemic concerns and will discuss with radiology if CTA needed  Await pathology read on biopsy    Iron deficiency anemia  Assessment & Plan  Likely secondary to GIB  IV iron infusion    Class 1 obesity in adult- (present on admission)  Assessment & Plan  Diet and lifestyle modification  Body mass index is 32.3 kg/m².      CVA (cerebral vascular accident) ([facility])- (present on admission)  Assessment & Plan  Recent CVA  Resume aspirin when appropriate  Continue statin    ACP (advance care planning)- (present on admission)  Assessment & Plan  Goal of care discussed with patient in ER.  She stated she does not wish for aggressive/heroic life-sustaining measures-no CPR/defibrillation/intubation or mechanical ventilation.  She is however agreeable for any other noninvasive medical management, including possible GI endoscopic evaluation as clinically warranted.  Diagnosis, prognosis, questions and concerns addressed.  DNR/DNI status confirmed.  ACP: 16 minutes    Seizure ([facility])- (present on admission)  Assessment & Plan  Continue Keppra 1000mg BID for prophylaxis  Continue monitoring  Seizure precautions  Check Mg and keppra levels      Acute blood loss anemia- (present on admission)  Assessment & Plan  Due to above  Anemia [id number] iron/B12/folate as needed  12/3 Hgb:9.5  Trend H&H  Transfuse for hemoglobin less than 7 or hemodynamic instability        Hypokalemia- (present on admission)  Assessment & Plan  Replace  12/1 K:3.5    Essential hypertension- (present on admission)  Assessment & Plan  Hold meds for now in setting for GIB concern  Amlodipine 10mg and losartan 50mg qd  Monitor vitals.    Type 2 diabetes mellitus with complication, without long-term current use of insulin ([facility])- (present on  admission)  Assessment & Plan  Lantus 10 units q HS  Diabetic diet  Monitor accuchecks and cover with SSI.  Hypoglycemic protocol  Statin         VTE prophylaxis: scd's    I have performed a physical exam and reviewed and updated ROS and Plan today (12/3/2023). In review of yesterday's note (12/2/2023), there are no changes except as documented above.      Total time of 54 minutes spent prepping to see patient (e.g. reviewing  tests/imaging results, notes from consultants, bedside nurse, night shift ) obtaining and/or reviewing separately obtained history. Performing a medically appropriate examination and evaluation.  Counseling and educating the patient.  Ordering medications, tests, or procedures.  Referring and communicating with other health care professionals.  Documenting clinical information in EPIC.  Independently interpreting results and communicating results to patient.  Care coordination.

## 2023-12-04 ENCOUNTER — APPOINTMENT (OUTPATIENT)
Dept: RADIOLOGY | Facility: MEDICAL CENTER | Age: 78
DRG: 394 | End: 2023-12-04
Attending: HOSPITALIST
Payer: MEDICARE

## 2023-12-04 LAB
ANION GAP SERPL CALC-SCNC: 8 MMOL/L (ref 7–16)
BUN SERPL-MCNC: 8 MG/DL (ref 8–22)
CALCIUM SERPL-MCNC: 8 MG/DL (ref 8.5–10.5)
CHLORIDE SERPL-SCNC: 108 MMOL/L (ref 96–112)
CO2 SERPL-SCNC: 27 MMOL/L (ref 20–33)
CREAT SERPL-MCNC: 0.76 MG/DL (ref 0.5–1.4)
GFR SERPLBLD CREATININE-BSD FMLA CKD-EPI: 80 ML/MIN/1.73 M 2
GLUCOSE BLD STRIP.AUTO-MCNC: 137 MG/DL (ref 65–99)
GLUCOSE BLD STRIP.AUTO-MCNC: 147 MG/DL (ref 65–99)
GLUCOSE BLD STRIP.AUTO-MCNC: 162 MG/DL (ref 65–99)
GLUCOSE SERPL-MCNC: 137 MG/DL (ref 65–99)
HAPTOGLOB SERPL-MCNC: 201 MG/DL (ref 30–200)
HCT VFR BLD AUTO: 23.1 % (ref 37–47)
HCT VFR BLD AUTO: 24.4 % (ref 37–47)
HCT VFR BLD AUTO: 27.1 % (ref 37–47)
HGB BLD-MCNC: 7.1 G/DL (ref 12–16)
HGB BLD-MCNC: 7.7 G/DL (ref 12–16)
HGB BLD-MCNC: 8.5 G/DL (ref 12–16)
MAGNESIUM SERPL-MCNC: 1.7 MG/DL (ref 1.5–2.5)
PATHOLOGY CONSULT NOTE: NORMAL
POTASSIUM SERPL-SCNC: 3 MMOL/L (ref 3.6–5.5)
SODIUM SERPL-SCNC: 143 MMOL/L (ref 135–145)

## 2023-12-04 PROCEDURE — 36415 COLL VENOUS BLD VENIPUNCTURE: CPT

## 2023-12-04 PROCEDURE — 86923 COMPATIBILITY TEST ELECTRIC: CPT

## 2023-12-04 PROCEDURE — B4151ZZ FLUOROSCOPY OF INFERIOR MESENTERIC ARTERY USING LOW OSMOLAR CONTRAST: ICD-10-PCS | Performed by: RADIOLOGY

## 2023-12-04 PROCEDURE — 700102 HCHG RX REV CODE 250 W/ 637 OVERRIDE(OP): Performed by: HOSPITALIST

## 2023-12-04 PROCEDURE — 99291 CRITICAL CARE FIRST HOUR: CPT | Performed by: HOSPITALIST

## 2023-12-04 PROCEDURE — B4141ZZ FLUOROSCOPY OF SUPERIOR MESENTERIC ARTERY USING LOW OSMOLAR CONTRAST: ICD-10-PCS | Performed by: RADIOLOGY

## 2023-12-04 PROCEDURE — 700111 HCHG RX REV CODE 636 W/ 250 OVERRIDE (IP): Mod: JZ

## 2023-12-04 PROCEDURE — 85018 HEMOGLOBIN: CPT | Mod: 91

## 2023-12-04 PROCEDURE — 99153 MOD SED SAME PHYS/QHP EA: CPT

## 2023-12-04 PROCEDURE — A9270 NON-COVERED ITEM OR SERVICE: HCPCS | Performed by: HOSPITALIST

## 2023-12-04 PROCEDURE — 83735 ASSAY OF MAGNESIUM: CPT

## 2023-12-04 PROCEDURE — 700117 HCHG RX CONTRAST REV CODE 255: Performed by: HOSPITALIST

## 2023-12-04 PROCEDURE — 99232 SBSQ HOSP IP/OBS MODERATE 35: CPT | Performed by: NURSE PRACTITIONER

## 2023-12-04 PROCEDURE — 770020 HCHG ROOM/CARE - TELE (206)

## 2023-12-04 PROCEDURE — 85014 HEMATOCRIT: CPT

## 2023-12-04 PROCEDURE — P9016 RBC LEUKOCYTES REDUCED: HCPCS

## 2023-12-04 PROCEDURE — 700102 HCHG RX REV CODE 250 W/ 637 OVERRIDE(OP): Performed by: STUDENT IN AN ORGANIZED HEALTH CARE EDUCATION/TRAINING PROGRAM

## 2023-12-04 PROCEDURE — 700111 HCHG RX REV CODE 636 W/ 250 OVERRIDE (IP): Mod: JZ | Performed by: HOSPITALIST

## 2023-12-04 PROCEDURE — 80048 BASIC METABOLIC PNL TOTAL CA: CPT

## 2023-12-04 PROCEDURE — 700117 HCHG RX CONTRAST REV CODE 255: Performed by: RADIOLOGY

## 2023-12-04 PROCEDURE — 74174 CTA ABD&PLVS W/CONTRAST: CPT

## 2023-12-04 PROCEDURE — B4121ZZ FLUOROSCOPY OF HEPATIC ARTERY USING LOW OSMOLAR CONTRAST: ICD-10-PCS | Performed by: RADIOLOGY

## 2023-12-04 PROCEDURE — 82962 GLUCOSE BLOOD TEST: CPT

## 2023-12-04 PROCEDURE — 700102 HCHG RX REV CODE 250 W/ 637 OVERRIDE(OP): Mod: JZ | Performed by: HOSPITALIST

## 2023-12-04 PROCEDURE — A9270 NON-COVERED ITEM OR SERVICE: HCPCS | Performed by: STUDENT IN AN ORGANIZED HEALTH CARE EDUCATION/TRAINING PROGRAM

## 2023-12-04 PROCEDURE — 36430 TRANSFUSION BLD/BLD COMPNT: CPT

## 2023-12-04 PROCEDURE — A9270 NON-COVERED ITEM OR SERVICE: HCPCS | Mod: JZ | Performed by: HOSPITALIST

## 2023-12-04 RX ORDER — ONDANSETRON 2 MG/ML
4 INJECTION INTRAMUSCULAR; INTRAVENOUS PRN
Status: ACTIVE | OUTPATIENT
Start: 2023-12-04 | End: 2023-12-04

## 2023-12-04 RX ORDER — SODIUM CHLORIDE 9 MG/ML
500 INJECTION, SOLUTION INTRAVENOUS
Status: ACTIVE | OUTPATIENT
Start: 2023-12-04 | End: 2023-12-04

## 2023-12-04 RX ORDER — AMLODIPINE BESYLATE 5 MG/1
2.5 TABLET ORAL
Status: DISCONTINUED | OUTPATIENT
Start: 2023-12-04 | End: 2023-12-04

## 2023-12-04 RX ORDER — POTASSIUM CHLORIDE 20 MEQ/1
40 TABLET, EXTENDED RELEASE ORAL ONCE
Status: COMPLETED | OUTPATIENT
Start: 2023-12-04 | End: 2023-12-04

## 2023-12-04 RX ORDER — MIDAZOLAM HYDROCHLORIDE 1 MG/ML
INJECTION INTRAMUSCULAR; INTRAVENOUS
Status: COMPLETED
Start: 2023-12-04 | End: 2023-12-04

## 2023-12-04 RX ORDER — MAGNESIUM SULFATE HEPTAHYDRATE 40 MG/ML
2 INJECTION, SOLUTION INTRAVENOUS ONCE
Status: COMPLETED | OUTPATIENT
Start: 2023-12-04 | End: 2023-12-04

## 2023-12-04 RX ORDER — MIDAZOLAM HYDROCHLORIDE 1 MG/ML
.5-2 INJECTION INTRAMUSCULAR; INTRAVENOUS PRN
Status: ACTIVE | OUTPATIENT
Start: 2023-12-04 | End: 2023-12-04

## 2023-12-04 RX ADMIN — LEVETIRACETAM 1000 MG: 500 TABLET, FILM COATED ORAL at 16:44

## 2023-12-04 RX ADMIN — ACETAMINOPHEN 650 MG: 325 TABLET, FILM COATED ORAL at 21:27

## 2023-12-04 RX ADMIN — INSULIN LISPRO 2 UNITS: 100 INJECTION, SOLUTION INTRAVENOUS; SUBCUTANEOUS at 11:59

## 2023-12-04 RX ADMIN — MIDAZOLAM 0.5 MG: 1 INJECTION INTRAMUSCULAR; INTRAVENOUS at 19:55

## 2023-12-04 RX ADMIN — IOHEXOL 82 ML: 300 INJECTION, SOLUTION INTRAVENOUS at 21:00

## 2023-12-04 RX ADMIN — MAGNESIUM SULFATE HEPTAHYDRATE 2 G: 2 INJECTION, SOLUTION INTRAVENOUS at 08:54

## 2023-12-04 RX ADMIN — ACETAMINOPHEN 650 MG: 325 TABLET, FILM COATED ORAL at 12:00

## 2023-12-04 RX ADMIN — MIDAZOLAM HYDROCHLORIDE 0.5 MG: 1 INJECTION INTRAMUSCULAR; INTRAVENOUS at 19:55

## 2023-12-04 RX ADMIN — OMEPRAZOLE 20 MG: 20 CAPSULE, DELAYED RELEASE ORAL at 04:11

## 2023-12-04 RX ADMIN — LEVETIRACETAM 1000 MG: 500 TABLET, FILM COATED ORAL at 04:11

## 2023-12-04 RX ADMIN — FENTANYL CITRATE 25 MCG: 50 INJECTION, SOLUTION INTRAMUSCULAR; INTRAVENOUS at 19:55

## 2023-12-04 RX ADMIN — Medication 400 MG: at 04:11

## 2023-12-04 RX ADMIN — IOHEXOL 100 ML: 350 INJECTION, SOLUTION INTRAVENOUS at 18:01

## 2023-12-04 RX ADMIN — POTASSIUM CHLORIDE 40 MEQ: 1500 TABLET, EXTENDED RELEASE ORAL at 08:46

## 2023-12-04 RX ADMIN — AMLODIPINE BESYLATE 2.5 MG: 5 TABLET ORAL at 08:47

## 2023-12-04 RX ADMIN — ATORVASTATIN CALCIUM 80 MG: 80 TABLET, FILM COATED ORAL at 16:44

## 2023-12-04 ASSESSMENT — ENCOUNTER SYMPTOMS
SPEECH CHANGE: 0
DIZZINESS: 0
CONSTIPATION: 0
MYALGIAS: 0
NAUSEA: 0
COUGH: 0
FEVER: 0
SORE THROAT: 0
SHORTNESS OF BREATH: 0
HEADACHES: 0
CHILLS: 0
DIARRHEA: 0
FALLS: 0
ABDOMINAL PAIN: 0
PALPITATIONS: 0
BLOOD IN STOOL: 1
STRIDOR: 0
NERVOUS/ANXIOUS: 0
INSOMNIA: 0
EYE DISCHARGE: 0
BACK PAIN: 0
VOMITING: 0
FLANK PAIN: 0

## 2023-12-04 ASSESSMENT — PAIN DESCRIPTION - PAIN TYPE
TYPE: ACUTE PAIN

## 2023-12-04 NOTE — PROGRESS NOTES
Gastroenterology Progress Note               Author:  KARLY Lee Date & Time Created: 12/4/2023 12:40 PM       Patient ID:  Name:             Molly Fountain    YOB: 1945  Age:                 78 y.o.  female  MRN:               [id number]        Medical Decision Making, by Problem:  Active Hospital Problems    Diagnosis     Pancolitis (HCC) [K51.00]     Acute GI bleeding [K92.2]     Iron deficiency anemia [D50.9]     Class 1 obesity in adult [E66.9]     CVA (cerebral vascular accident) (HCC) [I63.9]     ACP (advance care planning) [Z71.89]     Seizure (HCC) [R56.9]     Acute blood loss anemia [D62]     Type 2 diabetes mellitus with complication, without long-term current use of insulin (HCC) [E11.8]     Hypokalemia [E87.6]     Essential hypertension [I10]            Presenting Chief Complaint:  Hematochezia. Consult requested by Dr Reyes     HISTORY OF PRESENT ILLNESS:  Molly Fountain is a 78 y.o. female who was recently admitted for a CVA in November 21, 23. She also has a history of hypertension, dyslipidemia and diabetes and had been off her medication. 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 She was at rehab getting ready to go home and she had an episode of rectal bleeding. She has continued to have episodes of hematochezia and feels very lightheaded and week. Her hemoglobin is 5. She is waiting to receive blood. She had a bleed in 2019 that was likely from diverticulosis. It was not this severe. She does not have nay pain or any other GI complaints      Interval History:  12/3/2023: This is a very pleasant 78 y.o. female who had hematochezia. She states she has not had a bowel movement since yesterday. We did a colonoscopy yesterday that showed significant colitis. Her biopsies are still pending   She does not have pain and feels great. She has a new primary care doctor and is looking forward to going home.      12/4/2023: Patient with bloody bowel movement x 2 today (formed stool with surrounding blood and small clots) with associated left-sided abdominal aching.  Hemoglobin 9.1-8.5.  Pathology from colonoscopy is pending.        Hospital Medications:  Current Facility-Administered Medications   Medication Dose Frequency Provider Last Rate Last Admin    amLODIPine (Norvasc) tablet 2.5 mg  2.5 mg Q DAY Michele Reyes M.D.   2.5 mg at 12/04/23 0847    insulin GLARGINE (Lantus,Semglee) injection  15 Units Q EVENING Michele Reyes M.D.   15 Units at 12/03/23 1642    And    insulin lispro (HumaLOG,AdmeLOG) injection  2-9 Units Q6HRS Michele Reyes M.D.   2 Units at 12/04/23 1159    And    dextrose 10 % BOLUS 25 g  25 g Q15 MIN PRN Michele Reyes M.D.        diazePAM (Valium) injection 5 mg  5 mg Q4HRS PRN Michele Reyes M.D.        magnesium oxide tablet 400 mg  400 mg DAILY Michele Reyes M.D.   400 mg at 12/04/23 0411    levETIRAcetam (Keppra) tablet 1,000 mg  1,000 mg BID Tesfaye Mcmullen, D.O.   1,000 mg at 12/04/23 0411    omeprazole (PriLOSEC) capsule 20 mg  20 mg DAILY Tesfaye Mcmullen D.O.   20 mg at 12/04/23 0411    lactated ringers infusion (BOLUS)  500 mL Once PRN Brandon Hinton M.D.        acetaminophen (Tylenol) tablet 650 mg  650 mg Q6HRS PRN Brandon Hinton M.D.   650 mg at 12/04/23 1200    labetalol (Normodyne/Trandate) injection 10 mg  10 mg Q4HRS PRN Brandon Hinton M.D.        ondansetron (Zofran) syringe/vial injection 4 mg  4 mg Q4HRS PRN Brandon Hinton M.D.        ondansetron (Zofran ODT) dispertab 4 mg  4 mg Q4HRS PRN Brandon Hinton M.D.        atorvastatin (Lipitor) tablet 80 mg  80 mg Q EVENING Brandon Hinton M.D.   80 mg at 12/03/23 1648    ipratropium-albuterol (DUONEB) nebulizer solution  3 mL Q4H PRN (RT) Brandon Hinton M.D.       Last reviewed on 12/2/2023 11:02 AM by Xavier Fleming R.N.       Review of Systems:  Review of Systems   Constitutional:  Negative  for chills, fever and malaise/fatigue.   HENT:  Negative for congestion and sore throat.    Respiratory:  Negative for cough and shortness of breath.    Cardiovascular:  Negative for chest pain and leg swelling.   Gastrointestinal:  Positive for blood in stool. Negative for abdominal pain, constipation, diarrhea, melena, nausea and vomiting.   Genitourinary:  Negative for dysuria and flank pain.   Musculoskeletal:  Negative for falls and myalgias.   Neurological:  Negative for headaches.   Psychiatric/Behavioral:  The patient is not nervous/anxious and does not have insomnia.    All other systems reviewed and are negative.        Vital signs:  Weight/BMI: Body mass index is 31.48 kg/m².  BP (!) 159/90   Pulse 100   Temp 36.9 °C (98.5 °F) (Temporal)   Resp 16   Wt 80.6 kg (177 lb 11.1 oz)   SpO2 95%   Vitals:    12/03/23 1533 12/03/23 2000 12/04/23 0334 12/04/23 0734   BP: (!) 150/70 (!) 150/77 (!) 145/79 (!) 159/90   Pulse: 91 92 90 100   Resp: 18 18 18 16   Temp: 36.3 °C (97.4 °F) 36.1 °C (97 °F) 36.7 °C (98.1 °F) 36.9 °C (98.5 °F)   TempSrc: Temporal Temporal Temporal Temporal   SpO2: 94% 95% 97% 95%   Weight:  80.6 kg (177 lb 11.1 oz)       Oxygen Therapy:  Pulse Oximetry: 95 %, O2 (LPM): 0, O2 Delivery Device: None - Room Air    Intake/Output Summary (Last 24 hours) at 12/4/2023 1240  Last data filed at 12/4/2023 0900  Gross per 24 hour   Intake 480 ml   Output --   Net 480 ml         Physical Exam:  Physical Exam  Vitals and nursing note reviewed.   Constitutional:       General: She is not in acute distress.     Appearance: She is obese. She is not ill-appearing.   HENT:      Head: Normocephalic.      Nose: Nose normal. No congestion.      Mouth/Throat:      Mouth: Mucous membranes are moist.      Pharynx: Oropharynx is clear. No oropharyngeal exudate.   Eyes:      General: No scleral icterus.     Extraocular Movements: Extraocular movements intact.      Conjunctiva/sclera: Conjunctivae normal.    Cardiovascular:      Rate and Rhythm: Normal rate and regular rhythm.      Pulses: Normal pulses.      Heart sounds: Normal heart sounds. No murmur heard.  Pulmonary:      Effort: Pulmonary effort is normal. No respiratory distress.      Breath sounds: Normal breath sounds.   Abdominal:      General: Abdomen is flat. Bowel sounds are normal. There is no distension.      Palpations: Abdomen is soft.      Tenderness: There is abdominal tenderness (Mild left-sided abdominal tenderness). There is no guarding.   Musculoskeletal:      Right lower leg: No edema.      Left lower leg: No edema.   Skin:     General: Skin is warm and dry.      Capillary Refill: Capillary refill takes less than 2 seconds.      Coloration: Skin is not jaundiced.   Neurological:      General: No focal deficit present.      Mental Status: She is alert and oriented to person, place, and time. Mental status is at baseline.      Motor: No weakness.   Psychiatric:         Mood and Affect: Mood normal.         Behavior: Behavior normal.         Thought Content: Thought content normal.         Judgment: Judgment normal.             Labs:  Recent Labs     12/03/23  0538 12/04/23  0644   SODIUM 140 143   POTASSIUM 3.5* 3.0*   CHLORIDE 106 108   CO2 23 27   BUN 8 8   CREATININE 0.59 0.76   MAGNESIUM 1.9 1.7   CALCIUM 8.3* 8.0*     Recent Labs     12/03/23  0538 12/04/23  0644   GLUCOSE 232* 137*     Recent Labs     12/03/23  0538   WBC 7.4     Recent Labs     12/03/23  0538 12/03/23  1715 12/04/23  0644   RBC 3.64*  --   --    HEMOGLOBIN 9.5* 9.1* 8.5*   HEMATOCRIT 29.9* 28.8* 27.1*   PLATELETCT 273  --   --      Recent Results (from the past 24 hour(s))   POCT glucose device results    Collection Time: 12/03/23 12:46 PM   Result Value Ref Range    POC Glucose, Blood 235 (H) 65 - 99 mg/dL   POCT glucose device results    Collection Time: 12/03/23  4:39 PM   Result Value Ref Range    POC Glucose, Blood 217 (H) 65 - 99 mg/dL   HEMOGLOBIN AND HEMATOCRIT     Collection Time: 12/03/23  5:15 PM   Result Value Ref Range    Hemoglobin 9.1 (L) 12.0 - 16.0 g/dL    Hematocrit 28.8 (L) 37.0 - 47.0 %   POCT glucose device results    Collection Time: 12/03/23 11:20 PM   Result Value Ref Range    POC Glucose, Blood 201 (H) 65 - 99 mg/dL   POCT glucose device results    Collection Time: 12/04/23  6:05 AM   Result Value Ref Range    POC Glucose, Blood 137 (H) 65 - 99 mg/dL   Basic Metabolic Panel    Collection Time: 12/04/23  6:44 AM   Result Value Ref Range    Sodium 143 135 - 145 mmol/L    Potassium 3.0 (L) 3.6 - 5.5 mmol/L    Chloride 108 96 - 112 mmol/L    Co2 27 20 - 33 mmol/L    Glucose 137 (H) 65 - 99 mg/dL    Bun 8 8 - 22 mg/dL    Creatinine 0.76 0.50 - 1.40 mg/dL    Calcium 8.0 (L) 8.5 - 10.5 mg/dL    Anion Gap 8.0 7.0 - 16.0   MAGNESIUM    Collection Time: 12/04/23  6:44 AM   Result Value Ref Range    Magnesium 1.7 1.5 - 2.5 mg/dL   ESTIMATED GFR    Collection Time: 12/04/23  6:44 AM   Result Value Ref Range    GFR (CKD-EPI) 80 >60 mL/min/1.73 m 2   HEMOGLOBIN AND HEMATOCRIT    Collection Time: 12/04/23  6:44 AM   Result Value Ref Range    Hemoglobin 8.5 (L) 12.0 - 16.0 g/dL    Hematocrit 27.1 (L) 37.0 - 47.0 %   POCT glucose device results    Collection Time: 12/04/23 11:56 AM   Result Value Ref Range    POC Glucose, Blood 162 (H) 65 - 99 mg/dL       Radiology Review:  CTA ABDOMEN PELVIS W & W/O POST PROCESS   Final Result         1.  No visualized aneurysm or dissection.   2.  No intraluminal contrast identified to indicate site of GI bleed   3.  Diverticulosis   4.  Small fat-containing bilateral inguinal hernias   5.  Atherosclerosis            MDM (Data Review):   -Records reviewed and summarized in current documentation  -I personally reviewed and interpreted the laboratory results  -I personally reviewed the radiology images    Assessment/Recommendations:  Pancolitis  Lower GI bleeding  Acute blood loss anemia  Iron deficiency anemia  Obesity in Adult  History of  CVA  History of Seizure Secondary to CVA  Type 2 diabetes mellitus without use of insulin  Hypertension  Hypokalemia    MDM:  This is a pleasant 78-year-old female with a past medical history as listed above who presented to [facility] on 12/1/2023 with acute lower GI bleeding.  She underwent colonoscopy on 12/2/2023 during which time pancolitis seen.  Left side was worse than right.  Left side visually appeared with changes like ischemia in the right side with changes like ulcerative colitis.  Biopsies were obtained and are pending.  Additionally, there were small internal hemorrhoids were also seen.  Patient was tentatively to be discharged today, however, she had 2 episodes of bloody bowel movements.  Stools were formed with surrounding bright red blood and water with small clots.  This is associated with left-sided pain.  Low suspicion for hemorrhoidal bleed due to left-sided abdominal pain.  Hemoglobin 9.5-9.1-and 8.5 this morning.  Patient has not been on anticoagulation, aspirin has been held.  Recommend keeping patient inpatient for monitoring.  Expect pathology to be back today, if not tomorrow.  Discussed with patient and she is agreeable.    Plan:  Monitor H/H and for signs of rebleeding  Transfuse as necessary  Biopsy results pending-GI following.    Discussed with patient, nursing, Dr. Genia Srivastava      Core Quality Measures   Reviewed items::  Labs, Medications and Radiology reports reviewed

## 2023-12-04 NOTE — PROGRESS NOTES
Report received from RN, assumed care at 0715  Pt is A0X4, and responds appropriately   Pt declines any SOB, chest pain, new onset of numbness/ tingling  Pt rates pain at 2/10, on a scale of 1-10, tenderness to left side of abdomen, provided emotional support as patient was tearful expressing all the emotions she is feeling.  Pt is voiding adequatly and without hesitancy  Pt has + flatus, hypoactive bowel sounds, + BM this morning with abrahan red blood in toilet, picture voalted to hospitalist  Pt ambulates with a handheld assist  Pt is tolerating a diet, poor appetite at this time, willing tot ry hot tea and a banana, pt denies any nausea/vomiting  Plan of care discussed, all questions answered. Explained importance of calling before getting OOB and pt verbalizes understanding. Explained importance of oral care. Call light is within reach, treaded slipper socks on, bed in lowest/ locked position, hourly rounding in place, all needs met at this time

## 2023-12-04 NOTE — CARE PLAN
The patient is Stable - Low risk of patient condition declining or worsening    Shift Goals  Clinical Goals: ensure safety, monitor labs and bowel movements  Patient Goals: rest  Family Goals: janeth    Progress made toward(s) clinical / shift goals: Received patient in bed alert and oriented x4. Denies pain. Blood glucose 201 mg/dl and given 3units insulin. Administered scheduled medications. Hourly rounds performed. Fall precautions in place and call light within reach. Noted blood in the urine and semi-solid stool with estamated 20-30  ML of blood after one bathroom use. Patient is asymptomatic, Vital signsL /79, T 36.7, P 90bmp, R 18cpm.    Patient is not progressing towards the following goals:    Problem: Knowledge Deficit - Standard  Goal: Patient and family/care givers will demonstrate understanding of plan of care, disease process/condition, diagnostic tests and medications  Outcome: Not Progressing  Note: Plan to advance goal: explain disease process/condition, treatment plan, diagnostic tests and medications every shift.

## 2023-12-04 NOTE — PROGRESS NOTES
Hospital Medicine Daily Progress Note    Date of Service  12/4/2023    Chief Complaint  Rectal bleeding    Hospital Course  Molly Fountain is a 78 y.o. female with hx of stroke, HTN, DLD, DM, seizure d/o admitted 12/1/2023 with rectal fleed with bright red blood.   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  While at SNF it was noted she was having rectal bleed.  In the ER an initial Hgb:12.9. After admit she was noted to have more significant rectal bleeding and placed in the IMCU.  A repeat Hgb was 5.9 and she has been given transfusions. A prior 2019 bleed was felt to be due to diverticulosis.  GI has consulted and plans for colonoscopy 12/2.    Interval Problem Update  12/2: Hgb:9.4.  Colonoscopy today showed pancolitis with possible ischemia and ulcerative colitis.  Biopsies were taken.  She is alert and denies any bowel movement.    12/3 in bed, feels well, no nausea or vomiting, minimal abdominal pain, tolerating diet. Later today nurse called to inform patient was having facial tremors, no other signs of possible seizures, he K was low today replaced, checking Mg and keppra levels she is on 1g bid. Added ativan prn for seizures.   12/4 patient is resting in bed, she had a bloody bowel movement today with small clots, patient complaining of mild left lower quadrant pain, repeat hemoglobin is slightly lower 8.5, I have discussed with GI team, continue trending hemoglobin, replacing potassium replacing magnesium, due to her hypertension I have restarted her amlodipine but at a lower dose, will follow-up colon biopsy, continue close monitoring.      Addendum: came back to see patient, patient still having frequent bloody bm, hb is been trending down 8.5 > 7.7 > 7.1, she is alert and oriented, I have ordered 1 unit of blood to be transfused, I have ordered cta abdomen. Will transfer to telemetry for  close monitoring. Discussed with patient, nurse staff. Spent extra 15 min.       I have discussed this patient's plan of care and discharge plan at IDT rounds today with Case Management, Nursing, Nursing leadership, and other members of the IDT team.    Consultants/Specialty  GI    Code Status  DNAR/DNI    Disposition  The patient is not medically cleared for discharge to home or a post-acute facility.      I have placed the appropriate orders for post-discharge needs.    Review of Systems  Review of Systems   Constitutional:  Negative for chills and fever.   Eyes:  Negative for discharge.   Respiratory:  Negative for cough, shortness of breath and stridor.    Cardiovascular:  Negative for chest pain, palpitations and leg swelling.   Gastrointestinal:  Positive for blood in stool. Negative for abdominal pain, diarrhea, melena and nausea.   Genitourinary:  Negative for dysuria and hematuria.   Musculoskeletal:  Negative for back pain and joint pain.   Skin:  Negative for rash.   Neurological:  Negative for dizziness, speech change and headaches.   Psychiatric/Behavioral:  The patient is not nervous/anxious.         Physical Exam  Temp:  [36.1 °C (97 °F)-36.9 °C (98.5 °F)] 36.9 °C (98.5 °F)  Pulse:  [] 100  Resp:  [16-20] 16  BP: (126-159)/(61-90) 159/90  SpO2:  [94 %-97 %] 95 %    Physical Exam  Vitals reviewed.   Constitutional:       Appearance: Normal appearance. She is obese. She is not diaphoretic.   HENT:      Head: Normocephalic and atraumatic.      Nose: Nose normal.      Mouth/Throat:      Mouth: Mucous membranes are moist.      Pharynx: No oropharyngeal exudate.   Eyes:      General: No scleral icterus.        Right eye: No discharge.         Left eye: No discharge.      Extraocular Movements: Extraocular movements intact.      Conjunctiva/sclera: Conjunctivae normal.   Cardiovascular:      Rate and Rhythm: Normal rate and regular rhythm.      Pulses:           Radial pulses are 2+ on the right side  and 2+ on the left side.        Dorsalis pedis pulses are 2+ on the right side and 2+ on the left side.      Heart sounds: No murmur heard.  Pulmonary:      Effort: Pulmonary effort is normal. No respiratory distress.      Breath sounds: Normal breath sounds. No wheezing or rales.   Abdominal:      General: Bowel sounds are normal. There is no distension.      Palpations: Abdomen is soft.      Tenderness: There is no abdominal tenderness.   Musculoskeletal:         General: No swelling or tenderness.      Cervical back: Neck supple. No muscular tenderness.      Right lower leg: No edema.      Left lower leg: No edema.   Lymphadenopathy:      Cervical: No cervical adenopathy.   Skin:     Coloration: Skin is not jaundiced or pale.   Neurological:      General: No focal deficit present.      Mental Status: She is alert and oriented to person, place, and time. Mental status is at baseline.      Cranial Nerves: No cranial nerve deficit.   Psychiatric:         Mood and Affect: Mood normal.         Behavior: Behavior normal.         Fluids    Intake/Output Summary (Last 24 hours) at 12/4/2023 1137  Last data filed at 12/4/2023 0900  Gross per 24 hour   Intake 480 ml   Output --   Net 480 ml         Laboratory  Recent Labs     12/03/23  0538 12/03/23  1715 12/04/23  0644   WBC 7.4  --   --    RBC 3.64*  --   --    HEMOGLOBIN 9.5* 9.1* 8.5*   HEMATOCRIT 29.9* 28.8* 27.1*   MCV 82.1  --   --    MCH 26.1*  --   --    MCHC 31.8*  --   --    RDW 44.1  --   --    PLATELETCT 273  --   --    MPV 9.8  --   --        Recent Labs     12/03/23  0538 12/04/23  0644   SODIUM 140 143   POTASSIUM 3.5* 3.0*   CHLORIDE 106 108   CO2 23 27   GLUCOSE 232* 137*   BUN 8 8   CREATININE 0.59 0.76   CALCIUM 8.3* 8.0*                       Imaging  CTA ABDOMEN PELVIS W & W/O POST PROCESS   Final Result         1.  No visualized aneurysm or dissection.   2.  No intraluminal contrast identified to indicate site of GI bleed   3.  Diverticulosis   4.   Small fat-containing bilateral inguinal hernias   5.  Atherosclerosis           Assessment/Plan  * Acute GI bleeding- (present on admission)  Assessment & Plan  Rectal bleeding  On aspirin-held  Transfuse as needed  IV Protonix change to oral  Profuse bleeding upon arrival to floor  SBP 90s    D/w ICU attending for CU transfer  --ordered CTA AP for possible IR embolization evaluation  --DDAVP x1 given ASA intake  --1L IVF bolus  --transfer to [facility] for close hemodynamic monitoring    GI consulted, f/u appreciated    S/p colonoscopy pancolitis f/u biopsy.   Bloody bowel movement today, hemoglobin 8.5, continue trending hemoglobin, keep 2 large IV access available    Pancolitis (HCC)- (present on admission)  Assessment & Plan  Await colonoscopy biopsy result  Ischemic concerns and will discuss with radiology if CTA needed  Await pathology read on biopsy  Continue monitoring, left lower quadrant pain, change diet to clear liquid    Iron deficiency anemia  Assessment & Plan  Likely secondary to GIB  IV iron infusion    Class 1 obesity in adult- (present on admission)  Assessment & Plan  Diet and lifestyle modification  Body mass index is 32.3 kg/m².      CVA (cerebral vascular accident) (HCC)- (present on admission)  Assessment & Plan  Recent CVA  Resume aspirin when appropriate  Continue statin    ACP (advance care planning)- (present on admission)  Assessment & Plan  Goal of care discussed with patient in ER.  She stated she does not wish for aggressive/heroic life-sustaining measures-no CPR/defibrillation/intubation or mechanical ventilation.  She is however agreeable for any other noninvasive medical management, including possible GI endoscopic evaluation as clinically warranted.  Diagnosis, prognosis, questions and concerns addressed.  DNR/DNI status confirmed.  ACP: 16 minutes    Seizure (HCC)- (present on admission)  Assessment & Plan  Continue Keppra 1000mg BID for prophylaxis  Continue monitoring  Seizure  precautions  Check Mg and keppra levels pending      Acute blood loss anemia- (present on admission)  Assessment & Plan  Due to above  Anemia [id number] iron/B12/folate as needed  12/3 Hgb:9.5  Trend H&H  Transfuse for hemoglobin less than 7 or hemodynamic instability    Had a bloody bowel movement today, I have ordered a stat H&H which is 8.5 down from 9.1.  Continue trending hemoglobin, discussed with GI, blood pressure and is stable, follow-up colon biopsy        Hypokalemia- (present on admission)  Assessment & Plan  3.0 replacing, magnesium still low 1.7 I have ordered IV magnesium    Essential hypertension- (present on admission)  Assessment & Plan  Hold meds for now in setting for GIB concern  Amlodipine 10mg and losartan 50mg qd  Monitor vitals.    Restart amlodipine but at a lower dose 2.5 mg.  Continue monitoring    Type 2 diabetes mellitus with complication, without long-term current use of insulin (HCC)- (present on admission)  Assessment & Plan  Lantus 10 units q HS  Diabetic diet  Monitor accuchecks and cover with SSI.  Hypoglycemic protocol  Statin  Continue monitoring         VTE prophylaxis: scd's    I have performed a physical exam and reviewed and updated ROS and Plan today (12/4/2023). In review of yesterday's note (12/3/2023), there are no changes except as documented above.      Total time of 70 minutes spent prepping to see patient (e.g. reviewing  tests/imaging results, notes from consultants, bedside nurse, night shift ) obtaining and/or reviewing separately obtained history. Performing a medically appropriate examination and evaluation.  Counseling and educating the patient.  Ordering medications, tests, or procedures.  Referring and communicating with other health care professionals.  Documenting clinical information in EPIC.  Independently interpreting results and communicating results to patient.  Care coordination.      I was called by radiologist regarding CTA results, patient is  having extravasation on the descending colon, I have called IR for stat evaluation for embolization, discussed with  who reviewed imaging and agree that patient requires embolization, I have placed a stat order for embolization, I have also discussed with critical care for possible transfer to ICU or IMCU, I have also discussed with nurse staff regarding blood transfusion, the need for 2 large IV access, to get stat vital signs patient is to be n.p.o.      Patient is critically ill.   The patient continues to have: Very frequent bowel movements, hemoglobin is trending down from an 8.5-7.1, her blood pressure is becoming soft,   The vital organ system that is affected is the: Circulatory, GI bleed active  If untreated there is a high chance of deterioration into: Hemorrhagic shock and eventually death.   The critical care that I am providing today is: Ordering blood transfusion, discussing with radiology, discussion with IR, discussing with critical care, discussing with bedside nurse, ordering stat IR consult to treat/embolization, making sure patient has 2 large IV access, making sure that patient gets her blood as soon as possible, continue monitoring hemoglobin.  Night shift cross covering team informed.  The critical that has been undertaken is medically complex.   There has been no overlap in critical care time.   Critical Care Time not including procedures: 35 minutes.

## 2023-12-04 NOTE — CARE PLAN
The patient is Stable - Low risk of patient condition declining or worsening    Shift Goals  Clinical Goals: pain control, comfort, stable vitals, monitor labs  Patient Goals: rest, plan of care updates  Family Goals: janeth    Progress made toward(s) clinical / shift goals:    Problem: Knowledge Deficit - Standard  Goal: Patient and family/care givers will demonstrate understanding of plan of care, disease process/condition, diagnostic tests and medications  Outcome: Progressing     Problem: Fall Risk  Goal: Patient will remain free from falls  Outcome: Progressing

## 2023-12-05 LAB
ANION GAP SERPL CALC-SCNC: 6 MMOL/L (ref 7–16)
BUN SERPL-MCNC: 9 MG/DL (ref 8–22)
CALCIUM SERPL-MCNC: 7.6 MG/DL (ref 8.5–10.5)
CHLORIDE SERPL-SCNC: 109 MMOL/L (ref 96–112)
CO2 SERPL-SCNC: 26 MMOL/L (ref 20–33)
CREAT SERPL-MCNC: 0.61 MG/DL (ref 0.5–1.4)
ERYTHROCYTE [DISTWIDTH] IN BLOOD BY AUTOMATED COUNT: 44.6 FL (ref 35.9–50)
GFR SERPLBLD CREATININE-BSD FMLA CKD-EPI: 91 ML/MIN/1.73 M 2
GLUCOSE BLD STRIP.AUTO-MCNC: 102 MG/DL (ref 65–99)
GLUCOSE BLD STRIP.AUTO-MCNC: 127 MG/DL (ref 65–99)
GLUCOSE BLD STRIP.AUTO-MCNC: 92 MG/DL (ref 65–99)
GLUCOSE BLD STRIP.AUTO-MCNC: 94 MG/DL (ref 65–99)
GLUCOSE BLD STRIP.AUTO-MCNC: 98 MG/DL (ref 65–99)
GLUCOSE SERPL-MCNC: 121 MG/DL (ref 65–99)
HCT VFR BLD AUTO: 22.9 % (ref 37–47)
HCT VFR BLD AUTO: 24.1 % (ref 37–47)
HCT VFR BLD AUTO: 24.4 % (ref 37–47)
HCT VFR BLD AUTO: 25.2 % (ref 37–47)
HGB BLD-MCNC: 7.5 G/DL (ref 12–16)
HGB BLD-MCNC: 7.9 G/DL (ref 12–16)
HGB BLD-MCNC: 7.9 G/DL (ref 12–16)
HGB BLD-MCNC: 8.2 G/DL (ref 12–16)
LEVETIRACETAM SERPL-MCNC: 34 UG/ML (ref 10–40)
MAGNESIUM SERPL-MCNC: 2.1 MG/DL (ref 1.5–2.5)
MCH RBC QN AUTO: 27.1 PG (ref 27–33)
MCHC RBC AUTO-ENTMCNC: 32.8 G/DL (ref 32.2–35.5)
MCV RBC AUTO: 82.8 FL (ref 81.4–97.8)
PLATELET # BLD AUTO: 252 K/UL (ref 164–446)
PMV BLD AUTO: 9.6 FL (ref 9–12.9)
POTASSIUM SERPL-SCNC: 3.6 MMOL/L (ref 3.6–5.5)
RBC # BLD AUTO: 2.91 M/UL (ref 4.2–5.4)
SODIUM SERPL-SCNC: 141 MMOL/L (ref 135–145)
WBC # BLD AUTO: 7.4 K/UL (ref 4.8–10.8)

## 2023-12-05 PROCEDURE — 770020 HCHG ROOM/CARE - TELE (206)

## 2023-12-05 PROCEDURE — 85025 COMPLETE CBC W/AUTO DIFF WBC: CPT

## 2023-12-05 PROCEDURE — 99232 SBSQ HOSP IP/OBS MODERATE 35: CPT | Performed by: NURSE PRACTITIONER

## 2023-12-05 PROCEDURE — 80048 BASIC METABOLIC PNL TOTAL CA: CPT

## 2023-12-05 PROCEDURE — 700102 HCHG RX REV CODE 250 W/ 637 OVERRIDE(OP): Performed by: HOSPITALIST

## 2023-12-05 PROCEDURE — 82962 GLUCOSE BLOOD TEST: CPT | Mod: 91

## 2023-12-05 PROCEDURE — 85027 COMPLETE CBC AUTOMATED: CPT

## 2023-12-05 PROCEDURE — A9270 NON-COVERED ITEM OR SERVICE: HCPCS | Performed by: HOSPITALIST

## 2023-12-05 PROCEDURE — 80053 COMPREHEN METABOLIC PANEL: CPT

## 2023-12-05 PROCEDURE — 83735 ASSAY OF MAGNESIUM: CPT | Mod: 91

## 2023-12-05 PROCEDURE — 36415 COLL VENOUS BLD VENIPUNCTURE: CPT

## 2023-12-05 PROCEDURE — A9270 NON-COVERED ITEM OR SERVICE: HCPCS | Performed by: STUDENT IN AN ORGANIZED HEALTH CARE EDUCATION/TRAINING PROGRAM

## 2023-12-05 PROCEDURE — 99233 SBSQ HOSP IP/OBS HIGH 50: CPT | Performed by: INTERNAL MEDICINE

## 2023-12-05 PROCEDURE — 85014 HEMATOCRIT: CPT | Mod: 91

## 2023-12-05 PROCEDURE — 700102 HCHG RX REV CODE 250 W/ 637 OVERRIDE(OP): Performed by: STUDENT IN AN ORGANIZED HEALTH CARE EDUCATION/TRAINING PROGRAM

## 2023-12-05 PROCEDURE — 85018 HEMOGLOBIN: CPT

## 2023-12-05 PROCEDURE — 700105 HCHG RX REV CODE 258: Performed by: INTERNAL MEDICINE

## 2023-12-05 PROCEDURE — 85610 PROTHROMBIN TIME: CPT

## 2023-12-05 RX ORDER — SODIUM CHLORIDE 9 MG/ML
INJECTION, SOLUTION INTRAVENOUS CONTINUOUS
Status: DISCONTINUED | OUTPATIENT
Start: 2023-12-05 | End: 2023-12-07 | Stop reason: HOSPADM

## 2023-12-05 RX ADMIN — LEVETIRACETAM 1000 MG: 500 TABLET, FILM COATED ORAL at 06:00

## 2023-12-05 RX ADMIN — SODIUM CHLORIDE: 9 INJECTION, SOLUTION INTRAVENOUS at 14:05

## 2023-12-05 RX ADMIN — OMEPRAZOLE 20 MG: 20 CAPSULE, DELAYED RELEASE ORAL at 06:00

## 2023-12-05 RX ADMIN — LEVETIRACETAM 1000 MG: 500 TABLET, FILM COATED ORAL at 16:53

## 2023-12-05 RX ADMIN — ACETAMINOPHEN 650 MG: 325 TABLET, FILM COATED ORAL at 08:07

## 2023-12-05 RX ADMIN — Medication 400 MG: at 06:00

## 2023-12-05 RX ADMIN — ATORVASTATIN CALCIUM 80 MG: 80 TABLET, FILM COATED ORAL at 16:53

## 2023-12-05 ASSESSMENT — FIBROSIS 4 INDEX: FIB4 SCORE: 1.892671823812321993

## 2023-12-05 ASSESSMENT — ENCOUNTER SYMPTOMS
SPEECH CHANGE: 0
BLURRED VISION: 0
COUGH: 0
SHORTNESS OF BREATH: 0
ORTHOPNEA: 0
HEMOPTYSIS: 0
ABDOMINAL PAIN: 0
PHOTOPHOBIA: 0
NECK PAIN: 0
WEAKNESS: 0
FEVER: 0
DIZZINESS: 0
SORE THROAT: 0
INSOMNIA: 0
DIARRHEA: 0
CHILLS: 0
CLAUDICATION: 0
WEIGHT LOSS: 0
FLANK PAIN: 0
NAUSEA: 0
FALLS: 0
BLOOD IN STOOL: 1
CONSTIPATION: 0
MYALGIAS: 0
PALPITATIONS: 0
NERVOUS/ANXIOUS: 0
VOMITING: 0
DOUBLE VISION: 0
HEADACHES: 0

## 2023-12-05 ASSESSMENT — COGNITIVE AND FUNCTIONAL STATUS - GENERAL
HELP NEEDED FOR BATHING: A LITTLE
WALKING IN HOSPITAL ROOM: A LITTLE
MOVING FROM LYING ON BACK TO SITTING ON SIDE OF FLAT BED: A LITTLE
DRESSING REGULAR UPPER BODY CLOTHING: A LITTLE
SUGGESTED CMS G CODE MODIFIER MOBILITY: CK
MOVING TO AND FROM BED TO CHAIR: A LITTLE
TOILETING: A LITTLE
MOBILITY SCORE: 19
CLIMB 3 TO 5 STEPS WITH RAILING: A LITTLE
SUGGESTED CMS G CODE MODIFIER DAILY ACTIVITY: CJ
DAILY ACTIVITIY SCORE: 20
STANDING UP FROM CHAIR USING ARMS: A LITTLE
DRESSING REGULAR LOWER BODY CLOTHING: A LITTLE

## 2023-12-05 ASSESSMENT — PAIN DESCRIPTION - PAIN TYPE: TYPE: ACUTE PAIN

## 2023-12-05 NOTE — DISCHARGE PLANNING
"Care Transition Team Assessment    LMSW met with pt at bedside to complete assessment. Pt A&Ox4 and able to verify the information on the face sheet. Pt lives with her daughter in a single-story house that has two steps to enter. Prior to this hospitalization pt was independent at home with ADLs and most IADLs. Pt does not use any DME at baseline; reported to use a FWW but that she's \"getting off of it.\" Pt reported that her children are good support for her. Pt denies any SA or MH concerns. Pt arrives from [facility], states that she does not want to go back to [place] or any other SNF and would prefer to go home upon dc. Per pt, she is agreeable to HH; SW to confirm if pt has PCP. Per pt, she intends on selling her house to move closer to other children in [facility]; unsure of when move is going to happen. SW to f/u.    Information Source  Orientation Level: Oriented X4  Information Given By: Patient  Who is responsible for making decisions for patient? : Patient    Readmission Evaluation  Is this a readmission?: Yes - unplanned readmission  Why do you think you were readmitted?: Started bleeding  Was an appointment arranged for you prior to discharge?: No  Were there new prescriptions you were supposed to fill after you were discharged?: Yes, prescriptions filled  Did you understand your discharge instructions?: Yes  Did you have enough support after your last discharge?: Yes    Elopement Risk  Legal Hold: No  Ambulatory or Self Mobile in Wheelchair: Yes  Disoriented: No  Psychiatric Symptoms: None  History of Wandering: No  Elopement this Admit: No  Vocalizing Wanting to Leave: No  Displays Behaviors, Body Language Wanting to Leave: No-Not at Risk for Elopement  Elopement Risk: Not at Risk for Elopement    Interdisciplinary Discharge Planning  Lives with - Patient's Self Care Capacity: Adult Children  Patient or legal guardian wants to designate a caregiver: No  Support Systems: Children    Discharge " Preparedness  What is your plan after discharge?: Home health care, Home with help    Vision / Hearing Impairment  Vision Impairment : No  Hearing Impairment : No    Domestic Abuse  Have you ever been the victim of abuse or violence?: No  Physical Abuse or Sexual Abuse: No  Verbal Abuse or Emotional Abuse: No  Possible Abuse/Neglect Reported to:: Not Applicable    Psychological Assessment  History of Substance Abuse: None  History of Psychiatric Problems: No    Discharge Risks or Barriers  Discharge risks or barriers?: No PCP    Anticipated Discharge Information  Discharge Disposition: Discharged to home/self care (01)  Discharge Address: [address] [place] [address]

## 2023-12-05 NOTE — PROGRESS NOTES
RCC    Called by Dr. Michele Lambert's for possible IMCU upgrade for lower GI bleed.  Chart reviewed at length and patient seen and examined.  Patient admitted on 12/1 and assessed at that time and admitted to telemetry.  Patient is a 78-year-old woman with a past medical history of CVA, HTN, prior seizure, DM 2 and known history of diverticulosis with associated bleeding in 2019.  Patient's hemoglobin has been slowly trending down and she has had some persistent hematochezia at times.  Colonoscopy on 12/2 revealed pancolitis worse on the left with visual appearance suggestive of possible ulcerative colitis with biopsies sent and pathology is pending.  Initial hemoglobin 5.9 on 12/1 and she was transfused up to 10.1 with only 1 unit transfusion?  She has now drifted down to 7.1 and is receiving 1 unit packed RBCs.  TEG with mapping on 12/1 was borderline abnormal and did not obviate the need for transfusion of additional blood products.  On exam she is awake and alert in good spirits and denies any GI symptoms except for a little discomfort in her left lateral/lower abdomen earlier today.  Her vital signs are normal.  Her abdomen is completely nontender and benign on exam.  Her CTA abdomen pelvis done at 6 PM tonight revealed again colonic diverticulosis and a focus of active extravasation of bleeding in the descending colon.  Dr. Reyes and Dr. Retana have reviewed these films and IR embolization is being arranged now.  The patient has 2 PIV's at this point that are both flowing nicely per RN.  Family is present at the bedside and case reviewed with patient, family and RN at length.  GI has been following.  Patient does not appear to acutely need the ICU at this time.  We will reassess post IR procedure which should be performed here shortly and consider IMCU transfer.  Hospitalist team coordinating with IR and GI at this time.    Addendum: No bleeding identified when angiogram performed, no embolization procedure  performed.  Vital signs stable postprocedure on telemetry.  Repeat hemoglobin requested every 6 hours, will move up first draw to now.

## 2023-12-05 NOTE — OR SURGEON
Immediate Post- Operative Note        Findings: NO LONGER SHOWS ACTIVE EXTRAVASATION ALONG DESCENDING COLON AT TIME OF THIS ANGIO.    CELIAC: REPLACED RHA NOTED  SMA: REPLACED RHA NOTED. NO ACTIVE BLEED  ANDRY: CONVENTIONAL ANATOMY. NO ACTIVE BLEED.     R ILIAC-FEMORAL ANGIO. SUITABLE ANATOMY FOR 6F ANGIOSEAL WHICH WAS DEPLOYED WITH PROMPT HEMOSTASIS.      Procedure(s): R CFA PUNCTURE. CELIAC, SMA, ANDRY ANGIO.    NO EMBO PERFORMED OR INDICATED.     R CFA 6F ANGIOSEAL.       Estimated Blood Loss: Less than 20 ml  (FLUSHES)        Complications: None            12/4/2023     8:27 PM     Phill Retana M.D.

## 2023-12-05 NOTE — CARE PLAN
The patient is Stable - Low risk of patient condition declining or worsening    Shift Goals  Clinical Goals: Hemodynamic Stability/ Monitor H&H  Patient Goals: Sleep / Pain control  Family Goals: janeth    Progress made toward(s) clinical / shift goals:        Problem: Knowledge Deficit - Standard  Goal: Patient and family/care givers will demonstrate understanding of plan of care, disease process/condition, diagnostic tests and medications  Outcome: Progressing     Problem: Hemodynamics  Goal: Patient's hemodynamics, fluid balance and neurologic status will be stable or improve  Outcome: Progressing     Problem: Fluid Volume  Goal: Fluid volume balance will be maintained  Outcome: Progressing     Problem: Urinary - Renal Perfusion  Goal: Ability to achieve and maintain adequate renal perfusion and functioning will improve  Outcome: Progressing     Problem: Respiratory  Goal: Patient will achieve/maintain optimum respiratory ventilation and gas exchange  Outcome: Progressing       Patient is not progressing towards the following goals:

## 2023-12-05 NOTE — PROGRESS NOTES
Received bedside report from outgoing RN, and assumed care of the patient.  Patient is alert with family at bedside.  One unit of PRBC was infusing.  While receiving report, IR arrived to take her for procedure.  Patient went to IR with blood infusing.

## 2023-12-05 NOTE — PROGRESS NOTES
Pt presents to IR2. Patient was consented by MD at bedside, confirmed by this RN and consent at bedside. Pt transferred to IR table in supine position. Patient underwent a visceral angiogram by Dr. Retana. Procedure site was marked by MD and verified using imaging guidance. Pt placed on monitor, prepped and draped in a sterile fashion. Vitals were taken every 5 minutes and remained stable during procedure (see doc flow sheet for results). CO2 waveform capnography was monitored and remained WNL throughout procedure. Report called to Edwina SHARIF. Pt transported by stretcher with RN to [id number].     Angio-Seal VIP 6F  REF: [id number]  LOT: [id number]  EXP: 06/12/2024

## 2023-12-05 NOTE — CONSULTS
DATE OF CONSULTATION:  12/5/2023     REFERRING PHYSICIAN:   Pelon Westfall M.D.     CONSULTING PHYSICIAN:  Eitan Kay M.D.     REASON FOR CONSULTATION:  I have been asked by Dr. Westfall to see the patient in surgical consultation for evaluation of lower gastrointestinal bleeding.    HISTORY OF PRESENT ILLNESS: The patient is a 78 year-old elderly woman admitted to the hospital 4 days ago with hemodynamically significant rectal bleeding.  Despite lower endoscopy and visceral angiography, the source of hemorrhage has not been localized.  Colonoscopy demonstrated diffuse pancolitis and left sided diverticulosis without mass.  Biopsy was negative for inflammatory bowel disease.  She has received 2 units of packed red blood cells during this hospitalization.  The patient had a previous episode of lower GI bleeding in 2019.  She denies any prior abdominal surgery.  She was taking a baby aspirin daily prior to admission following a stroke last month.      PAST MEDICAL HISTORY:  has a past medical history of CVA (cerebral vascular accident) ([facility]) (11/24/2023), Essential hypertension (3/13/2019), and Type 2 diabetes mellitus with complication, without long-term current use of insulin ([facility]) (3/13/2019).    She has no past medical history of Asthma, CAD (coronary artery disease), Cancer (HCC), Chronic obstructive pulmonary disease (HCC), Congestive heart failure (HCC), Infectious disease, Liver disease, Psychiatric disorder, Renal disorder, or Seizure disorder ([facility]).    PAST SURGICAL HISTORY:  has a past surgical history that includes colonoscopy - endo (N/A, 5/28/2019); pr colonoscopy,diagnostic (N/A, 12/2/2023); and pr colonoscopy,biopsy (N/A, 12/2/2023).    ALLERGIES:   Allergies   Allergen Reactions    Sulfa Drugs Rash     Full body rash    Shellfish-Derived Products Rash     Full body rash     CURRENT MEDICATIONS:    Home Medications       Reviewed by Xavier Fleming R.N. (Registered Nurse) on 12/02/23 at  1102  Med List Status: Not Addressed     Medication Last Dose Status   acetaminophen (TYLENOL) 325 MG Tab 11/30/2023 Active   acetaminophen (Tylenol) tablet 650 mg  Active   amLODIPine (NORVASC) 10 MG Tab unk Active   aspirin (ASA) 81 MG Chew Tab chewable tablet 11/30/2023 Active   atorvastatin (LIPITOR) 80 MG tablet 11/29/2023 Active   atorvastatin (Lipitor) tablet 80 mg  Active   Blood Glucose Test Strips supply Active   dextrose 10 % BOLUS 25 g  Active   diclofenac sodium (VOLTAREN) 1 % Gel new Active   insulin glargine (LANTUS) 100 UNIT/ML SC SOLN 11/29/2023 Active   insulin GLARGINE (Lantus,Semglee) injection  Active   insulin lispro (HumaLOG,AdmeLOG) injection  Active   insulin regular (HUMULIN R) 100 Unit/mL Solution unk Active   ipratropium-albuterol (DUONEB) nebulizer solution  Active   labetalol (Normodyne/Trandate) injection 10 mg  Active   lactated ringers infusion  Active   lactated ringers infusion (BOLUS)  Active   Lancets supply Active   levETIRAcetam (KEPPRA) 1000 MG tablet 11/29/2023 Active   levETIRAcetam (Keppra) injection 1,000 mg  Active   losartan (COZAAR) 50 MG Tab 11/30/2023 Active   ondansetron (Zofran ODT) dispertab 4 mg  Active   ondansetron (Zofran) syringe/vial injection 4 mg  Active   pantoprazole (Protonix) injection 40 mg  Active                FAMILY HISTORY: family history includes Heart Disease in her father; Hypertension in her father.    SOCIAL HISTORY:  reports that she has never smoked. She has never used smokeless tobacco. She reports current alcohol use. She reports that she does not use drugs.    REVIEW OF SYSTEMS: Comprehensive review of systems is negative with the exception of the aforementioned HPI, PMH, and PSH bullets in accordance with CMS guidelines.    PHYSICAL EXAMINATION:    Physical Exam  Vitals and nursing note reviewed.   Constitutional:       General: She is not in acute distress.     Appearance: Normal appearance.   HENT:      Head: Normocephalic and  atraumatic.      Mouth/Throat:      Mouth: Mucous membranes are moist.      Pharynx: Oropharynx is clear.   Eyes:      Extraocular Movements: Extraocular movements intact.      Conjunctiva/sclera: Conjunctivae normal.      Pupils: Pupils are equal, round, and reactive to light.   Cardiovascular:      Rate and Rhythm: Regular rhythm. Tachycardia present.      Pulses: Normal pulses.   Pulmonary:      Effort: Pulmonary effort is normal. No respiratory distress.      Breath sounds: Normal breath sounds.   Abdominal:      General: There is no distension.      Palpations: Abdomen is soft.      Tenderness: There is no abdominal tenderness. There is no guarding.   Musculoskeletal:         General: No tenderness or deformity. Normal range of motion.      Cervical back: Normal range of motion and neck supple.   Skin:     General: Skin is warm and dry.      Capillary Refill: Capillary refill takes less than 2 seconds.   Neurological:      General: No focal deficit present.      Mental Status: She is alert.   Psychiatric:         Mood and Affect: Mood normal.         Behavior: Behavior normal.     LABORATORY VALUES:   Recent Labs     12/03/23  0538 12/03/23  1715 12/05/23  0035 12/05/23  0615 12/05/23  1147   WBC 7.4  --  7.4  --   --    RBC 3.64*  --  2.91*  --   --    HEMOGLOBIN 9.5*   < > 7.9* 8.2* 7.9*   HEMATOCRIT 29.9*   < > 24.1* 25.2* 24.4*   MCV 82.1  --  82.8  --   --    MCH 26.1*  --  27.1  --   --    MCHC 31.8*  --  32.8  --   --    RDW 44.1  --  44.6  --   --    PLATELETCT 273  --  252  --   --    MPV 9.8  --  9.6  --   --     < > = values in this interval not displayed.     Recent Labs     12/03/23  0538 12/04/23  0644 12/05/23  0035   SODIUM 140 143 141   POTASSIUM 3.5* 3.0* 3.6   CHLORIDE 106 108 109   CO2 23 27 26   GLUCOSE 232* 137* 121*   BUN 8 8 9   CREATININE 0.59 0.76 0.61   CALCIUM 8.3* 8.0* 7.6*     IMAGING:   IR-EMBOLIZATION   Final Result      1.  Visceral angiography with greatest attention to the  "inferior mesenteric artery (ANDRY) no longer demonstrating extravasation in the descending colon as was seen on the preceding CTA of the abdomen and pelvis from this evening. As such, no particulate    or coil embolization was performed or indicated.   2.  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   3.  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                  INTERPRETING LOCATION: [address], [place], [address]      CTA ABDOMEN PELVIS W & W/O POST PROCESS   Final Result      1.  Colonic diverticulosis.      2.  Focus of active extravasation in the descending colon.      3.  These findings were discussed with MARY HEREDIA at 18:15 hours 12/4/2023      CTA ABDOMEN PELVIS W & W/O POST PROCESS   Final Result         1.  No visualized aneurysm or dissection.   2.  No intraluminal contrast identified to indicate site of GI bleed   3.  Diverticulosis   4.  Small fat-containing bilateral inguinal hernias   5.  Atherosclerosis        ASSESSMENT AND PLAN:   Lower gastrointestinal hemorrhage without specific site localization.  No surgical intervention warranted currently.  Recommend continued observation and repeat endoscopy and/or angiography or for persistent hemorrhage.  Consider upper endoscopy and/or capsule endoscopy to exclude a more proximal source.  Surgical intervention reserved for significant ongoing hemorrhage.  Without localization, treatment would involve total abdominal colectomy with end ileostomy.    DISPOSITION: Medical evaluation and admission. The patient was admitted to the Medical Service prior to surgical consultation. [facility] [place] [facility] will follow.       ____________________________________     Eitan Kay M.D.    DD: 12/5/2023  1:44 PM  "

## 2023-12-05 NOTE — HOSPITAL COURSE
78-year-old female with history of stroke, hypertension, dyslipidemia diabetes and seizure who was admitted on 12/1 for bright blood in stool.  Patient had episode of rectal right red blood, denied any significant abdominal pain, no nausea or vomiting and no fever.  Initially patient was admitted to the hospital for stroke and seizure and discharged to [facility] on 11/29 and while she was at the SNF noticed rectal bleeding.  Her baseline hemoglobin around 12.9 and patient was found to have hemoglobin 5.9.  Patient underwent colonoscopy on 12/2 and showed pancolitis with possible ischemia versus ulcerative colitis, biopsy did not show signs of acute inflammation colitis however likely possible ischemia.  Patient had an other episode of bloody stool on 12/4, patient was transferred to telemetry floor, CTA was done and showed focus of active extravasation in the descending colon, patient went to the OR by IR for possible embolization however there was no indication for embolization and no signs of bleeding.  Vascular surgeon was consulted

## 2023-12-05 NOTE — PROGRESS NOTES
Hospital Medicine Daily Progress Note    Date of Service  12/5/2023    Chief Complaint  Molly Fountain is a 78 y.o. female admitted 12/1/2023 with lower GI bleeding    Hospital Course  78-year-old female with history of stroke, hypertension, dyslipidemia diabetes and seizure who was admitted on 12/1 for bright blood in stool.  Patient had episode of rectal right red blood, denied any significant abdominal pain, no nausea or vomiting and no fever.  Initially patient was admitted to the hospital for stroke and seizure and discharged to [facility] on 11/29 and while she was at the SNF noticed rectal bleeding.  Her baseline hemoglobin around 12.9 and patient was found to have hemoglobin 5.9.  Patient underwent colonoscopy on 12/2 and showed pancolitis with possible ischemia versus ulcerative colitis, biopsy did not show signs of acute inflammation colitis however likely possible ischemia.  Patient had an other episode of bloody stool on 12/4, patient was transferred to telemetry floor, CTA was done and showed focus of active extravasation in the descending colon, patient went to the OR by IR for possible embolization however there was no indication for embolization and no signs of bleeding.  Vascular surgeon was consulted      Interval Problem Update  -Evaluated examined the patient at bedside, alert oriented x 4, patient has another episode of bloody stool, hemoglobin dropped and received 1 unit of red blood cell  -Pathology result reviewed with GI and showed possible ischemic colitis, case was discussed with vascular surgeon, waiting for further recommendations.  -Continue IV fluid, continue holding aspirin at this time and continue monitoring hemoglobin every 8 hours.  -The case was discussed in detail with the patient, answered all her questions, patient is frustrated from recurrent GI bleeding.    I have discussed this patient's plan of care and discharge plan at IDT rounds today with Case Management, Nursing, Nursing  leadership, and other members of the IDT team.    Consultants/Specialty  GI and vascular surgery    Code Status  DNAR/DNI    Disposition  The patient is not medically cleared for discharge to home or a post-acute facility.  Anticipate discharge to: skilled nursing facility    I have placed the appropriate orders for post-discharge needs.    Review of Systems  Review of Systems   Constitutional:  Positive for malaise/fatigue. Negative for chills, fever and weight loss.   HENT:  Negative for ear pain, hearing loss and tinnitus.    Eyes:  Negative for blurred vision, double vision and photophobia.   Respiratory:  Negative for cough and hemoptysis.    Cardiovascular:  Negative for chest pain, palpitations, orthopnea and claudication.   Gastrointestinal:  Positive for blood in stool. Negative for abdominal pain, constipation, diarrhea, nausea and vomiting.   Genitourinary:  Negative for dysuria, frequency and urgency.   Musculoskeletal:  Negative for myalgias and neck pain.   Skin:  Negative for rash.   Neurological:  Negative for dizziness, speech change and weakness.        Physical Exam  Temp:  [36.2 °C (97.2 °F)-37.2 °C (99 °F)] 36.7 °C (98.1 °F)  Pulse:  [80-98] 98  Resp:  [14-21] 16  BP: ()/(56-84) 105/61  SpO2:  [90 %-100 %] 94 %    Physical Exam  Constitutional:       General: She is not in acute distress.     Appearance: She is obese. She is not ill-appearing.   HENT:      Head: Normocephalic and atraumatic.   Eyes:      General: No scleral icterus.  Cardiovascular:      Rate and Rhythm: Normal rate.      Heart sounds: No murmur heard.  Pulmonary:      Effort: No respiratory distress.      Breath sounds: No wheezing.   Abdominal:      General: Bowel sounds are normal. There is no distension.      Palpations: Abdomen is soft.      Tenderness: There is no abdominal tenderness.   Skin:     Coloration: Skin is not jaundiced.      Findings: No lesion or rash.   Neurological:      General: No focal deficit  "present.      Mental Status: She is oriented to person, place, and time. Mental status is at baseline.         Fluids    Intake/Output Summary (Last 24 hours) at 12/5/2023 1307  Last data filed at 12/5/2023 0614  Gross per 24 hour   Intake 608 ml   Output 800 ml   Net -192 ml       Laboratory  Recent Labs     12/03/23  0538 12/03/23  1715 12/05/23  0035 12/05/23  0615 12/05/23  1147   WBC 7.4  --  7.4  --   --    RBC 3.64*  --  2.91*  --   --    HEMOGLOBIN 9.5*   < > 7.9* 8.2* 7.9*   HEMATOCRIT 29.9*   < > 24.1* 25.2* 24.4*   MCV 82.1  --  82.8  --   --    MCH 26.1*  --  27.1  --   --    MCHC 31.8*  --  32.8  --   --    RDW 44.1  --  44.6  --   --    PLATELETCT 273  --  252  --   --    MPV 9.8  --  9.6  --   --     < > = values in this interval not displayed.     Recent Labs     12/03/23  0538 12/04/23  0644 12/05/23  0035   SODIUM 140 143 141   POTASSIUM 3.5* 3.0* 3.6   CHLORIDE 106 108 109   CO2 23 27 26   GLUCOSE 232* 137* 121*   BUN 8 8 9   CREATININE 0.59 0.76 0.61   CALCIUM 8.3* 8.0* 7.6*                   Imaging  IR-EMBOLIZATION   Final Result      1.  Visceral angiography with greatest attention to the inferior mesenteric artery (ANDRY) no longer demonstrating extravasation in the descending colon as was seen on the preceding CTA of the abdomen and pelvis from this evening. As such, no particulate    or coil embolization was performed or indicated.   2.  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   3.  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                  [facility] [address] [facility] [place] [address]      CTA ABDOMEN PELVIS W & W/O POST PROCESS   Final Result      1.  Colonic diverticulosis.      2.  Focus of active extravasation in the descending colon.      3.  These findings were discussed with MARY HEREDIA at 18:15 hours 12/4/2023      CTA ABDOMEN PELVIS W & W/O POST " PROCESS   Final Result         1.  No visualized aneurysm or dissection.   2.  No intraluminal contrast identified to indicate site of GI bleed   3.  Diverticulosis   4.  Small fat-containing bilateral inguinal hernias   5.  Atherosclerosis           Assessment/Plan  * Pancolitis (HCC)- (present on admission)  Assessment & Plan  Likely ischemia   Colonoscopy showed pancolitis  Biopsies did not show signs of acute inflammation colitis  CTA was done and did not show significant stenosis  IR was consulted and patient did not need embolization  GI on board and vascular surgeon was consulted  Continue IV fluid and continue statin    Iron deficiency anemia  Assessment & Plan  Likely secondary to GIB  IV iron infusion  Continue monitoring hemoglobin    Acute GI bleeding- (present on admission)  Assessment & Plan  Came with low hemoglobin 5.9 and received multiple red blood cell transfusion  Likely related to ischemic colitis  Colonoscopy showed pancolitis with no mass  Biopsy showed likely ischemic colitis and no signs of inflammation  GI on board and vascular surgeon was consulted  Continue monitoring hemoglobin every 8 hours and blood transfusion if needed    Class 1 obesity in adult- (present on admission)  Assessment & Plan  Diet and lifestyle modification  Body mass index is 32.3 kg/m².      CVA (cerebral vascular accident) (HCC)- (present on admission)  Assessment & Plan  Recent CVA  Resume aspirin when appropriate, patient still having active bleeding  Continue statin    ACP (advance care planning)- (present on admission)  Assessment & Plan  Patient wanted DNR/DNI    Seizure (HCC)- (present on admission)  Assessment & Plan  Continue monitoring  Seizure precautions  Stable, continue home dose of Keppra 1000 twice daily    Acute blood loss anemia- (present on admission)  Assessment & Plan  With hemoglobin 5.9 on admission  Continue trending hemoglobin and blood transfusion if needed          Hypokalemia- (present on  admission)  Assessment & Plan  3.0 replacing, magnesium still low 1.7 I have ordered IV magnesium    Essential hypertension- (present on admission)  Assessment & Plan  Hold meds for now in setting for GIB concern  Resume amlodipine 2.5 mg  Avoid hypotension especially patient has ischemic colitis    Type 2 diabetes mellitus with complication, without long-term current use of insulin (HCC)- (present on admission)  Assessment & Plan  Uncontrolled and A1c around 14  Continue Lantus 15 units  Diabetic diet  Monitor accuchecks and cover with SSI.  Hypoglycemic protocol  Statin  Continue monitoring         VTE prophylaxis:   SCDs/TEDs   pharmacologic prophylaxis contraindicated due to Active GI bleeding      I have performed a physical exam and reviewed and updated ROS and Plan today (12/5/2023). In review of yesterday's note (12/4/2023), there are no changes except as documented above.    Greater than 51 minutes spent prepping to see patient (e.g. review of tests) obtaining and/or reviewing separately obtained history. Performing a medically appropriate examination and/ evaluation.  Counseling and educating the patient/family/caregiver.  Ordering medications, tests, or procedures.  Referring and communicating with other health care professionals.  Documenting clinical information in EPIC.  Independently interpreting results and communicating results to patient/family/caregiver.  Care coordination

## 2023-12-05 NOTE — PROGRESS NOTES
INTRODUCED SELF TO PATIENT D/T ANOTHER RN ASSISTED PATIENT TO GET SITUATED IN BED AND V/S WAS TAKEN.    ALERT AND ORIENTED X 4, IN NO APPARENT DISTRESS. ON RA. DENIED PAIN. TELE ON AND HEART RHYTHM AND RATE VERIFIED WITH MONITOR TECH.    PLAN OF CARE DISCUSSED BRIEFLY. 1 UNIT PRBC ORDERED TO TRANSFUSE, SHE VERBALIZED UNDERSTANDING.     PADDED SIDE RAILS UP X 3 FOR SEIZURE PRECAUTIONS, BED TO LOWEST LEVEL AND ALARM ON. CALL LIGHT IN HAND AND APPROPRIATE TO USE FOR NEEDS AND ASSISTANCE.

## 2023-12-06 LAB
ALBUMIN SERPL BCP-MCNC: 2.7 G/DL (ref 3.2–4.9)
ALBUMIN/GLOB SERPL: 1.3 G/DL
ALP SERPL-CCNC: 70 U/L (ref 30–99)
ALT SERPL-CCNC: 8 U/L (ref 2–50)
ANION GAP SERPL CALC-SCNC: 7 MMOL/L (ref 7–16)
AST SERPL-CCNC: 10 U/L (ref 12–45)
BASOPHILS # BLD AUTO: 0.4 % (ref 0–1.8)
BASOPHILS # BLD: 0.03 K/UL (ref 0–0.12)
BILIRUB SERPL-MCNC: 0.3 MG/DL (ref 0.1–1.5)
BUN SERPL-MCNC: 11 MG/DL (ref 8–22)
CALCIUM ALBUM COR SERPL-MCNC: 8.8 MG/DL (ref 8.5–10.5)
CALCIUM SERPL-MCNC: 7.8 MG/DL (ref 8.5–10.5)
CHLORIDE SERPL-SCNC: 111 MMOL/L (ref 96–112)
CO2 SERPL-SCNC: 25 MMOL/L (ref 20–33)
CREAT SERPL-MCNC: 0.79 MG/DL (ref 0.5–1.4)
EOSINOPHIL # BLD AUTO: 0.26 K/UL (ref 0–0.51)
EOSINOPHIL NFR BLD: 3.7 % (ref 0–6.9)
ERYTHROCYTE [DISTWIDTH] IN BLOOD BY AUTOMATED COUNT: 46 FL (ref 35.9–50)
FERRITIN SERPL-MCNC: 21.1 NG/ML (ref 10–291)
GFR SERPLBLD CREATININE-BSD FMLA CKD-EPI: 76 ML/MIN/1.73 M 2
GLOBULIN SER CALC-MCNC: 2.1 G/DL (ref 1.9–3.5)
GLUCOSE BLD STRIP.AUTO-MCNC: 163 MG/DL (ref 65–99)
GLUCOSE BLD STRIP.AUTO-MCNC: 180 MG/DL (ref 65–99)
GLUCOSE BLD STRIP.AUTO-MCNC: 80 MG/DL (ref 65–99)
GLUCOSE SERPL-MCNC: 97 MG/DL (ref 65–99)
HCT VFR BLD AUTO: 22.4 % (ref 37–47)
HCT VFR BLD AUTO: 22.9 % (ref 37–47)
HCT VFR BLD AUTO: 23.4 % (ref 37–47)
HCT VFR BLD AUTO: 23.6 % (ref 37–47)
HGB BLD-MCNC: 7 G/DL (ref 12–16)
HGB BLD-MCNC: 7.3 G/DL (ref 12–16)
HGB BLD-MCNC: 7.5 G/DL (ref 12–16)
HGB BLD-MCNC: 7.6 G/DL (ref 12–16)
HGB RETIC QN AUTO: 26.5 PG/CELL (ref 29–35)
IMM GRANULOCYTES # BLD AUTO: 0.05 K/UL (ref 0–0.11)
IMM GRANULOCYTES NFR BLD AUTO: 0.7 % (ref 0–0.9)
IMM RETICS NFR: 30.8 % (ref 2.6–16.1)
INR PPP: 1.1 (ref 0.87–1.13)
IRON SATN MFR SERPL: 6 % (ref 15–55)
IRON SERPL-MCNC: 15 UG/DL (ref 40–170)
LYMPHOCYTES # BLD AUTO: 1.74 K/UL (ref 1–4.8)
LYMPHOCYTES NFR BLD: 24.6 % (ref 22–41)
MAGNESIUM SERPL-MCNC: 2.1 MG/DL (ref 1.5–2.5)
MCH RBC QN AUTO: 26.6 PG (ref 27–33)
MCHC RBC AUTO-ENTMCNC: 31.9 G/DL (ref 32.2–35.5)
MCV RBC AUTO: 83.6 FL (ref 81.4–97.8)
MONOCYTES # BLD AUTO: 0.55 K/UL (ref 0–0.85)
MONOCYTES NFR BLD AUTO: 7.8 % (ref 0–13.4)
NEUTROPHILS # BLD AUTO: 4.44 K/UL (ref 1.82–7.42)
NEUTROPHILS NFR BLD: 62.8 % (ref 44–72)
NRBC # BLD AUTO: 0 K/UL
NRBC BLD-RTO: 0 /100 WBC (ref 0–0.2)
PLATELET # BLD AUTO: 270 K/UL (ref 164–446)
PMV BLD AUTO: 9.3 FL (ref 9–12.9)
POTASSIUM SERPL-SCNC: 3.6 MMOL/L (ref 3.6–5.5)
PROT SERPL-MCNC: 4.8 G/DL (ref 6–8.2)
PROTHROMBIN TIME: 14.3 SEC (ref 12–14.6)
RBC # BLD AUTO: 2.74 M/UL (ref 4.2–5.4)
RETICS # AUTO: 0.09 M/UL (ref 0.04–0.12)
RETICS/RBC NFR: 3.4 % (ref 0.8–2.6)
SODIUM SERPL-SCNC: 143 MMOL/L (ref 135–145)
TIBC SERPL-MCNC: 262 UG/DL (ref 250–450)
UIBC SERPL-MCNC: 247 UG/DL (ref 110–370)
WBC # BLD AUTO: 7.1 K/UL (ref 4.8–10.8)

## 2023-12-06 PROCEDURE — 85014 HEMATOCRIT: CPT | Mod: 91

## 2023-12-06 PROCEDURE — 700102 HCHG RX REV CODE 250 W/ 637 OVERRIDE(OP): Performed by: HOSPITALIST

## 2023-12-06 PROCEDURE — 97162 PT EVAL MOD COMPLEX 30 MIN: CPT

## 2023-12-06 PROCEDURE — 85018 HEMOGLOBIN: CPT | Mod: 91

## 2023-12-06 PROCEDURE — A9270 NON-COVERED ITEM OR SERVICE: HCPCS | Performed by: HOSPITALIST

## 2023-12-06 PROCEDURE — 770020 HCHG ROOM/CARE - TELE (206)

## 2023-12-06 PROCEDURE — 99232 SBSQ HOSP IP/OBS MODERATE 35: CPT | Performed by: NURSE PRACTITIONER

## 2023-12-06 PROCEDURE — 700111 HCHG RX REV CODE 636 W/ 250 OVERRIDE (IP): Mod: JZ | Performed by: INTERNAL MEDICINE

## 2023-12-06 PROCEDURE — 82728 ASSAY OF FERRITIN: CPT

## 2023-12-06 PROCEDURE — 99233 SBSQ HOSP IP/OBS HIGH 50: CPT | Performed by: INTERNAL MEDICINE

## 2023-12-06 PROCEDURE — 85046 RETICYTE/HGB CONCENTRATE: CPT

## 2023-12-06 PROCEDURE — 83550 IRON BINDING TEST: CPT

## 2023-12-06 PROCEDURE — 700105 HCHG RX REV CODE 258: Performed by: INTERNAL MEDICINE

## 2023-12-06 PROCEDURE — 99231 SBSQ HOSP IP/OBS SF/LOW 25: CPT | Performed by: SURGERY

## 2023-12-06 PROCEDURE — 700102 HCHG RX REV CODE 250 W/ 637 OVERRIDE(OP): Performed by: STUDENT IN AN ORGANIZED HEALTH CARE EDUCATION/TRAINING PROGRAM

## 2023-12-06 PROCEDURE — 82962 GLUCOSE BLOOD TEST: CPT | Mod: 91

## 2023-12-06 PROCEDURE — A9270 NON-COVERED ITEM OR SERVICE: HCPCS | Performed by: STUDENT IN AN ORGANIZED HEALTH CARE EDUCATION/TRAINING PROGRAM

## 2023-12-06 PROCEDURE — 97535 SELF CARE MNGMENT TRAINING: CPT

## 2023-12-06 PROCEDURE — 36415 COLL VENOUS BLD VENIPUNCTURE: CPT

## 2023-12-06 PROCEDURE — 83540 ASSAY OF IRON: CPT

## 2023-12-06 RX ADMIN — LEVETIRACETAM 1000 MG: 500 TABLET, FILM COATED ORAL at 16:30

## 2023-12-06 RX ADMIN — ATORVASTATIN CALCIUM 80 MG: 80 TABLET, FILM COATED ORAL at 16:31

## 2023-12-06 RX ADMIN — INSULIN LISPRO 2 UNITS: 100 INJECTION, SOLUTION INTRAVENOUS; SUBCUTANEOUS at 16:32

## 2023-12-06 RX ADMIN — INSULIN LISPRO 2 UNITS: 100 INJECTION, SOLUTION INTRAVENOUS; SUBCUTANEOUS at 11:55

## 2023-12-06 RX ADMIN — SODIUM CHLORIDE 250 MG: 9 INJECTION, SOLUTION INTRAVENOUS at 16:31

## 2023-12-06 RX ADMIN — SODIUM CHLORIDE: 9 INJECTION, SOLUTION INTRAVENOUS at 10:22

## 2023-12-06 RX ADMIN — Medication 400 MG: at 05:01

## 2023-12-06 RX ADMIN — LEVETIRACETAM 1000 MG: 500 TABLET, FILM COATED ORAL at 05:01

## 2023-12-06 RX ADMIN — OMEPRAZOLE 20 MG: 20 CAPSULE, DELAYED RELEASE ORAL at 05:01

## 2023-12-06 ASSESSMENT — ENCOUNTER SYMPTOMS
FALLS: 0
PHOTOPHOBIA: 0
NECK PAIN: 0
DIZZINESS: 0
COUGH: 0
CHILLS: 0
FLANK PAIN: 0
FEVER: 0
DIARRHEA: 0
PALPITATIONS: 0
INSOMNIA: 0
BLOOD IN STOOL: 0
SORE THROAT: 0
CONSTIPATION: 0
WEIGHT LOSS: 0
BLURRED VISION: 0
HEADACHES: 0
DOUBLE VISION: 0
ORTHOPNEA: 0
NAUSEA: 0
NERVOUS/ANXIOUS: 0
MYALGIAS: 0
SPEECH CHANGE: 0
HEMOPTYSIS: 0
ABDOMINAL PAIN: 0
VOMITING: 0
WEAKNESS: 0
CLAUDICATION: 0
BLOOD IN STOOL: 1
SHORTNESS OF BREATH: 0

## 2023-12-06 ASSESSMENT — COGNITIVE AND FUNCTIONAL STATUS - GENERAL
MOBILITY SCORE: 24
SUGGESTED CMS G CODE MODIFIER MOBILITY: CH

## 2023-12-06 ASSESSMENT — GAIT ASSESSMENTS
DISTANCE (FEET): 400
GAIT LEVEL OF ASSIST: SUPERVISED
ASSISTIVE DEVICE: FRONT WHEEL WALKER

## 2023-12-06 ASSESSMENT — PAIN DESCRIPTION - PAIN TYPE: TYPE: ACUTE PAIN

## 2023-12-06 ASSESSMENT — FIBROSIS 4 INDEX: FIB4 SCORE: 1.03

## 2023-12-06 NOTE — FACE TO FACE
Face to Face Supporting Documentation - Home Health    The encounter with this patient was in whole or in part the primary reason for home health admission.    Date of encounter:   Patient:                    MRN:                       YOB: 2024  Molly Fountain  [id number]  1945     Home health to see patient for:  Skilled Nursing care for assessment, interventions & education, Home health aide, Physical Therapy evaluation and treatment, and Occupational therapy evaluation and treatment    Skilled need for:  Exacerbation of Chronic Disease State GI bleeding     Skilled nursing interventions to include:  Venous access care and Line/Drain/Airway education and care    Homebound status evidenced by:  Need the aid of supportive devices such as crutches, canes, wheelchairs or walkers. Leaving home requires a considerable and taxing effort. There is a normal inability to leave the home.    Community Physician to provide follow up care: Pcp Pt States None     Optional Interventions? No      I certify the face to face encounter for this home health care referral meets the CMS requirements and the encounter/clinical assessment with the patient was, in whole, or in part, for the medical condition(s) listed above, which is the primary reason for home health care. Based on my clinical findings: the service(s) are medically necessary, support the need for home health care, and the homebound criteria are met.  I certify that this patient has had a face to face encounter by myself.  Pelon Westfall M.D. - JESUSI: [phone]

## 2023-12-06 NOTE — PROGRESS NOTES
Assumed care on patient, alert and oriented x 4. Pleasant. Denies pain. Denies any more bloody stool but x 1 today. Vss. Tele on and heart rhythm and rate checked on monitor.    Plan of care include, v/s q 4 hours, HH q 6 hours, monitor for GIB, maintain safety. Safety precautions in place are nonskid socks on, padded side rails up x 3, bed to lowest level and alarm on. Able to use call light for needs and assistance. Will round hourly and as needed.

## 2023-12-06 NOTE — PROGRESS NOTES
DATE: 12/6/2023    INTERVAL EVENTS:  No evidence for significant persistent hemorrhage.      ASSESSMENT AND PLAN:  Lower gastrointestinal hemorrhage.  Gastroenterology assessment and recommendations noted.  Suspect ischemic colitis.  No indication for acute surgical intervention.  [facility] [place] [facility] will continue to follow closely.         ____________________________________     Eitan Kay M.D.    DD: 12/6/2023  7:28 AM

## 2023-12-06 NOTE — THERAPY
Physical Therapy   Initial Evaluation     Patient Name: Molly Fountain  Age:  78 y.o., Sex:  female  Medical Record #: [id number]  Today's Date: 12/6/2023     Precautions  Precautions: Fall Risk    Assessment  Patient is 78 y.o. female with history of recent CVA on ASA, hypertension, hyperlipidemia, diabetes, seizure disorder who presented 12/1/2023 with evaluation for rectal bleeding .  Additional factors influencing patient status / progress : today, pt shows adequate strength for transfers and ambulation using FWW, but does show a lack of endurance. Pt is motivated, agreeable to ambulate using FWW with nsg supervision to build her endurance. See details below. .      Plan         DC Equipment Recommendations: Front-Wheel Walker  Discharge Recommendations: Recommend post-acute placement for additional physical therapy services prior to discharge home            Objective       12/06/23 1246   Charge Group   PT Evaluation PT Evaluation Mod   PT Self Care / Home Evaluation (Units) 1   Total Time Spent   PT Total Time Yes   PT Evaluation Time Spent (Mins) 30   PT Self Care/Home Evaluation Time Spent (Mins) 8   PT Total Time Spent (Calculated) 38   Initial Contact Note    Initial Contact Note Order Received and Verified, Evaluation Only - Patient Does Not Require Further Acute Physical Therapy at this Time.  However, May Benefit from Post Acute Therapy for Higher Level Functional Deficits.   Precautions   Precautions Fall Risk   Vitals   O2 Delivery Device None - Room Air   Pain 0 - 10 Group   Therapist Pain Assessment During Activity;Nurse Notified;0  (no c/o pain when up to walk today)   Prior Living Situation   Prior Services   (here from SNF, does not want to return to [place])   Housing / Facility 1 Story House   Steps Into Home 3   Steps In Home 0   Rail Right Rail (Steps into Home)   Equipment Owned None   Lives with - Patient's Self Care Capacity Adult Children  (daughter, works a lot)   Prior Level of Functional  Mobility   Bed Mobility Independent   Transfer Status Independent   Ambulation Independent   Ambulation Distance lately, household, but normally share shopping with daughter.   Assistive Devices Used None   Stairs Independent   Cognition    Level of Consciousness Alert   Comments pleasant, chatty, asks to brush teeth. No assist needed, wants to brush teeth at EOB.   Active ROM Lower Body    Active ROM Lower Body  WDL   Strength Lower Body   Lower Body Strength  WDL   Comments decreased endurance, but strength functional for transfers, ambulation.   Sensation Lower Body   Comments no c/o n/t B LEs   Other Treatments   Other Treatments Provided education done re walking while using FWW with nsg to build endurance. Pt agreeable.   Balance Assessment   Sitting Balance (Static) Good   Sitting Balance (Dynamic) Fair   Standing Balance (Static) Fair   Standing Balance (Dynamic) Fair   Weight Shift Sitting Good   Weight Shift Standing Good   Comments with FWW   Bed Mobility    Supine to Sit Supervised   Sit to Supine Supervised   Scooting Supervised   Rolling Supervised   Comments Pt asks for help to don socks, stays reports that her daughter helps with this at home or she wears slip on shoes.   Gait Analysis   Gait Level Of Assist Supervised   Assistive Device Front Wheel Walker   Distance (Feet) 400   # of Times Distance was Traveled 1   # of Stairs Climbed 5   Level of Assist with Stairs Supervised   Weight Bearing Status no restrictions   Functional Mobility   Sit to Stand Supervised   Bed, Chair, Wheelchair Transfer Supervised   Transfer Method Stand Pivot   How much difficulty does the patient currently have...   Turning over in bed (including adjusting bedclothes, sheets and blankets)? 4   Sitting down on and standing up from a chair with arms (e.g., wheelchair, bedside commode, etc.) 4   Moving from lying on back to sitting on the side of the bed? 4   How much help from another person does the patient currently  need...   Moving to and from a bed to a chair (including a wheelchair)? 4   Need to walk in a hospital room? 4   Climbing 3-5 steps with a railing? 4   6 clicks Mobility Score 24   Activity Tolerance   Sitting Edge of Bed 15+   Standing 10+   Education Group   Education Provided Role of Physical Therapist   Role of Physical Therapist Patient Response Patient;Acceptance;Explanation;Verbal Demonstration   Additional Comments see above, pt will ambulate in hallway using fWW with nsg supervision to build endurance.   Anticipated Discharge Equipment and Recommendations   DC Equipment Recommendations Front-Wheel Walker   Discharge Recommendations Recommend post-acute placement for additional physical therapy services prior to discharge home   Interdisciplinary Plan of Care Collaboration   IDT Collaboration with  Nursing   Patient Position at End of Therapy In Bed;Call Light within Reach;Tray Table within Reach;Phone within Reach   Collaboration Comments ralph updated   Session Information   Date / Session Number  12/6-1x only, dc needs only

## 2023-12-06 NOTE — PROGRESS NOTES
Gastroenterology Progress Note               Author:  Kendy BRANDT Date & Time Created: 12/6/2023 8:40 AM       Patient ID:  Name:             Molly Fountain    YOB: 1945  Age:                 78 y.o.  female  MRN:               [id number]        Medical Decision Making, by Problem:  Active Hospital Problems    Diagnosis     Pancolitis (HCC) [K51.00]     Acute GI bleeding [K92.2]     Iron deficiency anemia [D50.9]     Class 1 obesity in adult [E66.9]     CVA (cerebral vascular accident) (HCC) [I63.9]     ACP (advance care planning) [Z71.89]     Seizure (HCC) [R56.9]     Acute blood loss anemia [D62]     Type 2 diabetes mellitus with complication, without long-term current use of insulin (HCC) [E11.8]     Hypokalemia [E87.6]     Essential hypertension [I10]        Presenting Chief Complaint:  Hematochezia. Consult requested by Dr Reyes     Interval History:  12/3/2023: This is a very pleasant 78 y.o. female who had hematochezia. She states she has not had a bowel movement since yesterday. We did a colonoscopy yesterday that showed significant colitis. Her biopsies are still pending   She does not have pain and feels great. She has a new primary care doctor and is looking forward to going home.     12/4/2023: Patient with bloody bowel movement x 2 today (formed stool with surrounding blood and small clots) with associated left-sided abdominal aching.  Hemoglobin 9.1-8.5.  Pathology from colonoscopy is pending.    12/5/2023: Reviewed overnight events.  Patient had bloody bowel movement overnight and intensive care was consulted for consideration of upgrade.  She received 1 unit PRBC for hgb 7.1. CTA abdomen revealed colonic diverticulosis with a focus of extravasation in the descending colon.  Unfortunately, when she went to IR there was no bleeding identified on angiogram and no embolization was performed.  Continues on clear liquids.  Vital signs stable with hemoglobin  7.9.    12/6/2023: Patient seen and examined.  Reports feeling much better, having brown stools with no evidence of bleeding.  Hemoglobin stable at 7.5.    Hospital Medications:  Current Facility-Administered Medications   Medication Dose Frequency Provider Last Rate Last Admin    MD Alert...Total Body Iron Replacement per Pharmacy   PHARMACY TO DOSE Pelon Westfall M.D.        NS infusion   Continuous Pelon Westfall M.D. 83 mL/hr at 12/05/23 1405 New Bag at 12/05/23 1405    insulin GLARGINE (Lantus,Semglee) injection  15 Units Q EVENING Michele Reyes M.D.   15 Units at 12/05/23 1701    And    insulin lispro (HumaLOG,AdmeLOG) injection  2-9 Units Q6HRS Michele Reyes M.D.   2 Units at 12/04/23 1159    And    dextrose 10 % BOLUS 25 g  25 g Q15 MIN PRN Michele Reyes M.D.        diazePAM (Valium) injection 5 mg  5 mg Q4HRS PRN Michele Reyes M.D.        magnesium oxide tablet 400 mg  400 mg DAILY Michele Reyes M.D.   400 mg at 12/06/23 0501    levETIRAcetam (Keppra) tablet 1,000 mg  1,000 mg BID Tesfaye Mcmullen D.O.   1,000 mg at 12/06/23 0501    omeprazole (PriLOSEC) capsule 20 mg  20 mg DAILY Tesfaye Mcmullen D.O.   20 mg at 12/06/23 0501    lactated ringers infusion (BOLUS)  500 mL Once PRN Brandon Hinton M.D.        acetaminophen (Tylenol) tablet 650 mg  650 mg Q6HRS PRN Brandon Hinton M.D.   650 mg at 12/05/23 0807    labetalol (Normodyne/Trandate) injection 10 mg  10 mg Q4HRS PRN Brandon Hinton M.D.        ondansetron (Zofran) syringe/vial injection 4 mg  4 mg Q4HRS PRN Brandon Hinton M.D.        ondansetron (Zofran ODT) dispertab 4 mg  4 mg Q4HRS PRN Brandon Hinton M.D.        atorvastatin (Lipitor) tablet 80 mg  80 mg Q EVENING Brandon Hinton M.D.   80 mg at 12/05/23 1653    ipratropium-albuterol (DUONEB) nebulizer solution  3 mL Q4H PRN (RT) Brandon Hinton M.D.       Last reviewed on 12/2/2023 11:02 AM by Xavier Fleming R.N.       Review of Systems:  Review of Systems    Constitutional:  Negative for chills, fever and malaise/fatigue.   HENT:  Negative for congestion and sore throat.    Respiratory:  Negative for cough and shortness of breath.    Cardiovascular:  Negative for chest pain and leg swelling.   Gastrointestinal:  Negative for abdominal pain, blood in stool, constipation, diarrhea, melena, nausea and vomiting.   Genitourinary:  Negative for dysuria and flank pain.   Musculoskeletal:  Negative for falls and myalgias.   Neurological:  Negative for headaches.   Psychiatric/Behavioral:  The patient is not nervous/anxious and does not have insomnia.    All other systems reviewed and are negative.        Vital signs:  Weight/BMI: Body mass index is 31.21 kg/m².  /78   Pulse 98   Temp 36.7 °C (98.1 °F) (Temporal)   Resp 18   Wt 79.9 kg (176 lb 2.4 oz)   SpO2 94%   Vitals:    12/05/23 1916 12/05/23 2334 12/06/23 0408 12/06/23 0709   BP: 113/60 108/61 130/81 132/78   Pulse: 98 95 (!) 101 98   Resp: 16 16 16 18   Temp: 37.4 °C (99.3 °F) 36.6 °C (97.9 °F) 36.7 °C (98.1 °F) 36.7 °C (98.1 °F)   TempSrc: Temporal Temporal Temporal Temporal   SpO2: 96% 95% 93% 94%   Weight:   79.9 kg (176 lb 2.4 oz)      Oxygen Therapy:  Pulse Oximetry: 94 %, O2 (LPM): 0, O2 Delivery Device: None - Room Air    Intake/Output Summary (Last 24 hours) at 12/6/2023 0840  Last data filed at 12/6/2023 0000  Gross per 24 hour   Intake --   Output 550 ml   Net -550 ml           Physical Exam:  Physical Exam  Vitals and nursing note reviewed.   Constitutional:       General: She is not in acute distress.     Appearance: She is obese. She is not ill-appearing.   HENT:      Head: Normocephalic.      Nose: Nose normal. No congestion.      Mouth/Throat:      Mouth: Mucous membranes are moist.      Pharynx: Oropharynx is clear. No oropharyngeal exudate.   Eyes:      General: No scleral icterus.     Extraocular Movements: Extraocular movements intact.      Conjunctiva/sclera: Conjunctivae normal.    Cardiovascular:      Rate and Rhythm: Normal rate and regular rhythm.      Pulses: Normal pulses.      Heart sounds: Normal heart sounds. No murmur heard.  Pulmonary:      Effort: Pulmonary effort is normal. No respiratory distress.      Breath sounds: Normal breath sounds.   Abdominal:      General: Abdomen is flat. Bowel sounds are normal. There is no distension.      Palpations: Abdomen is soft.      Tenderness: There is abdominal tenderness (Mild left-sided abdominal tenderness). There is no guarding.   Musculoskeletal:      Right lower leg: No edema.      Left lower leg: No edema.   Skin:     General: Skin is warm and dry.      Capillary Refill: Capillary refill takes less than 2 seconds.      Coloration: Skin is not jaundiced.   Neurological:      General: No focal deficit present.      Mental Status: She is alert and oriented to person, place, and time. Mental status is at baseline.      Motor: No weakness.   Psychiatric:         Mood and Affect: Mood normal.         Behavior: Behavior normal.         Thought Content: Thought content normal.         Judgment: Judgment normal.             Labs:  Recent Labs     12/04/23 0644 12/05/23 0035 12/05/23 2357   SODIUM 143 141 143   POTASSIUM 3.0* 3.6 3.6   CHLORIDE 108 109 111   CO2 27 26 25   BUN 8 9 11   CREATININE 0.76 0.61 0.79   MAGNESIUM 1.7 2.1 2.1   CALCIUM 8.0* 7.6* 7.8*       Recent Labs     12/04/23  0644 12/05/23 0035 12/05/23 2357   ALTSGPT  --   --  8   ASTSGOT  --   --  10*   ALKPHOSPHAT  --   --  70   TBILIRUBIN  --   --  0.3   GLUCOSE 137* 121* 97       Recent Labs     12/05/23 0035 12/05/23 2357   WBC 7.4 7.1   NEUTSPOLYS  --  62.80   LYMPHOCYTES  --  24.60   MONOCYTES  --  7.80   EOSINOPHILS  --  3.70   BASOPHILS  --  0.40   ASTSGOT  --  10*   ALTSGPT  --  8   ALKPHOSPHAT  --  70   TBILIRUBIN  --  0.3       Recent Labs     12/05/23  0035 12/05/23  0615 12/05/23  1801 12/05/23 2357 12/06/23  0621   RBC 2.91*  --   --  2.74*  --     HEMOGLOBIN 7.9*   < > 7.5* 7.3* 7.6*   HEMATOCRIT 24.1*   < > 22.9* 22.9* 23.6*   PLATELETCT 252  --   --  270  --    PROTHROMBTM  --   --   --  14.3  --    INR  --   --   --  1.10  --     < > = values in this interval not displayed.       Recent Results (from the past 24 hour(s))   HEMOGLOBIN AND HEMATOCRIT    Collection Time: 12/05/23 11:47 AM   Result Value Ref Range    Hemoglobin 7.9 (L) 12.0 - 16.0 g/dL    Hematocrit 24.4 (L) 37.0 - 47.0 %   POCT glucose device results    Collection Time: 12/05/23 12:20 PM   Result Value Ref Range    POC Glucose, Blood 94 65 - 99 mg/dL   POCT glucose device results    Collection Time: 12/05/23  4:53 PM   Result Value Ref Range    POC Glucose, Blood 92 65 - 99 mg/dL   HEMOGLOBIN AND HEMATOCRIT    Collection Time: 12/05/23  6:01 PM   Result Value Ref Range    Hemoglobin 7.5 (L) 12.0 - 16.0 g/dL    Hematocrit 22.9 (L) 37.0 - 47.0 %   POCT glucose device results    Collection Time: 12/05/23 11:33 PM   Result Value Ref Range    POC Glucose, Blood 98 65 - 99 mg/dL   CBC WITH DIFFERENTIAL    Collection Time: 12/05/23 11:57 PM   Result Value Ref Range    WBC 7.1 4.8 - 10.8 K/uL    RBC 2.74 (L) 4.20 - 5.40 M/uL    Hemoglobin 7.3 (L) 12.0 - 16.0 g/dL    Hematocrit 22.9 (L) 37.0 - 47.0 %    MCV 83.6 81.4 - 97.8 fL    MCH 26.6 (L) 27.0 - 33.0 pg    MCHC 31.9 (L) 32.2 - 35.5 g/dL    RDW 46.0 35.9 - 50.0 fL    Platelet Count 270 164 - 446 K/uL    MPV 9.3 9.0 - 12.9 fL    Neutrophils-Polys 62.80 44.00 - 72.00 %    Lymphocytes 24.60 22.00 - 41.00 %    Monocytes 7.80 0.00 - 13.40 %    Eosinophils 3.70 0.00 - 6.90 %    Basophils 0.40 0.00 - 1.80 %    Immature Granulocytes 0.70 0.00 - 0.90 %    Nucleated RBC 0.00 0.00 - 0.20 /100 WBC    Neutrophils (Absolute) 4.44 1.82 - 7.42 K/uL    Lymphs (Absolute) 1.74 1.00 - 4.80 K/uL    Monos (Absolute) 0.55 0.00 - 0.85 K/uL    Eos (Absolute) 0.26 0.00 - 0.51 K/uL    Baso (Absolute) 0.03 0.00 - 0.12 K/uL    Immature Granulocytes (abs) 0.05 0.00 - 0.11  "K/uL    NRBC (Absolute) 0.00 K/uL   Comp Metabolic Panel    Collection Time: 12/05/23 11:57 PM   Result Value Ref Range    Sodium 143 135 - 145 mmol/L    Potassium 3.6 3.6 - 5.5 mmol/L    Chloride 111 96 - 112 mmol/L    Co2 25 20 - 33 mmol/L    Anion Gap 7.0 7.0 - 16.0    Glucose 97 65 - 99 mg/dL    Bun 11 8 - 22 mg/dL    Creatinine 0.79 0.50 - 1.40 mg/dL    Calcium 7.8 (L) 8.5 - 10.5 mg/dL    Correct Calcium 8.8 8.5 - 10.5 mg/dL    AST(SGOT) 10 (L) 12 - 45 U/L    ALT(SGPT) 8 2 - 50 U/L    Alkaline Phosphatase 70 30 - 99 U/L    Total Bilirubin 0.3 0.1 - 1.5 mg/dL    Albumin 2.7 (L) 3.2 - 4.9 g/dL    Total Protein 4.8 (L) 6.0 - 8.2 g/dL    Globulin 2.1 1.9 - 3.5 g/dL    A-G Ratio 1.3 g/dL   Prothrombin Time    Collection Time: 12/05/23 11:57 PM   Result Value Ref Range    PT 14.3 12.0 - 14.6 sec    INR 1.10 0.87 - 1.13   MAGNESIUM    Collection Time: 12/05/23 11:57 PM   Result Value Ref Range    Magnesium 2.1 1.5 - 2.5 mg/dL   ESTIMATED GFR    Collection Time: 12/05/23 11:57 PM   Result Value Ref Range    GFR (CKD-EPI) 76 >60 mL/min/1.73 m 2   POCT glucose device results    Collection Time: 12/06/23  5:03 AM   Result Value Ref Range    POC Glucose, Blood 80 65 - 99 mg/dL   HEMOGLOBIN AND HEMATOCRIT    Collection Time: 12/06/23  6:21 AM   Result Value Ref Range    Hemoglobin 7.6 (L) 12.0 - 16.0 g/dL    Hematocrit 23.6 (L) 37.0 - 47.0 %       Radiology Review:  IR-EMBOLIZATION   Final Result      1.  Visceral angiography with greatest attention to the inferior mesenteric artery (ANDRY) no longer demonstrating extravasation in the descending colon as was seen on the preceding CTA of the abdomen and pelvis from this evening. As such, no particulate    or coil embolization was performed or indicated.   2.  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   3.  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             "      INTERPRETING LOCATION: [address], [place], [address]      CTA ABDOMEN PELVIS W & W/O POST PROCESS   Final Result      1.  Colonic diverticulosis.      2.  Focus of active extravasation in the descending colon.      3.  These findings were discussed with MARY HEREDIA at 18:15 hours 12/4/2023      CTA ABDOMEN PELVIS W & W/O POST PROCESS   Final Result         1.  No visualized aneurysm or dissection.   2.  No intraluminal contrast identified to indicate site of GI bleed   3.  Diverticulosis   4.  Small fat-containing bilateral inguinal hernias   5.  Atherosclerosis          MDM (Data Review):   -Records reviewed and summarized in current documentation  -I personally reviewed and interpreted the laboratory results  -I personally reviewed the radiology images    Assessment/Recommendations:  Pancolitis  Lower GI bleeding  Acute blood loss anemia  Iron deficiency anemia  Obesity in Adult  History of CVA  History of Seizure Secondary to CVA  Type 2 diabetes mellitus without use of insulin  Hypertension  Hypokalemia  Positive CTA  Colitis on imaging and colonscopy    Plan:  -- Monitor H/H and for signs of rebleeding  -- Transfuse as necessary  -- Pathology consistent with ischemic colitis in the left colon.   -- Dr. Venegas spoke to Dr. Valdivia from vascular, he does not feel that this ischemic colitis is due to vascular anomaly  --Reviewed general surgery evaluation, no acute surgical intervention warranted at this time  -- Ischemic colitis usually resolves over time  -- Maintain good blood pressure  -- Patient 1 small brown bowel movement today, agreed with her to trial diet today and monitor for further bleeding  -- IV iron replacement ordered, patient is significantly iron deficient    -- GI team will continue to follow    Discussed with patient, Dr. Westfall, Dr. Herrera    Core Quality Measures   Reviewed items::  Labs, Medications and Radiology reports reviewed

## 2023-12-06 NOTE — DISCHARGE PLANNING
1339  DPA sent [facility] referrals @ 1339 per LSNACHO Acevedo.     1539  DPA sent HH referral to [facility]  @ 1533 per choice form obtained from media.

## 2023-12-06 NOTE — PROGRESS NOTES
[facility] Medicine Daily Progress Note    Date of Service  12/6/2023    Chief Complaint  Molly Fountain is a 78 y.o. female admitted 12/1/2023 with lower GI bleeding    Hospital Course  78-year-old female with history of stroke, hypertension, dyslipidemia diabetes and seizure who was admitted on 12/1 for bright blood in stool.  Patient had episode of rectal right red blood, denied any significant abdominal pain, no nausea or vomiting and no fever.  Initially patient was admitted to the hospital for stroke and seizure and discharged to [facility] on 11/29 and while she was at the SNF noticed rectal bleeding.  Her baseline hemoglobin around 12.9 and patient was found to have hemoglobin 5.9.  Patient underwent colonoscopy on 12/2 and showed pancolitis with possible ischemia versus ulcerative colitis, biopsy did not show signs of acute inflammation colitis however likely possible ischemia.  Patient had an other episode of bloody stool on 12/4, patient was transferred to telemetry floor, CTA was done and showed focus of active extravasation in the descending colon, patient went to the OR by IR for possible embolization however there was no indication for embolization and no signs of bleeding.  Vascular surgeon and general surgeon were consulted and no need for intervention at this time.  Hemoglobin has been stable around 8 and patient received IV iron      Interval Problem Update  -Evaluated examined the patient at bedside, alert oriented x 4, denied any new symptoms, no blood in stool last 24 hours and hemoglobin stable around 7.5, case was discussed with GI team, continue monitoring him for another day and if there is any recurrent bleeding patient might need to repeat colonoscopy.    -IV iron was ordered, continue monitoring hemoglobin   -IV fluid, avoid hypotension to avoid ischemic colitis   -Case was discussed in detail with the patient and answered all her questions.    I have discussed this patient's plan of care and  discharge plan at IDT rounds today with Case Management, Nursing, Nursing leadership, and other members of the IDT team.    Consultants/Specialty  GI and vascular surgery    Code Status  DNAR/DNI    Disposition  The patient is not medically cleared for discharge to home or a post-acute facility.  Anticipate discharge to: skilled nursing facility    I have placed the appropriate orders for post-discharge needs.    Review of Systems  Review of Systems   Constitutional:  Positive for malaise/fatigue. Negative for chills, fever and weight loss.   HENT:  Negative for ear pain, hearing loss and tinnitus.    Eyes:  Negative for blurred vision, double vision and photophobia.   Respiratory:  Negative for cough and hemoptysis.    Cardiovascular:  Negative for chest pain, palpitations, orthopnea and claudication.   Gastrointestinal:  Positive for blood in stool. Negative for abdominal pain, constipation, diarrhea, nausea and vomiting.   Genitourinary:  Negative for dysuria, frequency and urgency.   Musculoskeletal:  Negative for myalgias and neck pain.   Skin:  Negative for rash.   Neurological:  Negative for dizziness, speech change and weakness.        Physical Exam  Temp:  [36.5 °C (97.7 °F)-37.4 °C (99.3 °F)] 36.5 °C (97.7 °F)  Pulse:  [] 95  Resp:  [16-18] 18  BP: (103-132)/(59-81) 109/65  SpO2:  [93 %-96 %] 96 %    Physical Exam  Constitutional:       General: She is not in acute distress.     Appearance: She is obese. She is not ill-appearing.   HENT:      Head: Normocephalic and atraumatic.   Eyes:      General: No scleral icterus.  Cardiovascular:      Rate and Rhythm: Normal rate.      Heart sounds: No murmur heard.  Pulmonary:      Effort: No respiratory distress.      Breath sounds: No wheezing.   Abdominal:      General: Bowel sounds are normal. There is no distension.      Palpations: Abdomen is soft.      Tenderness: There is no abdominal tenderness.   Skin:     Coloration: Skin is not jaundiced.       "Findings: No lesion or rash.   Neurological:      General: No focal deficit present.      Mental Status: She is oriented to person, place, and time. Mental status is at baseline.       Fluids    Intake/Output Summary (Last 24 hours) at 12/6/2023 1510  Last data filed at 12/6/2023 0000  Gross per 24 hour   Intake --   Output 550 ml   Net -550 ml       Laboratory  Recent Labs     12/05/23  0035 12/05/23  0615 12/05/23  2357 12/06/23  0621 12/06/23  1149   WBC 7.4  --  7.1  --   --    RBC 2.91*  --  2.74*  --   --    HEMOGLOBIN 7.9*   < > 7.3* 7.6* 7.5*   HEMATOCRIT 24.1*   < > 22.9* 23.6* 23.4*   MCV 82.8  --  83.6  --   --    MCH 27.1  --  26.6*  --   --    MCHC 32.8  --  31.9*  --   --    RDW 44.6  --  46.0  --   --    PLATELETCT 252  --  270  --   --    MPV 9.6  --  9.3  --   --     < > = values in this interval not displayed.     Recent Labs     12/04/23  0644 12/05/23  0035 12/05/23  2357   SODIUM 143 141 143   POTASSIUM 3.0* 3.6 3.6   CHLORIDE 108 109 111   CO2 27 26 25   GLUCOSE 137* 121* 97   BUN 8 9 11   CREATININE 0.76 0.61 0.79   CALCIUM 8.0* 7.6* 7.8*     Recent Labs     12/05/23  2357   INR 1.10               Imaging  IR-EMBOLIZATION   Final Result      1.  Visceral angiography with greatest attention to the inferior mesenteric artery (ANDRY) no longer demonstrating extravasation in the descending colon as was seen on the preceding CTA of the abdomen and pelvis from this evening. As such, no particulate    or coil embolization was performed or indicated.   2.  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   3.  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                  INTERPRETING LOCATION: [address] [facility], [place], [address]      CTA ABDOMEN PELVIS W & W/O POST PROCESS   Final Result      1.  Colonic diverticulosis.      2.  Focus of active extravasation in the descending colon.      3.  These " findings were discussed with MARY HEREDIA at 18:15 hours 12/4/2023      CTA ABDOMEN PELVIS W & W/O POST PROCESS   Final Result         1.  No visualized aneurysm or dissection.   2.  No intraluminal contrast identified to indicate site of GI bleed   3.  Diverticulosis   4.  Small fat-containing bilateral inguinal hernias   5.  Atherosclerosis           Assessment/Plan  * Pancolitis (HCC)- (present on admission)  Assessment & Plan  Likely ischemia   Colonoscopy showed pancolitis  Biopsies did not show signs of acute inflammation colitis  CTA was done and did not show significant stenosis  IR was consulted and patient did not need embolization  GI on board and vascular surgeon was consulted  Continue IV fluid and continue statin  Vascular surgeon and general surgeon did not recommend any intervention, continue supportive treatment  Avoid hypotension    Iron deficiency anemia  Assessment & Plan  Likely secondary to GIB  IV iron infusion  Continue monitoring hemoglobin    Acute GI bleeding- (present on admission)  Assessment & Plan  Came with low hemoglobin 5.9 and received multiple red blood cell transfusion  Likely related to ischemic colitis  Colonoscopy showed pancolitis with no mass  Biopsy showed likely ischemic colitis and no signs of inflammation  GI on board and vascular surgeon was consulted  Continue monitoring hemoglobin every 8 hours and blood transfusion if needed    Class 1 obesity in adult- (present on admission)  Assessment & Plan  Diet and lifestyle modification  Body mass index is 32.3 kg/m².      CVA (cerebral vascular accident) (HCC)- (present on admission)  Assessment & Plan  Recent CVA  Resume aspirin when appropriate, patient still having active bleeding  Continue statin    ACP (advance care planning)- (present on admission)  Assessment & Plan  Patient wanted DNR/DNI    Seizure (HCC)- (present on admission)  Assessment & Plan  Continue monitoring  Seizure precautions  Stable, continue  home dose of Keppra 1000 twice daily    Acute blood loss anemia- (present on admission)  Assessment & Plan  With hemoglobin 5.9 on admission  Continue trending hemoglobin and blood transfusion if needed          Hypokalemia- (present on admission)  Assessment & Plan  3.0 replacing, magnesium still low 1.7 I have ordered IV magnesium    Essential hypertension- (present on admission)  Assessment & Plan  Hold meds for now in setting for GIB concern  Resume amlodipine 2.5 mg  Avoid hypotension especially patient has ischemic colitis    Type 2 diabetes mellitus with complication, without long-term current use of insulin (HCC)- (present on admission)  Assessment & Plan  Uncontrolled and A1c around 14  Continue Lantus 15 units  Diabetic diet  Monitor accuchecks and cover with SSI.  Hypoglycemic protocol  Statin  Continue monitoring         VTE prophylaxis:   SCDs/TEDs   pharmacologic prophylaxis contraindicated due to GI bleeding      I have performed a physical exam and reviewed and updated ROS and Plan today (12/6/2023). In review of yesterday's note (12/5/2023), there are no changes except as documented above.    Greater than 51 minutes spent prepping to see patient (e.g. review of tests) obtaining and/or reviewing separately obtained history. Performing a medically appropriate examination and/ evaluation.  Counseling and educating the patient/family/caregiver.  Ordering medications, tests, or procedures.  Referring and communicating with other health care professionals.  Documenting clinical information in EPIC.  Independently interpreting results and communicating results to patient/family/caregiver.  Care coordination

## 2023-12-06 NOTE — PROGRESS NOTES
Gastroenterology Progress Note               Author:  KARLY Lee Date & Time Created: 12/5/2023 4:59 PM       Patient ID:  Name:             Molly Fountain    YOB: 1945  Age:                 78 y.o.  female  MRN:               [id number]        Medical Decision Making, by Problem:  Active Hospital Problems    Diagnosis     Pancolitis (HCC) [K51.00]     Acute GI bleeding [K92.2]     Iron deficiency anemia [D50.9]     Class 1 obesity in adult [E66.9]     CVA (cerebral vascular accident) (HCC) [I63.9]     ACP (advance care planning) [Z71.89]     Seizure (HCC) [R56.9]     Acute blood loss anemia [D62]     Type 2 diabetes mellitus with complication, without long-term current use of insulin (HCC) [E11.8]     Hypokalemia [E87.6]     Essential hypertension [I10]        Presenting Chief Complaint:  Hematochezia. Consult requested by Dr Reyes     Interval History:  12/3/2023: This is a very pleasant 78 y.o. female who had hematochezia. She states she has not had a bowel movement since yesterday. We did a colonoscopy yesterday that showed significant colitis. Her biopsies are still pending   She does not have pain and feels great. She has a new primary care doctor and is looking forward to going home.     12/4/2023: Patient with bloody bowel movement x 2 today (formed stool with surrounding blood and small clots) with associated left-sided abdominal aching.  Hemoglobin 9.1-8.5.  Pathology from colonoscopy is pending.    12/5/2023: Reviewed overnight events.  Patient had bloody bowel movement overnight and intensive care was consulted for consideration of upgrade.  She received 1 unit PRBC for hgb 7.1. CTA abdomen revealed colonic diverticulosis with a focus of extravasation in the descending colon.  Unfortunately, when she went to IR there was no bleeding identified on angiogram and no embolization was performed.  Continues on clear liquids.  Vital signs stable with hemoglobin  7.9.      Hospital Medications:  Current Facility-Administered Medications   Medication Dose Frequency Provider Last Rate Last Admin    NS infusion   Continuous Pelon Westfall M.D. 83 mL/hr at 12/05/23 1405 New Bag at 12/05/23 1405    insulin GLARGINE (Lantus,Semglee) injection  15 Units Q EVENING Michele Reyes M.D.   15 Units at 12/04/23 1718    And    insulin lispro (HumaLOG,AdmeLOG) injection  2-9 Units Q6HRS Michele Reyes M.D.   2 Units at 12/04/23 1159    And    dextrose 10 % BOLUS 25 g  25 g Q15 MIN PRN Michele Reyes M.D.        diazePAM (Valium) injection 5 mg  5 mg Q4HRS PRN Michele Reyes M.D.        magnesium oxide tablet 400 mg  400 mg DAILY Michele Reyes M.D.   400 mg at 12/05/23 0600    levETIRAcetam (Keppra) tablet 1,000 mg  1,000 mg BID Tesfaye Mcmullen D.O.   1,000 mg at 12/05/23 1653    omeprazole (PriLOSEC) capsule 20 mg  20 mg DAILY Tesfaye Mcmullen D.OSteffanie   20 mg at 12/05/23 0600    lactated ringers infusion (BOLUS)  500 mL Once PRN Brandon Hinton M.D.        acetaminophen (Tylenol) tablet 650 mg  650 mg Q6HRS PRN Brandon Hinton M.D.   650 mg at 12/05/23 0807    labetalol (Normodyne/Trandate) injection 10 mg  10 mg Q4HRS PRN Brandon Hinton M.D.        ondansetron (Zofran) syringe/vial injection 4 mg  4 mg Q4HRS PRN Brandon Hinton M.D.        ondansetron (Zofran ODT) dispertab 4 mg  4 mg Q4HRS PRN Brandon Hinton M.D.        atorvastatin (Lipitor) tablet 80 mg  80 mg Q EVENING Brandon Hinton M.D.   80 mg at 12/05/23 1653    ipratropium-albuterol (DUONEB) nebulizer solution  3 mL Q4H PRN (RT) Brandon Hinton M.D.       Last reviewed on 12/2/2023 11:02 AM by Xavier Fleming R.N.       Review of Systems:  Review of Systems   Constitutional:  Negative for chills, fever and malaise/fatigue.   HENT:  Negative for congestion and sore throat.    Respiratory:  Negative for cough and shortness of breath.    Cardiovascular:  Negative for chest pain and leg swelling.    Gastrointestinal:  Positive for blood in stool. Negative for abdominal pain, constipation, diarrhea, melena, nausea and vomiting.   Genitourinary:  Negative for dysuria and flank pain.   Musculoskeletal:  Negative for falls and myalgias.   Neurological:  Negative for headaches.   Psychiatric/Behavioral:  The patient is not nervous/anxious and does not have insomnia.    All other systems reviewed and are negative.        Vital signs:  Weight/BMI: Body mass index is 31.21 kg/m².  /65   Pulse 97   Temp 36.9 °C (98.4 °F) (Temporal)   Resp 16   Wt 79.9 kg (176 lb 2.4 oz)   SpO2 94%   Vitals:    12/05/23 0500 12/05/23 0727 12/05/23 1114 12/05/23 1511   BP: 100/68 121/69 105/61 119/65   Pulse: 97 97 98 97   Resp: 17 16 16 16   Temp: 36.2 °C (97.2 °F) 36.8 °C (98.2 °F) 36.7 °C (98.1 °F) 36.9 °C (98.4 °F)   TempSrc: Temporal Temporal Temporal Temporal   SpO2: 90% 91% 94% 94%   Weight: 79.9 kg (176 lb 2.4 oz)        Oxygen Therapy:  Pulse Oximetry: 94 %, O2 (LPM): 0, O2 Delivery Device: None - Room Air    Intake/Output Summary (Last 24 hours) at 12/5/2023 1659  Last data filed at 12/5/2023 0614  Gross per 24 hour   Intake 368 ml   Output 800 ml   Net -432 ml           Physical Exam:  Physical Exam  Vitals and nursing note reviewed.   Constitutional:       General: She is not in acute distress.     Appearance: She is obese. She is not ill-appearing.   HENT:      Head: Normocephalic.      Nose: Nose normal. No congestion.      Mouth/Throat:      Mouth: Mucous membranes are moist.      Pharynx: Oropharynx is clear. No oropharyngeal exudate.   Eyes:      General: No scleral icterus.     Extraocular Movements: Extraocular movements intact.      Conjunctiva/sclera: Conjunctivae normal.   Cardiovascular:      Rate and Rhythm: Normal rate and regular rhythm.      Pulses: Normal pulses.      Heart sounds: Normal heart sounds. No murmur heard.  Pulmonary:      Effort: Pulmonary effort is normal. No respiratory distress.       Breath sounds: Normal breath sounds.   Abdominal:      General: Abdomen is flat. Bowel sounds are normal. There is no distension.      Palpations: Abdomen is soft.      Tenderness: There is abdominal tenderness (Mild left-sided abdominal tenderness). There is no guarding.   Musculoskeletal:      Right lower leg: No edema.      Left lower leg: No edema.   Skin:     General: Skin is warm and dry.      Capillary Refill: Capillary refill takes less than 2 seconds.      Coloration: Skin is not jaundiced.   Neurological:      General: No focal deficit present.      Mental Status: She is alert and oriented to person, place, and time. Mental status is at baseline.      Motor: No weakness.   Psychiatric:         Mood and Affect: Mood normal.         Behavior: Behavior normal.         Thought Content: Thought content normal.         Judgment: Judgment normal.             Labs:  Recent Labs     12/03/23  0538 12/04/23  0644 12/05/23  0035   SODIUM 140 143 141   POTASSIUM 3.5* 3.0* 3.6   CHLORIDE 106 108 109   CO2 23 27 26   BUN 8 8 9   CREATININE 0.59 0.76 0.61   MAGNESIUM 1.9 1.7 2.1   CALCIUM 8.3* 8.0* 7.6*       Recent Labs     12/03/23  0538 12/04/23  0644 12/05/23  0035   GLUCOSE 232* 137* 121*       Recent Labs     12/03/23  0538 12/05/23  0035   WBC 7.4 7.4       Recent Labs     12/03/23  0538 12/03/23  1715 12/05/23  0035 12/05/23  0615 12/05/23  1147   RBC 3.64*  --  2.91*  --   --    HEMOGLOBIN 9.5*   < > 7.9* 8.2* 7.9*   HEMATOCRIT 29.9*   < > 24.1* 25.2* 24.4*   PLATELETCT 273  --  252  --   --     < > = values in this interval not displayed.       Recent Results (from the past 24 hour(s))   POCT glucose device results    Collection Time: 12/04/23  5:15 PM   Result Value Ref Range    POC Glucose, Blood 147 (H) 65 - 99 mg/dL   POCT glucose device results    Collection Time: 12/05/23 12:06 AM   Result Value Ref Range    POC Glucose, Blood 127 (H) 65 - 99 mg/dL   CBC WITHOUT DIFFERENTIAL    Collection Time: 12/05/23  12:35 AM   Result Value Ref Range    WBC 7.4 4.8 - 10.8 K/uL    RBC 2.91 (L) 4.20 - 5.40 M/uL    Hemoglobin 7.9 (L) 12.0 - 16.0 g/dL    Hematocrit 24.1 (L) 37.0 - 47.0 %    MCV 82.8 81.4 - 97.8 fL    MCH 27.1 27.0 - 33.0 pg    MCHC 32.8 32.2 - 35.5 g/dL    RDW 44.6 35.9 - 50.0 fL    Platelet Count 252 164 - 446 K/uL    MPV 9.6 9.0 - 12.9 fL   Basic Metabolic Panel    Collection Time: 12/05/23 12:35 AM   Result Value Ref Range    Sodium 141 135 - 145 mmol/L    Potassium 3.6 3.6 - 5.5 mmol/L    Chloride 109 96 - 112 mmol/L    Co2 26 20 - 33 mmol/L    Glucose 121 (H) 65 - 99 mg/dL    Bun 9 8 - 22 mg/dL    Creatinine 0.61 0.50 - 1.40 mg/dL    Calcium 7.6 (L) 8.5 - 10.5 mg/dL    Anion Gap 6.0 (L) 7.0 - 16.0   MAGNESIUM    Collection Time: 12/05/23 12:35 AM   Result Value Ref Range    Magnesium 2.1 1.5 - 2.5 mg/dL   ESTIMATED GFR    Collection Time: 12/05/23 12:35 AM   Result Value Ref Range    GFR (CKD-EPI) 91 >60 mL/min/1.73 m 2   POCT glucose device results    Collection Time: 12/05/23  5:55 AM   Result Value Ref Range    POC Glucose, Blood 102 (H) 65 - 99 mg/dL   HEMOGLOBIN AND HEMATOCRIT    Collection Time: 12/05/23  6:15 AM   Result Value Ref Range    Hemoglobin 8.2 (L) 12.0 - 16.0 g/dL    Hematocrit 25.2 (L) 37.0 - 47.0 %   HEMOGLOBIN AND HEMATOCRIT    Collection Time: 12/05/23 11:47 AM   Result Value Ref Range    Hemoglobin 7.9 (L) 12.0 - 16.0 g/dL    Hematocrit 24.4 (L) 37.0 - 47.0 %   POCT glucose device results    Collection Time: 12/05/23 12:20 PM   Result Value Ref Range    POC Glucose, Blood 94 65 - 99 mg/dL       Radiology Review:  IR-EMBOLIZATION   Final Result      1.  Visceral angiography with greatest attention to the inferior mesenteric artery (ANDRY) no longer demonstrating extravasation in the descending colon as was seen on the preceding CTA of the abdomen and pelvis from this evening. As such, no particulate    or coil embolization was performed or indicated.   2.  Variant anatomy with common hepatic  "artery arising separately from the celiac trunk directly from the aorta best seen on CT.   3.  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                  INTERPRETING LOCATION: [address], [place], [address]      CTA ABDOMEN PELVIS W & W/O POST PROCESS   Final Result      1.  Colonic diverticulosis.      2.  Focus of active extravasation in the descending colon.      3.  These findings were discussed with MARY HEREDIA at 18:15 hours 12/4/2023      CTA ABDOMEN PELVIS W & W/O POST PROCESS   Final Result         1.  No visualized aneurysm or dissection.   2.  No intraluminal contrast identified to indicate site of GI bleed   3.  Diverticulosis   4.  Small fat-containing bilateral inguinal hernias   5.  Atherosclerosis          MDM (Data Review):   -Records reviewed and summarized in current documentation  -I personally reviewed and interpreted the laboratory results  -I personally reviewed the radiology images    Assessment/Recommendations:  Pancolitis  Lower GI bleeding  Acute blood loss anemia  Iron deficiency anemia  Obesity in Adult  History of CVA  History of Seizure Secondary to CVA  Type 2 diabetes mellitus without use of insulin  Hypertension  Hypokalemia  Positive CTA  Colitis on imaging and colonscopy    Plan:  -- Monitor H/H and for signs of rebleeding  -- Transfuse as necessary  -- Pathology consistent with ischemic colitis in the left colon.   -- Dr. Venegas spoke to Dr. Valdivia from vascular, he does not feel that this ischemic colitis is due to vascular anomaly  --Reviewed general surgery evaluation, no acute surgical intervention warranted at this time  -- Ischemic colitis usually resolves over time  -- Maintain good blood pressure    -- GI team will continue to follow    Discussed with patient, Dr. Westfall, Dr. Valdivia, Dr. Venegas    Core Quality Measures   Reviewed items::  Labs, Medications and Radiology reports reviewed    "

## 2023-12-06 NOTE — CARE PLAN
The patient is Stable - Low risk of patient condition declining or worsening    Shift Goals  Clinical Goals: stable hemodynamics, no bleeding  Patient Goals: no bleeding  Family Goals: janeth    Progress made toward(s) clinical / shift goals:      Problem: Knowledge Deficit - Standard  Goal: Patient and family/care givers will demonstrate understanding of plan of care, disease process/condition, diagnostic tests and medications  Description: Target End Date:  1-3 days or as soon as patient condition allows    Document in Patient Education    1.  Patient and family/caregiver oriented to unit, equipment, visitation policy and means for communicating concern  2.  Complete/review Learning Assessment  3.  Assess knowledge level of disease process/condition, treatment plan, diagnostic tests and medications  4.  Explain disease process/condition, treatment plan, diagnostic tests and medications  Outcome: Progressing     Problem: Hemodynamics  Goal: Patient's hemodynamics, fluid balance and neurologic status will be stable or improve  Description: Target End Date:  Prior to discharge or change in level of care    Document on Assessment and I/O flowsheet templates    1.  Monitor vital signs, pulse oximetry and cardiac monitor per provider order and/or policy  2.  Maintain blood pressure per provider order  3.  Hemodynamic monitoring per provider order  4.  Manage IV fluids and IV infusions  5.  Monitor intake and output  6.  Daily weights per unit policy or provider order  7.  Assess peripheral pulses and capillary refill  8.  Assess color and body temperature  9.  Position patient for maximum circulation/cardiac output  10. Monitor for signs/symptoms of excessive bleeding  11. Assess mental status, restlessness and changes in level of consciousness  12. Monitor temperature and report fever or hypothermia to provider immediately. Consideration of targeted temperature management.  Outcome: Progressing     Problem: Fall  Risk  Goal: Patient will remain free from falls  Description: Target End Date:  Prior to discharge or change in level of care    Document interventions on the Kamara Jose G Fall Risk Assessment    1.  Assess for fall risk factors  2.  Implement fall precautions  Outcome: Progressing     Problem: Mobility  Goal: Patient's capacity to carry out activities will improve  Description: Target End Date:  Prior to discharge or change in level of care    1.  Assess for barriers to mobility/activity  2.  Implement activity per interdisciplinary team recommendations  3.  Target activity level identified and patient/family/caregiver aware of goal  4.  Provide assistive devices  5.  Instruct patient/caregiver on proper use of assistive/adaptive devices  6.  Schedule activities and rest periods to decrease effects of fatigue  7.  Encourage mobilization to extent of ability  8.  Maintain proper body alignment  9.  Provide adequate pain management to allow progressive mobilization  10. Implement pace maker precautions as needed  Outcome: Progressing     Problem: Self Care  Goal: Patient will have the ability to perform ADLs independently or with assistance (bathe, groom, dress, toilet and feed)  Description: Target End Date:  Prior to discharge or change in level of care    Document on ADL flowsheet    1.  Assess the capability and level of deficiency to perform ADLs  2.  Encourage family/care giver involvement  3.  Provide assistive devices  4.  Consider PT/OT evaluations  5.  Maintain support, give positive feedback, encourage self-care allowing extra time and verbal cuing as needed  6.  Avoid doing something for patients they can do themselves, but provide assistance as needed  7.  Assist in anticipating/planning individual needs  8.  Collaborate with Case Management and  to meet discharge needs  Outcome: Progressing     Problem: Pain - Standard  Goal: Alleviation of pain or a reduction in pain to the patient’s  comfort goal  Description: Target End Date:  Prior to discharge or change in level of care    Document on Vitals flowsheet    1.  Document pain using the appropriate pain scale per order or unit policy  2.  Educate and implement non-pharmacologic comfort measures (i.e. relaxation, distraction, massage, cold/heat therapy, etc.)  3.  Pain management medications as ordered  4.  Reassess pain after pain med administration per policy  5.  If opiods administered assess patient's response to pain medication is appropriate per POSS sedation scale  6.  Follow pain management plan developed in collaboration with patient and interdisciplinary team (including palliative care or pain specialists if applicable)  Outcome: Progressing       Patient is not progressing towards the following goals:

## 2023-12-06 NOTE — DISCHARGE PLANNING
Case Management Discharge Planning    Admission Date: 12/1/2023  GMLOS: 3  ALOS: 5    6-Clicks ADL Score: 20  6-Clicks Mobility Score: 19      Anticipated Discharge Dispo: Discharge Disposition: D/T to home under HHA care in anticipation of covered skilled care (06)  Discharge Address: [address] [place] [id number]    DME Needed: No    Action(s) Taken: Discussed pt during morning rounds. Per Malou WARNER, pt anticipated to be medically clear tomorrow. OMKAR informed medical team of pt's choice of refusing post-acute placement and wanting HH. Pending PT/OT eval.    @ 0915: SW met with pt at bedside to obtain HH choice preemptively. Per pt, she chooses [facility]. SW informed pt that PT/OT eval is still pending. Pt verbalized understanding. Per pt, she has a new established PCP; will be seeing PCP next month, does not recall name. SW to f/u with pt's daughter for name.    Addendum @ 1045: Discussed pt during IDT rounds. Per Malou WARNER, pt will need post-acute. OMKAR reminded that pt is refusing placement. SW to send referrals per Malou's orders. SW asked for PT/OT orders to be placed. SW to f/u with pt for dcp.    Addendum @ 5800: OMKAR called pt's daughter to obtain pt's PCP name. Per Librado, pt's PCP is Sandra Billings. Pt had a follow up appointment on January 10; Per daughter, pt will be seen as soon as she gets released from hospital now. OMKAR discussed PT recs for post-acute placement and pt's wishes for HH. Per Librado, she is agreeable to pt going home with HH; does not want pt to return to [facility]. She reports working in [employer] for 12 hours a day but a neighbor will assist pt when she returns home. Pt's relative will also be assisting when needed, per pt's daughter.     Addendum @ 4676: Discussed pt during afternoon rounds. Per Malou WARNER, pt anticipated to dc tomorrow. OMKAR informed attending of dcp for home with HH. Pending HH order and face to face.    Escalations Completed: None    Medically Clear:  No    Next Steps: F/U with medical team to discuss discharge needs and barriers.    Barriers to Discharge: Medical clearance and Pending PT Evaluation

## 2023-12-06 NOTE — CARE PLAN
The patient is Watcher - Medium risk of patient condition declining or worsening    Shift Goals  Clinical Goals: Hemodynamic Stability/ Monitor H&H  Patient Goals: Sleep / Pain control  Family Goals: janeth    Progress made toward(s) clinical / shift goals:      Patient is not progressing towards the following goals:      Problem: Knowledge Deficit - Standard  Goal: Patient and family/care givers will demonstrate understanding of plan of care, disease process/condition, diagnostic tests and medications  Outcome: Progressing     Problem: Hemodynamics  Goal: Patient's hemodynamics, fluid balance and neurologic status will be stable or improve  Outcome: Progressing     Problem: Fluid Volume  Goal: Fluid volume balance will be maintained  Outcome: Progressing     Problem: Urinary - Renal Perfusion  Goal: Ability to achieve and maintain adequate renal perfusion and functioning will improve  Outcome: Progressing     Problem: Respiratory  Goal: Patient will achieve/maintain optimum respiratory ventilation and gas exchange  Outcome: Progressing     Problem: Mechanical Ventilation  Goal: Safe management of artificial airway and ventilation  Outcome: Progressing  Goal: Successful weaning off mechanical ventilator, spontaneously maintains adequate gas exchange  Outcome: Progressing  Goal: Patient will be able to express needs and understand communication  Outcome: Progressing     Problem: Physical Regulation  Goal: Diagnostic test results will improve  Outcome: Progressing  Goal: Signs and symptoms of infection will decrease  Outcome: Progressing     Problem: Fall Risk  Goal: Patient will remain free from falls  Outcome: Progressing     Problem: Nutrition  Goal: Patient's nutritional and fluid intake will be adequate or improve  Outcome: Progressing  Goal: Enteral nutrition will be maintained or improve  Outcome: Progressing  Goal: Enteral nutrition will be maintained or improve  Outcome: Progressing     Problem: Bowel  Elimination  Goal: Establish and maintain regular bowel function  Outcome: Progressing     Problem: Mobility  Goal: Patient's capacity to carry out activities will improve  Outcome: Progressing     Problem: Self Care  Goal: Patient will have the ability to perform ADLs independently or with assistance (bathe, groom, dress, toilet and feed)  Outcome: Progressing     Problem: Pain - Standard  Goal: Alleviation of pain or a reduction in pain to the patient’s comfort goal  Outcome: Progressing

## 2023-12-07 VITALS
HEART RATE: 98 BPM | OXYGEN SATURATION: 92 % | BODY MASS INDEX: 29.96 KG/M2 | TEMPERATURE: 97.5 F | RESPIRATION RATE: 18 BRPM | SYSTOLIC BLOOD PRESSURE: 105 MMHG | DIASTOLIC BLOOD PRESSURE: 68 MMHG | WEIGHT: 169.09 LBS

## 2023-12-07 LAB
ANION GAP SERPL CALC-SCNC: 10 MMOL/L (ref 7–16)
BASOPHILS # BLD AUTO: 0.5 % (ref 0–1.8)
BASOPHILS # BLD: 0.04 K/UL (ref 0–0.12)
BUN SERPL-MCNC: 10 MG/DL (ref 8–22)
CALCIUM SERPL-MCNC: 8.2 MG/DL (ref 8.5–10.5)
CHLORIDE SERPL-SCNC: 111 MMOL/L (ref 96–112)
CO2 SERPL-SCNC: 24 MMOL/L (ref 20–33)
CREAT SERPL-MCNC: 0.67 MG/DL (ref 0.5–1.4)
EOSINOPHIL # BLD AUTO: 0.2 K/UL (ref 0–0.51)
EOSINOPHIL NFR BLD: 2.4 % (ref 0–6.9)
ERYTHROCYTE [DISTWIDTH] IN BLOOD BY AUTOMATED COUNT: 46.6 FL (ref 35.9–50)
GFR SERPLBLD CREATININE-BSD FMLA CKD-EPI: 89 ML/MIN/1.73 M 2
GLUCOSE BLD STRIP.AUTO-MCNC: 107 MG/DL (ref 65–99)
GLUCOSE BLD STRIP.AUTO-MCNC: 95 MG/DL (ref 65–99)
GLUCOSE SERPL-MCNC: 113 MG/DL (ref 65–99)
HCT VFR BLD AUTO: 22.4 % (ref 37–47)
HCT VFR BLD AUTO: 23.2 % (ref 37–47)
HCT VFR BLD AUTO: 23.5 % (ref 37–47)
HGB BLD-MCNC: 7.2 G/DL (ref 12–16)
HGB BLD-MCNC: 7.4 G/DL (ref 12–16)
HGB BLD-MCNC: 7.4 G/DL (ref 12–16)
IMM GRANULOCYTES # BLD AUTO: 0.06 K/UL (ref 0–0.11)
IMM GRANULOCYTES NFR BLD AUTO: 0.7 % (ref 0–0.9)
LYMPHOCYTES # BLD AUTO: 1.54 K/UL (ref 1–4.8)
LYMPHOCYTES NFR BLD: 18.7 % (ref 22–41)
MAGNESIUM SERPL-MCNC: 1.9 MG/DL (ref 1.5–2.5)
MCH RBC QN AUTO: 26.9 PG (ref 27–33)
MCHC RBC AUTO-ENTMCNC: 32.1 G/DL (ref 32.2–35.5)
MCV RBC AUTO: 83.6 FL (ref 81.4–97.8)
MONOCYTES # BLD AUTO: 0.6 K/UL (ref 0–0.85)
MONOCYTES NFR BLD AUTO: 7.3 % (ref 0–13.4)
NEUTROPHILS # BLD AUTO: 5.8 K/UL (ref 1.82–7.42)
NEUTROPHILS NFR BLD: 70.4 % (ref 44–72)
NRBC # BLD AUTO: 0 K/UL
NRBC BLD-RTO: 0 /100 WBC (ref 0–0.2)
PLATELET # BLD AUTO: 315 K/UL (ref 164–446)
PMV BLD AUTO: 9.2 FL (ref 9–12.9)
POTASSIUM SERPL-SCNC: 3.2 MMOL/L (ref 3.6–5.5)
RBC # BLD AUTO: 2.68 M/UL (ref 4.2–5.4)
SODIUM SERPL-SCNC: 145 MMOL/L (ref 135–145)
WBC # BLD AUTO: 8.2 K/UL (ref 4.8–10.8)

## 2023-12-07 PROCEDURE — 700111 HCHG RX REV CODE 636 W/ 250 OVERRIDE (IP): Mod: JZ | Performed by: INTERNAL MEDICINE

## 2023-12-07 PROCEDURE — 700102 HCHG RX REV CODE 250 W/ 637 OVERRIDE(OP): Mod: JZ | Performed by: INTERNAL MEDICINE

## 2023-12-07 PROCEDURE — 82962 GLUCOSE BLOOD TEST: CPT

## 2023-12-07 PROCEDURE — 80048 BASIC METABOLIC PNL TOTAL CA: CPT

## 2023-12-07 PROCEDURE — 700102 HCHG RX REV CODE 250 W/ 637 OVERRIDE(OP): Performed by: HOSPITALIST

## 2023-12-07 PROCEDURE — 99239 HOSP IP/OBS DSCHRG MGMT >30: CPT | Performed by: INTERNAL MEDICINE

## 2023-12-07 PROCEDURE — A9270 NON-COVERED ITEM OR SERVICE: HCPCS | Performed by: HOSPITALIST

## 2023-12-07 PROCEDURE — 99232 SBSQ HOSP IP/OBS MODERATE 35: CPT | Performed by: NURSE PRACTITIONER

## 2023-12-07 PROCEDURE — 85018 HEMOGLOBIN: CPT | Mod: 91

## 2023-12-07 PROCEDURE — 85025 COMPLETE CBC W/AUTO DIFF WBC: CPT

## 2023-12-07 PROCEDURE — A9270 NON-COVERED ITEM OR SERVICE: HCPCS | Mod: JZ | Performed by: INTERNAL MEDICINE

## 2023-12-07 PROCEDURE — 83735 ASSAY OF MAGNESIUM: CPT

## 2023-12-07 PROCEDURE — 85014 HEMATOCRIT: CPT

## 2023-12-07 PROCEDURE — 700105 HCHG RX REV CODE 258: Performed by: INTERNAL MEDICINE

## 2023-12-07 PROCEDURE — 36415 COLL VENOUS BLD VENIPUNCTURE: CPT

## 2023-12-07 RX ORDER — OMEPRAZOLE 20 MG/1
20 CAPSULE, DELAYED RELEASE ORAL DAILY
Qty: 30 CAPSULE | Refills: 1 | Status: SHIPPED | OUTPATIENT
Start: 2023-12-08

## 2023-12-07 RX ORDER — POTASSIUM CHLORIDE 20 MEQ/1
40 TABLET, EXTENDED RELEASE ORAL ONCE
Status: COMPLETED | OUTPATIENT
Start: 2023-12-07 | End: 2023-12-07

## 2023-12-07 RX ORDER — LANOLIN ALCOHOL/MO/W.PET/CERES
400 CREAM (GRAM) TOPICAL DAILY
Qty: 30 TABLET | Refills: 1 | Status: SHIPPED | OUTPATIENT
Start: 2023-12-08

## 2023-12-07 RX ADMIN — OMEPRAZOLE 20 MG: 20 CAPSULE, DELAYED RELEASE ORAL at 05:07

## 2023-12-07 RX ADMIN — SODIUM CHLORIDE 250 MG: 9 INJECTION, SOLUTION INTRAVENOUS at 15:15

## 2023-12-07 RX ADMIN — SODIUM CHLORIDE 250 MG: 9 INJECTION, SOLUTION INTRAVENOUS at 05:21

## 2023-12-07 RX ADMIN — SODIUM CHLORIDE: 9 INJECTION, SOLUTION INTRAVENOUS at 05:20

## 2023-12-07 RX ADMIN — SODIUM CHLORIDE 250 MG: 9 INJECTION, SOLUTION INTRAVENOUS at 00:46

## 2023-12-07 RX ADMIN — Medication 400 MG: at 05:07

## 2023-12-07 RX ADMIN — LEVETIRACETAM 1000 MG: 500 TABLET, FILM COATED ORAL at 05:07

## 2023-12-07 RX ADMIN — POTASSIUM CHLORIDE 40 MEQ: 1500 TABLET, EXTENDED RELEASE ORAL at 07:57

## 2023-12-07 ASSESSMENT — ENCOUNTER SYMPTOMS
INSOMNIA: 0
NERVOUS/ANXIOUS: 0
SORE THROAT: 0
CHILLS: 0
CONSTIPATION: 0
COUGH: 0
FEVER: 0
NAUSEA: 0
MYALGIAS: 0
VOMITING: 0
BLOOD IN STOOL: 0
ABDOMINAL PAIN: 0
SHORTNESS OF BREATH: 0
FALLS: 0
HEADACHES: 0
FLANK PAIN: 0
DIARRHEA: 0

## 2023-12-07 ASSESSMENT — FIBROSIS 4 INDEX: FIB4 SCORE: 0.89

## 2023-12-07 NOTE — PROGRESS NOTES
Telemetry Shift Summary    Rhythm SR  HR Range 90-99  Ectopy PVC  Measurements .18/.10/.43        Normal Values  Rhythm SR  HR Range    Measurements 0.12-0.20 / 0.06-0.10  / 0.30-0.52

## 2023-12-07 NOTE — CARE PLAN
The patient is Stable - Low risk of patient condition declining or worsening    Shift Goals  Clinical Goals: safety and comfort, H&H monitoring  Patient Goals: rest  Family Goals: janeth    Progress made toward(s) clinical / shift goals:    Problem: Knowledge Deficit - Standard  Goal: Patient and family/care givers will demonstrate understanding of plan of care, disease process/condition, diagnostic tests and medications  Outcome: Progressing     Problem: Hemodynamics  Goal: Patient's hemodynamics, fluid balance and neurologic status will be stable or improve  Outcome: Progressing     Problem: Fluid Volume  Goal: Fluid volume balance will be maintained  Outcome: Progressing       Patient is not progressing towards the following goals:

## 2023-12-07 NOTE — PROGRESS NOTES
Received bedside report from RN, pt care assumed, VSS, pt assessment complete. Pt AAOx4, no report of pain at this time. No signs of acute distress noted at this time. Plan of care discussed with pt and verbalizes no questions. Pt denies any additional needs at this time. Bed locked/in lowest position, pt educated on fall risk and verbalized understanding, call light within reach, hourly rounding initiated.

## 2023-12-07 NOTE — DISCHARGE PLANNING
Case Management Discharge Planning    Admission Date: 12/1/2023  GMLOS: 3  ALOS: 6    6-Clicks ADL Score: 20  6-Clicks Mobility Score: 24      Anticipated Discharge Dispo: Discharge Disposition: D/T to home under HHA care in anticipation of covered skilled care (06)  Discharge Address: [address] [place] [id number]    DME Needed: No    Action(s) Taken: Discussed pt during morning rounds. Per Malou, pt is medically cleared to go home with HH. OMKAR notified medical team of pt being declined HH due to insurance. SW to f/u with pt for second choice and dcp.    SW met with pt to complete IMM and discuss dcp. Per pt, she chooses [facility]; signed IMM. Per pt, she wants to know options for Medicare. SW provided information, notified pt today is the last day for open enrollment. SW to f/u with pt's daughter. Per pt, she is comfortable going home without HH.    @ 1400: OMKAR met with pt at bedside to discuss HH declining due to being out of area. Pt verbalized understanding. OMKAR asked if pt still felt safe dc home. Per pt, she does feel safe as her daughter will be providing support for the first few days, her neighbor will be assisting when needed, and her niece will be staying with her for a couple of weeks after dc.     Escalations Completed: None    Medically Clear: Yes    Next Steps: F/U with medical team to discuss discharge needs and barriers.    Barriers to Discharge: Transportation    Is the patient up for discharge tomorrow: No

## 2023-12-07 NOTE — PROGRESS NOTES
Gastroenterology Progress Note               Author:  Kendy BRANDT Date & Time Created: 12/7/2023 7:37 AM       Patient ID:  Name:             Molly Fountain    YOB: 1945  Age:                 78 y.o.  female  MRN:               [id number]        Medical Decision Making, by Problem:  Active Hospital Problems    Diagnosis     Pancolitis (HCC) [K51.00]     Acute GI bleeding [K92.2]     Iron deficiency anemia [D50.9]     Class 1 obesity in adult [E66.9]     CVA (cerebral vascular accident) (HCC) [I63.9]     ACP (advance care planning) [Z71.89]     Seizure (HCC) [R56.9]     Acute blood loss anemia [D62]     Type 2 diabetes mellitus with complication, without long-term current use of insulin (HCC) [E11.8]     Hypokalemia [E87.6]     Essential hypertension [I10]        Presenting Chief Complaint:  Hematochezia. Consult requested by Dr Reyes     Interval History:  12/3/2023: This is a very pleasant 78 y.o. female who had hematochezia. She states she has not had a bowel movement since yesterday. We did a colonoscopy yesterday that showed significant colitis. Her biopsies are still pending   She does not have pain and feels great. She has a new primary care doctor and is looking forward to going home.     12/4/2023: Patient with bloody bowel movement x 2 today (formed stool with surrounding blood and small clots) with associated left-sided abdominal aching.  Hemoglobin 9.1-8.5.  Pathology from colonoscopy is pending.    12/5/2023: Reviewed overnight events.  Patient had bloody bowel movement overnight and intensive care was consulted for consideration of upgrade.  She received 1 unit PRBC for hgb 7.1. CTA abdomen revealed colonic diverticulosis with a focus of extravasation in the descending colon.  Unfortunately, when she went to IR there was no bleeding identified on angiogram and no embolization was performed.  Continues on clear liquids.  Vital signs stable with hemoglobin  7.9.    12/6/2023: Patient seen and examined.  Reports feeling much better, having brown stools with no evidence of bleeding.  Hemoglobin stable at 7.5.    12/7/2023: Patient seen, had a normal bowel movement this morning.  Is very excited to discharge.  Tolerating her diet, no abdominal pain, nausea or vomiting.  Hemoglobin stable at 7.4.    Hospital Medications:  Current Facility-Administered Medications   Medication Dose Frequency Provider Last Rate Last Admin    ferric gluconate complex (Ferrlecit) 250 mg in  mL IVPB  250 mg BID Pelon Westfall M.D.   Stopped at 12/07/23 0621    NS infusion   Continuous Pelon Westfall M.D. 83 mL/hr at 12/07/23 0520 New Bag at 12/07/23 0520    insulin GLARGINE (Lantus,Semglee) injection  15 Units Q EVENING Michele Reyes M.D.   15 Units at 12/06/23 1633    And    insulin lispro (HumaLOG,AdmeLOG) injection  2-9 Units Q6HRS Michele Reyes M.D.   2 Units at 12/06/23 1632    And    dextrose 10 % BOLUS 25 g  25 g Q15 MIN PRN Michele Reyes M.D.        diazePAM (Valium) injection 5 mg  5 mg Q4HRS PRN Michele Reyes M.D.        magnesium oxide tablet 400 mg  400 mg DAILY Michele Reyes M.D.   400 mg at 12/07/23 0507    levETIRAcetam (Keppra) tablet 1,000 mg  1,000 mg BID Tesfaye Mcmullen D.OSteffanie   1,000 mg at 12/07/23 0507    omeprazole (PriLOSEC) capsule 20 mg  20 mg DAILY Tesfaye Mcmullen DSteffanieOSteffanie   20 mg at 12/07/23 0507    lactated ringers infusion (BOLUS)  500 mL Once PRN Brandon Hinton M.D.        acetaminophen (Tylenol) tablet 650 mg  650 mg Q6HRS PRN Brandon Hinton M.D.   650 mg at 12/05/23 0807    labetalol (Normodyne/Trandate) injection 10 mg  10 mg Q4HRS PRN Brandon Hinton M.D.        ondansetron (Zofran) syringe/vial injection 4 mg  4 mg Q4HRS PRN Brandon Hinton M.D.        ondansetron (Zofran ODT) dispertab 4 mg  4 mg Q4HRS PRN Brandon Hinton M.D.        atorvastatin (Lipitor) tablet 80 mg  80 mg Q EVENING Brandon Hinton M.D.   80 mg at  12/06/23 1631    ipratropium-albuterol (DUONEB) nebulizer solution  3 mL Q4H PRN (RT) Brandon Hinton M.D.       Last reviewed on 12/2/2023 11:02 AM by Xavier Fleming R.N.       Review of Systems:  Review of Systems   Constitutional:  Negative for chills, fever and malaise/fatigue.   HENT:  Negative for congestion and sore throat.    Respiratory:  Negative for cough and shortness of breath.    Cardiovascular:  Negative for chest pain and leg swelling.   Gastrointestinal:  Negative for abdominal pain, blood in stool, constipation, diarrhea, melena, nausea and vomiting.   Genitourinary:  Negative for dysuria and flank pain.   Musculoskeletal:  Negative for falls and myalgias.   Neurological:  Negative for headaches.   Psychiatric/Behavioral:  The patient is not nervous/anxious and does not have insomnia.    All other systems reviewed and are negative.        Vital signs:  Weight/BMI: Body mass index is 29.96 kg/m².  /82   Pulse 94   Temp 36.4 °C (97.5 °F) (Temporal)   Resp 18   Wt 76.7 kg (169 lb 1.5 oz)   SpO2 90%   Vitals:    12/06/23 1915 12/06/23 2312 12/07/23 0406 12/07/23 0659   BP: 116/75 131/79 119/83 127/82   Pulse: 97 95 98 94   Resp: 18 18 18 18   Temp: 37.1 °C (98.8 °F) 37.5 °C (99.5 °F) 37.5 °C (99.5 °F) 36.4 °C (97.5 °F)   TempSrc: Temporal Temporal Temporal Temporal   SpO2: 92% 92% 90% 90%   Weight:   76.7 kg (169 lb 1.5 oz)      Oxygen Therapy:  Pulse Oximetry: 90 %, O2 (LPM): 0, O2 Delivery Device: None - Room Air    Intake/Output Summary (Last 24 hours) at 12/7/2023 0737  Last data filed at 12/7/2023 0659  Gross per 24 hour   Intake 240 ml   Output 1750 ml   Net -1510 ml           Physical Exam:  Physical Exam  Vitals and nursing note reviewed.   Constitutional:       General: She is not in acute distress.     Appearance: She is obese. She is not ill-appearing.   HENT:      Head: Normocephalic.      Nose: Nose normal. No congestion.      Mouth/Throat:      Mouth: Mucous membranes are  moist.      Pharynx: Oropharynx is clear. No oropharyngeal exudate.   Eyes:      General: No scleral icterus.     Extraocular Movements: Extraocular movements intact.      Conjunctiva/sclera: Conjunctivae normal.   Cardiovascular:      Rate and Rhythm: Normal rate and regular rhythm.      Pulses: Normal pulses.      Heart sounds: Normal heart sounds. No murmur heard.  Pulmonary:      Effort: Pulmonary effort is normal. No respiratory distress.      Breath sounds: Normal breath sounds.   Abdominal:      General: Abdomen is flat. Bowel sounds are normal. There is no distension.      Palpations: Abdomen is soft.      Tenderness: There is no abdominal tenderness. There is no guarding.   Musculoskeletal:      Right lower leg: No edema.      Left lower leg: No edema.   Skin:     General: Skin is warm and dry.      Capillary Refill: Capillary refill takes less than 2 seconds.      Coloration: Skin is not jaundiced.   Neurological:      General: No focal deficit present.      Mental Status: She is alert and oriented to person, place, and time. Mental status is at baseline.      Motor: No weakness.   Psychiatric:         Mood and Affect: Mood normal.         Behavior: Behavior normal.         Thought Content: Thought content normal.         Judgment: Judgment normal.         Labs:  Recent Labs     12/05/23 0035 12/05/23 2357 12/07/23  0015   SODIUM 141 143 145   POTASSIUM 3.6 3.6 3.2*   CHLORIDE 109 111 111   CO2 26 25 24   BUN 9 11 10   CREATININE 0.61 0.79 0.67   MAGNESIUM 2.1 2.1 1.9   CALCIUM 7.6* 7.8* 8.2*       Recent Labs     12/05/23 0035 12/05/23 2357 12/07/23  0015   ALTSGPT  --  8  --    ASTSGOT  --  10*  --    ALKPHOSPHAT  --  70  --    TBILIRUBIN  --  0.3  --    GLUCOSE 121* 97 113*       Recent Labs     12/05/23 0035 12/05/23 2357 12/07/23  0015   WBC 7.4 7.1 8.2   NEUTSPOLYS  --  62.80 70.40   LYMPHOCYTES  --  24.60 18.70*   MONOCYTES  --  7.80 7.30   EOSINOPHILS  --  3.70 2.40   BASOPHILS  --  0.40 0.50    ASTSGOT  --  10*  --    ALTSGPT  --  8  --    ALKPHOSPHAT  --  70  --    TBILIRUBIN  --  0.3  --        Recent Labs     12/05/23  0035 12/05/23  0615 12/05/23  2357 12/06/23  0621 12/06/23  1149 12/06/23  1819 12/07/23  0015 12/07/23  0614   RBC 2.91*  --  2.74*  --   --   --  2.68*  --    HEMOGLOBIN 7.9*   < > 7.3*   < > 7.5* 7.0* 7.2* 7.4*   HEMATOCRIT 24.1*   < > 22.9*   < > 23.4* 22.4* 22.4* 23.2*   PLATELETCT 252  --  270  --   --   --  315  --    PROTHROMBTM  --   --  14.3  --   --   --   --   --    INR  --   --  1.10  --   --   --   --   --    IRON  --   --   --   --  15*  --   --   --    FERRITIN  --   --   --   --  21.1  --   --   --    TOTIRONBC  --   --   --   --  262  --   --   --     < > = values in this interval not displayed.       Recent Results (from the past 24 hour(s))   HEMOGLOBIN AND HEMATOCRIT    Collection Time: 12/06/23 11:49 AM   Result Value Ref Range    Hemoglobin 7.5 (L) 12.0 - 16.0 g/dL    Hematocrit 23.4 (L) 37.0 - 47.0 %   IRON/TOTAL IRON BIND    Collection Time: 12/06/23 11:49 AM   Result Value Ref Range    Iron 15 (L) 40 - 170 ug/dL    Total Iron Binding 262 250 - 450 ug/dL    Unsat Iron Binding 247 110 - 370 ug/dL    % Saturation 6 (L) 15 - 55 %   FERRITIN    Collection Time: 12/06/23 11:49 AM   Result Value Ref Range    Ferritin 21.1 10.0 - 291.0 ng/mL   RETICULOCYTES COUNT    Collection Time: 12/06/23 11:49 AM   Result Value Ref Range    Reticulocyte Count 3.4 (H) 0.8 - 2.6 %    Retic, Absolute 0.09 0.04 - 0.12 M/uL    Imm. Reticulocyte Fraction 30.8 (H) 2.6 - 16.1 %    Retic Hgb Equivalent 26.5 (L) 29.0 - 35.0 pg/cell   POCT glucose device results    Collection Time: 12/06/23 11:51 AM   Result Value Ref Range    POC Glucose, Blood 163 (H) 65 - 99 mg/dL   POCT glucose device results    Collection Time: 12/06/23  4:29 PM   Result Value Ref Range    POC Glucose, Blood 180 (H) 65 - 99 mg/dL   HEMOGLOBIN AND HEMATOCRIT    Collection Time: 12/06/23  6:19 PM   Result Value Ref Range     Hemoglobin 7.0 (L) 12.0 - 16.0 g/dL    Hematocrit 22.4 (L) 37.0 - 47.0 %   POCT glucose device results    Collection Time: 12/07/23 12:11 AM   Result Value Ref Range    POC Glucose, Blood 107 (H) 65 - 99 mg/dL   Basic Metabolic Panel    Collection Time: 12/07/23 12:15 AM   Result Value Ref Range    Sodium 145 135 - 145 mmol/L    Potassium 3.2 (L) 3.6 - 5.5 mmol/L    Chloride 111 96 - 112 mmol/L    Co2 24 20 - 33 mmol/L    Glucose 113 (H) 65 - 99 mg/dL    Bun 10 8 - 22 mg/dL    Creatinine 0.67 0.50 - 1.40 mg/dL    Calcium 8.2 (L) 8.5 - 10.5 mg/dL    Anion Gap 10.0 7.0 - 16.0   MAGNESIUM    Collection Time: 12/07/23 12:15 AM   Result Value Ref Range    Magnesium 1.9 1.5 - 2.5 mg/dL   CBC WITH DIFFERENTIAL    Collection Time: 12/07/23 12:15 AM   Result Value Ref Range    WBC 8.2 4.8 - 10.8 K/uL    RBC 2.68 (L) 4.20 - 5.40 M/uL    Hemoglobin 7.2 (L) 12.0 - 16.0 g/dL    Hematocrit 22.4 (L) 37.0 - 47.0 %    MCV 83.6 81.4 - 97.8 fL    MCH 26.9 (L) 27.0 - 33.0 pg    MCHC 32.1 (L) 32.2 - 35.5 g/dL    RDW 46.6 35.9 - 50.0 fL    Platelet Count 315 164 - 446 K/uL    MPV 9.2 9.0 - 12.9 fL    Neutrophils-Polys 70.40 44.00 - 72.00 %    Lymphocytes 18.70 (L) 22.00 - 41.00 %    Monocytes 7.30 0.00 - 13.40 %    Eosinophils 2.40 0.00 - 6.90 %    Basophils 0.50 0.00 - 1.80 %    Immature Granulocytes 0.70 0.00 - 0.90 %    Nucleated RBC 0.00 0.00 - 0.20 /100 WBC    Neutrophils (Absolute) 5.80 1.82 - 7.42 K/uL    Lymphs (Absolute) 1.54 1.00 - 4.80 K/uL    Monos (Absolute) 0.60 0.00 - 0.85 K/uL    Eos (Absolute) 0.20 0.00 - 0.51 K/uL    Baso (Absolute) 0.04 0.00 - 0.12 K/uL    Immature Granulocytes (abs) 0.06 0.00 - 0.11 K/uL    NRBC (Absolute) 0.00 K/uL   ESTIMATED GFR    Collection Time: 12/07/23 12:15 AM   Result Value Ref Range    GFR (CKD-EPI) 89 >60 mL/min/1.73 m 2   POCT glucose device results    Collection Time: 12/07/23  5:16 AM   Result Value Ref Range    POC Glucose, Blood 95 65 - 99 mg/dL   HEMOGLOBIN AND HEMATOCRIT     "Collection Time: 12/07/23  6:14 AM   Result Value Ref Range    Hemoglobin 7.4 (L) 12.0 - 16.0 g/dL    Hematocrit 23.2 (L) 37.0 - 47.0 %       Radiology Review:  IR-EMBOLIZATION   Final Result      1.  Visceral angiography with greatest attention to the inferior mesenteric artery (ANDRY) no longer demonstrating extravasation in the descending colon as was seen on the preceding CTA of the abdomen and pelvis from this evening. As such, no particulate    or coil embolization was performed or indicated.   2.  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   3.  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                  INTERPRETING LOCATION: [address], [place], [address]      CTA ABDOMEN PELVIS W & W/O POST PROCESS   Final Result      1.  Colonic diverticulosis.      2.  Focus of active extravasation in the descending colon.      3.  These findings were discussed with MARY HEREDIA at 18:15 hours 12/4/2023      CTA ABDOMEN PELVIS W & W/O POST PROCESS   Final Result         1.  No visualized aneurysm or dissection.   2.  No intraluminal contrast identified to indicate site of GI bleed   3.  Diverticulosis   4.  Small fat-containing bilateral inguinal hernias   5.  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   -- Dr. Venegas spoke to Dr. Valdivia from vascular, he does not feel that this ischemic colitis is due to vascular anomaly  --Reviewed general surgery evaluation, no acute surgical intervention warranted at this time  -- Ischemic colitis usually resolves over time  -- Maintain good blood pressure  -- Received IV iron replenishment, please ensure patient is on oral supplementation at discharge  --- She is at significant risk for bleeding, however she had a recent CVA and requires aspirin for stroke prevention  --- Recommend restarting in 3 days  --- Patient provided information on diverticulosis diet  --- Follow-up with primary care and recommended patient to establish with gastroenterology near her hometown of [place]    Patient discharging today, GI team will sign off.    Discussed with patient, Dr. Westfall, Dr. Arenas    Core Quality Measures   Reviewed items::  Labs, Medications and Radiology reports reviewed

## 2023-12-07 NOTE — DISCHARGE PLANNING
0083  DPA received fax response from [facility], pt declined due to non-contracted insurance provider.     1011  Received Choice form at 1010  Agency/Facility Name: [facility]   Referral sent per Choice form @ 1011    [phone]  PDA received fax response from [facility], pt declined due to non-covered service area.

## 2023-12-07 NOTE — CARE PLAN
The patient is Watcher - Medium risk of patient condition declining or worsening    Shift Goals  Clinical Goals: stable hemodynamics, no bleeding  Patient Goals: no bleeding  Family Goals: janeth    Progress made toward(s) clinical / shift goals:        Patient is not progressing towards the following goals:      Problem: Knowledge Deficit - Standard  Goal: Patient and family/care givers will demonstrate understanding of plan of care, disease process/condition, diagnostic tests and medications  Outcome: Progressing     Problem: Hemodynamics  Goal: Patient's hemodynamics, fluid balance and neurologic status will be stable or improve  Outcome: Progressing     Problem: Fluid Volume  Goal: Fluid volume balance will be maintained  Outcome: Progressing     Problem: Urinary - Renal Perfusion  Goal: Ability to achieve and maintain adequate renal perfusion and functioning will improve  Outcome: Progressing     Problem: Respiratory  Goal: Patient will achieve/maintain optimum respiratory ventilation and gas exchange  Outcome: Progressing     Problem: Mechanical Ventilation  Goal: Safe management of artificial airway and ventilation  Outcome: Progressing  Goal: Successful weaning off mechanical ventilator, spontaneously maintains adequate gas exchange  Outcome: Progressing  Goal: Patient will be able to express needs and understand communication  Outcome: Progressing     Problem: Physical Regulation  Goal: Diagnostic test results will improve  Outcome: Progressing  Goal: Signs and symptoms of infection will decrease  Outcome: Progressing     Problem: Fall Risk  Goal: Patient will remain free from falls  Outcome: Progressing     Problem: Nutrition  Goal: Patient's nutritional and fluid intake will be adequate or improve  Outcome: Progressing  Goal: Enteral nutrition will be maintained or improve  Outcome: Progressing  Goal: Enteral nutrition will be maintained or improve  Outcome: Progressing     Problem: Bowel Elimination  Goal:  Establish and maintain regular bowel function  Outcome: Progressing     Problem: Mobility  Goal: Patient's capacity to carry out activities will improve  Outcome: Progressing     Problem: Self Care  Goal: Patient will have the ability to perform ADLs independently or with assistance (bathe, groom, dress, toilet and feed)  Outcome: Progressing     Problem: Pain - Standard  Goal: Alleviation of pain or a reduction in pain to the patient’s comfort goal  Outcome: Progressing

## 2023-12-07 NOTE — DISCHARGE INSTRUCTIONS
Ischemic Colitis    Ischemic colitis is damage to the large intestine due to reduced blood flow (ischemia) to the colon. The colon is the last section of the large intestine, where stool is formed. Reduced blood flow may lead to the death of cells (necrosis) in the lining of the colon, damaging the colon and often causing bleeding.  Most cases of ischemic colitis clear up in a few days with treatment. In other cases, blood flow does not improve, and parts of the colon start to die. This is extremely serious and even life-threatening. If this happens, surgery may be required. In some cases, parts of the colon may need to be removed.  What are the causes?  Ischemic colitis results from a decrease in the blood supply to the colon. Many conditions can cause this, such as:  Heart problems that reduce blood flow to the arteries that supply the colon. These include problems, such as coronary heart disease, peripheral vascular disease, atrial fibrillation, and congestive heart failure.  Low blood pressure from:  An infection that spreads to the blood (sepsis).  Dehydration or bleeding (shock).  Drugs that narrow blood vessels (vasoconstrictors).  Sometimes the cause is not known.  What increases the risk?  You are more likely to develop this condition if you:  Are 60 years of age or older.  Are female.  Have another medical condition, such as:  Heart disease.  Diabetes.  Kidney disease that requires you to be on dialysis.  A disease that causes blood clots.  Are frequently constipated.  Have had surgery on the heart, blood vessels (such as the aorta), or colon.  Take certain medicines or drugs, such as:  Medicines that suppress your immune system.  Medicines that cause constipation.  Illegal drugs, such as cocaine or methamphetamines.  Get an extreme amount of exercise from long-distance bike riding or running.  What are the signs or symptoms?  Symptoms of this condition start suddenly and may include:  Dull pain, usually  on the left side of the abdomen (abdominal pain).  Tenderness of the abdomen or abdominal cramps.  An urgent need to have a bowel movement.  Loose, bloody stools with clots of dark or bright red blood.  Nausea and vomiting.  Fever.  Weakness, fatigue, and confusion.  How is this diagnosed?  This condition may be diagnosed based on:  Your symptoms, your medical history, and a physical exam. Giving a complete and accurate medical history is important to make an accurate diagnosis.  Tests to find out more about your condition and to rule out other causes of pain and bleeding. These tests may include:  Blood tests to check for clotting, blood loss, and low proteins in your blood.  CT scan of the colon.  A procedure to examine the inside of your colon using a scope that is passed through the rectum (colonoscopy). Colonoscopy is the most important diagnostic test. During this test, your health care provider may take a small piece of tissue from your colon to be examined under a microscope (biopsy).  An X-ray may also be taken of your chest and abdomen (stomach area).  How is this treated?  You may be hospitalized for treatment. Treatment usually includes:  Not eating or drinking anything. This allows the colon to rest.  IV fluids to maintain blood pressure, regulate salts and minerals in the blood (electrolytes), and provide nutrition.  Having a tube inserted into your stomach through your nose (nasogastric tube) to drain your stomach.  IV antibiotic medicines. These may be used if an infection is suspected.  Stopping or changing medicines that may be causing the condition.  You may need surgery if your condition is severe or if it gets worse or does not get better after a few days. Parts of the colon that will not recover may need to be removed. In some cases, a procedure is also done to attach the healthy part of the colon to the outer wall of the abdomen to drain stool (colostomy).  Follow these instructions at  home:  Follow instructions from your health care provider about eating or drinking restrictions.  Drink enough fluid to keep your urine pale yellow.  Take over-the-counter and prescription medicines only as told by your health care provider.  Take your antibiotic medicine as told by your health care provider. Do not stop taking the antibiotic even if you start to feel better  Return to your normal activities as told by your health care provider. Ask your health care provider what activities are safe for you.  Do not use any products that contain nicotine or tobacco. These products include cigarettes, chewing tobacco, and vaping devices, such as e-cigarettes. If you need help quitting, ask your health care provider.  Keep all follow-up visits. This is important.  Contact a health care provider if:  You have blood in your stool.  You have abdominal pain or cramps.  You are constipated.  You have nausea or vomiting.  Get help right away if:  You have a moderate to large amount of loose, bloody stools with dark or bright red blood clots.  You have severe abdominal pain.  Your abdominal pain has not improved after 24 hours.  You have a fever.  You have not been able to have a bowel movement, and you are in pain and vomiting.  You have shortness of breath.  You are very tired (lethargic) or have confusion.  Summary  Ischemic colitis is damage to the large intestine due to reduced blood flow (ischemia) to the colon.  Some of the symptoms of this condition include abdominal pain or tenderness, bloody stools, and an urgent need to have a bowel movement.  Diagnosis usually includes a procedure to examine the inside of the colon using a scope that is passed through the rectum (colonoscopy).  This information is not intended to replace advice given to you by your health care provider. Make sure you discuss any questions you have with your health care provider.  Document Revised: 08/24/2021 Document Reviewed:  08/24/2021  Elsevier Patient Education © 2023 [employer]

## 2023-12-07 NOTE — DISCHARGE SUMMARY
Discharge Summary    CHIEF COMPLAINT ON ADMISSION  Chief Complaint   Patient presents with    Rectal Bleeding     BIB [facility] from [place] for patient reports of large bright red stool about 1 hr ago.        Reason for Admission  Lower GI bleeding, likely ischemic colitis    Admission Date  12/1/2023    CODE STATUS  DNAR/DNI    HPI & HOSPITAL COURSE    78-year-old female with history of stroke, hypertension, dyslipidemia diabetes and seizure who was admitted on 12/1 for bright blood in stool.  Patient had episode of rectal right red blood, denied any significant abdominal pain, no nausea or vomiting and no fever.  Initially patient was admitted to the hospital for stroke and seizure and discharged to [facility] on 11/29 and while she was at the SNF noticed rectal bleeding.  Her baseline hemoglobin around 12.9 and patient was found to have hemoglobin 5.9.  Patient underwent colonoscopy on 12/2 and showed pancolitis with possible ischemia versus ulcerative colitis, biopsy did not show signs of acute inflammation colitis however likely possible ischemia.  Patient had an other episode of bloody stool on 12/4, patient was transferred to telemetry floor, CTA was done and showed focus of active extravasation in the descending colon, patient went to the OR by IR for possible embolization however there was no indication for embolization and no signs of bleeding.  Vascular surgeon and general surgeon were consulted and no need for intervention at this time, patient might have watershed small vessels disease.  Hemoglobin has been stable around 7- 8 and patient received IV iron.  No no more episodes of GI bleeding for last 24 hours before his discharge, patient was cleared by GI for discharge.  Encouraged the patient to follow-up with PCP and come back to the emergency for any signs of bleeding, will discontinue amlodipine to avoid hypotension, encouraged the patient to resume aspirin after couple days of from the discharge if  there is no any episode of bleeding.      Recently patient had stroke and was discharged to SNF, PT and OT evaluated patient recommended SNF however patient refused to go back to skilled nursing facility, discussed home health, patient wanted to go with family even before get approval for home health, discussed the risk of fall and the benefit from therapy however patient states she has a good support from family.    Therefore, she is discharged in good and stable condition to home with close outpatient follow-up.    The patient met 2-midnight criteria for an inpatient stay at the time of discharge.    Discharge Date  12/07/23      FOLLOW UP ITEMS POST DISCHARGE  Follow-up with PCP for diabetes and hypertension  Follow-up with stroke clinic  Follow-up with GI for lower GI bleeding and ischemia    DISCHARGE DIAGNOSES  Principal Problem:    Pancolitis (HCC) (POA: Yes)  Active Problems:    Essential hypertension (POA: Yes)    Hypokalemia (POA: Yes)    Acute blood loss anemia (POA: Yes)    Seizure (HCC) (POA: Yes)    ACP (advance care planning) (POA: Yes)    CVA (cerebral vascular accident) (HCC) (POA: Yes)    Class 1 obesity in adult (POA: Yes)    Acute GI bleeding (POA: Yes)    Iron deficiency anemia (POA: Unknown)    Type 2 diabetes mellitus with complication, without long-term current use of insulin (HCC) (POA: Yes)  Resolved Problems:    * No resolved hospital problems. *      FOLLOW UP  No future appointments.  Daina Venegas M.D.  [address] [place] [id number]  [phone]    Follow up in 1 week(s)        MEDICATIONS ON DISCHARGE     Medication List        START taking these medications        Instructions   magnesium oxide 400 (240 Mg) MG Tabs  Start taking on: December 8, 2023   Take 1 Tablet by mouth every day.  Dose: 400 mg     omeprazole 20 MG delayed-release capsule  Start taking on: December 8, 2023  Commonly known as: PriLOSEC   Take 1 Capsule by mouth every day.  Dose: 20 mg             CONTINUE taking these medications        Instructions   acetaminophen 325 MG Tabs  Commonly known as: Tylenol   Take 650 mg by mouth every four hours as needed for Mild Pain.  Dose: 650 mg     aspirin 81 MG Chew chewable tablet  Commonly known as: Asa   Chew 1 Tablet every day.  Dose: 81 mg     atorvastatin 80 MG tablet  Commonly known as: Lipitor   Take 1 Tablet by mouth every evening.  Dose: 80 mg     Blood Glucose Test Strips   Doctor's comments: Or per formulary preference. ICD-10 code: E11.65 Uncontrolled type 2 Diabetes Mellitus  Use one Accucheck Guide strip to test blood sugar once daily early morning before first meal. For uncontrolled diabetes. E11.65     insulin regular 100 Unit/mL Soln  Commonly known as: Humulin R   Inject 2-10 Units under the skin 3 times a day before meals. If -200=2 units  201-250=4 units  251-300=6 units  301-350=8 units  351-400=10 units   >400=call MD  Dose: 2-10 Units     Lancets   Doctor's comments: Or per formulary preference. ICD-10 code: E11.65 Uncontrolled type 2 Diabetes Mellitus.  Use one fast clix lancet to test blood sugar once daily early morning before first meal. For uncontrolled diabetes. E11.65     Lantus 100 UNIT/ML Soln  Generic drug: insulin glargine   Inject 10 Units under the skin every evening.  Dose: 10 Units     levetiracetam 1000 MG tablet  Commonly known as: Keppra   Take 1 Tablet by mouth 2 times a day.  Dose: 1,000 mg     losartan 50 MG Tabs  Commonly known as: Cozaar   Take 1 Tablet by mouth every day.  Dose: 50 mg     Voltaren 1 % Gel  Generic drug: diclofenac sodium   Apply  topically 2 times a day. 1 application to left knee            STOP taking these medications      amLODIPine 10 MG Tabs  Commonly known as: Norvasc              Allergies  Allergies   Allergen Reactions    Sulfa Drugs Rash     Full body rash    Shellfish-Derived Products Rash     Full body rash       DIET  Orders Placed This Encounter   Procedures    Diet Order Diet: Low  Fiber(GI Soft)     Standing Status:   Standing     Number of Occurrences:   1     Order Specific Question:   Diet:     Answer:   Low Fiber(GI Soft) [2]       ACTIVITY  As tolerated.  Weight bearing as tolerated    CONSULTATIONS  GI and general surgeon    PROCEDURES  Colonoscopy    LABORATORY  Lab Results   Component Value Date    SODIUM 145 12/07/2023    POTASSIUM 3.2 (L) 12/07/2023    CHLORIDE 111 12/07/2023    CO2 24 12/07/2023    GLUCOSE 113 (H) 12/07/2023    BUN 10 12/07/2023    CREATININE 0.67 12/07/2023        Lab Results   Component Value Date    WBC 8.2 12/07/2023    HEMOGLOBIN 7.4 (L) 12/07/2023    HEMATOCRIT 23.5 (L) 12/07/2023    PLATELETCT 315 12/07/2023        Total time of the discharge process exceeds 34 minutes.

## 2023-12-08 RX ORDER — ASPIRIN 81 MG/1
81 TABLET, CHEWABLE ORAL DAILY
Qty: 100 TABLET | Refills: 4 | Status: SHIPPED | OUTPATIENT
Start: 2023-12-08

## 2023-12-08 RX ORDER — LANCETS 30 GAUGE
EACH MISCELLANEOUS
Qty: 100 EACH | Refills: 0 | Status: SHIPPED | OUTPATIENT
Start: 2023-12-08

## 2023-12-08 RX ORDER — ATORVASTATIN CALCIUM 80 MG/1
80 TABLET, FILM COATED ORAL EVERY EVENING
Qty: 90 TABLET | Refills: 4 | Status: SHIPPED | OUTPATIENT
Start: 2023-12-08

## 2023-12-08 RX ORDER — EMPAGLIFLOZIN 10 MG/1
10 TABLET, FILM COATED ORAL DAILY
Qty: 30 TABLET | Refills: 3 | Status: SHIPPED | OUTPATIENT
Start: 2023-12-08

## 2023-12-08 RX ORDER — LEVETIRACETAM 1000 MG/1
1000 TABLET ORAL 2 TIMES DAILY
Qty: 60 TABLET | Refills: 4 | Status: SHIPPED | OUTPATIENT
Start: 2023-12-08

## 2023-12-08 RX ORDER — LOSARTAN POTASSIUM 50 MG/1
50 TABLET ORAL DAILY
Qty: 30 TABLET | Refills: 4 | Status: SHIPPED | OUTPATIENT
Start: 2023-12-08 | End: 2023-12-08

## 2023-12-08 RX ORDER — GLUCOSAMINE HCL/CHONDROITIN SU 500-400 MG
CAPSULE ORAL
Qty: 100 EACH | Refills: 0 | Status: SHIPPED | OUTPATIENT
Start: 2023-12-08

## 2023-12-29 ENCOUNTER — TELEPHONE (OUTPATIENT)
Dept: CARDIOLOGY | Facility: MEDICAL CENTER | Age: 78
End: 2023-12-29
Payer: MEDICARE

## 2024-01-02 PROCEDURE — 93228 REMOTE 30 DAY ECG REV/REPORT: CPT | Performed by: INTERNAL MEDICINE

## 2024-03-15 ENCOUNTER — HOSPITAL ENCOUNTER (OUTPATIENT)
Dept: RADIOLOGY | Facility: MEDICAL CENTER | Age: 79
End: 2024-03-15

## 2024-03-15 ENCOUNTER — HOSPITAL ENCOUNTER (INPATIENT)
Facility: MEDICAL CENTER | Age: 79
LOS: 2 days | DRG: 378 | End: 2024-03-17
Attending: HOSPITALIST | Admitting: HOSPITALIST
Payer: MEDICARE

## 2024-03-15 DIAGNOSIS — K59.00 CONSTIPATION IN FEMALE: ICD-10-CM

## 2024-03-15 PROBLEM — E87.20 METABOLIC ACIDOSIS: Status: ACTIVE | Noted: 2024-03-15

## 2024-03-15 PROBLEM — K57.92 DIVERTICULITIS: Status: ACTIVE | Noted: 2024-03-15

## 2024-03-15 LAB
ANION GAP SERPL CALC-SCNC: 12 MMOL/L (ref 7–16)
APTT PPP: 27.7 SEC (ref 24.7–36)
BASOPHILS # BLD AUTO: 0.7 % (ref 0–1.8)
BASOPHILS # BLD: 0.05 K/UL (ref 0–0.12)
BUN SERPL-MCNC: 19 MG/DL (ref 8–22)
CALCIUM SERPL-MCNC: 8.1 MG/DL (ref 8.5–10.5)
CHLORIDE SERPL-SCNC: 111 MMOL/L (ref 96–112)
CO2 SERPL-SCNC: 19 MMOL/L (ref 20–33)
CREAT SERPL-MCNC: 0.85 MG/DL (ref 0.5–1.4)
EOSINOPHIL # BLD AUTO: 0.14 K/UL (ref 0–0.51)
EOSINOPHIL NFR BLD: 1.9 % (ref 0–6.9)
ERYTHROCYTE [DISTWIDTH] IN BLOOD BY AUTOMATED COUNT: 44 FL (ref 35.9–50)
GFR SERPLBLD CREATININE-BSD FMLA CKD-EPI: 69 ML/MIN/1.73 M 2
GLUCOSE BLD STRIP.AUTO-MCNC: 76 MG/DL (ref 65–99)
GLUCOSE SERPL-MCNC: 78 MG/DL (ref 65–99)
HCT VFR BLD AUTO: 36.2 % (ref 37–47)
HGB BLD-MCNC: 11.6 G/DL (ref 12–16)
IMM GRANULOCYTES # BLD AUTO: 0.02 K/UL (ref 0–0.11)
IMM GRANULOCYTES NFR BLD AUTO: 0.3 % (ref 0–0.9)
INR PPP: 1.12 (ref 0.87–1.13)
LYMPHOCYTES # BLD AUTO: 1.95 K/UL (ref 1–4.8)
LYMPHOCYTES NFR BLD: 26.9 % (ref 22–41)
MCH RBC QN AUTO: 27.4 PG (ref 27–33)
MCHC RBC AUTO-ENTMCNC: 32 G/DL (ref 32.2–35.5)
MCV RBC AUTO: 85.6 FL (ref 81.4–97.8)
MONOCYTES # BLD AUTO: 0.53 K/UL (ref 0–0.85)
MONOCYTES NFR BLD AUTO: 7.3 % (ref 0–13.4)
NEUTROPHILS # BLD AUTO: 4.56 K/UL (ref 1.82–7.42)
NEUTROPHILS NFR BLD: 62.9 % (ref 44–72)
NRBC # BLD AUTO: 0 K/UL
NRBC BLD-RTO: 0 /100 WBC (ref 0–0.2)
PLATELET # BLD AUTO: 242 K/UL (ref 164–446)
PMV BLD AUTO: 9.9 FL (ref 9–12.9)
POTASSIUM SERPL-SCNC: 3.9 MMOL/L (ref 3.6–5.5)
PROTHROMBIN TIME: 14.6 SEC (ref 12–14.6)
RBC # BLD AUTO: 4.23 M/UL (ref 4.2–5.4)
SODIUM SERPL-SCNC: 142 MMOL/L (ref 135–145)
WBC # BLD AUTO: 7.3 K/UL (ref 4.8–10.8)

## 2024-03-15 PROCEDURE — 99222 1ST HOSP IP/OBS MODERATE 55: CPT | Performed by: SPECIALIST

## 2024-03-15 PROCEDURE — 80048 BASIC METABOLIC PNL TOTAL CA: CPT

## 2024-03-15 PROCEDURE — 36415 COLL VENOUS BLD VENIPUNCTURE: CPT

## 2024-03-15 PROCEDURE — 93005 ELECTROCARDIOGRAM TRACING: CPT | Performed by: HOSPITALIST

## 2024-03-15 PROCEDURE — 700102 HCHG RX REV CODE 250 W/ 637 OVERRIDE(OP): Performed by: HOSPITALIST

## 2024-03-15 PROCEDURE — 82962 GLUCOSE BLOOD TEST: CPT

## 2024-03-15 PROCEDURE — 770020 HCHG ROOM/CARE - TELE (206)

## 2024-03-15 PROCEDURE — 85610 PROTHROMBIN TIME: CPT

## 2024-03-15 PROCEDURE — 85730 THROMBOPLASTIN TIME PARTIAL: CPT

## 2024-03-15 PROCEDURE — 700105 HCHG RX REV CODE 258: Performed by: HOSPITALIST

## 2024-03-15 PROCEDURE — 85025 COMPLETE CBC W/AUTO DIFF WBC: CPT

## 2024-03-15 PROCEDURE — A9270 NON-COVERED ITEM OR SERVICE: HCPCS | Performed by: HOSPITALIST

## 2024-03-15 RX ORDER — LEVETIRACETAM 500 MG/1
500 TABLET ORAL 2 TIMES DAILY
Status: DISCONTINUED | OUTPATIENT
Start: 2024-03-16 | End: 2024-03-17 | Stop reason: HOSPADM

## 2024-03-15 RX ORDER — SODIUM CHLORIDE 9 MG/ML
INJECTION, SOLUTION INTRAVENOUS CONTINUOUS
Status: DISCONTINUED | OUTPATIENT
Start: 2024-03-15 | End: 2024-03-17 | Stop reason: HOSPADM

## 2024-03-15 RX ORDER — SODIUM CHLORIDE 9 MG/ML
INJECTION, SOLUTION INTRAVENOUS CONTINUOUS
Status: DISCONTINUED | OUTPATIENT
Start: 2024-03-15 | End: 2024-03-15

## 2024-03-15 RX ORDER — LEVETIRACETAM 500 MG/1
1000 TABLET ORAL 2 TIMES DAILY
Status: DISCONTINUED | OUTPATIENT
Start: 2024-03-15 | End: 2024-03-15

## 2024-03-15 RX ORDER — ATORVASTATIN CALCIUM 80 MG/1
80 TABLET, FILM COATED ORAL EVERY EVENING
Status: DISCONTINUED | OUTPATIENT
Start: 2024-03-15 | End: 2024-03-17 | Stop reason: HOSPADM

## 2024-03-15 RX ORDER — ORAL SEMAGLUTIDE 7 MG/1
7 TABLET ORAL DAILY
COMMUNITY

## 2024-03-15 RX ADMIN — SODIUM CHLORIDE: 9 INJECTION, SOLUTION INTRAVENOUS at 18:21

## 2024-03-15 RX ADMIN — ATORVASTATIN CALCIUM 80 MG: 80 TABLET, FILM COATED ORAL at 20:47

## 2024-03-15 ASSESSMENT — PAIN DESCRIPTION - PAIN TYPE
TYPE: ACUTE PAIN
TYPE: ACUTE PAIN

## 2024-03-15 ASSESSMENT — FIBROSIS 4 INDEX: FIB4 SCORE: 0.89

## 2024-03-15 ASSESSMENT — ENCOUNTER SYMPTOMS
EYES NEGATIVE: 1
BLOOD IN STOOL: 1
BLURRED VISION: 0
SORE THROAT: 0
BRUISES/BLEEDS EASILY: 0
DIAPHORESIS: 0
MYALGIAS: 0
WEIGHT LOSS: 0
FEVER: 0
RESPIRATORY NEGATIVE: 1
VOMITING: 0
WEAKNESS: 0
DEPRESSION: 0
COUGH: 0
MUSCULOSKELETAL NEGATIVE: 1
SENSORY CHANGE: 1
NAUSEA: 0
CONSTITUTIONAL NEGATIVE: 1
ABDOMINAL PAIN: 0
CARDIOVASCULAR NEGATIVE: 1
PSYCHIATRIC NEGATIVE: 1
HEADACHES: 0
SHORTNESS OF BREATH: 0
DIZZINESS: 0
PALPITATIONS: 0
FOCAL WEAKNESS: 0
CHILLS: 0

## 2024-03-15 ASSESSMENT — LIFESTYLE VARIABLES
HAVE YOU EVER FELT YOU SHOULD CUT DOWN ON YOUR DRINKING: NO
ON A TYPICAL DAY WHEN YOU DRINK ALCOHOL HOW MANY DRINKS DO YOU HAVE: 0
AVERAGE NUMBER OF DAYS PER WEEK YOU HAVE A DRINK CONTAINING ALCOHOL: 0
HAVE PEOPLE ANNOYED YOU BY CRITICIZING YOUR DRINKING: NO
CONSUMPTION TOTAL: NEGATIVE
TOTAL SCORE: 0
ALCOHOL_USE: NO
SUBSTANCE_ABUSE: 0
EVER HAD A DRINK FIRST THING IN THE MORNING TO STEADY YOUR NERVES TO GET RID OF A HANGOVER: NO
DOES PATIENT WANT TO STOP DRINKING: NO
HOW MANY TIMES IN THE PAST YEAR HAVE YOU HAD 5 OR MORE DRINKS IN A DAY: 0
EVER FELT BAD OR GUILTY ABOUT YOUR DRINKING: NO

## 2024-03-15 ASSESSMENT — COGNITIVE AND FUNCTIONAL STATUS - GENERAL
MOVING TO AND FROM BED TO CHAIR: A LITTLE
HELP NEEDED FOR BATHING: A LITTLE
WALKING IN HOSPITAL ROOM: A LITTLE
SUGGESTED CMS G CODE MODIFIER DAILY ACTIVITY: CJ
TOILETING: A LITTLE
SUGGESTED CMS G CODE MODIFIER MOBILITY: CJ
CLIMB 3 TO 5 STEPS WITH RAILING: A LITTLE
TURNING FROM BACK TO SIDE WHILE IN FLAT BAD: A LITTLE
DAILY ACTIVITIY SCORE: 22
MOBILITY SCORE: 20

## 2024-03-16 LAB
ANION GAP SERPL CALC-SCNC: 10 MMOL/L (ref 7–16)
BASOPHILS # BLD AUTO: 0.6 % (ref 0–1.8)
BASOPHILS # BLD: 0.04 K/UL (ref 0–0.12)
BUN SERPL-MCNC: 17 MG/DL (ref 8–22)
CALCIUM SERPL-MCNC: 7.7 MG/DL (ref 8.5–10.5)
CHLORIDE SERPL-SCNC: 111 MMOL/L (ref 96–112)
CO2 SERPL-SCNC: 21 MMOL/L (ref 20–33)
CREAT SERPL-MCNC: 0.72 MG/DL (ref 0.5–1.4)
EKG IMPRESSION: NORMAL
EOSINOPHIL # BLD AUTO: 0.25 K/UL (ref 0–0.51)
EOSINOPHIL NFR BLD: 3.8 % (ref 0–6.9)
ERYTHROCYTE [DISTWIDTH] IN BLOOD BY AUTOMATED COUNT: 43.5 FL (ref 35.9–50)
GFR SERPLBLD CREATININE-BSD FMLA CKD-EPI: 85 ML/MIN/1.73 M 2
GLUCOSE BLD STRIP.AUTO-MCNC: 100 MG/DL (ref 65–99)
GLUCOSE BLD STRIP.AUTO-MCNC: 106 MG/DL (ref 65–99)
GLUCOSE BLD STRIP.AUTO-MCNC: 79 MG/DL (ref 65–99)
GLUCOSE SERPL-MCNC: 93 MG/DL (ref 65–99)
HCT VFR BLD AUTO: 28.2 % (ref 37–47)
HCT VFR BLD AUTO: 28.3 % (ref 37–47)
HCT VFR BLD AUTO: 28.9 % (ref 37–47)
HCT VFR BLD AUTO: 30.8 % (ref 37–47)
HGB BLD-MCNC: 9.1 G/DL (ref 12–16)
HGB BLD-MCNC: 9.2 G/DL (ref 12–16)
HGB BLD-MCNC: 9.3 G/DL (ref 12–16)
HGB BLD-MCNC: 9.8 G/DL (ref 12–16)
IMM GRANULOCYTES # BLD AUTO: 0.01 K/UL (ref 0–0.11)
IMM GRANULOCYTES NFR BLD AUTO: 0.2 % (ref 0–0.9)
LYMPHOCYTES # BLD AUTO: 1.94 K/UL (ref 1–4.8)
LYMPHOCYTES NFR BLD: 29.6 % (ref 22–41)
MCH RBC QN AUTO: 27.1 PG (ref 27–33)
MCHC RBC AUTO-ENTMCNC: 31.8 G/DL (ref 32.2–35.5)
MCV RBC AUTO: 85.3 FL (ref 81.4–97.8)
MONOCYTES # BLD AUTO: 0.52 K/UL (ref 0–0.85)
MONOCYTES NFR BLD AUTO: 7.9 % (ref 0–13.4)
NEUTROPHILS # BLD AUTO: 3.8 K/UL (ref 1.82–7.42)
NEUTROPHILS NFR BLD: 57.9 % (ref 44–72)
NRBC # BLD AUTO: 0 K/UL
NRBC BLD-RTO: 0 /100 WBC (ref 0–0.2)
PLATELET # BLD AUTO: 209 K/UL (ref 164–446)
PMV BLD AUTO: 10 FL (ref 9–12.9)
POTASSIUM SERPL-SCNC: 3.8 MMOL/L (ref 3.6–5.5)
RBC # BLD AUTO: 3.61 M/UL (ref 4.2–5.4)
SODIUM SERPL-SCNC: 142 MMOL/L (ref 135–145)
WBC # BLD AUTO: 6.6 K/UL (ref 4.8–10.8)

## 2024-03-16 PROCEDURE — 80048 BASIC METABOLIC PNL TOTAL CA: CPT

## 2024-03-16 PROCEDURE — 82962 GLUCOSE BLOOD TEST: CPT

## 2024-03-16 PROCEDURE — 85018 HEMOGLOBIN: CPT | Mod: 91

## 2024-03-16 PROCEDURE — 85025 COMPLETE CBC W/AUTO DIFF WBC: CPT

## 2024-03-16 PROCEDURE — 93010 ELECTROCARDIOGRAM REPORT: CPT | Performed by: INTERNAL MEDICINE

## 2024-03-16 PROCEDURE — 700102 HCHG RX REV CODE 250 W/ 637 OVERRIDE(OP): Performed by: NURSE PRACTITIONER

## 2024-03-16 PROCEDURE — 99232 SBSQ HOSP IP/OBS MODERATE 35: CPT | Performed by: NURSE PRACTITIONER

## 2024-03-16 PROCEDURE — 700102 HCHG RX REV CODE 250 W/ 637 OVERRIDE(OP): Performed by: HOSPITALIST

## 2024-03-16 PROCEDURE — A9270 NON-COVERED ITEM OR SERVICE: HCPCS | Performed by: HOSPITALIST

## 2024-03-16 PROCEDURE — 85014 HEMATOCRIT: CPT | Mod: 91

## 2024-03-16 PROCEDURE — A9270 NON-COVERED ITEM OR SERVICE: HCPCS | Performed by: NURSE PRACTITIONER

## 2024-03-16 PROCEDURE — 770020 HCHG ROOM/CARE - TELE (206)

## 2024-03-16 PROCEDURE — 36415 COLL VENOUS BLD VENIPUNCTURE: CPT

## 2024-03-16 PROCEDURE — 700105 HCHG RX REV CODE 258: Performed by: HOSPITALIST

## 2024-03-16 PROCEDURE — 99233 SBSQ HOSP IP/OBS HIGH 50: CPT | Performed by: HOSPITALIST

## 2024-03-16 RX ORDER — DEXTROSE MONOHYDRATE 25 G/50ML
25 INJECTION, SOLUTION INTRAVENOUS
Status: DISCONTINUED | OUTPATIENT
Start: 2024-03-16 | End: 2024-03-17 | Stop reason: HOSPADM

## 2024-03-16 RX ORDER — AMOXICILLIN AND CLAVULANATE POTASSIUM 875; 125 MG/1; MG/1
1 TABLET, FILM COATED ORAL EVERY 12 HOURS
Status: DISCONTINUED | OUTPATIENT
Start: 2024-03-16 | End: 2024-03-17 | Stop reason: HOSPADM

## 2024-03-16 RX ADMIN — SODIUM CHLORIDE: 9 INJECTION, SOLUTION INTRAVENOUS at 15:21

## 2024-03-16 RX ADMIN — AMOXICILLIN AND CLAVULANATE POTASSIUM 1 TABLET: 875; 125 TABLET, FILM COATED ORAL at 17:01

## 2024-03-16 RX ADMIN — SODIUM CHLORIDE: 9 INJECTION, SOLUTION INTRAVENOUS at 23:22

## 2024-03-16 RX ADMIN — ATORVASTATIN CALCIUM 80 MG: 80 TABLET, FILM COATED ORAL at 17:01

## 2024-03-16 RX ADMIN — AMOXICILLIN AND CLAVULANATE POTASSIUM 1 TABLET: 875; 125 TABLET, FILM COATED ORAL at 08:51

## 2024-03-16 RX ADMIN — LEVETIRACETAM 500 MG: 500 TABLET, FILM COATED ORAL at 05:29

## 2024-03-16 RX ADMIN — SODIUM CHLORIDE: 9 INJECTION, SOLUTION INTRAVENOUS at 00:30

## 2024-03-16 RX ADMIN — LEVETIRACETAM 500 MG: 500 TABLET, FILM COATED ORAL at 17:01

## 2024-03-16 ASSESSMENT — ENCOUNTER SYMPTOMS
FEVER: 0
VOMITING: 0
ABDOMINAL PAIN: 0
COUGH: 0
BACK PAIN: 0
SHORTNESS OF BREATH: 0
DIARRHEA: 0
HEARTBURN: 0
DIZZINESS: 0
DEPRESSION: 0
BLURRED VISION: 0
CONSTIPATION: 0
WHEEZING: 0
WEAKNESS: 0
BLOOD IN STOOL: 1
CHILLS: 0
NAUSEA: 0
HEADACHES: 0

## 2024-03-16 ASSESSMENT — FIBROSIS 4 INDEX: FIB4 SCORE: 1.34

## 2024-03-16 ASSESSMENT — PAIN DESCRIPTION - PAIN TYPE
TYPE: ACUTE PAIN
TYPE: ACUTE PAIN

## 2024-03-16 NOTE — CONSULTS
GASTROENTEROLOGY CONSULTATION    PATIENT NAME: Molly Fountain  : 1945  CSN: [id number]  MRN:  [id number]     CONSULTATION DATE:  3/15/2024    PRIMARY CARE PROVIDER:  Pcp Pt States None      REASON FOR CONSULT:  BRBPR  Consult requested by Dr. Haylee Martinez    HISTORY OF PRESENT ILLNESS:  Molly Fountain is a 78 y.o. female who was hospitalized in  for lower GI bleeding. She had a colonoscopy by myself which showed pan colitis and biopsies consistent with ischemic colitis. She did end up being assessed by IR as well for continued bleeding but they did not find an area to embolize. She eventually stopped bleeding and has done well since. She woke up this morning and had two episodes of BRBPR. She eventually saw it when she wiped and then in the toilet. She has not had further bleeding. She had a ct scan in [place] that showed diverticulitis. Her hemoglobin is 11.6 which is much higher then when discharged. She has been controlling her diabetes well. She takes ASA for CVA but no other NSAID's. Pt has no pain. When she had ischemic colitis her hemoglobin was 5 and her bleeding was perfuse.    PAST MEDICAL HISTORY:  Past Medical History:   Diagnosis Date    CVA (cerebral vascular accident) (HCC) 2023    Essential hypertension 3/13/2019    Type 2 diabetes mellitus with complication, without long-term current use of insulin ([facility]) 3/13/2019       PAST SURGICAL HISTORY:  Past Surgical History:   Procedure Laterality Date    FL COLONOSCOPY,DIAGNOSTIC N/A 2023    Procedure: COLONOSCOPY with biopsy;  Surgeon: Daina Venegas M.D.;  Location: [facility];  Service: Gastroenterology    FL COLONOSCOPY,BIOPSY N/A 2023    Procedure: COLONOSCOPY, WITH BIOPSY;  Surgeon: Daina Venegas M.D.;  Location: [facility];  Service: Gastroenterology    COLONOSCOPY - ENDO N/A 2019    Procedure: COLONOSCOPY;  Surgeon: Ger Nichols M.D.;  Location: [facility];  Service:  Gastroenterology        CURRENT MEDS:  Current Facility-Administered Medications   Medication Dose Route Frequency Provider Last Rate Last Admin    NS infusion   Intravenous Continuous Haylee Martinez M.D.            ALLERGIES:  Allergies   Allergen Reactions    Sulfa Drugs Rash     Full body rash    Shellfish-Derived Products Rash     Full body rash       SOCIAL HISTORY:  Social History     Socioeconomic History    Marital status:      Spouse name: Not on file    Number of children: Not on file    Years of education: Not on file    Highest education level: Not on file   Occupational History    Not on file   Tobacco Use    Smoking status: Never    Smokeless tobacco: Never   Substance and Sexual Activity    Alcohol use: Yes     Comment: occasionally    Drug use: No    Sexual activity: Not on file   Other Topics Concern    Not on file   Social History Narrative    Not on file     Social Determinants of Health     Financial Resource Strain: Not on file   Food Insecurity: Not on file   Transportation Needs: Not on file   Physical Activity: Not on file   Stress: Not on file   Social Connections: Not on file   Intimate Partner Violence: Not on file   Housing Stability: Not on file       FAMILY HISTORY:  Family History   Problem Relation Age of Onset    Heart Disease Father     Hypertension Father         REVIEW OF SYSTEMS:  General ROS: Negative for - chills, fever, night sweats or weight loss.  HEENT ROS: Negative  Respiratory ROS: Negative for - cough or shortness of breath.  Cardiovascular ROS:  Negative for - chest pain or palpitations.  Gastrointestinal ROS: As per the history of present illness.  Genito-Urinary ROS: Negative  Musculoskeletal ROS: Negative.  Neurological ROS: Negative  Skin ROS: negative  Hematology ROS: negative  Endocrinology ROS: Negative        PHYSICAL EXAM:  VITALS: /84   Pulse 96   Temp 36.6 °C (97.9 °F) (Temporal)   Resp 18   Wt 72.6 kg (160 lb 0.9 oz)   LMP  (LMP Unknown)    "SpO2 96%   BMI 28.36 kg/m²   GEN:  Molly Fountain is a 78 y.o. female in no acute distress.  HEENT: Mucous membranes pink and moist.  Sclera anicteric.    NECK:    Neck supple without lymphadenopathy or thyromegaly.  LUNGS: Clear to auscultation posteriorly.  HEART:           irregular rate and rhythm. S1 and S2 normal. No murmurs, gallops  ABD:  + BS nt/ND -hsm  RECTAL: Not done at this time.  EXT:  Without cyanosis, deformity or pitting edema.  SKIN:  Pink, warm, dry.  NEURO: Grossly intact, A/OR.    LABS:  Recent Labs     03/15/24  1739   WBC 7.3   MCV 85.6     No results for input(s): \"GLUCOSE\", \"BUN\", \"NA\", \"K\", \"CL\", \"CO2\" in the last 72 hours.    Invalid input(s): \"CREAT\", \"ANIONGAP\", \"CA\", \"MG\", \"PO4\"  Lab Results   Component Value Date    INR 1.10 12/05/2023    INR 0.97 12/01/2023    INR 1.13 05/26/2019     No components found for: \"ALT\", \"AST\", \"GGT\", \"ALKPHOS\"  No results found for: \"BILINEO\"      @LASTIMGCAT(YM0039)@     @LASTIMGCAT(DS6402)@       IMPRESSION/PLAN:  1.BRBPR  2.Abnormal ct scan  3. DM 2  4.HTN  5. A.fib  6. Diverticulitis on ct scan      Pt does not seem to be having a significant GI bleed At this time it seems hemorrhoidal in nature. We will follow closely overnight. If bleeds perfuse CTA but she has not had any bleeding since this morning/ If she has diverticulitis then colonoscopy not recommended. She needs Augmentin. She does not have pain. I will have out radiologist go over ct scan with me.   Trend h/h  We will evaluate in am and decide if anything further should be done, such as colonoscopy or possibly sigmoidoscopy           Daina Venegas M.D.  Gastroenterology      "

## 2024-03-16 NOTE — DIETARY
Nutrition services: Day 1 of admit.  Molly Fountain is a 78 y.o. female with admitting DX of GI bleed.    Consult received for wt loss (2-13 lbs in 3 months), poor PO per admit screen. Met with pt at bedside, she appeared adequately nourished. Pt reports a good appetite PTA. She shared that she has lost wt at home intentionally as she has been eating less sugar.    Assessment:  Weight: 71.5 kg (157 lb 10.1 oz)  Body mass index is 27.93 kg/m²., BMI classification: overweight  Diet/Intake: Low Fiber (GI soft); no PO recorded in chart to assess     Evaluation:   Transferred from outside facility with bright red blood per rectum.   PMH: CVA (cerebral vascular accident), Essential hypertension, Type 2 diabetes mellitus with complication, without long-term current use of insulin.   Blood sugars have been well controlled during admit. No recent A1c to trend.    Malnutrition Risk: No new risk identified.    Recommendations/Plan:  Continue current diet order.  Encourage intake of meals.  Document intake of all meals as % taken in ADLs to provide interdisciplinary communication across all shifts.   Monitor weight.  Nutrition rep will continue to see patient for ongoing meal and snack preferences.     RD will follow per dept guidelines..

## 2024-03-16 NOTE — PROGRESS NOTES
..Gastroenterology Progress Note               Author:  Kendy Flores, ALONA,  APRN Date & Time Created: 3/16/2024 8:19 AM       Patient ID:  Name:             Molly Fountain    YOB: 1945  Age:                 78 y.o.  female  MRN:               [id number]        Medical Decision Making, by Problem:  Active Hospital Problems    Diagnosis     Diverticulitis [K57.92]     Metabolic acidosis [E87.20]     CVA (cerebral vascular accident) (HCC) [I63.9]     Seizure (HCC) [R56.9]     GI bleed [K92.2]     Type 2 diabetes mellitus with complication, without long-term current use of insulin (HCC) [E11.8]     Essential hypertension [I10]        Presenting Chief Complaint:  BRBPR, transferred from [facility]    HPI:  Patient is a 78 year old female hospitalized here in 12/2023 for lower GI bleeding. Colonsocopy with biopsies at that time consistent with ischemic colitis. She continued to bleed and underwent IR assessment, but there was no target for embolization. She improved and was discharged home. She was doing well until the morning of 3/15 where she again had 2 episodes of BRBPR. CT scan at OSH showed diverticulitis and her hgb was 11.6. Of note, she recently started Rybelsus about 3 weeks ago.     Interval History:  3/16/2024: Patient seen and examined. No complaints, feels well, has had no further episodes of bleeding and had a small brown BM this morning. Hgb 9.2<9.8<11.6. VSS.     Hospital Medications:  Current Facility-Administered Medications   Medication Dose Frequency Provider Last Rate Last Admin    amoxicillin-clavulanate (Augmentin) 875-125 MG per tablet 1 Tablet  1 Tablet Q12HRS Pablo Ramírez M.D.        NS infusion   Continuous Haylee Martinez M.D. 150 mL/hr at 03/16/24 0030 New Bag at 03/16/24 0030    atorvastatin (Lipitor) tablet 80 mg  80 mg Q EVENING Haylee Martinez M.D.   80 mg at 03/15/24 2047    levETIRAcetam (Keppra) tablet 500 mg  500 mg BID Apoorva Lanza, EZRA.P.RSteffanieNSteffanie   500 mg at  03/16/24 0529   Last reviewed on 3/15/2024  9:09 PM by Jaclyn Huddleston R.N.       Review of Systems:  Review of Systems   Constitutional:  Negative for chills, fever and malaise/fatigue.   HENT:  Negative for hearing loss.    Eyes:  Negative for blurred vision.   Respiratory:  Negative for cough and shortness of breath.    Cardiovascular:  Negative for chest pain and leg swelling.   Gastrointestinal:  Positive for blood in stool. Negative for abdominal pain, constipation, diarrhea, heartburn, melena, nausea and vomiting.   Genitourinary:  Negative for dysuria.   Musculoskeletal:  Negative for back pain.   Skin:  Negative for rash.   Neurological:  Negative for dizziness and weakness.   Psychiatric/Behavioral:  Negative for depression.    All other systems reviewed and are negative.        Vital signs:  Weight/BMI: Body mass index is 27.93 kg/m².  /68   Pulse 82   Temp 36.6 °C (97.9 °F) (Temporal)   Resp 18   Wt 71.5 kg (157 lb 10.1 oz)   SpO2 91%   Vitals:    03/15/24 2000 03/15/24 2330 03/16/24 0508 03/16/24 0714   BP: 101/65 98/57 107/55 109/68   Pulse:  85 95 82   Resp:  18 18 18   Temp:  37 °C (98.6 °F) 37.1 °C (98.8 °F) 36.6 °C (97.9 °F)   TempSrc:  Temporal Temporal Temporal   SpO2: 95% 92% 94% 91%   Weight:   71.5 kg (157 lb 10.1 oz)      Oxygen Therapy:  Pulse Oximetry: 91 %, O2 (LPM): 0, O2 Delivery Device: None - Room Air    Intake/Output Summary (Last 24 hours) at 3/16/2024 0819  Last data filed at 3/16/2024 0508  Gross per 24 hour   Intake --   Output 875 ml   Net -875 ml         Physical Exam:  Physical Exam  Vitals and nursing note reviewed.   Constitutional:       General: She is not in acute distress.     Appearance: Normal appearance.   HENT:      Head: Normocephalic and atraumatic.      Right Ear: External ear normal.      Left Ear: External ear normal.      Nose: Nose normal.      Mouth/Throat:      Mouth: Mucous membranes are moist.      Pharynx: Oropharynx is clear.   Eyes:       General: No scleral icterus.  Cardiovascular:      Rate and Rhythm: Normal rate and regular rhythm.      Pulses: Normal pulses.      Heart sounds: Normal heart sounds.   Pulmonary:      Effort: Pulmonary effort is normal. No respiratory distress.      Breath sounds: Normal breath sounds.   Abdominal:      General: Abdomen is flat. Bowel sounds are normal. There is no distension.      Palpations: Abdomen is soft.      Tenderness: There is no abdominal tenderness.   Musculoskeletal:         General: Normal range of motion.      Cervical back: Normal range of motion.   Skin:     General: Skin is warm and dry.      Capillary Refill: Capillary refill takes less than 2 seconds.   Neurological:      Mental Status: She is alert and oriented to person, place, and time.   Psychiatric:         Mood and Affect: Mood normal.         Behavior: Behavior normal.             Labs:  Recent Labs     03/15/24  1739 03/16/24  0009   SODIUM 142 142   POTASSIUM 3.9 3.8   CHLORIDE 111 111   CO2 19* 21   BUN 19 17   CREATININE 0.85 0.72   CALCIUM 8.1* 7.7*     Recent Labs     03/15/24  1739 03/16/24  0009   GLUCOSE 78 93     Recent Labs     03/15/24  1739 03/16/24  0009   WBC 7.3 6.6   NEUTSPOLYS 62.90 57.90   LYMPHOCYTES 26.90 29.60   MONOCYTES 7.30 7.90   EOSINOPHILS 1.90 3.80   BASOPHILS 0.70 0.60     Recent Labs     03/15/24  1739 03/16/24  0009 03/16/24  0609   RBC 4.23 3.61*  --    HEMOGLOBIN 11.6* 9.8* 9.2*   HEMATOCRIT 36.2* 30.8* 28.3*   PLATELETCT 242 209  --    PROTHROMBTM 14.6  --   --    APTT 27.7  --   --    INR 1.12  --   --      Recent Results (from the past 24 hour(s))   CBC WITH DIFFERENTIAL    Collection Time: 03/15/24  5:39 PM   Result Value Ref Range    WBC 7.3 4.8 - 10.8 K/uL    RBC 4.23 4.20 - 5.40 M/uL    Hemoglobin 11.6 (L) 12.0 - 16.0 g/dL    Hematocrit 36.2 (L) 37.0 - 47.0 %    MCV 85.6 81.4 - 97.8 fL    MCH 27.4 27.0 - 33.0 pg    MCHC 32.0 (L) 32.2 - 35.5 g/dL    RDW 44.0 35.9 - 50.0 fL    Platelet Count 242 164  - 446 K/uL    MPV 9.9 9.0 - 12.9 fL    Neutrophils-Polys 62.90 44.00 - 72.00 %    Lymphocytes 26.90 22.00 - 41.00 %    Monocytes 7.30 0.00 - 13.40 %    Eosinophils 1.90 0.00 - 6.90 %    Basophils 0.70 0.00 - 1.80 %    Immature Granulocytes 0.30 0.00 - 0.90 %    Nucleated RBC 0.00 0.00 - 0.20 /100 WBC    Neutrophils (Absolute) 4.56 1.82 - 7.42 K/uL    Lymphs (Absolute) 1.95 1.00 - 4.80 K/uL    Monos (Absolute) 0.53 0.00 - 0.85 K/uL    Eos (Absolute) 0.14 0.00 - 0.51 K/uL    Baso (Absolute) 0.05 0.00 - 0.12 K/uL    Immature Granulocytes (abs) 0.02 0.00 - 0.11 K/uL    NRBC (Absolute) 0.00 K/uL   Basic Metabolic Panel    Collection Time: 03/15/24  5:39 PM   Result Value Ref Range    Sodium 142 135 - 145 mmol/L    Potassium 3.9 3.6 - 5.5 mmol/L    Chloride 111 96 - 112 mmol/L    Co2 19 (L) 20 - 33 mmol/L    Glucose 78 65 - 99 mg/dL    Bun 19 8 - 22 mg/dL    Creatinine 0.85 0.50 - 1.40 mg/dL    Calcium 8.1 (L) 8.5 - 10.5 mg/dL    Anion Gap 12.0 7.0 - 16.0   Prothrombin Time    Collection Time: 03/15/24  5:39 PM   Result Value Ref Range    PT 14.6 12.0 - 14.6 sec    INR 1.12 0.87 - 1.13   APTT    Collection Time: 03/15/24  5:39 PM   Result Value Ref Range    APTT 27.7 24.7 - 36.0 sec   ESTIMATED GFR    Collection Time: 03/15/24  5:39 PM   Result Value Ref Range    GFR (CKD-EPI) 69 >60 mL/min/1.73 m 2   POCT glucose device results    Collection Time: 03/15/24  5:41 PM   Result Value Ref Range    POC Glucose, Blood 76 65 - 99 mg/dL   EKG    Collection Time: 03/15/24  5:46 PM   Result Value Ref Range    Report       [facility]    Test Date:  2024-03-15  Pt Name:    SARAH GUNN                Department: 183  MRN:        [id number]                      Room:       T823  Gender:     Female                       Technician: GABBY  :        [id number]                   Requested By:IRINA LINDO  Order #:    [id number]                    Reading MD: Susie Guardado    Measurements  Intervals                                 Axis  Rate:       88                           P:          30  LA:         174                          QRS:        21  QRSD:       79                           T:          19  QT:         391  QTc:        473    Interpretive Statements  Sinus rhythm  Borderline T wave abnormalities  Inferior Q waves, consider inferior infarct, age undetermined  Compared to ECG 12/02/2023 09:06:51  Premature ventricular beat no longer present  Electronically Signed On 03- 05:59:38 PDT by Susie Guardado     CBC with Differential    Collection Time: 03/16/24 12:09 AM   Result Value Ref Range    WBC 6.6 4.8 - 10.8 K/uL    RBC 3.61 (L) 4.20 - 5.40 M/uL    Hemoglobin 9.8 (L) 12.0 - 16.0 g/dL    Hematocrit 30.8 (L) 37.0 - 47.0 %    MCV 85.3 81.4 - 97.8 fL    MCH 27.1 27.0 - 33.0 pg    MCHC 31.8 (L) 32.2 - 35.5 g/dL    RDW 43.5 35.9 - 50.0 fL    Platelet Count 209 164 - 446 K/uL    MPV 10.0 9.0 - 12.9 fL    Neutrophils-Polys 57.90 44.00 - 72.00 %    Lymphocytes 29.60 22.00 - 41.00 %    Monocytes 7.90 0.00 - 13.40 %    Eosinophils 3.80 0.00 - 6.90 %    Basophils 0.60 0.00 - 1.80 %    Immature Granulocytes 0.20 0.00 - 0.90 %    Nucleated RBC 0.00 0.00 - 0.20 /100 WBC    Neutrophils (Absolute) 3.80 1.82 - 7.42 K/uL    Lymphs (Absolute) 1.94 1.00 - 4.80 K/uL    Monos (Absolute) 0.52 0.00 - 0.85 K/uL    Eos (Absolute) 0.25 0.00 - 0.51 K/uL    Baso (Absolute) 0.04 0.00 - 0.12 K/uL    Immature Granulocytes (abs) 0.01 0.00 - 0.11 K/uL    NRBC (Absolute) 0.00 K/uL   Basic Metabolic Panel (BMP)    Collection Time: 03/16/24 12:09 AM   Result Value Ref Range    Sodium 142 135 - 145 mmol/L    Potassium 3.8 3.6 - 5.5 mmol/L    Chloride 111 96 - 112 mmol/L    Co2 21 20 - 33 mmol/L    Glucose 93 65 - 99 mg/dL    Bun 17 8 - 22 mg/dL    Creatinine 0.72 0.50 - 1.40 mg/dL    Calcium 7.7 (L) 8.5 - 10.5 mg/dL    Anion Gap 10.0 7.0 - 16.0   ESTIMATED GFR    Collection Time: 03/16/24 12:09 AM   Result Value Ref Range    GFR (CKD-EPI) 85 >60 mL/min/1.73 m 2    HEMOGLOBIN AND HEMATOCRIT    Collection Time: 03/16/24  6:09 AM   Result Value Ref Range    Hemoglobin 9.2 (L) 12.0 - 16.0 g/dL    Hematocrit 28.3 (L) 37.0 - 47.0 %       Radiology Review:  No orders to display         MDM (Data Review):   -Records reviewed and summarized in current documentation  -I personally reviewed and interpreted the laboratory results  -I personally reviewed the radiology images    Assessment/Recommendations:  BRBPR  CVA on aspirin  DM 2  HTN  Seizure disorder on Keppra  Diverticulitis on ct scan     Recommendations:  No planned GI procedure given acute diverticulitis   Advance to GI soft diet (low fiber during diverticulitis)  ---When resolved, should follow high fiber diet  D/W pharmacy regarding Rybelsus.  The side effect profile of Rybelsus causes constipation which patient endorsed prior to her bleeding, which in turn can exacerbate diverticulitis  ---Recommend she discuss with her PCP who prescribed the medication  Monitor H/H and transfuse for Hgb <7  If patient develops brisk bleeding, please obtain stat CTA    GI team will follow.    Discussed with patient, pharmacy, Dr. Ramírez, Dr. Herrera    Core Quality Measures   Reviewed items::  Labs, Medications and Radiology reports reviewed

## 2024-03-16 NOTE — ASSESSMENT & PLAN NOTE
History of same recently, previously thought to be due to ischemic colitis,  Now has diverticulitis  Serial h/h  Transfuse if needed  GI consulted

## 2024-03-16 NOTE — PROGRESS NOTES
Bedside report received from off going RN/tech: Lilia RN, assumed care of patient.     Fall Risk Score: HIGH RISK  Fall risk interventions in place: Place yellow fall risk ID band on patient, Provide patient/family education based on risk assessment, Educate patient/family to call staff for assistance when getting out of bed, Place fall precaution signage outside patient door, Place patient in room close to nursing station, Utilize bed/chair fall alarm, Notify charge of high risk for huddle, and Bed alarm connected correctly  Bed type: Regular (Deuce Score less than 17 interventions in place)  Patient on cardiac monitor: Yes  IVF/IV medications: Infusion per MAR (List Med(s)) NS @ 150ml/hr  Oxygen: Room Air  Bedside sitter: Not Applicable   Isolation: Not applicable

## 2024-03-16 NOTE — PROGRESS NOTES
Bedside report received. Patient A/Ox 4. VS -110's. No oxygen requirements currently. Telemetry monitoring SR. No complaints of pain at this time. POC discussed with patient. Pt verbalizes understanding. Call light and belongings within reach. Bed locked and lowest position, alarm and fall precautions in place.    Bedside report received from off going RN/tech: Tamiko, assumed care of patient.     Fall Risk Score: HIGH RISK  Fall risk interventions in place: Place yellow fall risk ID band on patient, Provide patient/family education based on risk assessment, Educate patient/family to call staff for assistance when getting out of bed, Place fall precaution signage outside patient door, Place patient in room close to nursing station, Utilize bed/chair fall alarm, and Bed alarm connected correctly  Bed type: Regular (Deuce Score less than 17 interventions in place)  Patient on cardiac monitor: Yes  IVF/IV medications: Infusion per MAR (List Med(s)) NS at 150 mL/hr  Oxygen: Room Air  Bedside sitter: Not Applicable   Isolation: Not applicable

## 2024-03-16 NOTE — ASSESSMENT & PLAN NOTE
Mild hypoglycemia earlier, but no associated sx  Added ISS for monitoring as well as hypoglycemia protocol

## 2024-03-16 NOTE — CARE PLAN
The patient is Watcher - Medium risk of patient condition declining or worsening    Shift Goals  Clinical Goals: Monitor H&H and hemodynamic stability  Patient Goals: Rest  Family Goals: janeth    Progress made toward(s) clinical / shift goals:    Problem: Knowledge Deficit - Standard  Goal: Patient and family/care givers will demonstrate understanding of plan of care, disease process/condition, diagnostic tests and medications  Outcome: Progressing  Note: POC discussed with patient. Discussed medications and had home medications adjusted correctly according to the patient. Answered all questions at this time.     Problem: Fall Risk  Goal: Patient will remain free from falls  Outcome: Progressing  Note: Bed locked in the lowest position with bed alarm on. Call light and personal belongings placed within reach. Treaded socks placed on patient. Patient reminded to call for assistance.       Patient is not progressing towards the following goals:

## 2024-03-16 NOTE — PROGRESS NOTES
3853 - Report received from Lilia RN at patient's bedside. Patient resting in bed quietly with no complaints at this time. Telemetry monitor intact et functioning. Call light and belongings within reach, safety measures intact, white board updated.     0850 - Patient had small BM with no bleeding noted.     1913 - Reported off to Jim SHARIF at patient's bedside.

## 2024-03-16 NOTE — PROGRESS NOTES
Patient arrived via EMS, assumed care of patient. Patient is A&Ox4, vital signs are stable, on room air. Pt connected to monitors, monitors notified. Pt SR. Patient denies pain at this time, no signs of distress or discomfort. Received verbal orders for fluids from MD Martinez, fluids initiated. Call light and belongings within reach. Bed in lowest/locked position. Bed alarm on.

## 2024-03-16 NOTE — PROGRESS NOTES
Hospital Medicine Daily Progress Note    Date of Service  3/16/2024    Chief Complaint  Molly Fountain is a 78 y.o. female admitted 3/15/2024 with GI bleed and diverticulitis    Hospital Course  No notes on file    Interval Problem Update  3/16: Patient send her last bloody bowel movement was yesterday and had 3 urinary episodes today.  However she told RN that she had 3 normal bowel movements today.  Hemoglobin trending down.  Added sliding scale insulin.  Total time 51 minutes.    I have discussed this patient's plan of care and discharge plan at IDT rounds today with Case Management, Nursing, Nursing leadership, and other members of the IDT team.    Disposition  The patient is not medically cleared for discharge to home or a post-acute facility.      I have placed the appropriate orders for post-discharge needs.    Review of Systems  Review of Systems   Constitutional:  Negative for chills and fever.   Respiratory:  Negative for cough, shortness of breath and wheezing.    Cardiovascular:  Negative for chest pain.   Gastrointestinal:  Positive for blood in stool. Negative for constipation, nausea and vomiting.   Genitourinary:  Negative for dysuria.   Neurological:  Negative for headaches.        Physical Exam  Temp:  [36.6 °C (97.9 °F)-37.1 °C (98.8 °F)] 36.6 °C (97.9 °F)  Pulse:  [79-96] 82  Resp:  [18] 18  BP: ()/(55-84) 109/68  SpO2:  [91 %-98 %] 91 %    Physical Exam  Constitutional:       General: She is not in acute distress.     Appearance: She is well-developed. She is ill-appearing.   HENT:      Head: Normocephalic.   Cardiovascular:      Rate and Rhythm: Normal rate.      Heart sounds:      No gallop.   Pulmonary:      Effort: No respiratory distress.      Breath sounds: No wheezing.   Abdominal:      General: There is no distension.      Tenderness: There is no abdominal tenderness.   Skin:     General: Skin is warm.   Neurological:      Mental Status: She is alert. Mental status is at baseline.          Fluids    Intake/Output Summary (Last 24 hours) at 3/16/2024 1155  Last data filed at 3/16/2024 0850  Gross per 24 hour   Intake 1895.46 ml   Output 875 ml   Net 1020.46 ml       Laboratory  Recent Labs     03/15/24  1739 03/16/24  0009 03/16/24  0609   WBC 7.3 6.6  --    RBC 4.23 3.61*  --    HEMOGLOBIN 11.6* 9.8* 9.2*   HEMATOCRIT 36.2* 30.8* 28.3*   MCV 85.6 85.3  --    MCH 27.4 27.1  --    MCHC 32.0* 31.8*  --    RDW 44.0 43.5  --    PLATELETCT 242 209  --    MPV 9.9 10.0  --      Recent Labs     03/15/24  1739 03/16/24  0009   SODIUM 142 142   POTASSIUM 3.9 3.8   CHLORIDE 111 111   CO2 19* 21   GLUCOSE 78 93   BUN 19 17   CREATININE 0.85 0.72   CALCIUM 8.1* 7.7*     Recent Labs     03/15/24  1739   APTT 27.7   INR 1.12               Imaging  No orders to display        Assessment/Plan  * GI bleed- (present on admission)  Assessment & Plan  History of same recently, previously thought to be due to ischemic colitis,  Now has diverticulitis  Serial h/h  Transfuse if needed  GI consulted    Metabolic acidosis  Assessment & Plan  Mild  resolved with hydration    Diverticulitis  Assessment & Plan  Suspected  See above  following    CVA (cerebral vascular accident) (HCC)- (present on admission)  Assessment & Plan  Recent, no new sx  Supportive care  Holding asa due to gi bleed    Seizure (HCC)- (present on admission)  Assessment & Plan  Continue home seizure meds  Seizure precautions  following    Essential hypertension- (present on admission)  Assessment & Plan  Following  Hold bp meds for now due to acute gi bleed  following    Type 2 diabetes mellitus with complication, without long-term current use of insulin (HCC)- (present on admission)  Assessment & Plan  Mild hypoglycemia earlier, but no associated sx  Added ISS for monitoring as well as hypoglycemia protocol         VTE prophylaxis:   SCDs/TEDs      I have performed a physical exam and reviewed and updated ROS and Plan today (3/16/2024). In review of  yesterday's note (3/15/2024), there are no changes except as documented above.

## 2024-03-16 NOTE — H&P
Hospital Medicine History & Physical Note    Date of Service  3/15/2024    Primary Care Physician  Pcp Pt States None    Consultants  Theo    Code Status  DNAR/DNI    Chief Complaint  No chief complaint on file.      History of Presenting Illness  Molly Fountain is a 78 y.o. female who presented [facility] in [facility] on 3/15/2024 with bright red blood per rectum. She was transferred to [facility] on 3/15/24 for a GI consultation. She had two episodes of bright red blood per rectum this am, she denies any associated abdominal pain, she denies chest pain, no dizziness. I spoke with Dr. Adamson the ERP in [place]. He reports initially this am her hbg was 13. She was given 1L of fluid and after this a recheck was 11.  She had initial mild hypotension but this resolved and her HR has been stable. A CT abdomen was obtained in [place] and was suggestive of diverticulitis.    Patient has a history of CVA on ASA, hypertension, hyperlipidemia, diabetes, seizure disorder and was recently hospitalized at [facility] with a GI bleed. She had a colonoscopy at [facility] on 12/2/23 that was suggestive with ischemic colitis. A CT angio at that time suggested extravasation so she was seen by IR and vascular surgery at the time and they saw no indication for intervention.     Upon arrival here she has stable vitals. Has no further episodes of bleeding. She continues to deny pain. She does report mild numbness of her right fingertips but reports this is chronic, present since her stroke. A stat cbc was ordered and her hbg is stable. I discussed plan with Dr. Venegas and patient. We will obtain a CTA if there is evidence of acute brisk bleeding, otherwise it will be low yield. I will continue serial h/h and transfuse as needed. Dr. Venegas will follow.  Two large bore IVs have been placed.    Code status was discussed and she remains a DNR/ DNI    I discussed the plan of care with patient, ERP, nursing and Dr. Venegas    Review of  Systems  Review of Systems   Constitutional: Negative.  Negative for chills, diaphoresis, fever, malaise/fatigue and weight loss.   HENT: Negative.  Negative for sore throat.    Eyes: Negative.  Negative for blurred vision.   Respiratory: Negative.  Negative for cough and shortness of breath.    Cardiovascular: Negative.  Negative for chest pain, palpitations and leg swelling.   Gastrointestinal:  Positive for blood in stool. Negative for abdominal pain, nausea and vomiting.   Genitourinary: Negative.  Negative for dysuria.   Musculoskeletal: Negative.  Negative for myalgias.   Skin: Negative.  Negative for itching and rash.   Neurological:  Positive for sensory change (r hand, chronic, intermittent). Negative for dizziness, focal weakness, weakness and headaches.   Endo/Heme/Allergies: Negative.  Does not bruise/bleed easily.   Psychiatric/Behavioral: Negative.  Negative for depression, substance abuse and suicidal ideas.    All other systems reviewed and are negative.      Past Medical History   has a past medical history of CVA (cerebral vascular accident) ([facility]) (11/24/2023), Essential hypertension (3/13/2019), and Type 2 diabetes mellitus with complication, without long-term current use of insulin ([facility]) (3/13/2019).    Surgical History   has a past surgical history that includes colonoscopy - endo (N/A, 5/28/2019); pr colonoscopy,diagnostic (N/A, 12/2/2023); and pr colonoscopy,biopsy (N/A, 12/2/2023).     Family History  family history includes Heart Disease in her father; Hypertension in her father.   Family history reviewed with patient. There is no family history that is pertinent to the chief complaint.     Social History   reports that she has never smoked. She has never used smokeless tobacco. She reports current alcohol use. She reports that she does not use drugs.    Allergies  Allergies   Allergen Reactions    Sulfa Drugs Rash     Full body rash    Shellfish-Derived Products Rash     Full body rash        Medications  Prior to Admission Medications   Prescriptions Last Dose Informant Patient Reported? Taking?   Alcohol Swabs   No No   Sig: Wipe site with prep pad prior to injection.   Blood Glucose Meter Kit   No No   Sig: Test blood sugar as recommended by provider. One Touch Verio blood glucose monitoring kit.   Blood Glucose Test Strips   No No   Sig: Use one Accucheck Guide strip to test blood sugar once daily early morning before first meal. For uncontrolled diabetes. E11.65   Blood Glucose Test Strips   No No   Sig: Use one One Touch Verio strip to test blood sugar once daily early morning before first meal.   Empagliflozin (JARDIANCE) 10 MG Tab tablet   No No   Sig: Take 1 Tablet by mouth every day.   Lancets   No No   Sig: Use one fast clix lancet to test blood sugar once daily early morning before first meal. For uncontrolled diabetes. E11.65   Lancets   No No   Sig: Use one One Touch Verio lancet to test blood sugar once daily early morning before first meal.   acetaminophen (TYLENOL) 325 MG Tab   Yes No   Sig: Take 650 mg by mouth every four hours as needed for Mild Pain.   aspirin (ASA) 81 MG Chew Tab chewable tablet   No No   Sig: Chew 1 Tablet every day.   atorvastatin (LIPITOR) 80 MG tablet   No No   Sig: Take 1 Tablet by mouth every evening.   diclofenac sodium (VOLTAREN) 1 % Gel   Yes No   Sig: Apply  topically 2 times a day. 1 application to left knee   levetiracetam (KEPPRA) 1000 MG tablet   No No   Sig: Take 1 Tablet by mouth 2 times a day.   magnesium oxide 400 (240 Mg) MG Tab   No No   Sig: Take 1 Tablet by mouth every day.   omeprazole (PRILOSEC) 20 MG delayed-release capsule   No No   Sig: Take 1 Capsule by mouth every day.      Facility-Administered Medications: None       Physical Exam  Temp:  [36.6 °C (97.9 °F)-37 °C (98.6 °F)] 37 °C (98.6 °F)  Pulse:  [79-96] 79  Resp:  [18] 18  BP: (101-111)/(65-84) 101/65  SpO2:  [91 %-96 %] 91 %                          Physical Exam  Vitals and  "nursing note reviewed. Exam conducted with a chaperone present.   Constitutional:       General: She is not in acute distress.     Appearance: Normal appearance. She is not diaphoretic.   HENT:      Head: Normocephalic.      Nose: Nose normal.      Mouth/Throat:      Mouth: Mucous membranes are moist.   Eyes:      Pupils: Pupils are equal, round, and reactive to light.   Cardiovascular:      Rate and Rhythm: Normal rate and regular rhythm.      Pulses: Normal pulses.      Heart sounds: Normal heart sounds.   Pulmonary:      Effort: Pulmonary effort is normal.      Breath sounds: Normal breath sounds.   Abdominal:      General: Abdomen is flat. Bowel sounds are normal.      Palpations: Abdomen is soft.   Musculoskeletal:         General: No swelling or deformity. Normal range of motion.   Skin:     General: Skin is warm and dry.      Capillary Refill: Capillary refill takes less than 2 seconds.   Neurological:      General: No focal deficit present.      Mental Status: She is alert and oriented to person, place, and time.      Cranial Nerves: No cranial nerve deficit.   Psychiatric:         Mood and Affect: Mood normal.         Behavior: Behavior normal.         Laboratory:  Recent Labs     03/15/24  1739   WBC 7.3   RBC 4.23   HEMOGLOBIN 11.6*   HEMATOCRIT 36.2*   MCV 85.6   MCH 27.4   MCHC 32.0*   RDW 44.0   PLATELETCT 242   MPV 9.9     Recent Labs     03/15/24  1739   SODIUM 142   POTASSIUM 3.9   CHLORIDE 111   CO2 19*   GLUCOSE 78   BUN 19   CREATININE 0.85   CALCIUM 8.1*     Recent Labs     03/15/24  1739   GLUCOSE 78     Recent Labs     03/15/24  1739   APTT 27.7   INR 1.12     No results for input(s): \"NTPROBNP\" in the last 72 hours.      No results for input(s): \"TROPONINT\" in the last 72 hours.    Imaging:  No orders to display       Ekg pending    Assessment/Plan:  Justification for Admission Status  I anticipate this patient will require at least two midnights for appropriate medical management, " necessitating inpatient admission because above    Patient will need a Telemetry bed on CARDIOLOGY service .  The need is secondary to above.    * GI bleed- (present on admission)  Assessment & Plan  History of same recently, thought to be due to ischemic colitis,    H/h stable since arrival from [place], no bleed in several hours - will check CTA if brisk bleeding happens, otherwise continue to monitor closely   Serial h/h  Transfuse if needed  GI following    Metabolic acidosis  Assessment & Plan  Mild  Hydration   Following  Bmp in am    Diverticulitis  Assessment & Plan  Suspected  See above  following    CVA (cerebral vascular accident) (HCC)- (present on admission)  Assessment & Plan  Recent, no new sx  Supportive care  Holding asa due to gi bleed    Seizure ([facility])- (present on admission)  Assessment & Plan  Continue home seizure meds  Seizure precautions  following    Essential hypertension- (present on admission)  Assessment & Plan  Following  Hold bp meds for now due to acute gi bleed  following    Type 2 diabetes mellitus with complication, without long-term current use of insulin ([facility])- (present on admission)  Assessment & Plan  Mild hypoglycemia earlier, but no associated sx - will hold SSI for now due to minimal oral intake - will adjust as needed  Discussed with GI, okay to start clears  following        VTE prophylaxis: SCDs/TEDs

## 2024-03-16 NOTE — PROGRESS NOTES
4 Eyes Skin Assessment Completed by KOLE Carias and KOLE Fraser.    Head WDL  Ears WDL  Nose WDL  Mouth WDL  Neck WDL  Breast/Chest WDL  Shoulder Blades WDL  Spine WDL  (R) Arm/Elbow/Hand Bruising  (L) Arm/Elbow/Hand Bruising  Abdomen WDL  Groin WDL  Scrotum/Coccyx/Buttocks Redness and Blanching  (R) Leg Edema  (L) Leg Edema  (R) Heel/Foot/Toe Redness and Blanching  (L) Heel/Foot/Toe Redness and Blanching          Devices In Places Tele Box and Blood Pressure Cuff      Interventions In Place Pillows    Possible Skin Injury No    Pictures Uploaded Into Epic N/A  Wound Consult Placed N/A  RN Wound Prevention Protocol Ordered No

## 2024-03-16 NOTE — PROGRESS NOTES
HOSPITALIST NOC CROSS COVER    Patient reports home dose keppra is 500 mg BID and not 1000 mg BID. Med rec adjusted per RN. Orders changed to Keppra 500 mg BID.

## 2024-03-16 NOTE — CARE PLAN
The patient is Stable - Low risk of patient condition declining or worsening    Shift Goals  Clinical Goals: Monitor H&H and hemodynamic stability  Patient Goals: Rest  Family Goals: janeth    Progress made toward(s) clinical / shift goals:    Problem: Knowledge Deficit - Standard  Goal: Patient and family/care givers will demonstrate understanding of plan of care, disease process/condition, diagnostic tests and medications  Outcome: Progressing     Problem: Fall Risk  Goal: Patient will remain free from falls  Outcome: Progressing     Problem: Risk for Fluid Volume Deficit Related to Bleeding  Goal: Fluid volume balance will be maintained  Outcome: Progressing  Goal: Patient will show no signs and symptoms of excessive bleeding  Outcome: Progressing     Problem: Risk for Bleeding  Goal: Patient will take measures to prevent bleeding and recognizes signs of bleeding that need to be reported immediately to a health care professional  Outcome: Progressing  Goal: Patient will not experience bleeding as evidenced by normal blood pressure, stable hematocrit and hemoglobin levels and desired ranges for coagulation profiles  Outcome: Progressing       Patient is not progressing towards the following goals:

## 2024-03-17 VITALS
RESPIRATION RATE: 16 BRPM | BODY MASS INDEX: 27.77 KG/M2 | DIASTOLIC BLOOD PRESSURE: 80 MMHG | OXYGEN SATURATION: 91 % | HEART RATE: 76 BPM | WEIGHT: 156.75 LBS | SYSTOLIC BLOOD PRESSURE: 142 MMHG | TEMPERATURE: 97.3 F

## 2024-03-17 PROBLEM — K59.00 CONSTIPATION IN FEMALE: Status: ACTIVE | Noted: 2024-03-17

## 2024-03-17 LAB
ALBUMIN SERPL BCP-MCNC: 3.1 G/DL (ref 3.2–4.9)
BUN SERPL-MCNC: 8 MG/DL (ref 8–22)
CALCIUM ALBUM COR SERPL-MCNC: 8.7 MG/DL (ref 8.5–10.5)
CALCIUM SERPL-MCNC: 8 MG/DL (ref 8.5–10.5)
CHLORIDE SERPL-SCNC: 112 MMOL/L (ref 96–112)
CO2 SERPL-SCNC: 20 MMOL/L (ref 20–33)
CREAT SERPL-MCNC: 0.76 MG/DL (ref 0.5–1.4)
ERYTHROCYTE [DISTWIDTH] IN BLOOD BY AUTOMATED COUNT: 43.6 FL (ref 35.9–50)
GFR SERPLBLD CREATININE-BSD FMLA CKD-EPI: 79 ML/MIN/1.73 M 2
GLUCOSE BLD STRIP.AUTO-MCNC: 95 MG/DL (ref 65–99)
GLUCOSE SERPL-MCNC: 113 MG/DL (ref 65–99)
HCT VFR BLD AUTO: 29.7 % (ref 37–47)
HCT VFR BLD AUTO: 30.9 % (ref 37–47)
HGB BLD-MCNC: 10 G/DL (ref 12–16)
HGB BLD-MCNC: 9.6 G/DL (ref 12–16)
MCH RBC QN AUTO: 27.5 PG (ref 27–33)
MCHC RBC AUTO-ENTMCNC: 32.3 G/DL (ref 32.2–35.5)
MCV RBC AUTO: 85.1 FL (ref 81.4–97.8)
PHOSPHATE SERPL-MCNC: 3 MG/DL (ref 2.5–4.5)
PLATELET # BLD AUTO: 201 K/UL (ref 164–446)
PMV BLD AUTO: 10.2 FL (ref 9–12.9)
POTASSIUM SERPL-SCNC: 3.6 MMOL/L (ref 3.6–5.5)
RBC # BLD AUTO: 3.49 M/UL (ref 4.2–5.4)
SODIUM SERPL-SCNC: 141 MMOL/L (ref 135–145)
WBC # BLD AUTO: 5.8 K/UL (ref 4.8–10.8)

## 2024-03-17 PROCEDURE — 85014 HEMATOCRIT: CPT

## 2024-03-17 PROCEDURE — 700102 HCHG RX REV CODE 250 W/ 637 OVERRIDE(OP): Performed by: HOSPITALIST

## 2024-03-17 PROCEDURE — 85018 HEMOGLOBIN: CPT

## 2024-03-17 PROCEDURE — A9270 NON-COVERED ITEM OR SERVICE: HCPCS | Performed by: HOSPITALIST

## 2024-03-17 PROCEDURE — 82962 GLUCOSE BLOOD TEST: CPT

## 2024-03-17 PROCEDURE — 36415 COLL VENOUS BLD VENIPUNCTURE: CPT

## 2024-03-17 PROCEDURE — 85027 COMPLETE CBC AUTOMATED: CPT

## 2024-03-17 PROCEDURE — 700102 HCHG RX REV CODE 250 W/ 637 OVERRIDE(OP): Performed by: NURSE PRACTITIONER

## 2024-03-17 PROCEDURE — 99232 SBSQ HOSP IP/OBS MODERATE 35: CPT | Performed by: NURSE PRACTITIONER

## 2024-03-17 PROCEDURE — 80069 RENAL FUNCTION PANEL: CPT

## 2024-03-17 PROCEDURE — A9270 NON-COVERED ITEM OR SERVICE: HCPCS | Performed by: NURSE PRACTITIONER

## 2024-03-17 PROCEDURE — 700105 HCHG RX REV CODE 258: Performed by: HOSPITALIST

## 2024-03-17 PROCEDURE — 99239 HOSP IP/OBS DSCHRG MGMT >30: CPT | Performed by: HOSPITALIST

## 2024-03-17 RX ORDER — POLYETHYLENE GLYCOL 3350 17 G/17G
1 POWDER, FOR SOLUTION ORAL ONCE
Status: COMPLETED | OUTPATIENT
Start: 2024-03-17 | End: 2024-03-17

## 2024-03-17 RX ORDER — POLYETHYLENE GLYCOL 3350 17 G/17G
17 POWDER, FOR SOLUTION ORAL DAILY
COMMUNITY
Start: 2024-03-17

## 2024-03-17 RX ADMIN — AMOXICILLIN AND CLAVULANATE POTASSIUM 1 TABLET: 875; 125 TABLET, FILM COATED ORAL at 05:39

## 2024-03-17 RX ADMIN — POLYETHYLENE GLYCOL 3350 1 PACKET: 17 POWDER, FOR SOLUTION ORAL at 10:38

## 2024-03-17 RX ADMIN — LEVETIRACETAM 500 MG: 500 TABLET, FILM COATED ORAL at 05:39

## 2024-03-17 RX ADMIN — SODIUM CHLORIDE: 9 INJECTION, SOLUTION INTRAVENOUS at 06:25

## 2024-03-17 ASSESSMENT — PAIN DESCRIPTION - PAIN TYPE: TYPE: ACUTE PAIN

## 2024-03-17 ASSESSMENT — ENCOUNTER SYMPTOMS
NAUSEA: 0
BACK PAIN: 0
DIARRHEA: 0
DEPRESSION: 0
ABDOMINAL PAIN: 0
WEAKNESS: 0
HEARTBURN: 0
DIZZINESS: 0
VOMITING: 0
COUGH: 0
CONSTIPATION: 0
CHILLS: 0
SHORTNESS OF BREATH: 0
FEVER: 0
BLURRED VISION: 0
BLOOD IN STOOL: 0

## 2024-03-17 ASSESSMENT — FIBROSIS 4 INDEX: FIB4 SCORE: 1.34

## 2024-03-17 NOTE — DISCHARGE INSTRUCTIONS
Diet    Diabetic Diet     A Diabetic Diet can help you control your blood glucose, lower your risk of heart disease, improve your blood pressure and maintain a healthy weight.  Eating healthy foods, low in calories, fat and carbohydrates at the same time every day can help control your blood glucose. Carbohydrates can greatly affect your blood glucose levels.  Counting carbs is important to keep your blood glucose at a healthy level, especially if you use insulin or take certain oral diabetes medicines.  Avoid alcohol as it can cause a sudden decrease in blood glucose (hypoglycemia), especially if you use insulin or take certain oral diabetes medicines.    Activity    Resume Your Normal Activity    You may resume your normal activity as tolerated.  Rest as needed.

## 2024-03-17 NOTE — PROGRESS NOTES
Bedside report received from off going RN/tech: Jim RN, assumed care of patient.     Fall Risk Score: LOW RISK  Fall risk interventions in place: Provide patient/family education based on risk assessment  Bed type: Regular (Deuce Score less than 17 interventions in place)  Patient on cardiac monitor: Yes  IVF/IV medications: Infusion per MAR (List Med(s)) NS @ 150ml/hr  Oxygen: Room Air  Bedside sitter: Not Applicable   Isolation: Not applicable

## 2024-03-17 NOTE — PROGRESS NOTES
..Gastroenterology Progress Note               Author:  Kendy Flores, ALONA,  APRN Date & Time Created: 3/17/2024 7:43 AM       Patient ID:  Name:             Molly Fountain    YOB: 1945  Age:                 78 y.o.  female  MRN:               [id number]        Medical Decision Making, by Problem:  Active Hospital Problems    Diagnosis     Diverticulitis [K57.92]     Metabolic acidosis [E87.20]     CVA (cerebral vascular accident) (HCC) [I63.9]     Seizure (HCC) [R56.9]     GI bleed [K92.2]     Type 2 diabetes mellitus with complication, without long-term current use of insulin (HCC) [E11.8]     Essential hypertension [I10]        Presenting Chief Complaint:  BRBPR, transferred from [facility]    HPI:  Patient is a 78 year old female hospitalized here in 12/2023 for lower GI bleeding. Colonsocopy with biopsies at that time consistent with ischemic colitis. She continued to bleed and underwent IR assessment, but there was no target for embolization. She improved and was discharged home. She was doing well until the morning of 3/15 where she again had 2 episodes of BRBPR. CT scan at OSH showed diverticulitis and her hgb was 11.6. Of note, she recently started Rybelsus about 3 weeks ago.     Interval History:  3/17/2024: Patient seen, no complaints. Hemoglobin stable at 10.0, BM yesterday with no signs of bleeding. Vital signs stable  Tolerating her diet. Excited to discharge today.     3/16/2024: Patient seen and examined. No complaints, feels well, has had no further episodes of bleeding and had a small brown BM this morning. Hgb 9.2<9.8<11.6. VSS.     Hospital Medications:  Current Facility-Administered Medications   Medication Dose Frequency Provider Last Rate Last Admin    amoxicillin-clavulanate (Augmentin) 875-125 MG per tablet 1 Tablet  1 Tablet Q12HRS Pablo Ramírez M.D.   1 Tablet at 03/17/24 0539    insulin regular (HumuLIN R,NovoLIN R) injection  1-6 Units 4X/DAY REAGAN Ramírez M.D.         And    dextrose 50% (D50W) injection 25 g  25 g Q15 MIN PRN Pablo Ramírez M.D.        NS infusion   Continuous Haylee Martinez M.D. 150 mL/hr at 03/17/24 0625 New Bag at 03/17/24 0625    atorvastatin (Lipitor) tablet 80 mg  80 mg Q EVENING Haylee Martinez M.D.   80 mg at 03/16/24 1701    levETIRAcetam (Keppra) tablet 500 mg  500 mg BID Apoorva Lanza A.P.R.N.   500 mg at 03/17/24 0539   Last reviewed on 3/15/2024  9:09 PM by Jaclyn Huddleston R.N.       Review of Systems:  Review of Systems   Constitutional:  Negative for chills, fever and malaise/fatigue.   HENT:  Negative for hearing loss.    Eyes:  Negative for blurred vision.   Respiratory:  Negative for cough and shortness of breath.    Cardiovascular:  Negative for chest pain and leg swelling.   Gastrointestinal:  Negative for abdominal pain, blood in stool, constipation, diarrhea, heartburn, melena, nausea and vomiting.   Genitourinary:  Negative for dysuria.   Musculoskeletal:  Negative for back pain.   Skin:  Negative for rash.   Neurological:  Negative for dizziness and weakness.   Psychiatric/Behavioral:  Negative for depression.    All other systems reviewed and are negative.        Vital signs:  Weight/BMI: Body mass index is 27.77 kg/m².  BP (!) 142/80   Pulse 76   Temp 36.3 °C (97.3 °F) (Temporal)   Resp 16   Wt 71.1 kg (156 lb 12 oz)   SpO2 91%   Vitals:    03/16/24 2026 03/16/24 2332 03/17/24 0309 03/17/24 0720   BP: (!) 139/103 132/73 132/75 (!) 142/80   Pulse: 81 86 88 76   Resp: 16 16 16 16   Temp: 36.7 °C (98.1 °F) 36.7 °C (98.1 °F) 36.7 °C (98.1 °F) 36.3 °C (97.3 °F)   TempSrc: Temporal Temporal Temporal Temporal   SpO2: 90% 92% 91% 91%   Weight:   71.1 kg (156 lb 12 oz)      Oxygen Therapy:  Pulse Oximetry: 91 %, O2 (LPM): 0, O2 Delivery Device: None - Room Air    Intake/Output Summary (Last 24 hours) at 3/17/2024 0743  Last data filed at 3/16/2024 2322  Gross per 24 hour   Intake 2595.46 ml   Output 250 ml   Net 2345.46 ml          Physical Exam:  Physical Exam  Vitals and nursing note reviewed.   Constitutional:       General: She is not in acute distress.     Appearance: Normal appearance.   HENT:      Head: Normocephalic and atraumatic.      Right Ear: External ear normal.      Left Ear: External ear normal.      Nose: Nose normal.      Mouth/Throat:      Mouth: Mucous membranes are moist.      Pharynx: Oropharynx is clear.   Eyes:      General: No scleral icterus.  Cardiovascular:      Rate and Rhythm: Normal rate and regular rhythm.      Pulses: Normal pulses.      Heart sounds: Normal heart sounds.   Pulmonary:      Effort: Pulmonary effort is normal. No respiratory distress.      Breath sounds: Normal breath sounds.   Abdominal:      General: Abdomen is flat. Bowel sounds are normal. There is no distension.      Palpations: Abdomen is soft.      Tenderness: There is no abdominal tenderness.   Musculoskeletal:         General: Normal range of motion.      Cervical back: Normal range of motion.   Skin:     General: Skin is warm and dry.      Capillary Refill: Capillary refill takes less than 2 seconds.   Neurological:      Mental Status: She is alert and oriented to person, place, and time.   Psychiatric:         Mood and Affect: Mood normal.         Behavior: Behavior normal.             Labs:  Recent Labs     03/15/24  1739 03/16/24  0009 03/17/24  0014   SODIUM 142 142 141   POTASSIUM 3.9 3.8 3.6   CHLORIDE 111 111 112   CO2 19* 21 20   BUN 19 17 8   CREATININE 0.85 0.72 0.76   PHOSPHORUS  --   --  3.0   CALCIUM 8.1* 7.7* 8.0*     Recent Labs     03/15/24  1739 03/16/24  0009 03/17/24  0014   GLUCOSE 78 93 113*     Recent Labs     03/15/24  1739 03/16/24  0009 03/17/24  0014   WBC 7.3 6.6 5.8   NEUTSPOLYS 62.90 57.90  --    LYMPHOCYTES 26.90 29.60  --    MONOCYTES 7.30 7.90  --    EOSINOPHILS 1.90 3.80  --    BASOPHILS 0.70 0.60  --      Recent Labs     03/15/24  1739 03/16/24  0009 03/16/24  0609 03/16/24  1811 03/17/24  0014  03/17/24  0637   RBC 4.23 3.61*  --   --  3.49*  --    HEMOGLOBIN 11.6* 9.8*   < > 9.3* 9.6* 10.0*   HEMATOCRIT 36.2* 30.8*   < > 28.2* 29.7* 30.9*   PLATELETCT 242 209  --   --  201  --    PROTHROMBTM 14.6  --   --   --   --   --    APTT 27.7  --   --   --   --   --    INR 1.12  --   --   --   --   --     < > = values in this interval not displayed.     Recent Results (from the past 24 hour(s))   HEMOGLOBIN AND HEMATOCRIT    Collection Time: 03/16/24 12:13 PM   Result Value Ref Range    Hemoglobin 9.1 (L) 12.0 - 16.0 g/dL    Hematocrit 28.9 (L) 37.0 - 47.0 %   POCT glucose device results    Collection Time: 03/16/24  1:14 PM   Result Value Ref Range    POC Glucose, Blood 79 65 - 99 mg/dL   POCT glucose device results    Collection Time: 03/16/24  5:42 PM   Result Value Ref Range    POC Glucose, Blood 100 (H) 65 - 99 mg/dL   HEMOGLOBIN AND HEMATOCRIT    Collection Time: 03/16/24  6:11 PM   Result Value Ref Range    Hemoglobin 9.3 (L) 12.0 - 16.0 g/dL    Hematocrit 28.2 (L) 37.0 - 47.0 %   POCT glucose device results    Collection Time: 03/16/24  8:55 PM   Result Value Ref Range    POC Glucose, Blood 106 (H) 65 - 99 mg/dL   Renal Function Panel    Collection Time: 03/17/24 12:14 AM   Result Value Ref Range    Sodium 141 135 - 145 mmol/L    Potassium 3.6 3.6 - 5.5 mmol/L    Chloride 112 96 - 112 mmol/L    Co2 20 20 - 33 mmol/L    Glucose 113 (H) 65 - 99 mg/dL    Creatinine 0.76 0.50 - 1.40 mg/dL    Bun 8 8 - 22 mg/dL    Calcium 8.0 (L) 8.5 - 10.5 mg/dL    Correct Calcium 8.7 8.5 - 10.5 mg/dL    Phosphorus 3.0 2.5 - 4.5 mg/dL    Albumin 3.1 (L) 3.2 - 4.9 g/dL   CBC WITHOUT DIFFERENTIAL    Collection Time: 03/17/24 12:14 AM   Result Value Ref Range    WBC 5.8 4.8 - 10.8 K/uL    RBC 3.49 (L) 4.20 - 5.40 M/uL    Hemoglobin 9.6 (L) 12.0 - 16.0 g/dL    Hematocrit 29.7 (L) 37.0 - 47.0 %    MCV 85.1 81.4 - 97.8 fL    MCH 27.5 27.0 - 33.0 pg    MCHC 32.3 32.2 - 35.5 g/dL    RDW 43.6 35.9 - 50.0 fL    Platelet Count 201 164 -  446 K/uL    MPV 10.2 9.0 - 12.9 fL   ESTIMATED GFR    Collection Time: 03/17/24 12:14 AM   Result Value Ref Range    GFR (CKD-EPI) 79 >60 mL/min/1.73 m 2   HEMOGLOBIN AND HEMATOCRIT    Collection Time: 03/17/24  6:37 AM   Result Value Ref Range    Hemoglobin 10.0 (L) 12.0 - 16.0 g/dL    Hematocrit 30.9 (L) 37.0 - 47.0 %       Radiology Review:  No orders to display         MDM (Data Review):   -Records reviewed and summarized in current documentation  -I personally reviewed and interpreted the laboratory results  -I personally reviewed the radiology images    Assessment/Recommendations:  BRBPR  CVA on aspirin  DM 2  HTN  Seizure disorder on Keppra  Diverticulitis on ct scan     Recommendations:  ---No GI procedure given diverticulitis   ---High fiber diet, daily miralax, supplemental fiber as needed to avoid constipation  --D/W pharmacy regarding Rybelsus.  The side effect profile of Rybelsus causes constipation which patient endorsed prior to her bleeding, which in turn can exacerbate diverticulitis  --Advised patient to discuss with her PCP, she will make an appointment for this week.    Gi signing off. Please don't hesitate to contact us with any questions or concerns.     Discussed with patient, pharmacy, Dr. Ramírez, Dr. Arenas    Core Quality Measures   Reviewed items::  Labs, Medications and Radiology reports reviewed

## 2024-03-17 NOTE — PROGRESS NOTES
0700 - Report received from Jim SHARIF at patient's bedside. Patient resting in bed quietly with no complaints at this time. Telemetry monitor intact et functioning. Call light and belongings within reach, safety measures intact, white board updated.     1015 - All discharge instructions reviewed with patient at patient's bedside. Verbalized understanding. IV's removed with immediate hemostasis. Telemetry removed and returned to desk, notified telemetry monitoring. Patient currently waiting on ride home.     1105 - Patient taken via wheelchair to private vehicle for Dc home. All belongings sent with patient.

## 2024-03-17 NOTE — CARE PLAN
The patient is Stable - Low risk of patient condition declining or worsening    Shift Goals  Clinical Goals: Monitor Lavs, bleeding, VS  Patient Goals: sleep, no bleeding  Family Goals: na    Progress made toward(s) clinical / shift goals: Please refer to flowsheets and education for additional details   Problem: Risk for Fluid Volume Deficit Related to Bleeding  Goal: Fluid volume balance will be maintained  Outcome: Progressing  Goal: Patient will show no signs and symptoms of excessive bleeding  Outcome: Progressing       Patient is not progressing towards the following goals:

## 2024-03-17 NOTE — CARE PLAN
The patient is Stable - Low risk of patient condition declining or worsening    Shift Goals  Clinical Goals: Monitor Lavs, bleeding, VS  Patient Goals: sleep, no bleeding  Family Goals: na    Progress made toward(s) clinical / shift goals:  DC home       Problem: Knowledge Deficit - Standard  Goal: Patient and family/care givers will demonstrate understanding of plan of care, disease process/condition, diagnostic tests and medications  Outcome: Met     Problem: Fall Risk  Goal: Patient will remain free from falls  Outcome: Met     Problem: Risk for Fluid Volume Deficit Related to Bleeding  Goal: Fluid volume balance will be maintained  Outcome: Met  Goal: Patient will show no signs and symptoms of excessive bleeding  Outcome: Met     Problem: Risk for Bleeding  Goal: Patient will take measures to prevent bleeding and recognizes signs of bleeding that need to be reported immediately to a health care professional  Outcome: Met  Goal: Patient will not experience bleeding as evidenced by normal blood pressure, stable hematocrit and hemoglobin levels and desired ranges for coagulation profiles  Outcome: Met       Patient is not progressing towards the following goals:

## 2024-03-17 NOTE — PROGRESS NOTES
Report received from outgoing nurse, care transferred at 1900. Patient currently in SR 82 on the monitor and A&Ox 4. Patient denies any pain, abdominal cramping or bloody stools. Whiteboard updated with plan for the day and names of staff. No other questions or concerns at this time from the patient. Call light within reach, fall precautions in place.     Bedside report received from off going RN: Dejah, assumed care of patient.     Fall Risk Score: LOW RISK  Fall risk interventions in place: Place yellow fall risk ID band on patient, Provide patient/family education based on risk assessment, Educate patient/family to call staff for assistance when getting out of bed, Place fall precaution signage outside patient door, Place patient in room close to nursing station, Utilize bed/chair fall alarm, and Bed alarm connected correctly  Bed type: Regular (Deuce Score less than 17 interventions in place)  Patient on cardiac monitor: Yes  IVF/IV medications: Not Applicable   Oxygen: Room Air  Bedside sitter: Not Applicable   Isolation: Not applicable

## 2024-03-17 NOTE — PROGRESS NOTES
Monitor Summary:   Rhythm: NSR   Rate: 74 - 85  Measurement: .17/.09/.40  Ectopy: Rare PVC    12 hour chart check

## 2024-03-18 NOTE — DISCHARGE SUMMARY
Hospital Medicine Discharge Note     Admit Date:  3/15/2024       Discharge Date:   3/17/2024    Attending Physician:  Pablo Ramírez M.D.     Diagnoses (includes active and resolved):   Principal Problem:    GI bleed (POA: Yes)  Active Problems:    Type 2 diabetes mellitus with complication, without long-term current use of insulin (HCC) (POA: Yes)    Essential hypertension (POA: Yes)    Seizure (HCC) (POA: Yes)    CVA (cerebral vascular accident) (HCC) (POA: Yes)    Diverticulitis (POA: Unknown)    Metabolic acidosis (POA: Unknown)    Constipation in female (POA: Unknown)  Resolved Problems:    * No resolved hospital problems. *      Hospital Summary (Brief Narrative):        78 y.o. female who [facility] [place] [facility] with bright red blood per rectum. She was transferred to [facility] on 3/15/24 for a GI consultation. She had two episodes of bright red blood per rectum. A CT abdomen was obtained in [place] and was suggestive of diverticulitis.     Patient has a history of CVA on ASA, hypertension, hyperlipidemia, diabetes, seizure disorder and was recently hospitalized at [facility] with a GI bleed. She had a colonoscopy at [facility] on 12/2/23 that was suggestive with ischemic colitis. A CT angio at that time suggested extravasation so she was seen by IR and vascular surgery at the time and they saw no indication for intervention.       GI was consulted and initially placed the patient on antibiotics.  Her hemoglobin was monitored and stabilized.  She did not have any bowel movements in the hospital.  She was given a dose of MiraLAX.  As she was doing well she was able to be discharged home and does not need any further antibiotics.  She also does not require bowel rest per the latest guidelines.  She can follow-up with GI outpatient.  She was advised on avoiding constipation in the future.  She had concerned that Rybelsus may have caused her bleeding.  This was discussed with pharmacy and  Rybelsus does cause constipation but does not directly cause bleeding.  As long as she avoids constipation she likely can continue Rybelsus but she can continue to discuss with her PCP.     The patient met 2-midnight criteria for an inpatient stay at the time of discharge.     Consultants:      CHARMAINE Veengas    Procedures:        None    Discharge Medications:           Medication List        START taking these medications        Instructions   polyethylene glycol 3350 17 GM/SCOOP Powd  Commonly known as: Miralax  Next Dose Due: Take tomorrow    Take 17 g by mouth every day.  Dose: 17 g            CONTINUE taking these medications        Instructions   acetaminophen 325 MG Tabs  Commonly known as: Tylenol   Take 650 mg by mouth every four hours as needed for Mild Pain.  Dose: 650 mg     Alcohol Swabs   Doctor's comments: Per formulary preference. ICD-10 code: E11.65 Uncontrolled type 2 Diabetes Mellitus  Wipe site with prep pad prior to injection.     aspirin 81 MG Chew chewable tablet  Commonly known as: Asa  Next Dose Due: Take tomorrow    Chew 1 Tablet every day.  Dose: 81 mg     atorvastatin 80 MG tablet  Commonly known as: Lipitor  Next Dose Due: Take this evening    Take 1 Tablet by mouth every evening.  Dose: 80 mg     * Blood Glucose Test Strips   Doctor's comments: Or per formulary preference. ICD-10 code: E11.65 Uncontrolled type 2 Diabetes Mellitus  Use one Accucheck Guide strip to test blood sugar once daily early morning before first meal. For uncontrolled diabetes. E11.65     * Blood Glucose Meter Kit   Doctor's comments: Or per formulary preference. ICD-10 code: E11.65 Uncontrolled type 2 Diabetes Mellitus  Test blood sugar as recommended by provider. One Touch Verio blood glucose monitoring kit.     * Blood Glucose Test Strips   Doctor's comments: Or per formulary preference. ICD-10 code: E11.65 Uncontrolled type 2 Diabetes Mellitus  Use one One Touch Verio strip to test blood sugar once daily early  morning before first meal.     Jardiance 10 MG Tabs tablet  Generic drug: Empagliflozin  Next Dose Due: Take tomorrow    Take 1 Tablet by mouth every day.  Dose: 10 mg     * Lancets   Doctor's comments: Or per formulary preference. ICD-10 code: E11.65 Uncontrolled type 2 Diabetes Mellitus.  Use one fast clix lancet to test blood sugar once daily early morning before first meal. For uncontrolled diabetes. E11.65     * Lancets   Doctor's comments: Or per formulary preference. ICD-10 code: E11.65 Uncontrolled type 2 Diabetes Mellitus  Use one One Touch Verio lancet to test blood sugar once daily early morning before first meal.     levetiracetam 1000 MG tablet  Commonly known as: Keppra  Next Dose Due: Take this evening    Take 1 Tablet by mouth 2 times a day.  Dose: 1,000 mg     magnesium oxide 400 (240 Mg) MG Tabs  Next Dose Due: Take tomorrow    Take 1 Tablet by mouth every day.  Dose: 400 mg     omeprazole 20 MG delayed-release capsule  Commonly known as: PriLOSEC  Next Dose Due: Take tomorrow    Take 1 Capsule by mouth every day.  Dose: 20 mg     Rybelsus 7 MG Tabs  Generic drug: Semaglutide  Next Dose Due: Take tomorrow    Take 7 mg by mouth every day.  Dose: 7 mg     Voltaren 1 % Gel  Generic drug: diclofenac sodium  Next Dose Due: Use tonight    Apply  topically 2 times a day. 1 application to left knee           * This list has 5 medication(s) that are the same as other medications prescribed for you. Read the directions carefully, and ask your doctor or other care provider to review them with you.                Disposition:   Discharge home    Activity:   As tolerated    Code status:   DNAR/DNI    Primary Care Provider:    MARYANN MillardNYOMAIRA    Follow up appointment details :      NIDIA Millard  [address] [facility] [address] [phone]    Schedule an appointment as soon as possible for a visit  As needed    No future appointments.    Pending Studies:        None    Time spent on  discharge day patient visit: 41 minutes    #################################################    Interval history/exam for day of discharge:    Vitals:    03/16/24 2026 03/16/24 2332 03/17/24 0309 03/17/24 0720   BP: (!) 139/103 132/73 132/75 (!) 142/80   Pulse: 81 86 88 76   Resp: 16 16 16 16   Temp: 36.7 °C (98.1 °F) 36.7 °C (98.1 °F) 36.7 °C (98.1 °F) 36.3 °C (97.3 °F)   TempSrc: Temporal Temporal Temporal Temporal   SpO2: 90% 92% 91% 91%   Weight:   71.1 kg (156 lb 12 oz)      Weight/BMI: Body mass index is 27.77 kg/m².  Pulse Oximetry: 91 %, O2 (LPM): 0, O2 Delivery Device: None - Room Air    Most Recent Labs:    Lab Results   Component Value Date/Time    WBC 5.8 03/17/2024 12:14 AM    RBC 3.49 (L) 03/17/2024 12:14 AM    HEMOGLOBIN 10.0 (L) 03/17/2024 06:37 AM    HEMATOCRIT 30.9 (L) 03/17/2024 06:37 AM    MCV 85.1 03/17/2024 12:14 AM    MCH 27.5 03/17/2024 12:14 AM    MCHC 32.3 03/17/2024 12:14 AM    MPV 10.2 03/17/2024 12:14 AM    NEUTSPOLYS 57.90 03/16/2024 12:09 AM    LYMPHOCYTES 29.60 03/16/2024 12:09 AM    MONOCYTES 7.90 03/16/2024 12:09 AM    EOSINOPHILS 3.80 03/16/2024 12:09 AM    BASOPHILS 0.60 03/16/2024 12:09 AM      Lab Results   Component Value Date/Time    SODIUM 141 03/17/2024 12:14 AM    POTASSIUM 3.6 03/17/2024 12:14 AM    CHLORIDE 112 03/17/2024 12:14 AM    CO2 20 03/17/2024 12:14 AM    GLUCOSE 113 (H) 03/17/2024 12:14 AM    BUN 8 03/17/2024 12:14 AM    CREATININE 0.76 03/17/2024 12:14 AM      Lab Results   Component Value Date/Time    ALTSGPT 8 12/05/2023 11:57 PM    ASTSGOT 10 (L) 12/05/2023 11:57 PM    ALKPHOSPHAT 70 12/05/2023 11:57 PM    TBILIRUBIN 0.3 12/05/2023 11:57 PM    LIPASE 5 (L) 03/13/2019 04:50 PM    ALBUMIN 3.1 (L) 03/17/2024 12:14 AM    GLOBULIN 2.1 12/05/2023 11:57 PM    INR 1.12 03/15/2024 05:39 PM     Lab Results   Component Value Date/Time    PROTHROMBTM 14.6 03/15/2024 05:39 PM    INR 1.12 03/15/2024 05:39 PM        Imaging/ Testing:      No orders to display        Instructions:      The patient was instructed to return to the ER in the event of worsening symptoms. I have counseled the patient on the importance of compliance and the patient has agreed to proceed with all medical recommendations and follow up plan indicated above.   The patient understands that all medications come with benefits and risks. Risks may include permanent injury or death and these risks can be minimized with close reassessment and monitoring.

## 2024-05-09 NOTE — CONSULTS
"Neuro Interventional Service Consultation      Re: Molly Fountain     MRN: [id number]   : 1945    Molly Fountain is a 78 y.o. female seen in clinic for evaluation and possible intracranial aneurysm repair.     Referring provider: Devang Hunter PA-C  PCP: Lizbeth BLANC  Neurologist: Abiodun Wilburn MD  Neurosurgeon: none  Gastroenterologist: MD in [place], name unknown    History of Present Illness:  Initial consultation 5/10/24:  presents with findings of an incidental, unruptured brain aneurysm. Imaging revealed a 2-3 mm ACOM aneurysm during a stroke workup in November in which she was also diagnosed with seizures. She describes symptoms of her left arm raising above her head and not able to control it as well as her left eye \"wanting to close.\" No records are available to us from this episode. She has interval hospitalizations for 2 critical episodes of gastrointestinal bleeding since 2023 while on aspirin monotherapy. She has been referred to the Neuro Interventional Service for evaluation and management of this finding.     She is seen today for review of imaging studies and discussion of possible intracranial aneurysm intervention. Today, the patient reports her history of stroke and seizure, with no recurrence since that time. She denies focal weakness of the left arm. She states she had been neglecting her medical diagnoses of hypertension and diabetes at the time of her stroke but is more focused on wellness now. There is no family history of brain aneurysm. Blood pressure is reported to be controlled with medications. She is does not think she has high cholesterol. There is a remote history of smoking in high school but she reports significant exposure to second hand stroke as her late  smoked. She denies current or past methamphetamine use. The patient has had anesthesia in the past and reports no problems or aftereffects. She is taking a daily aspirin 81 mg and in addition to the 2 " episodes of GI bleeding requiring hospitalization and an embolization effort, she had a spontaneous nosebleed last week that was quite significant, although it did not require intervention. She has never had nosebleeds before. She lives in [place] and is not working. She is accompanied by a support person Socorro (daughter) to today's consultation.    Family history of aneurysm: no  Hyperlipidemia: unknown  Hypertension: yes  Smoking: never  Diabetes Mellitus: yes    Past Medical History:   Diagnosis Date    CVA (cerebral vascular accident) ([facility]) 11/24/2023    Essential hypertension 3/13/2019    Type 2 diabetes mellitus with complication, without long-term current use of insulin ([facility]) 3/13/2019     Past Surgical History:   Procedure Laterality Date    NC COLONOSCOPY,DIAGNOSTIC N/A 12/2/2023    Procedure: COLONOSCOPY with biopsy;  Surgeon: Daina Venegas M.D.;  Location: SURGERY [facility];  Service: Gastroenterology    NC COLONOSCOPY,BIOPSY N/A 12/2/2023    Procedure: COLONOSCOPY, WITH BIOPSY;  Surgeon: Daina Venegas M.D.;  Location: SURGERY [facility];  Service: Gastroenterology    COLONOSCOPY - ENDO N/A 5/28/2019    Procedure: COLONOSCOPY;  Surgeon: Ger Nichols M.D.;  Location: [facility];  Service: Gastroenterology     Social History     Socioeconomic History    Marital status:      Spouse name: Not on file    Number of children: Not on file    Years of education: Not on file    Highest education level: Not on file   Occupational History    Not on file   Tobacco Use    Smoking status: Never    Smokeless tobacco: Never   Substance and Sexual Activity    Alcohol use: Yes     Comment: occasionally    Drug use: No    Sexual activity: Not on file   Other Topics Concern    Not on file   Social History Narrative    Not on file     Social Determinants of Health     Financial Resource Strain: Not on file   Food Insecurity: Not on file   Transportation Needs: Not on file   Physical  Activity: Not on file   Stress: Not on file   Social Connections: Not on file   Intimate Partner Violence: Not on file   Housing Stability: Not on file     Family History   Problem Relation Age of Onset    Heart Disease Father     Hypertension Father        Review of Systems   Constitutional: Negative.  Negative for chills, diaphoresis, fever, malaise/fatigue and weight loss.   HENT:  Positive for nosebleeds.    Respiratory: Negative.     Cardiovascular: Negative.    Gastrointestinal:         Recent GIB x 2   Skin: Negative.    Neurological:  Negative for sensory change, speech change, focal weakness, seizures (last seizure 11/2023) and weakness.   Psychiatric/Behavioral:  Negative for substance abuse.      A comprehensive 14-point review of systems was negative except as described above.     Labs:    Latest Reference Range & Units 03/16/24 00:09 03/16/24 06:09 03/16/24 12:13 03/16/24 18:11 03/17/24 00:14 03/17/24 06:37   WBC 4.8 - 10.8 K/uL 6.6    5.8    RBC 4.20 - 5.40 M/uL 3.61 (L)    3.49 (L)    Hemoglobin 12.0 - 16.0 g/dL 9.8 (L) 9.2 (L) 9.1 (L) 9.3 (L) 9.6 (L) 10.0 (L)   Hematocrit 37.0 - 47.0 % 30.8 (L) 28.3 (L) 28.9 (L) 28.2 (L) 29.7 (L) 30.9 (L)   MCV 81.4 - 97.8 fL 85.3    85.1    MCH 27.0 - 33.0 pg 27.1    27.5    MCHC 32.2 - 35.5 g/dL 31.8 (L)    32.3    RDW 35.9 - 50.0 fL 43.5    43.6    Platelet Count 164 - 446 K/uL 209    201    MPV 9.0 - 12.9 fL 10.0    10.2    Neutrophils-Polys 44.00 - 72.00 % 57.90        Neutrophils (Absolute) 1.82 - 7.42 K/uL 3.80        Lymphocytes 22.00 - 41.00 % 29.60        Lymphs (Absolute) 1.00 - 4.80 K/uL 1.94        Monocytes 0.00 - 13.40 % 7.90        Monos (Absolute) 0.00 - 0.85 K/uL 0.52        Eosinophils 0.00 - 6.90 % 3.80        Eos (Absolute) 0.00 - 0.51 K/uL 0.25        Basophils 0.00 - 1.80 % 0.60        Baso (Absolute) 0.00 - 0.12 K/uL 0.04        Immature Granulocytes 0.00 - 0.90 % 0.20        Immature Granulocytes (abs) 0.00 - 0.11 K/uL 0.01        Nucleated  RBC 0.00 - 0.20 /100 WBC 0.00        NRBC (Absolute) K/uL 0.00           Latest Reference Range & Units 12/06/23 11:49 12/07/23 00:15 03/15/24 17:39 03/16/24 00:09 03/17/24 00:14   Sodium 135 - 145 mmol/L  145 142 142 141   Potassium 3.6 - 5.5 mmol/L  3.2 (L) 3.9 3.8 3.6   Chloride 96 - 112 mmol/L  111 111 111 112   Co2 20 - 33 mmol/L  24 19 (L) 21 20   Anion Gap 7.0 - 16.0   10.0 12.0 10.0    Glucose 65 - 99 mg/dL  113 (H) 78 93 113 (H)   Bun 8 - 22 mg/dL  10 19 17 8   Creatinine 0.50 - 1.40 mg/dL  0.67 0.85 0.72 0.76   GFR (CKD-EPI) >60 mL/min/1.73 m 2  89 69 85 79   Calcium 8.5 - 10.5 mg/dL  8.2 (L) 8.1 (L) 7.7 (L) 8.0 (L)   Correct Calcium 8.5 - 10.5 mg/dL     8.7   Albumin 3.2 - 4.9 g/dL     3.1 (L)   Phosphorus 2.5 - 4.5 mg/dL     3.0   Magnesium 1.5 - 2.5 mg/dL  1.9      Iron 40 - 170 ug/dL 15 (L)       Total Iron Binding 250 - 450 ug/dL 262       % Saturation 15 - 55 % 6 (L)       Unsat Iron Binding 110 - 370 ug/dL 247          Latest Reference Range & Units 03/15/24 17:39   PT 12.0 - 14.6 sec 14.6   INR 0.87 - 1.13  1.12   APTT 24.7 - 36.0 sec 27.7       Radiology:   MRI brain 11/23/23 at [facility]:  ADDENDUM:     Upon further review with Dr. Jamison on 11/24/2023, one notes a tiny punctate focus of bright diffusion signal in the right insula (diffusion-weighted axial image 17, series 4). No corresponding restricted diffusion/hypointensity is evident on the ADC map.   Nonspecific finding most likely representing a subacute lacunar size infarct. No mass effect or surrounding edema in this vicinity.  IMPRESSION:     1.  Moderate cerebral atrophy.  2.  Advanced supratentorial white matter disease most consistent with microvascular ischemic change.  3.  Minimal pontine ischemic gliosis.  4.  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  5.  No evidence of acute hemorrhage, acute infarction, or mass  lesion.    CTA Head 11/24/23 at [facility]:  1.  No large vessel occlusion.  2.  Mild irregularity and narrowing of the proximal posterior cerebral arteries.  3.  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      CTA Neck 11/24/23 at [facility]:  1.  Attenuated flow involving the right common carotid artery at the origin and extending along the course of several centimeters within the lower cervical/upper thoracic region. There appears to be some blurring of the vessel in that area as well which   may represent a component of motion artifact. This may represent significant stenosis at the common carotid artery origin versus motion artifact.     2.  Mild atherosclerotic plaque involving the carotid bifurcations bilaterally resulting in less than 50% diameter narrowing.     3.  Patent vertebral arteries bilaterally.     USD carotid arteries 11/24/23 at [facility]:  Vascular Laboratory   CONCLUSIONS   Mild stenosis of the left internal carotid artery (<50%).    Tortuous right carotid exam.    Elevated velocities of the right subclavian artery in comparison to right    side with some turbulence noted which may represent stenosis.     Current Outpatient Medications   Medication Sig Dispense Refill    polyethylene glycol 3350 (MIRALAX) 17 GM/SCOOP Powder Take 17 g by mouth every day.      Semaglutide (RYBELSUS) 7 MG Tab Take 7 mg by mouth every day.      aspirin (ASA) 81 MG Chew Tab chewable tablet Chew 1 Tablet every day. 100 Tablet 4    atorvastatin (LIPITOR) 80 MG tablet Take 1 Tablet by mouth every evening. 90 Tablet 4    levetiracetam (KEPPRA) 1000 MG tablet Take 1 Tablet by mouth 2 times a day. (Patient taking differently: Take 500 mg by mouth 2 times a day. Per patient she takes 500mg two times daily.) 60 Tablet 4    Empagliflozin (JARDIANCE) 10 MG Tab tablet Take 1 Tablet by mouth every day. 30 Tablet 3    Blood Glucose Meter Kit Test blood sugar as recommended by provider. One Touch Verio blood glucose  monitoring kit. 1 Kit 0    Blood Glucose Test Strips Use one One Touch Verio strip to test blood sugar once daily early morning before first meal. 100 Strip 0    Lancets Use one One Touch Verio lancet to test blood sugar once daily early morning before first meal. 100 Each 0    Alcohol Swabs Wipe site with prep pad prior to injection. 100 Each 0    magnesium oxide 400 (240 Mg) MG Tab Take 1 Tablet by mouth every day. 30 Tablet 1    omeprazole (PRILOSEC) 20 MG delayed-release capsule Take 1 Capsule by mouth every day. 30 Capsule 1    diclofenac sodium (VOLTAREN) 1 % Gel Apply  topically 2 times a day. 1 application to left knee      acetaminophen (TYLENOL) 325 MG Tab Take 650 mg by mouth every four hours as needed for Mild Pain.      Blood Glucose Test Strips Use one Accucheck Guide strip to test blood sugar once daily early morning before first meal. For uncontrolled diabetes. E11.65 100 Strip 1    Lancets Use one fast clix lancet to test blood sugar once daily early morning before first meal. For uncontrolled diabetes. E11.65 100 Each 1     No current facility-administered medications for this encounter.       Allergies   Allergen Reactions    Sulfa Drugs Rash     Full body rash    Shellfish-Derived Products Rash     Full body rash       Physical Exam  Constitutional:       General: She is not in acute distress.     Appearance: She is not diaphoretic.   HENT:      Head: Normocephalic.   Eyes:      General: No scleral icterus.  Pulmonary:      Effort: Pulmonary effort is normal. No respiratory distress.   Abdominal:      General: There is no distension.   Skin:     General: Skin is warm and dry.      Coloration: Skin is not pale.      Findings: No erythema or rash.   Neurological:      General: No focal deficit present.      Mental Status: She is alert and oriented to person, place, and time. She is not disoriented.      Cranial Nerves: No cranial nerve deficit or facial asymmetry.      Sensory: Sensation is intact.       Motor: No weakness or tremor.      Coordination: Coordination normal.      Gait: Gait is intact. Gait normal.   Psychiatric:         Mood and Affect: Mood and affect normal.         Behavior: Behavior normal.         Thought Content: Thought content normal.         Cognition and Memory: Memory normal.         Judgment: Judgment normal.       PHASES Aneurysm Risk Score: 6    The 5-year absolute risk of rupture based on score is currently estimated to be:    ?2 points: 0.4%  3 points: 0.7%  4 points: 0.9%  5 points: 1.3%  6 points: 1.7%  7 points: 2.4%  8 points: 3.2%  9 points: 4.3%  10 points: 5.3%  11 points: 7.2%  ? 12 points: 17.8%    Impression:   1. 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  2. Tortuous carotid arteries.  3. Gastrointestinal bleeding on aspirin.  4. Diverticulosis.  5. Diabetes mellitus.  6. History of stroke.  7. Hypertension.    Plan:   Tyler Guzmán MD has reviewed 's history and imaging studies, examined the patient, and discussed treatment options.  has an incidental, asymptomatic finding of a small ACOM aneurysm. It has no concerning, irregular features noted on the available scan such as a daughter aneurysm. Aneurysms of this size and in this location carry a statistical cumulative 5 year rupture risk of 1.7%, however we explained that even small aneurysms can rupture. Overall procedure risk is approximately 3%. We discussed the method of the procedure at length including the use of catheters, contrast, and xrays to facilitate diagnostic procedures and on and off label use of stents and embolic agents to perform endovascular intervention. A commercially available video depicting coiling was played for the patient and her daughter. We additionally discussed the major procedure risks that include stroke, hemorrhage, and death. After the procedure, there is a chance that the aneurysm could recur. We reviewed known risk factors for vascular  health that include hypertension, hyperglycemia, hyperlipidemia, and tobacco use, and modifiable risk factors were discussed. We explained that the patient will need to have ongoing surveillance imaging after the procedure to monitor for recurrence of the condition. We discussed alternatives to the procedure including surveillance and surgical intervention, which could be discussed with a surgeon. Our recommendation is for surveillance based on the small size of the aneurysm. In addition to the above, we discussed the need for DAPT with aspirin and Plavix prior to the procedure and for up to 3 months post procedure if a stent is implanted. We are concerned about her risk for further bleeding episodes if we place her on DAPT given her three described episodes of hemorrhage on aspirin monotherapy. She is in agreement for surveillance and since it has been 6 months since her scan, we ordered an updated CTA to compare for any size or shape change. We discussed signs of a sentinel headache and stroke with instructions to call an ambulance for the onset of these symptoms.       JAYASHREE Ngo with Tyler Guzmán MD  Neuro Interventional Service   [address] ([id number])  [place] [address]  [phone]

## 2024-05-10 ENCOUNTER — HOSPITAL ENCOUNTER (OUTPATIENT)
Dept: RADIOLOGY | Facility: MEDICAL CENTER | Age: 79
End: 2024-05-10
Payer: MEDICARE

## 2024-05-10 DIAGNOSIS — I67.1 INTRACRANIAL ANEURYSM: ICD-10-CM

## 2024-05-10 DIAGNOSIS — Z01.812 PRE-PROCEDURE LAB EXAM: ICD-10-CM

## 2024-05-10 DIAGNOSIS — I67.1 CEREBRAL ANEURYSM, NONRUPTURED: ICD-10-CM

## 2024-05-10 ASSESSMENT — LIFESTYLE VARIABLES: SUBSTANCE_ABUSE: 0

## 2024-05-10 ASSESSMENT — ENCOUNTER SYMPTOMS
CARDIOVASCULAR NEGATIVE: 1
SPEECH CHANGE: 0
DIAPHORESIS: 0
CHILLS: 0
WEAKNESS: 0
FEVER: 0
RESPIRATORY NEGATIVE: 1
SEIZURES: 0
WEIGHT LOSS: 0
CONSTITUTIONAL NEGATIVE: 1
FOCAL WEAKNESS: 0
SENSORY CHANGE: 0

## 2024-05-23 ENCOUNTER — HOSPITAL ENCOUNTER (OUTPATIENT)
Dept: RADIOLOGY | Facility: MEDICAL CENTER | Age: 79
End: 2024-05-23
Payer: MEDICARE

## 2025-03-10 NOTE — PROGRESS NOTES
Message received from pt regarding surveillance CTA for small ACOM aneurysm, asking when she is due. Last evaluated in [facility] in May 2024, at which time an updated CTA order was sent to [facility]. Upon chart review, pt had scan done in May 2024 but results/ imaging not forwarded to [facility] Providers for review, therefore no updated recommendations were relayed to the pt. Scan reviewed with Dr. Guzmán, no change in small ACOM aneurysm. Recommend continued surveillance given aneurysm size and relative contraindication to antiplatelet medication.     Returned call to pt to clarify plan for next study. LM with direct call back information.

## 2025-03-13 ENCOUNTER — TELEPHONE (OUTPATIENT)
Dept: RADIOLOGY | Facility: MEDICAL CENTER | Age: 80
End: 2025-03-13
Payer: MEDICARE

## 2025-03-13 NOTE — TELEPHONE ENCOUNTER
Pt called back and LM for APRN; upon return call, no answer, left VM. Will also send MyChart communication.

## 2025-03-14 DIAGNOSIS — I67.1 INTRACRANIAL ANEURYSM: ICD-10-CM

## 2025-03-14 DIAGNOSIS — Z01.812 PRE-PROCEDURE LAB EXAM: ICD-10-CM

## 2025-03-14 NOTE — PROGRESS NOTES
Annual surveillance for untreated ACOM aneurysm orders placed. Pt contacted, she wishes to hand carry orders to [facility]. Will mail at her request, address confirmed. She will contact APRN once scan is completed.

## 2025-04-15 ENCOUNTER — TELEPHONE (OUTPATIENT)
Dept: RADIOLOGY | Facility: MEDICAL CENTER | Age: 80
End: 2025-04-15
Payer: MEDICARE

## 2025-04-15 NOTE — TELEPHONE ENCOUNTER
Call placed to pt for follow up after imaging order and pre scan lab mailed to pt. No report received by 1 month reminder. Pt states she has not scheduled the scan yet but states she intends to do so. Will f/u next month w/pt.

## 2025-04-25 ENCOUNTER — HOSPITAL ENCOUNTER (INPATIENT)
Facility: MEDICAL CENTER | Age: 80
LOS: 3 days | End: 2025-04-28
Attending: EMERGENCY MEDICINE | Admitting: STUDENT IN AN ORGANIZED HEALTH CARE EDUCATION/TRAINING PROGRAM
Payer: MEDICARE

## 2025-04-25 ENCOUNTER — APPOINTMENT (OUTPATIENT)
Dept: RADIOLOGY | Facility: MEDICAL CENTER | Age: 80
End: 2025-04-25
Attending: EMERGENCY MEDICINE
Payer: MEDICARE

## 2025-04-25 ENCOUNTER — APPOINTMENT (OUTPATIENT)
Dept: RADIOLOGY | Facility: MEDICAL CENTER | Age: 80
End: 2025-04-25
Attending: NURSE PRACTITIONER
Payer: MEDICARE

## 2025-04-25 DIAGNOSIS — K92.2 LOWER GI BLEED: ICD-10-CM

## 2025-04-25 DIAGNOSIS — R25.1 SPELLS OF TREMBLING: ICD-10-CM

## 2025-04-25 DIAGNOSIS — K57.90 DIVERTICULOSIS: ICD-10-CM

## 2025-04-25 DIAGNOSIS — R56.9 SEIZURE (HCC): ICD-10-CM

## 2025-04-25 DIAGNOSIS — K92.2 ACUTE GI BLEEDING: ICD-10-CM

## 2025-04-25 PROBLEM — E87.29 HIGH ANION GAP METABOLIC ACIDOSIS: Status: ACTIVE | Noted: 2024-03-15

## 2025-04-25 PROBLEM — Z86.73 HISTORY OF CVA (CEREBROVASCULAR ACCIDENT): Status: ACTIVE | Noted: 2025-04-25

## 2025-04-25 LAB
ABO GROUP BLD: NORMAL
ACANTHOCYTES BLD QL SMEAR: NORMAL
ALBUMIN SERPL BCP-MCNC: 3.8 G/DL (ref 3.2–4.9)
ALBUMIN/GLOB SERPL: 1.3 G/DL
ALP SERPL-CCNC: 84 U/L (ref 30–99)
ALT SERPL-CCNC: 10 U/L (ref 2–50)
ANION GAP SERPL CALC-SCNC: 13 MMOL/L (ref 7–16)
ANISOCYTOSIS BLD QL SMEAR: ABNORMAL
APTT PPP: 24.8 SEC (ref 24.7–36)
AST SERPL-CCNC: 22 U/L (ref 12–45)
B-OH-BUTYR SERPL-MCNC: 0.74 MMOL/L (ref 0.02–0.27)
BARCODED ABORH UBTYP: 5100
BARCODED ABORH UBTYP: 5100
BARCODED PRD CODE UBPRD: NORMAL
BARCODED PRD CODE UBPRD: NORMAL
BARCODED UNIT NUM UBUNT: NORMAL
BARCODED UNIT NUM UBUNT: NORMAL
BASOPHILS # BLD AUTO: 0.6 % (ref 0–1.8)
BASOPHILS # BLD: 0.05 K/UL (ref 0–0.12)
BILIRUB SERPL-MCNC: 0.3 MG/DL (ref 0.1–1.5)
BLD GP AB SCN SERPL QL: NORMAL
BUN SERPL-MCNC: 19 MG/DL (ref 8–22)
BURR CELLS BLD QL SMEAR: NORMAL
CALCIUM ALBUM COR SERPL-MCNC: 8.9 MG/DL (ref 8.5–10.5)
CALCIUM SERPL-MCNC: 8.7 MG/DL (ref 8.5–10.5)
CHLORIDE SERPL-SCNC: 108 MMOL/L (ref 96–112)
CO2 SERPL-SCNC: 19 MMOL/L (ref 20–33)
COMMENT 1642: NORMAL
COMPONENT R 8504R: NORMAL
COMPONENT R 8504R: NORMAL
CREAT SERPL-MCNC: 0.83 MG/DL (ref 0.5–1.4)
EOSINOPHIL # BLD AUTO: 0.07 K/UL (ref 0–0.51)
EOSINOPHIL NFR BLD: 0.8 % (ref 0–6.9)
ERYTHROCYTE [DISTWIDTH] IN BLOOD BY AUTOMATED COUNT: 49.4 FL (ref 35.9–50)
GFR SERPLBLD CREATININE-BSD FMLA CKD-EPI: 71 ML/MIN/1.73 M 2
GLOBULIN SER CALC-MCNC: 3 G/DL (ref 1.9–3.5)
GLUCOSE BLD STRIP.AUTO-MCNC: 107 MG/DL (ref 65–99)
GLUCOSE BLD STRIP.AUTO-MCNC: 128 MG/DL (ref 65–99)
GLUCOSE BLD STRIP.AUTO-MCNC: 92 MG/DL (ref 65–99)
GLUCOSE SERPL-MCNC: 141 MG/DL (ref 65–99)
HCT VFR BLD AUTO: 26.8 % (ref 37–47)
HCT VFR BLD AUTO: 28.4 % (ref 37–47)
HGB BLD-MCNC: 7.5 G/DL (ref 12–16)
HGB BLD-MCNC: 8.7 G/DL (ref 12–16)
HYPOCHROMIA BLD QL SMEAR: ABNORMAL
IMM GRANULOCYTES # BLD AUTO: 0.03 K/UL (ref 0–0.11)
IMM GRANULOCYTES NFR BLD AUTO: 0.3 % (ref 0–0.9)
INR PPP: 1.05 (ref 0.87–1.13)
LACTATE SERPL-SCNC: 1.3 MMOL/L (ref 0.5–2)
LIPASE SERPL-CCNC: 25 U/L (ref 11–82)
LYMPHOCYTES # BLD AUTO: 1.27 K/UL (ref 1–4.8)
LYMPHOCYTES NFR BLD: 14.6 % (ref 22–41)
MAGNESIUM SERPL-MCNC: 2.2 MG/DL (ref 1.5–2.5)
MCH RBC QN AUTO: 20 PG (ref 27–33)
MCHC RBC AUTO-ENTMCNC: 28 G/DL (ref 32.2–35.5)
MCV RBC AUTO: 71.5 FL (ref 81.4–97.8)
MICROCYTES BLD QL SMEAR: ABNORMAL
MONOCYTES # BLD AUTO: 0.42 K/UL (ref 0–0.85)
MONOCYTES NFR BLD AUTO: 4.8 % (ref 0–13.4)
MORPHOLOGY BLD-IMP: NORMAL
NEUTROPHILS # BLD AUTO: 6.87 K/UL (ref 1.82–7.42)
NEUTROPHILS NFR BLD: 78.9 % (ref 44–72)
NRBC # BLD AUTO: 0 K/UL
NRBC BLD-RTO: 0 /100 WBC (ref 0–0.2)
OVALOCYTES BLD QL SMEAR: NORMAL
PLATELET # BLD AUTO: 327 K/UL (ref 164–446)
PLATELET BLD QL SMEAR: NORMAL
PMV BLD AUTO: 9.8 FL (ref 9–12.9)
POIKILOCYTOSIS BLD QL SMEAR: NORMAL
POTASSIUM SERPL-SCNC: 3.4 MMOL/L (ref 3.6–5.5)
PRODUCT TYPE UPROD: NORMAL
PRODUCT TYPE UPROD: NORMAL
PROT SERPL-MCNC: 6.8 G/DL (ref 6–8.2)
PROTHROMBIN TIME: 13.7 SEC (ref 12–14.6)
RBC # BLD AUTO: 3.75 M/UL (ref 4.2–5.4)
RBC BLD AUTO: PRESENT
RH BLD: NORMAL
SCHISTOCYTES BLD QL SMEAR: NORMAL
SODIUM SERPL-SCNC: 140 MMOL/L (ref 135–145)
TROPONIN T SERPL-MCNC: 11 NG/L (ref 6–19)
UNIT STATUS USTAT: NORMAL
UNIT STATUS USTAT: NORMAL
WBC # BLD AUTO: 8.7 K/UL (ref 4.8–10.8)

## 2025-04-25 PROCEDURE — 86923 COMPATIBILITY TEST ELECTRIC: CPT

## 2025-04-25 PROCEDURE — 99292 CRITICAL CARE ADDL 30 MIN: CPT

## 2025-04-25 PROCEDURE — A9270 NON-COVERED ITEM OR SERVICE: HCPCS | Performed by: STUDENT IN AN ORGANIZED HEALTH CARE EDUCATION/TRAINING PROGRAM

## 2025-04-25 PROCEDURE — 83605 ASSAY OF LACTIC ACID: CPT

## 2025-04-25 PROCEDURE — 36415 COLL VENOUS BLD VENIPUNCTURE: CPT

## 2025-04-25 PROCEDURE — 85025 COMPLETE CBC W/AUTO DIFF WBC: CPT

## 2025-04-25 PROCEDURE — 86850 RBC ANTIBODY SCREEN: CPT

## 2025-04-25 PROCEDURE — 99223 1ST HOSP IP/OBS HIGH 75: CPT | Mod: AI | Performed by: STUDENT IN AN ORGANIZED HEALTH CARE EDUCATION/TRAINING PROGRAM

## 2025-04-25 PROCEDURE — 30233N1 TRANSFUSION OF NONAUTOLOGOUS RED BLOOD CELLS INTO PERIPHERAL VEIN, PERCUTANEOUS APPROACH: ICD-10-PCS | Performed by: STUDENT IN AN ORGANIZED HEALTH CARE EDUCATION/TRAINING PROGRAM

## 2025-04-25 PROCEDURE — 85730 THROMBOPLASTIN TIME PARTIAL: CPT

## 2025-04-25 PROCEDURE — 700102 HCHG RX REV CODE 250 W/ 637 OVERRIDE(OP): Performed by: NURSE PRACTITIONER

## 2025-04-25 PROCEDURE — 85014 HEMATOCRIT: CPT

## 2025-04-25 PROCEDURE — 74174 CTA ABD&PLVS W/CONTRAST: CPT

## 2025-04-25 PROCEDURE — 36430 TRANSFUSION BLD/BLD COMPNT: CPT

## 2025-04-25 PROCEDURE — 83690 ASSAY OF LIPASE: CPT

## 2025-04-25 PROCEDURE — 84484 ASSAY OF TROPONIN QUANT: CPT

## 2025-04-25 PROCEDURE — 85018 HEMOGLOBIN: CPT

## 2025-04-25 PROCEDURE — 82010 KETONE BODYS QUAN: CPT

## 2025-04-25 PROCEDURE — 99285 EMERGENCY DEPT VISIT HI MDM: CPT

## 2025-04-25 PROCEDURE — P9016 RBC LEUKOCYTES REDUCED: HCPCS

## 2025-04-25 PROCEDURE — 770020 HCHG ROOM/CARE - TELE (206)

## 2025-04-25 PROCEDURE — 86900 BLOOD TYPING SEROLOGIC ABO: CPT

## 2025-04-25 PROCEDURE — 83735 ASSAY OF MAGNESIUM: CPT

## 2025-04-25 PROCEDURE — 700117 HCHG RX CONTRAST REV CODE 255: Performed by: EMERGENCY MEDICINE

## 2025-04-25 PROCEDURE — 85610 PROTHROMBIN TIME: CPT

## 2025-04-25 PROCEDURE — 700102 HCHG RX REV CODE 250 W/ 637 OVERRIDE(OP): Performed by: STUDENT IN AN ORGANIZED HEALTH CARE EDUCATION/TRAINING PROGRAM

## 2025-04-25 PROCEDURE — 82962 GLUCOSE BLOOD TEST: CPT | Mod: 91

## 2025-04-25 PROCEDURE — 80053 COMPREHEN METABOLIC PANEL: CPT

## 2025-04-25 PROCEDURE — 86901 BLOOD TYPING SEROLOGIC RH(D): CPT

## 2025-04-25 PROCEDURE — A9270 NON-COVERED ITEM OR SERVICE: HCPCS | Performed by: NURSE PRACTITIONER

## 2025-04-25 RX ORDER — FUROSEMIDE 20 MG/1
10 TABLET ORAL 2 TIMES DAILY
Status: ON HOLD | COMMUNITY
End: 2025-04-28

## 2025-04-25 RX ORDER — POTASSIUM CHLORIDE 1500 MG/1
40 TABLET, EXTENDED RELEASE ORAL ONCE
Status: COMPLETED | OUTPATIENT
Start: 2025-04-25 | End: 2025-04-25

## 2025-04-25 RX ORDER — DEXTROSE MONOHYDRATE 25 G/50ML
25 INJECTION, SOLUTION INTRAVENOUS
Status: DISCONTINUED | OUTPATIENT
Start: 2025-04-25 | End: 2025-04-28 | Stop reason: HOSPADM

## 2025-04-25 RX ORDER — AMLODIPINE BESYLATE 5 MG/1
5 TABLET ORAL DAILY
Status: ON HOLD | COMMUNITY
End: 2025-04-28

## 2025-04-25 RX ORDER — AMLODIPINE BESYLATE 5 MG/1
5 TABLET ORAL DAILY
Status: DISCONTINUED | OUTPATIENT
Start: 2025-04-25 | End: 2025-04-28 | Stop reason: HOSPADM

## 2025-04-25 RX ORDER — ACETAMINOPHEN 325 MG/1
650 TABLET ORAL EVERY 6 HOURS PRN
Status: DISCONTINUED | OUTPATIENT
Start: 2025-04-25 | End: 2025-04-28 | Stop reason: HOSPADM

## 2025-04-25 RX ORDER — ATORVASTATIN CALCIUM 80 MG/1
80 TABLET, FILM COATED ORAL EVERY EVENING
Status: DISCONTINUED | OUTPATIENT
Start: 2025-04-25 | End: 2025-04-28 | Stop reason: HOSPADM

## 2025-04-25 RX ORDER — ONDANSETRON 2 MG/ML
4 INJECTION INTRAMUSCULAR; INTRAVENOUS EVERY 4 HOURS PRN
Status: DISCONTINUED | OUTPATIENT
Start: 2025-04-25 | End: 2025-04-28 | Stop reason: HOSPADM

## 2025-04-25 RX ORDER — LEVETIRACETAM 1000 MG/1
1 TABLET ORAL 2 TIMES DAILY
Status: ON HOLD | COMMUNITY
End: 2025-04-28

## 2025-04-25 RX ORDER — EMPAGLIFLOZIN 25 MG/1
25 TABLET, FILM COATED ORAL DAILY
Status: ON HOLD | COMMUNITY
End: 2025-04-28

## 2025-04-25 RX ORDER — DAPAGLIFLOZIN 10 MG/1
10 TABLET, FILM COATED ORAL DAILY
Status: DISCONTINUED | OUTPATIENT
Start: 2025-04-25 | End: 2025-04-28 | Stop reason: HOSPADM

## 2025-04-25 RX ORDER — INSULIN LISPRO 100 [IU]/ML
1-6 INJECTION, SOLUTION INTRAVENOUS; SUBCUTANEOUS EVERY 6 HOURS
Status: DISCONTINUED | OUTPATIENT
Start: 2025-04-25 | End: 2025-04-28 | Stop reason: HOSPADM

## 2025-04-25 RX ORDER — LISINOPRIL 30 MG/1
30 TABLET ORAL DAILY
Status: ON HOLD | COMMUNITY
End: 2025-04-28

## 2025-04-25 RX ORDER — FUROSEMIDE 20 MG/1
10 TABLET ORAL 2 TIMES DAILY
Status: DISCONTINUED | OUTPATIENT
Start: 2025-04-25 | End: 2025-04-28 | Stop reason: HOSPADM

## 2025-04-25 RX ORDER — METFORMIN HYDROCHLORIDE 500 MG/1
500 TABLET, EXTENDED RELEASE ORAL 2 TIMES DAILY
Status: ON HOLD | COMMUNITY
End: 2025-04-28

## 2025-04-25 RX ORDER — POTASSIUM CHLORIDE 1500 MG/1
20 TABLET, EXTENDED RELEASE ORAL ONCE
Status: COMPLETED | OUTPATIENT
Start: 2025-04-25 | End: 2025-04-25

## 2025-04-25 RX ORDER — POLYETHYLENE GLYCOL 3350 17 G/17G
17 POWDER, FOR SOLUTION ORAL
COMMUNITY

## 2025-04-25 RX ORDER — ALENDRONATE SODIUM 70 MG/1
70 TABLET ORAL
Status: ON HOLD | COMMUNITY
End: 2025-04-28

## 2025-04-25 RX ORDER — OMEPRAZOLE 20 MG/1
20 CAPSULE, DELAYED RELEASE ORAL DAILY
Status: DISCONTINUED | OUTPATIENT
Start: 2025-04-25 | End: 2025-04-28 | Stop reason: HOSPADM

## 2025-04-25 RX ORDER — ONDANSETRON 4 MG/1
4 TABLET, ORALLY DISINTEGRATING ORAL EVERY 4 HOURS PRN
Status: DISCONTINUED | OUTPATIENT
Start: 2025-04-25 | End: 2025-04-28 | Stop reason: HOSPADM

## 2025-04-25 RX ORDER — LEVETIRACETAM 500 MG/1
500 TABLET ORAL 2 TIMES DAILY
Status: DISCONTINUED | OUTPATIENT
Start: 2025-04-25 | End: 2025-04-25

## 2025-04-25 RX ORDER — ASPIRIN 81 MG/1
81 TABLET, CHEWABLE ORAL DAILY
Status: DISCONTINUED | OUTPATIENT
Start: 2025-04-25 | End: 2025-04-28 | Stop reason: HOSPADM

## 2025-04-25 RX ORDER — LEVETIRACETAM 500 MG/1
1000 TABLET ORAL 2 TIMES DAILY
Status: DISCONTINUED | OUTPATIENT
Start: 2025-04-25 | End: 2025-04-28 | Stop reason: HOSPADM

## 2025-04-25 RX ADMIN — POTASSIUM CHLORIDE 20 MEQ: 1500 TABLET, EXTENDED RELEASE ORAL at 10:15

## 2025-04-25 RX ADMIN — ATORVASTATIN CALCIUM 80 MG: 80 TABLET, FILM COATED ORAL at 17:36

## 2025-04-25 RX ADMIN — LEVETIRACETAM 1000 MG: 500 TABLET, FILM COATED ORAL at 17:35

## 2025-04-25 RX ADMIN — POTASSIUM CHLORIDE 40 MEQ: 1500 TABLET, EXTENDED RELEASE ORAL at 06:23

## 2025-04-25 RX ADMIN — INSULIN LISPRO 1 UNITS: 100 INJECTION, SOLUTION INTRAVENOUS; SUBCUTANEOUS at 23:46

## 2025-04-25 RX ADMIN — IOHEXOL 80 ML: 350 INJECTION, SOLUTION INTRAVENOUS at 04:15

## 2025-04-25 RX ADMIN — LEVETIRACETAM 500 MG: 500 TABLET, FILM COATED ORAL at 06:23

## 2025-04-25 RX ADMIN — OMEPRAZOLE 20 MG: 20 CAPSULE, DELAYED RELEASE ORAL at 06:23

## 2025-04-25 SDOH — ECONOMIC STABILITY: TRANSPORTATION INSECURITY
IN THE PAST 12 MONTHS, HAS LACK OF RELIABLE TRANSPORTATION KEPT YOU FROM MEDICAL APPOINTMENTS, MEETINGS, WORK OR FROM GETTING THINGS NEEDED FOR DAILY LIVING?: NO

## 2025-04-25 SDOH — ECONOMIC STABILITY: TRANSPORTATION INSECURITY
IN THE PAST 12 MONTHS, HAS THE LACK OF TRANSPORTATION KEPT YOU FROM MEDICAL APPOINTMENTS OR FROM GETTING MEDICATIONS?: NO

## 2025-04-25 ASSESSMENT — ENCOUNTER SYMPTOMS
NECK PAIN: 0
BACK PAIN: 0
BLURRED VISION: 0
COUGH: 0
FEVER: 0
DIZZINESS: 0
DOUBLE VISION: 0
ABDOMINAL PAIN: 0
NAUSEA: 0
VOMITING: 0
WHEEZING: 0
DIARRHEA: 0
EYE PAIN: 0
SHORTNESS OF BREATH: 0
CONSTIPATION: 0
CHILLS: 0
BLOOD IN STOOL: 1
HEADACHES: 0
PALPITATIONS: 0

## 2025-04-25 ASSESSMENT — PATIENT HEALTH QUESTIONNAIRE - PHQ9
6. FEELING BAD ABOUT YOURSELF - OR THAT YOU ARE A FAILURE OR HAVE LET YOURSELF OR YOUR FAMILY DOWN: NOT AL ALL
3. TROUBLE FALLING OR STAYING ASLEEP OR SLEEPING TOO MUCH: NOT AT ALL
8. MOVING OR SPEAKING SO SLOWLY THAT OTHER PEOPLE COULD HAVE NOTICED. OR THE OPPOSITE, BEING SO FIGETY OR RESTLESS THAT YOU HAVE BEEN MOVING AROUND A LOT MORE THAN USUAL: NOT AT ALL
1. LITTLE INTEREST OR PLEASURE IN DOING THINGS: NOT AT ALL
SUM OF ALL RESPONSES TO PHQ QUESTIONS 1-9: 4
2. FEELING DOWN, DEPRESSED, IRRITABLE, OR HOPELESS: SEVERAL DAYS
7. TROUBLE CONCENTRATING ON THINGS, SUCH AS READING THE NEWSPAPER OR WATCHING TELEVISION: NOT AT ALL
4. FEELING TIRED OR HAVING LITTLE ENERGY: SEVERAL DAYS
SUM OF ALL RESPONSES TO PHQ9 QUESTIONS 1 AND 2: 1
9. THOUGHTS THAT YOU WOULD BE BETTER OFF DEAD, OR OF HURTING YOURSELF: SEVERAL DAYS
5. POOR APPETITE OR OVEREATING: SEVERAL DAYS

## 2025-04-25 ASSESSMENT — LIFESTYLE VARIABLES
TOTAL SCORE: 0
TOTAL SCORE: 0
HOW MANY TIMES IN THE PAST YEAR HAVE YOU HAD 5 OR MORE DRINKS IN A DAY: 0
TOTAL SCORE: 0
ON A TYPICAL DAY WHEN YOU DRINK ALCOHOL HOW MANY DRINKS DO YOU HAVE: 0
ALCOHOL_USE: YES
HAVE YOU EVER FELT YOU SHOULD CUT DOWN ON YOUR DRINKING: NO
AVERAGE NUMBER OF DAYS PER WEEK YOU HAVE A DRINK CONTAINING ALCOHOL: 0
CONSUMPTION TOTAL: NEGATIVE
HAVE PEOPLE ANNOYED YOU BY CRITICIZING YOUR DRINKING: NO
EVER FELT BAD OR GUILTY ABOUT YOUR DRINKING: NO
DOES PATIENT WANT TO STOP DRINKING: CANNOT ASSESS
EVER HAD A DRINK FIRST THING IN THE MORNING TO STEADY YOUR NERVES TO GET RID OF A HANGOVER: NO

## 2025-04-25 ASSESSMENT — SOCIAL DETERMINANTS OF HEALTH (SDOH)
WITHIN THE PAST 12 MONTHS, THE FOOD YOU BOUGHT JUST DIDN'T LAST AND YOU DIDN'T HAVE MONEY TO GET MORE: NEVER TRUE
IN THE PAST 12 MONTHS, HAS THE ELECTRIC, GAS, OIL, OR WATER COMPANY THREATENED TO SHUT OFF SERVICE IN YOUR HOME?: NO
WITHIN THE LAST YEAR, HAVE TO BEEN RAPED OR FORCED TO HAVE ANY KIND OF SEXUAL ACTIVITY BY YOUR PARTNER OR EX-PARTNER?: NO
WITHIN THE LAST YEAR, HAVE YOU BEEN HUMILIATED OR EMOTIONALLY ABUSED IN OTHER WAYS BY YOUR PARTNER OR EX-PARTNER?: NO
WITHIN THE LAST YEAR, HAVE YOU BEEN KICKED, HIT, SLAPPED, OR OTHERWISE PHYSICALLY HURT BY YOUR PARTNER OR EX-PARTNER?: NO
WITHIN THE PAST 12 MONTHS, YOU WORRIED THAT YOUR FOOD WOULD RUN OUT BEFORE YOU GOT THE MONEY TO BUY MORE: NEVER TRUE
WITHIN THE LAST YEAR, HAVE YOU BEEN AFRAID OF YOUR PARTNER OR EX-PARTNER?: NO

## 2025-04-25 ASSESSMENT — COGNITIVE AND FUNCTIONAL STATUS - GENERAL
DAILY ACTIVITIY SCORE: 22
HELP NEEDED FOR BATHING: A LITTLE
CLIMB 3 TO 5 STEPS WITH RAILING: A LITTLE
MOVING TO AND FROM BED TO CHAIR: A LITTLE
TOILETING: A LITTLE
WALKING IN HOSPITAL ROOM: A LITTLE
SUGGESTED CMS G CODE MODIFIER MOBILITY: CJ
SUGGESTED CMS G CODE MODIFIER DAILY ACTIVITY: CJ
MOBILITY SCORE: 21

## 2025-04-25 ASSESSMENT — PAIN DESCRIPTION - PAIN TYPE
TYPE: ACUTE PAIN

## 2025-04-25 ASSESSMENT — FIBROSIS 4 INDEX
FIB4 SCORE: 1.41
FIB4 SCORE: 1.7

## 2025-04-25 ASSESSMENT — COPD QUESTIONNAIRES
COPD SCREENING SCORE: 2
DURING THE PAST 4 WEEKS HOW MUCH DID YOU FEEL SHORT OF BREATH: NONE/LITTLE OF THE TIME
HAVE YOU SMOKED AT LEAST 100 CIGARETTES IN YOUR ENTIRE LIFE: NO/DON'T KNOW
DO YOU EVER COUGH UP ANY MUCUS OR PHLEGM?: NO/ONLY WITH OCCASIONAL COLDS OR INFECTIONS

## 2025-04-25 NOTE — PROGRESS NOTES
Hospital medicine progress note after midnight:     Ms. Molly Fountain is an 79-year-old female with past medical history of NIDDM 2, HTN, seizure disorder, CVA 2023 that presents with hematochezia since yesterday.     Recently admitted 3/15/2024 to 3/17/2020 for similar complaint.  IR, vascular surgery, and GI consulted without any intervention.     Patient states that they have been having multiple large bloody bowel movements since yesterday morning.  At outside hospital patient's hemoglobin was 7.5, decreased from 9.4 January 2025.  Hemoglobin stable on admission here, 7.5.     In the ED, BP 110s to 130s/60s to 100s, HR 90s, RR 16-21, afebrile, saturating well on room air.  1 unit PRBC ordered in the ED.  WBC 8.7, hemoglobin 7.5, , sodium 140, potassium 3.4, bicarb 19, anion gap 13, BUN 19, creatinine 0.83, lipase 25, INR 1.05.  CTA abdomen pelvis without evidence of acute GI hemorrhage, colonic diverticulosis.  Patient admitted to hospital medicine for management of care.    On assessment, patient reports that no blood noted when she went to the bathroom however, patient has not had a bowel movement.  Will monitor her hemoglobin levels, and obtain CT of the abdomen pelvis.  Monitor for any signs of bleeds.    Plan of care: Continue to monitor for any signs of bleed; trend H/H; obtain CT abdomen pelvis    Disposition: Patient currently inpatient status as she is anticipated to stay 2-3 midnights for management of rectal bleed    Problem list:   Rectal bleed  NIDDM T2  Hypertension  Seizures  CVA 2023       Please note that this dictation was created using voice recognition software. I have made every reasonable attempt to correct obvious errors, but there may be errors of grammar and possibly content that I did not discover before finalizing the note.    Electronically signed by:  Dr. IRIS Hackett, DNP, APRN, FNP-C  Hospitalist Services  [facility]  [phone]  [phone]  [email or web address]  04/25/25                 [phone]

## 2025-04-25 NOTE — PROGRESS NOTES
Notified JAYASHREE Smith that patient has ST elevation of 0.9. Received and carried out orders. This RN to continue purposeful rounding and notify MD of any changes PRN

## 2025-04-25 NOTE — ED NOTES
Pt up to the bedside commode w/ nursing student SBA.   Admitting team has rounded on patient.  Pt has been assigned and awaiting transport.

## 2025-04-25 NOTE — PROGRESS NOTES
4 Eyes Skin Assessment Completed by KOLE Brown and KOLE John.    Head WDL  Ears WDL  Nose WDL  Mouth WDL  Neck WDL  Breast/Chest WDL  Shoulder Blades WDL  Spine WDL  (R) Arm/Elbow/Hand WDL  (L) Arm/Elbow/Hand WDL  Abdomen Redness and Blanching- under pannus  Groin Redness and Blanching  Scrotum/Coccyx/Buttocks Redness and Blanching  (R) Leg Swelling  (L) Leg Swelling  (R) Heel/Foot/Toe Redness and Blanching  (L) Heel/Foot/Toe Redness and Blanching          Devices In Places Tele Box and Pulse Ox      Interventions In Place Pillows and Pressure Redistribution Mattress    Possible Skin Injury No    Pictures Uploaded Into Epic N/A  Wound Consult Placed N/A  RN Wound Prevention Protocol Ordered No       Patients overall skin is intact with no areas of concern noticed. All bony prominences have been assessed, they are all intact with some redness  but all blanching. Pt educated in regard to skin breakdown risk, pillows in use for support and positioning.

## 2025-04-25 NOTE — ED NOTES
Medication history reviewed with patient.  Med rec is complete.  Allergies reviewed.     Patient denies any outpatient antibiotics or anticoagulants in the last 30 days. Patient is on Aspirin 81 mg daily.     Dispense history available in EPIC? None    Patient unable to confirm Keppra dose at this time      Luz Alcantar, PhT

## 2025-04-25 NOTE — ASSESSMENT & PLAN NOTE
- Hypoglycemia protocol  - Diabetic diet  - SSI  - Hold home empagliflozin in the setting of GI bleed

## 2025-04-25 NOTE — ED NOTES
Blood transfusion in process, pt tolerating well, no signs of reaction at this time. RN remained w/ pt for initial 15 minutes per policy. Pt educated on potential transfusion reactions and agrees to call RN w/ any changes in condition. Call light w/in reach. Bed locked in lowest position. Connected to pulse ox, bp cuff, and cardiac monitor. Pt a/ox4, RR even/unlabored.

## 2025-04-25 NOTE — H&P
Hospital Medicine History & Physical Note    Date of Service  4/25/2025    Primary Care Physician  MARYANN MillardNSteffanieP.    Consultants  none    Specialist Names: N/A    Code Status  DNAR/DNI    Chief Complaint  Chief Complaint   Patient presents with    Rectal Bleeding     BIB paramedics from [facility] as transfer  for GIB. Pt reports bright red bleeding from rectum x1 day. H/o GIB + diverticulitis.   No meds PTA. HGB 7.5 at previous facility.   Pt reports GLF yesterday. Denies any HS or LOC       History of Presenting Illness  Patient is an 79-year-old female with past medical history of NIDDM 2, HTN, seizure disorder, CVA 2023 that presents with hematochezia since yesterday.     Recently admitted 3/15/2024 to 3/17/2020 for similar complaint.  IR, vascular surgery, and GI consulted without any intervention.    Patient states that they have been having multiple large bloody bowel movements since yesterday morning.  At outside hospital patient's hemoglobin was 7.5, decreased from 9.4 January 2025.  Hemoglobin stable on admission here, 7.5.     In the ED, BP 110s to 130s/60s to 100s, HR 90s, RR 16-21, afebrile, saturating well on room air.  1 unit PRBC ordered in the ED.  WBC 8.7, hemoglobin 7.5, , sodium 140, potassium 3.4, bicarb 19, anion gap 13, BUN 19, creatinine 0.83, lipase 25, INR 1.05.  CTA abdomen pelvis without evidence of acute GI hemorrhage, colonic diverticulosis.     I discussed the plan of care with patient.    Review of Systems  Review of Systems   Constitutional:  Negative for chills and fever.   HENT:  Negative for ear pain.    Eyes:  Negative for blurred vision, double vision and pain.   Respiratory:  Negative for cough, shortness of breath and wheezing.    Cardiovascular:  Negative for chest pain, palpitations and leg swelling.   Gastrointestinal:  Positive for blood in stool. Negative for abdominal pain, constipation, diarrhea, melena, nausea and vomiting.   Genitourinary:   Negative for dysuria, frequency and hematuria.   Musculoskeletal:  Negative for back pain, joint pain and neck pain.   Neurological:  Negative for dizziness and headaches.       Past Medical History   has a past medical history of CVA (cerebral vascular accident) ([facility]) (11/24/2023), CVA (cerebral vascular accident) ([facility]) (2024), Diverticulitis, Essential hypertension (03/13/2019), GIB (gastrointestinal bleeding), Osteoporosis, Seizure ([facility]), and Type 2 diabetes mellitus with complication, without long-term current use of insulin ([facility]) (03/13/2019).    Surgical History   has a past surgical history that includes colonoscopy - endo (N/A, 5/28/2019); pr colonoscopy-flexible (N/A, 12/2/2023); and pr colonoscopy,biopsy (N/A, 12/2/2023).     Family History  family history includes Heart Disease in her father; Hypertension in her father.   Family history reviewed with patient. There is no family history that is pertinent to the chief complaint.     Social History   reports that she has never smoked. She has never used smokeless tobacco. She reports current alcohol use. She reports that she does not use drugs.    Allergies  Allergies   Allergen Reactions    Semaglutide Unspecified     bleeding    Shellfish-Derived Products Rash     Full body rash    Sulfa Drugs Rash     Full body rash       Medications  Prior to Admission Medications   Prescriptions Last Dose Informant Patient Reported? Taking?   Alcohol Swabs   No No   Sig: Wipe site with prep pad prior to injection.   Blood Glucose Meter Kit   No No   Sig: Test blood sugar as recommended by provider. One Touch Verio blood glucose monitoring kit.   Blood Glucose Test Strips   No No   Sig: Use one Accucheck Guide strip to test blood sugar once daily early morning before first meal. For uncontrolled diabetes. E11.65   Blood Glucose Test Strips   No No   Sig: Use one One Touch Verio strip to test blood sugar once daily early morning before first meal.   Empagliflozin  (JARDIANCE) 10 MG Tab tablet   No No   Sig: Take 1 Tablet by mouth every day.   Lancets   No No   Sig: Use one fast clix lancet to test blood sugar once daily early morning before first meal. For uncontrolled diabetes. E11.65   Lancets   No No   Sig: Use one One Touch Verio lancet to test blood sugar once daily early morning before first meal.   acetaminophen (TYLENOL) 325 MG Tab   Yes No   Sig: Take 650 mg by mouth every four hours as needed for Mild Pain.   aspirin (ASA) 81 MG Chew Tab chewable tablet   No No   Sig: Chew 1 Tablet every day.   atorvastatin (LIPITOR) 80 MG tablet   No No   Sig: Take 1 Tablet by mouth every evening.   diclofenac sodium (VOLTAREN) 1 % Gel   Yes No   Sig: Apply  topically 2 times a day. 1 application to left knee   levetiracetam (KEPPRA) 1000 MG tablet   No No   Sig: Take 1 Tablet by mouth 2 times a day.   Patient taking differently: Take 500 mg by mouth 2 times a day. Per patient she takes 500mg two times daily.   magnesium oxide 400 (240 Mg) MG Tab   No No   Sig: Take 1 Tablet by mouth every day.   omeprazole (PRILOSEC) 20 MG delayed-release capsule   No No   Sig: Take 1 Capsule by mouth every day.   polyethylene glycol 3350 (MIRALAX) 17 GM/SCOOP Powder   No No   Sig: Take 17 g by mouth every day.      Facility-Administered Medications: None       Physical Exam  Temp:  [36.3 °C (97.3 °F)] 36.3 °C (97.3 °F)  Pulse:  [91-93] 93  Resp:  [16-21] 20  BP: (114-134)/() 134/103  SpO2:  [88 %-99 %] 99 %  Blood Pressure : (!) 134/103   Temperature: 36.3 °C (97.3 °F)   Pulse: 93   Respiration: 20   Pulse Oximetry: 99 %       Physical Exam  Vitals reviewed.   Constitutional:       General: She is not in acute distress.     Appearance: Normal appearance. She is not ill-appearing, toxic-appearing or diaphoretic.   HENT:      Head: Normocephalic and atraumatic.      Mouth/Throat:      Mouth: Mucous membranes are moist.      Pharynx: No oropharyngeal exudate or posterior oropharyngeal  "erythema.   Eyes:      General: No scleral icterus.     Extraocular Movements: Extraocular movements intact.      Conjunctiva/sclera: Conjunctivae normal.   Cardiovascular:      Rate and Rhythm: Normal rate and regular rhythm.      Heart sounds: Normal heart sounds. No murmur heard.     No friction rub. No gallop.   Pulmonary:      Effort: Pulmonary effort is normal. No respiratory distress.      Breath sounds: Normal breath sounds. No stridor. No wheezing, rhonchi or rales.   Abdominal:      General: Abdomen is flat. There is no distension.      Palpations: Abdomen is soft. There is no mass.      Tenderness: There is no abdominal tenderness. There is no guarding or rebound.      Hernia: No hernia is present.   Musculoskeletal:         General: No swelling or tenderness. Normal range of motion.      Cervical back: Neck supple. No rigidity.      Right lower leg: No edema.      Left lower leg: No edema.   Lymphadenopathy:      Cervical: No cervical adenopathy.   Skin:     General: Skin is warm and dry.      Coloration: Skin is not jaundiced.   Neurological:      Mental Status: She is alert and oriented to person, place, and time. Mental status is at baseline.      Cranial Nerves: No cranial nerve deficit.         Laboratory:  Recent Labs     04/25/25  0401   WBC 8.7   RBC 3.75*   HEMOGLOBIN 7.5*   HEMATOCRIT 26.8*   MCV 71.5*   MCH 20.0*   MCHC 28.0*   RDW 49.4   PLATELETCT 327   MPV 9.8     Recent Labs     04/25/25  0401   SODIUM 140   POTASSIUM 3.4*   CHLORIDE 108   CO2 19*   GLUCOSE 141*   BUN 19   CREATININE 0.83   CALCIUM 8.7     Recent Labs     04/25/25  0401   ALTSGPT 10   ASTSGOT 22   ALKPHOSPHAT 84   TBILIRUBIN 0.3   LIPASE 25   GLUCOSE 141*     Recent Labs     04/25/25  0401   APTT 24.8   INR 1.05     No results for input(s): \"NTPROBNP\" in the last 72 hours.      No results for input(s): \"TROPONINT\" in the last 72 hours.    Imaging:  CTA ABDOMEN PELVIS W & W/O POST PROCESS   Final Result      1.  No evidence " of acute GI hemorrhage.   2.  Colonic diverticulosis.          no X-Ray or EKG requiring interpretation    Assessment/Plan:  Justification for Admission Status  I anticipate this patient will require at least two midnights for appropriate medical management, necessitating inpatient admission because lower GI bleed requiring GI consultation    Patient will need a Telemetry bed on MEDICAL service .  The need is secondary to lower GI bleed requiring GI consultation.    * Lower GI bleed- (present on admission)  Assessment & Plan  Patient with multiple episodes of large bloody stool since yesterday. At outside hospital patient's hemoglobin was 7.5, decreased from 9.4 January 2025.  Hemoglobin stable on admission here, 7.5. Recently admitted 3/15/2024 to 3/17/2020 for similar complaint.  IR, vascular surgery, and GI consulted without any intervention.    Lower GI: Diverticulosis, AVM, malignancy, hemorrhoids     -Day team to consult GI  -1 unit PRBC ordered in the ED  -NPO  -Avoid NSAIDs  -2 large bore IVs  -Transfuse PRN Hgb<7 and PLT<50    High anion gap metabolic acidosis- (present on admission)  Assessment & Plan  Bicarb 19 and anion gap 13    - Order LA and BHB    Hypokalemia- (present on admission)  Assessment & Plan  Potassium 3.4    - Ordered mag level  - K>4, Mg>2    History of CVA (cerebrovascular accident)- (present on admission)  Assessment & Plan  History of CVA 2023    - Hold home aspirin in the setting of GI bleed  - Continue home atorvastatin    Seizure (HCC)- (present on admission)  Assessment & Plan  - Continue home Keppra    Essential hypertension- (present on admission)  Assessment & Plan  - Hold antihypertensives in the setting of GI bleed    Type 2 diabetes mellitus with complication, without long-term current use of insulin (HCC)- (present on admission)  Assessment & Plan  - Hypoglycemia protocol  - Diabetic diet  - SSI  - Hold home empagliflozin in the setting of GI bleed        VTE prophylaxis:  SCDs/TEDs

## 2025-04-25 NOTE — ED PROVIDER NOTES
ED Provider Note    CHIEF COMPLAINT  Chief Complaint   Patient presents with    Rectal Bleeding     BIB paramedics from [facility] as transfer  for GIB. Pt reports bright red bleeding from rectum x1 day. H/o GIB + diverticulitis.   No meds PTA. HGB 7.5 at previous facility.   Pt reports GLF yesterday. Denies any HS or LOC       EXTERNAL RECORDS REVIEWED  External ED Note seen today for bright red blood per rectum hemoglobin of 7.5 down from 9 transferred here for higher level of care and Inpatient Notes was last hospitalized in March 2024 for lower GI bleed CT scan originally showed some diverticulitis but colonoscopy revealed concern for ischemic colitis and CTA was concerning for active extra but did not go to IR.  Was started on antibiotics and observed    HPI/ROS  LIMITATION TO HISTORY   Select: : None  OUTSIDE HISTORIAN(S):  EMS no change en route    Molly Fountain is a 79 y.o. female who presents to the emergency department chief complaint of 1 day of multiple bouts of abrahan bloody diarrheas.  She denies nausea or vomiting fevers chills or abdominal pain.  She is not on any blood thinners she just states she was feeling very lightheaded earlier today because every time she sits down she feels like blood is just pouring out of her.  She states it is even coming out of her currently when she is not trying to have a bowel movement.    PAST MEDICAL HISTORY   has a past medical history of CVA (cerebral vascular accident) (HCC) (11/24/2023), CVA (cerebral vascular accident) ([facility]) (2024), Diverticulitis, Essential hypertension (03/13/2019), GIB (gastrointestinal bleeding), Osteoporosis, Seizure ([facility]), and Type 2 diabetes mellitus with complication, without long-term current use of insulin ([facility]) (03/13/2019).    SURGICAL HISTORY   has a past surgical history that includes colonoscopy - endo (N/A, 5/28/2019); colonoscopy-flexible (N/A, 12/2/2023); and colonoscopy,biopsy (N/A, 12/2/2023).    FAMILY  HISTORY  Family History   Problem Relation Age of Onset    Heart Disease Father     Hypertension Father        SOCIAL HISTORY  Social History     Tobacco Use    Smoking status: Never    Smokeless tobacco: Never   Substance and Sexual Activity    Alcohol use: Yes     Comment: occasionally    Drug use: No    Sexual activity: Not on file       CURRENT MEDICATIONS  Home Medications    **Home medications have not yet been reviewed for this encounter**         ALLERGIES  Allergies   Allergen Reactions    Sulfa Drugs Rash     Full body rash    Semaglutide Unspecified     bleeding    Shellfish-Derived Products Rash     Full body rash       PHYSICAL EXAM  VITAL SIGNS: /65   Pulse 92   Temp 36.3 °C (97.3 °F) (Temporal)   Resp 16   Ht 1.524 m (5')   Wt 63.5 kg (140 lb)   LMP  (LMP Unknown)   SpO2 99%   BMI 27.34 kg/m²    Pulse OX: Pulse Oxygen level is within normal limits on room air  Constitutional: Alert in no apparent distress.  HENT: Normocephalic, Atraumatic, MMM  Eyes: PERound. Conjunctiva significantly pale l, non-icteric.   Heart: Regular rate and rhythm, intact distal pulses   Lungs: Symmetrical movement, no resp distress   Abdomen: Non-tender, non-distended, normal bowel sounds  EXT/Back no significant edema  Skin: Warm, Dry, No erythema, No rash.   Neurologic: Alert and oriented, Grossly non-focal.       EKG/LABS  Labs Reviewed   CBC WITH DIFFERENTIAL - Abnormal; Notable for the following components:       Result Value    RBC 3.75 (*)     Hemoglobin 7.5 (*)     Hematocrit 26.8 (*)     MCV 71.5 (*)     MCH 20.0 (*)     MCHC 28.0 (*)     Neutrophils-Polys 78.90 (*)     Lymphocytes 14.60 (*)     All other components within normal limits   COMP METABOLIC PANEL - Abnormal; Notable for the following components:    Potassium 3.4 (*)     Co2 19 (*)     Glucose 141 (*)     All other components within normal limits   COD (ADULT)   LIPASE   PROTHROMBIN TIME   APTT   ESTIMATED GFR   PLATELET ESTIMATE   MORPHOLOGY    PERIPHERAL SMEAR REVIEW   DIFFERENTIAL COMMENT   LACTIC ACID   MAGNESIUM   BETA-HYDROXYBUTYRIC ACID   TRANSFUSE RBC ACTIVE BLEED-NURSING COMMUNICATION       I have independently interpreted this EKG    RADIOLOGY/PROCEDURES   I have independently interpreted the diagnostic imaging associated with this visit and am waiting the final reading from the radiologist.   My preliminary interpretation is as follows:     CTA abdomen pelvis with evidence of acute arterial bleed in the abdomen or pelvis no significant diverticulitis    Radiologist interpretation:  CTA ABDOMEN PELVIS W & W/O POST PROCESS   Final Result      1.  No evidence of acute GI hemorrhage.   2.  Colonic diverticulosis.          COURSE & MEDICAL DECISION MAKING    ASSESSMENT, COURSE AND PLAN  Care Narrative:     Patient is an 79-year-old female presents to the emergency department with a lower GI bleed prior was likely diagnosed with ischemic colitis she does not have any abdominal pain at this time CT angiogram of the abdomen pelvis will be ordered she does have blood in her drawers at this time and feeling lightheaded with pale mucous membranes and conjunctival should be given 1 unit of blood especially with a drop of the hemoglobin 1.5.      I have explained to the patient the risks and benefits of transfusion of blood products.  This includes, as appropriate, the risk of mild allergic reaction, hemolytic reaction, transfusion-associated lung injury, febrile reactions, circulatory or iron overload, and infection.    We discussed possible alternatives and their risks, including directed donation, autologous transfusion, and no transfusion, including IV or oral iron supplementation, as appropriate.  I believe the patient understands the risks and benefits and was able to express understanding.      DISPOSITION AND DISCUSSIONS    I reassessed the patient at bedside fortunately CT scan does not reveal any active bleeding and her hemoglobin is stable at  7.5.  She is going to receive a unit of blood she understands a plan for hospitalization but does not need to emergently be evaluated by GI until later this morning.    5:06 AM  Spoke w John on-call for the medicine service and he has accepted the patient for hospitalization.      I have discussed management of the patient with the following physicians and KAIA's:  John    Discussion of management with other QHP or appropriate source(s):  blood bank      FINAL DIAGNOSIS  1. Lower GI bleed    2. Diverticulosis         Electronically signed by: Daina Huerta M.D., 4/25/2025 3:56 AM

## 2025-04-25 NOTE — ASSESSMENT & PLAN NOTE
History of CVA 2023    - Hold home aspirin in the setting of GI bleed  - Continue home atorvastatin

## 2025-04-25 NOTE — PROGRESS NOTES
Pt transferred into room from [facility], was able to ambulate into bed with standby assistance. Monitor room notified of patient transfer, ensured visualization by CMR tech. Pt is alert and oriented x4, on room air. Declines pain at this time. She has been updated in regard to plan of care, all questions answered accordingly. Pt oriented to room including use of call light to notify staff of needs, verbalizes understanding and demonstrates task appropriately. Bed is locked and in lowest position, call light within reach, bed alarm on.

## 2025-04-25 NOTE — ED NOTES
Bedside report received from off going RN/tech: Leyla SHARIF, assumed care of patient.  POC discussed with patient. Call light within reach, all needs addressed at this time.       Fall risk interventions in place: Patient's personal possessions are with in their safe reach, Place socks on patient, Place fall risk sign on patient's door, and Keep floor surfaces clean and dry (all applicable per [facility] Fall risk assessment)   Continuous monitoring: Cardiac Leads, Pulse Ox, or Blood Pressure  IVF/IV medications: Infusion per MAR (List Med(s)) BLOOD  Oxygen: How many liters 2L  Bedside sitter: Not Applicable   Isolation: Not Applicable    Blood transfusion continues.  Pt sleeping, awakens to verbal stimuli.  All needs met.

## 2025-04-25 NOTE — ASSESSMENT & PLAN NOTE
Patient with multiple episodes of large bloody stool since 4/23 s/p 1 unit PRBC in the ED.  -GI did colonoscopy on 4/27 which revelaed L side colitis and internal hemorrhoids grade 2 which was the likely source  for which patient is started on Cortenema pr.

## 2025-04-25 NOTE — ED TRIAGE NOTES
Chief Complaint   Patient presents with    Rectal Bleeding     BIB paramedics from [facility] as transfer  for GIB. Pt reports bright red bleeding from rectum x1 day. H/o GIB + diverticulitis.   No meds PTA. HGB 7.5 at previous facility.   Pt reports GLF yesterday. Denies any HS or LOC       Pt is GCS 15, speaking in full sentences, follows commands and responds appropriately to questions. Resp are even and unlabored.     Pt in bed, connected to pulse ox, bp, and cardiac monitor. RN introduced self to pt and oriented pt  to room and call light.     /65   Pulse 92   Temp 36.3 °C (97.3 °F) (Temporal)   Resp 16   Ht 1.524 m (5')   Wt 63.5 kg (140 lb)   LMP  (LMP Unknown)   SpO2 99%   BMI 27.34 kg/m²

## 2025-04-25 NOTE — ED NOTES
Blood complete.  Pt tolerated well.  Pt denies pain or any concerns.  VSS.  Remain awaiting bed assignment.  All needs met.

## 2025-04-26 LAB
ANION GAP SERPL CALC-SCNC: 10 MMOL/L (ref 7–16)
BUN SERPL-MCNC: 18 MG/DL (ref 8–22)
CALCIUM SERPL-MCNC: 8.4 MG/DL (ref 8.5–10.5)
CHLORIDE SERPL-SCNC: 113 MMOL/L (ref 96–112)
CO2 SERPL-SCNC: 19 MMOL/L (ref 20–33)
CREAT SERPL-MCNC: 0.8 MG/DL (ref 0.5–1.4)
ERYTHROCYTE [DISTWIDTH] IN BLOOD BY AUTOMATED COUNT: 54.4 FL (ref 35.9–50)
ERYTHROCYTE [DISTWIDTH] IN BLOOD BY AUTOMATED COUNT: 54.6 FL (ref 35.9–50)
FERRITIN SERPL-MCNC: 18.8 NG/ML (ref 10–291)
GFR SERPLBLD CREATININE-BSD FMLA CKD-EPI: 74 ML/MIN/1.73 M 2
GLUCOSE BLD STRIP.AUTO-MCNC: 117 MG/DL (ref 65–99)
GLUCOSE BLD STRIP.AUTO-MCNC: 126 MG/DL (ref 65–99)
GLUCOSE BLD STRIP.AUTO-MCNC: 146 MG/DL (ref 65–99)
GLUCOSE BLD STRIP.AUTO-MCNC: 186 MG/DL (ref 65–99)
GLUCOSE SERPL-MCNC: 119 MG/DL (ref 65–99)
HCT VFR BLD AUTO: 26.8 % (ref 37–47)
HCT VFR BLD AUTO: 27.3 % (ref 37–47)
HGB BLD-MCNC: 8 G/DL (ref 12–16)
HGB BLD-MCNC: 8 G/DL (ref 12–16)
IRON SATN MFR SERPL: 13 % (ref 15–55)
IRON SERPL-MCNC: 39 UG/DL (ref 40–170)
MCH RBC QN AUTO: 22 PG (ref 27–33)
MCH RBC QN AUTO: 22.2 PG (ref 27–33)
MCHC RBC AUTO-ENTMCNC: 29.3 G/DL (ref 32.2–35.5)
MCHC RBC AUTO-ENTMCNC: 29.9 G/DL (ref 32.2–35.5)
MCV RBC AUTO: 74.2 FL (ref 81.4–97.8)
MCV RBC AUTO: 75.2 FL (ref 81.4–97.8)
PLATELET # BLD AUTO: 264 K/UL (ref 164–446)
PLATELET # BLD AUTO: 280 K/UL (ref 164–446)
PMV BLD AUTO: 9.4 FL (ref 9–12.9)
PMV BLD AUTO: 9.7 FL (ref 9–12.9)
POTASSIUM SERPL-SCNC: 4.1 MMOL/L (ref 3.6–5.5)
RBC # BLD AUTO: 3.61 M/UL (ref 4.2–5.4)
RBC # BLD AUTO: 3.63 M/UL (ref 4.2–5.4)
SODIUM SERPL-SCNC: 142 MMOL/L (ref 135–145)
TIBC SERPL-MCNC: 312 UG/DL (ref 250–450)
UIBC SERPL-MCNC: 273 UG/DL (ref 110–370)
WBC # BLD AUTO: 6.7 K/UL (ref 4.8–10.8)
WBC # BLD AUTO: 7.4 K/UL (ref 4.8–10.8)

## 2025-04-26 PROCEDURE — 83550 IRON BINDING TEST: CPT

## 2025-04-26 PROCEDURE — A9270 NON-COVERED ITEM OR SERVICE: HCPCS | Performed by: NURSE PRACTITIONER

## 2025-04-26 PROCEDURE — 700101 HCHG RX REV CODE 250: Performed by: SPECIALIST

## 2025-04-26 PROCEDURE — 770020 HCHG ROOM/CARE - TELE (206)

## 2025-04-26 PROCEDURE — 82962 GLUCOSE BLOOD TEST: CPT

## 2025-04-26 PROCEDURE — 700102 HCHG RX REV CODE 250 W/ 637 OVERRIDE(OP): Performed by: NURSE PRACTITIONER

## 2025-04-26 PROCEDURE — 85027 COMPLETE CBC AUTOMATED: CPT

## 2025-04-26 PROCEDURE — 36415 COLL VENOUS BLD VENIPUNCTURE: CPT

## 2025-04-26 PROCEDURE — A9270 NON-COVERED ITEM OR SERVICE: HCPCS | Performed by: STUDENT IN AN ORGANIZED HEALTH CARE EDUCATION/TRAINING PROGRAM

## 2025-04-26 PROCEDURE — 80048 BASIC METABOLIC PNL TOTAL CA: CPT

## 2025-04-26 PROCEDURE — 99233 SBSQ HOSP IP/OBS HIGH 50: CPT | Mod: GC | Performed by: INTERNAL MEDICINE

## 2025-04-26 PROCEDURE — 700102 HCHG RX REV CODE 250 W/ 637 OVERRIDE(OP): Performed by: STUDENT IN AN ORGANIZED HEALTH CARE EDUCATION/TRAINING PROGRAM

## 2025-04-26 PROCEDURE — 83540 ASSAY OF IRON: CPT

## 2025-04-26 PROCEDURE — 99222 1ST HOSP IP/OBS MODERATE 55: CPT | Performed by: SPECIALIST

## 2025-04-26 PROCEDURE — 82728 ASSAY OF FERRITIN: CPT

## 2025-04-26 RX ADMIN — LEVETIRACETAM 1000 MG: 500 TABLET, FILM COATED ORAL at 05:08

## 2025-04-26 RX ADMIN — POLYETHYLENE GLYCOL-3350 AND ELECTROLYTES 4 L: 236; 6.74; 5.86; 2.97; 22.74 POWDER, FOR SOLUTION ORAL at 16:32

## 2025-04-26 RX ADMIN — ATORVASTATIN CALCIUM 80 MG: 80 TABLET, FILM COATED ORAL at 16:32

## 2025-04-26 RX ADMIN — OMEPRAZOLE 20 MG: 20 CAPSULE, DELAYED RELEASE ORAL at 05:08

## 2025-04-26 RX ADMIN — LEVETIRACETAM 1000 MG: 500 TABLET, FILM COATED ORAL at 16:32

## 2025-04-26 ASSESSMENT — ENCOUNTER SYMPTOMS
CARDIOVASCULAR NEGATIVE: 1
MUSCULOSKELETAL NEGATIVE: 1
BLOOD IN STOOL: 1
RESPIRATORY NEGATIVE: 1
CONSTITUTIONAL NEGATIVE: 1
EYES NEGATIVE: 1
NEUROLOGICAL NEGATIVE: 1

## 2025-04-26 ASSESSMENT — PAIN DESCRIPTION - PAIN TYPE
TYPE: ACUTE PAIN
TYPE: ACUTE PAIN

## 2025-04-26 ASSESSMENT — FIBROSIS 4 INDEX: FIB4 SCORE: 2.108185106778919555

## 2025-04-26 NOTE — CARE PLAN
The patient is Stable - Low risk of patient condition declining or worsening    Shift Goals  Clinical Goals: NPO at midnight, monitor Hgb and vitals  Patient Goals: eat, rest  Family Goals: updates via phone call    Progress made toward(s) clinical / shift goals:    Problem: Knowledge Deficit - Standard  Goal: Patient and family/care givers will demonstrate understanding of plan of care, disease process/condition, diagnostic tests and medications  Description: Target End Date:  1-3 days or as soon as patient condition allowsDocument in Patient Education1.  Patient and family/caregiver oriented to unit, equipment, visitation policy and means for communicating concern2.  Complete/review Learning Assessment3.  Assess knowledge level of disease process/condition, treatment plan, diagnostic tests and medications4.  Explain disease process/condition, treatment plan, diagnostic tests and medications  Outcome: Progressing  Note: Pt educated about plan of care tonight, recent test results, medications for presenting symptoms and treatment going forward. Pt verbalized understanding.       Problem: Bowel Elimination  Goal: Establish and maintain regular bowel function  Description: Target End Date:  Prior to discharge or change in level of care1.   Note date of last BM2.   Educate about diet, fluid intake, medication and activity to promote bowel function3.   Educate signs and symptoms of constipation and interventions to implement4.   Pharmacologic bowel management per provider order5.   Regular toileting schedule6.   Upright position for toileting7.   High fiber diet8.   Encourage hydration9.   Collaborate with Clinical [id number]. Care and maintenance of ostomy if applicable  4/26/2025 0219 by Dilcia Garcia, R.N.  Outcome: Progressing  Note: PT's LBM still PTA, pt educated to let us observe any BM's for bleeding, PT NPO since midnight.  4/26/2025 0217 by Dilcia Garcia, R.N.  Outcome: Progressing       Patient is  not progressing towards the following goals:

## 2025-04-26 NOTE — PROGRESS NOTES
[facility] Daily Progress Note    Date of Service  4/26/2025    [facility] Team: [facility]   Attending: Flor Golden M.d.  Senior Resident: Dr. Kai Paez  Intern:  Dr. Jigna Benjamin  Contact Number: [phone]    Chief Complaint  Molly Fountain is a 79 y.o. female admitted 4/25/2025 with rectal bleed    Hospital Course  Ms. Molly Fountain is an 79-year-old female with past medical history of NIDDM 2, HTN, seizure disorder, CVA 2023 that presents with hematochezia since 4/23/2025.     Recently admitted 3/15/2024 to 3/17/2020 for similar complaint.  IR, vascular surgery, and GI consulted without any intervention. Patient had similar complaints in December 2023 and had a colonoscopy then that was s/o ischemic colitis.     Patient states that they have been having multiple large bloody bowel movements since yesterday morning.  At outside hospital patient's hemoglobin was 7.5, decreased from 9.4 January 2025.  Hemoglobin stable on admission here, 7.5, received 1 unit PRBC. CTA abdomen pelvis without evidence of acute GI hemorrhage, colonic diverticulosis. GI consulted and plan to do colonoscopy on 4/27.      Interval Problem Update  -Patient continues to have BRPR x1BM today. Hb slightly dropped to 8.0 from 8.7. Will CTM H&H Q12h  -GI consulted and plan to do colonoscopy on 4/27. Clear diet today  -NPO midnight, hold chemoprophylaxis for DVT    I have discussed this patient's plan of care and discharge plan at IDT rounds today with Case Management, Nursing, Nursing leadership, and other members of the IDT team.    Consultants/Specialty  GI    Code Status  DNAR/DNI    Disposition  The patient is not medically cleared for discharge to home or a post-acute facility.      I have placed the appropriate orders for post-discharge needs.    Review of Systems  Review of Systems   Constitutional: Negative.    HENT: Negative.     Eyes: Negative.    Respiratory: Negative.     Cardiovascular: Negative.     Gastrointestinal:  Positive for blood in stool.   Genitourinary: Negative.    Musculoskeletal: Negative.    Neurological: Negative.         Physical Exam  Temp:  [36 °C (96.8 °F)-36.6 °C (97.9 °F)] 36.6 °C (97.9 °F)  Pulse:  [] 85  Resp:  [16-17] 16  BP: ()/(58-65) 122/62  SpO2:  [90 %-94 %] 93 %    Physical Exam  HENT:      Head: Normocephalic.      Mouth/Throat:      Mouth: Mucous membranes are moist.   Eyes:      Extraocular Movements: Extraocular movements intact.      Pupils: Pupils are equal, round, and reactive to light.   Cardiovascular:      Rate and Rhythm: Normal rate and regular rhythm.      Pulses: Normal pulses.      Heart sounds: Normal heart sounds.   Pulmonary:      Effort: Pulmonary effort is normal.      Breath sounds: Normal breath sounds.   Abdominal:      General: Abdomen is flat.      Palpations: Abdomen is soft.      Comments: Painless bloody stool with clots   Musculoskeletal:         General: Normal range of motion.      Cervical back: Normal range of motion.   Skin:     General: Skin is warm.      Capillary Refill: Capillary refill takes less than 2 seconds.   Neurological:      General: No focal deficit present.      Mental Status: She is alert.         Fluids    Intake/Output Summary (Last 24 hours) at 4/26/2025 1313  Last data filed at 4/26/2025 1200  Gross per 24 hour   Intake 360 ml   Output 0 ml   Net 360 ml       Laboratory  Recent Labs     04/25/25  0401 04/25/25  1614 04/26/25  0234   WBC 8.7  --  6.7   RBC 3.75*  --  3.63*   HEMOGLOBIN 7.5* 8.7* 8.0*   HEMATOCRIT 26.8* 28.4* 27.3*   MCV 71.5*  --  75.2*   MCH 20.0*  --  22.0*   MCHC 28.0*  --  29.3*   RDW 49.4  --  54.6*   PLATELETCT 327  --  264   MPV 9.8  --  9.7     Recent Labs     04/25/25  0401 04/26/25  0234   SODIUM 140 142   POTASSIUM 3.4* 4.1   CHLORIDE 108 113*   CO2 19* 19*   GLUCOSE 141* 119*   BUN 19 18   CREATININE 0.83 0.80   CALCIUM 8.7 8.4*     Recent Labs     04/25/25  0401   APTT 24.8   INR 1.05                Imaging  CTA ABDOMEN PELVIS W & W/O POST PROCESS   Final Result      1.  No evidence of acute GI hemorrhage.   2.  Colonic diverticulosis.           Assessment/Plan  Problem Representation:    * Lower GI bleed- (present on admission)  Assessment & Plan  Patient with multiple episodes of large bloody stool since 4/23 s/p 1 unit PRBC in the ED.    -Patient continues to have BRPR x1BM today. Hb slightly dropped to 8.0 from 8.7. Will CTM H&H Q12h  -GI consulted and plan to do colonoscopy on 4/27. Clear diet today.    History of CVA (cerebrovascular accident)- (present on admission)  Assessment & Plan  History of CVA 2023    - Hold home aspirin in the setting of GI bleed  - Continue home atorvastatin    High anion gap metabolic acidosis- (present on admission)  Assessment & Plan  Bicarb stable at 19  -AG closed 10  -LA 1.3    Seizure (HCC)- (present on admission)  Assessment & Plan  - Continue home Keppra    Hypokalemia- (present on admission)  Assessment & Plan  Potassium 3.4  - K>4, Mg>2  -Resolved    Essential hypertension- (present on admission)  Assessment & Plan  - Hold antihypertensives in the setting of GI bleed    Type 2 diabetes mellitus with complication, without long-term current use of insulin (HCC)- (present on admission)  Assessment & Plan  - Hypoglycemia protocol  - Diabetic diet  - SSI  - Hold home empagliflozin in the setting of GI bleed         VTE prophylaxis: SCDs/TEDs    I have performed a physical exam and reviewed and updated ROS and Plan today (4/26/2025). In review of yesterday's note (4/25/2025), there are no changes except as documented above.

## 2025-04-26 NOTE — CARE PLAN
The patient is Stable - Low risk of patient condition declining or worsening    Shift Goals  Clinical Goals: NPO at midnight, monitor hemodynamics, I&O  Patient Goals: Comfort  Family Goals: Updates    Progress made toward(s) clinical / shift goals:    Problem: Knowledge Deficit - Standard  Goal: Patient and family/care givers will demonstrate understanding of plan of care, disease process/condition, diagnostic tests and medications  Description: Target End Date:  1-3 days or as soon as patient condition allowsDocument in Patient Education1.  Patient and family/caregiver oriented to unit, equipment, visitation policy and means for communicating concern2.  Complete/review Learning Assessment3.  Assess knowledge level of disease process/condition, treatment plan, diagnostic tests and medications4.  Explain disease process/condition, treatment plan, diagnostic tests and medications  Outcome: Progressing  Note: Patient updated on plan of care. Patient alert and oriented x4. Patient compliant with interventions and verbalizes understanding. All questions answered accordingly.        Problem: Bowel Elimination  Goal: Establish and maintain regular bowel function  Description: Target End Date:  Prior to discharge or change in level of care1.   Note date of last BM2.   Educate about diet, fluid intake, medication and activity to promote bowel function3.   Educate signs and symptoms of constipation and interventions to implement4.   Pharmacologic bowel management per provider order5.   Regular toileting schedule6.   Upright position for toileting7.   High fiber diet8.   Encourage hydration9.   Collaborate with Clinical [id number]. Care and maintenance of ostomy if applicable  Outcome: Progressing  Note: RN monitored I&O. Scattered clotting in stool. MD notified. Patient NPO at midnight for scheduled colonoscopy tomorrow. Educated patient importance of NPO compliance for procedure tomorrow. Patient verbalizes understanding.  Bowel prep initiated.

## 2025-04-26 NOTE — CONSULTS
GASTROENTEROLOGY CONSULTATION    PATIENT NAME: Molly Fountain  : 1945  CSN: [id number]  MRN:  [id number]     CONSULTATION DATE:  2025    PRIMARY CARE PROVIDER:  NIDIA Millard      REASON FOR CONSULT:  GI bleed  Consult requested by Flor Golden MD    HISTORY OF PRESENT ILLNESS:  Molly Fountain is a 79 y.o. female who was admitted for rectal bleeding. She had a likely diverticular bleed in  but when I did her colonoscopy in  she had ischemic colitis. She had another small episode of bleeding in 3/2024 but colonoscopy not done due to diagnosis of acute diverticulitis. She was admitted 2024 with multiple large bloody bowel movements. She just had a bowel movement as well. She states it was black, but nurse says it was brown with red blood and clots. She is not having pain. At outside hospital patient's hemoglobin was 7.5, decreased from 9.2025.   PAST MEDICAL HISTORY:  Past Medical History:   Diagnosis Date    CVA (cerebral vascular accident) ([facility]) 2023    CVA (cerebral vascular accident) ([facility])     Diverticulitis     Essential hypertension 2019    GIB (gastrointestinal bleeding)     Osteoporosis     Seizure ([facility])     Type 2 diabetes mellitus with complication, without long-term current use of insulin ([facility]) 2019       PAST SURGICAL HISTORY:  Past Surgical History:   Procedure Laterality Date    WA COLONOSCOPY-FLEXIBLE N/A 2023    Procedure: COLONOSCOPY with biopsy;  Surgeon: Daina Venegas M.D.;  Location: [facility];  Service: Gastroenterology    WA COLONOSCOPY,BIOPSY N/A 2023    Procedure: COLONOSCOPY, WITH BIOPSY;  Surgeon: Daina Venegas M.D.;  Location: [facility];  Service: Gastroenterology    COLONOSCOPY - ENDO N/A 2019    Procedure: COLONOSCOPY;  Surgeon: Ger Nichols M.D.;  Location: [facility];  Service: Gastroenterology        CURRENT MEDS:  Current Facility-Administered  Medications   Medication Dose Route Frequency Provider Last Rate Last Admin    polyethylene glycol-electrolytes (Golytely) solution 4 L  4 L Oral Once Daina Venegas M.D.        Respiratory Therapy Consult   Nebulization Continuous RT John Bynum M.D.        insulin lispro (HumaLOG,AdmeLOG) subcutaneous injection  1-6 Units Subcutaneous Q6HRS John Bynum M.D.   1 Units at 04/25/25 2346    And    dextrose 50 % (D50W) injection 25 g  25 g Intravenous Q15 MIN PRN John Bynum M.D.        acetaminophen (Tylenol) tablet 650 mg  650 mg Oral Q6HRS PRN John Bynum M.D.        ondansetron (Zofran) syringe/vial injection 4 mg  4 mg Intravenous Q4HRS PRN John Bynum M.D.        Or    ondansetron (Zofran ODT) dispertab 4 mg  4 mg Oral Q4HRS PRN John Bynum M.D.        [Held by provider] aspirin (Asa) chewable tab 81 mg  81 mg Oral DAILY John Bynum M.D.        atorvastatin (Lipitor) tablet 80 mg  80 mg Oral Q EVENING John Bynum M.D.   80 mg at 04/25/25 1736    [Held by provider] dapagliflozin propanediol (Farxiga) tablet 10 mg  10 mg Oral DAILY John Bynum M.D.        omeprazole (PriLOSEC) capsule 20 mg  20 mg Oral DAILY John Bynum M.D.   20 mg at 04/26/25 0508    [Held by provider] amLODIPine (Norvasc) tablet 5 mg  5 mg Oral DAILY John Bynum M.D.        [Held by provider] furosemide (Lasix) tablet 10 mg  10 mg Oral BID John Bynum M.D.        [Held by provider] lisinopril (Prinivil) tablet 30 mg  30 mg Oral DAILY John Hajazimzanjani, M.D.        levETIRAcetam (Keppra) tablet 1,000 mg  1,000 mg Oral BID SHERLEY ZavaletaP.RSteffanieN.   1,000 mg at 04/26/25 0500        ALLERGIES:  Allergies   Allergen Reactions    Semaglutide Unspecified     bleeding    Shellfish-Derived Products Rash     Full body rash    Sulfa Drugs Rash     Full body rash       SOCIAL HISTORY:  Social History     Socioeconomic History     Marital status:      Spouse name: Not on file    Number of children: Not on file    Years of education: Not on file    Highest education level: Not on file   Occupational History    Not on file   Tobacco Use    Smoking status: Never    Smokeless tobacco: Never   Substance and Sexual Activity    Alcohol use: Yes     Comment: occasionally    Drug use: No    Sexual activity: Not on file   Other Topics Concern    Not on file   Social History Narrative    Not on file     Social Drivers of Health     Financial Resource Strain: Not on file   Food Insecurity: No Food Insecurity (4/25/2025)    Hunger Vital Sign     Worried About Running Out of Food in the Last Year: Never true     Ran Out of Food in the Last Year: Never true   Transportation Needs: No Transportation Needs (4/25/2025)    PRAPARE - Transportation     Lack of Transportation (Medical): No     Lack of Transportation (Non-Medical): No   Physical Activity: Not on file   Stress: Not on file   Social Connections: Not on file   Intimate Partner Violence: Not At Risk (4/25/2025)    Humiliation, Afraid, Rape, and Kick questionnaire     Fear of Current or Ex-Partner: No     Emotionally Abused: No     Physically Abused: No     Sexually Abused: No   Housing Stability: Low Risk  (4/25/2025)    Housing Stability Vital Sign     Unable to Pay for Housing in the Last Year: No     Number of Times Moved in the Last Year: 0     Homeless in the Last Year: No       FAMILY HISTORY:  Family History   Problem Relation Age of Onset    Heart Disease Father     Hypertension Father         REVIEW OF SYSTEMS:  General ROS: Negative for - chills, fever, night sweats or weight loss.  HEENT ROS: Negative  Respiratory ROS: Negative for - cough or shortness of breath.  Cardiovascular ROS:  Negative for - chest pain or palpitations.  Gastrointestinal ROS: As per the history of present illness.  Genito-Urinary ROS: Negative  Musculoskeletal ROS: Negative.  Neurological ROS: Negative  Skin  "ROS: negative  Hematology ROS: negative  Endocrinology ROS: Negative        PHYSICAL EXAM:  VITALS: /62   Pulse 85   Temp 36.6 °C (97.9 °F) (Temporal)   Resp 16   Ht 1.524 m (5')   Wt 68.5 kg (151 lb 0.2 oz)   LMP  (LMP Unknown)   SpO2 93%   BMI 29.49 kg/m²   GEN:  Molly Fountain is a 79 y.o. female in no acute distress.  HEENT: Mucous membranes pink and moist.  Sclera anicteric.    NECK:    Neck supple without lymphadenopathy or thyromegaly.  LUNGS: Clear to auscultation posteriorly.  HEART: Regular rate and rhythm. S1 and S2 normal. No murmurs, gallops  ABD:  + BS nt/nd -hsm  RECTAL: Not done at this time.  EXT:  Without cyanosis, deformity or pitting edema.  SKIN:  Pink, warm, dry.  NEURO: Grossly intact, A/OR.    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  Gastroenterology      "

## 2025-04-26 NOTE — CARE PLAN
The patient is Stable - Low risk of patient condition declining or worsening    Shift Goals  Clinical Goals: NPO, monitor hemodynamics, I&O  Patient Goals: Comfort and safety  Family Goals: VIPIN    Progress made toward(s) clinical / shift goals:    Problem: Knowledge Deficit - Standard  Goal: Patient and family/care givers will demonstrate understanding of plan of care, disease process/condition, diagnostic tests and medications  Description: Target End Date:  1-3 days or as soon as patient condition allowsDocument in Patient Education1.  Patient and family/caregiver oriented to unit, equipment, visitation policy and means for communicating concern2.  Complete/review Learning Assessment3.  Assess knowledge level of disease process/condition, treatment plan, diagnostic tests and medications4.  Explain disease process/condition, treatment plan, diagnostic tests and medications  Outcome: Progressing  Note: Patient updated on plan of care. Patient alert and oriented x4. Patient compliant with interventions and verbalizes understanding. All questions answered accordingly.        Problem: Bowel/Gastric:  Goal: Normal bowel function is maintained or improved  Outcome: Progressing  Note: Abdominal assessment performed. Abdomen is semi firm and nondistended. Per patient, LBM PTA. Educated patient to notify RN when have a bowel movement to assess for blood in stool.

## 2025-04-27 ENCOUNTER — ANESTHESIA EVENT (OUTPATIENT)
Dept: SURGERY | Facility: MEDICAL CENTER | Age: 80
End: 2025-04-27
Payer: MEDICARE

## 2025-04-27 ENCOUNTER — ANESTHESIA (OUTPATIENT)
Dept: SURGERY | Facility: MEDICAL CENTER | Age: 80
End: 2025-04-27
Payer: MEDICARE

## 2025-04-27 LAB
ALBUMIN SERPL BCP-MCNC: 3.3 G/DL (ref 3.2–4.9)
ALBUMIN/GLOB SERPL: 1.3 G/DL
ALP SERPL-CCNC: 69 U/L (ref 30–99)
ALT SERPL-CCNC: 8 U/L (ref 2–50)
ANION GAP SERPL CALC-SCNC: 11 MMOL/L (ref 7–16)
AST SERPL-CCNC: 20 U/L (ref 12–45)
BILIRUB SERPL-MCNC: 0.4 MG/DL (ref 0.1–1.5)
BUN SERPL-MCNC: 13 MG/DL (ref 8–22)
CALCIUM ALBUM COR SERPL-MCNC: 8.9 MG/DL (ref 8.5–10.5)
CALCIUM SERPL-MCNC: 8.3 MG/DL (ref 8.5–10.5)
CHLORIDE SERPL-SCNC: 111 MMOL/L (ref 96–112)
CO2 SERPL-SCNC: 20 MMOL/L (ref 20–33)
CREAT SERPL-MCNC: 0.64 MG/DL (ref 0.5–1.4)
ERYTHROCYTE [DISTWIDTH] IN BLOOD BY AUTOMATED COUNT: 55.1 FL (ref 35.9–50)
ERYTHROCYTE [DISTWIDTH] IN BLOOD BY AUTOMATED COUNT: 55.4 FL (ref 35.9–50)
GFR SERPLBLD CREATININE-BSD FMLA CKD-EPI: 89 ML/MIN/1.73 M 2
GLOBULIN SER CALC-MCNC: 2.5 G/DL (ref 1.9–3.5)
GLUCOSE BLD STRIP.AUTO-MCNC: 114 MG/DL (ref 65–99)
GLUCOSE BLD STRIP.AUTO-MCNC: 119 MG/DL (ref 65–99)
GLUCOSE BLD STRIP.AUTO-MCNC: 120 MG/DL (ref 65–99)
GLUCOSE BLD STRIP.AUTO-MCNC: 134 MG/DL (ref 65–99)
GLUCOSE BLD STRIP.AUTO-MCNC: 158 MG/DL (ref 65–99)
GLUCOSE SERPL-MCNC: 127 MG/DL (ref 65–99)
HCT VFR BLD AUTO: 24.7 % (ref 37–47)
HCT VFR BLD AUTO: 25.1 % (ref 37–47)
HGB BLD-MCNC: 7.4 G/DL (ref 12–16)
HGB BLD-MCNC: 7.5 G/DL (ref 12–16)
MCH RBC QN AUTO: 22.3 PG (ref 27–33)
MCH RBC QN AUTO: 22.4 PG (ref 27–33)
MCHC RBC AUTO-ENTMCNC: 29.9 G/DL (ref 32.2–35.5)
MCHC RBC AUTO-ENTMCNC: 30 G/DL (ref 32.2–35.5)
MCV RBC AUTO: 74.5 FL (ref 81.4–97.8)
MCV RBC AUTO: 74.6 FL (ref 81.4–97.8)
PLATELET # BLD AUTO: 248 K/UL (ref 164–446)
PLATELET # BLD AUTO: 257 K/UL (ref 164–446)
PMV BLD AUTO: 9.7 FL (ref 9–12.9)
PMV BLD AUTO: 9.8 FL (ref 9–12.9)
POTASSIUM SERPL-SCNC: 3.7 MMOL/L (ref 3.6–5.5)
PROT SERPL-MCNC: 5.8 G/DL (ref 6–8.2)
RBC # BLD AUTO: 3.31 M/UL (ref 4.2–5.4)
RBC # BLD AUTO: 3.37 M/UL (ref 4.2–5.4)
SODIUM SERPL-SCNC: 142 MMOL/L (ref 135–145)
WBC # BLD AUTO: 5 K/UL (ref 4.8–10.8)
WBC # BLD AUTO: 8.1 K/UL (ref 4.8–10.8)

## 2025-04-27 PROCEDURE — 700101 HCHG RX REV CODE 250: Performed by: ANESTHESIOLOGY

## 2025-04-27 PROCEDURE — 700105 HCHG RX REV CODE 258: Performed by: ANESTHESIOLOGY

## 2025-04-27 PROCEDURE — 700102 HCHG RX REV CODE 250 W/ 637 OVERRIDE(OP)

## 2025-04-27 PROCEDURE — A9270 NON-COVERED ITEM OR SERVICE: HCPCS

## 2025-04-27 PROCEDURE — 160002 HCHG RECOVERY MINUTES (STAT): Performed by: SPECIALIST

## 2025-04-27 PROCEDURE — 99232 SBSQ HOSP IP/OBS MODERATE 35: CPT | Mod: GC | Performed by: INTERNAL MEDICINE

## 2025-04-27 PROCEDURE — 160015 HCHG STAT PREOP MINUTES: Performed by: SPECIALIST

## 2025-04-27 PROCEDURE — 0DB98ZX EXCISION OF DUODENUM, VIA NATURAL OR ARTIFICIAL OPENING ENDOSCOPIC, DIAGNOSTIC: ICD-10-PCS | Performed by: SPECIALIST

## 2025-04-27 PROCEDURE — 160036 HCHG PACU - EA ADDL 30 MINS PHASE I: Performed by: SPECIALIST

## 2025-04-27 PROCEDURE — 80053 COMPREHEN METABOLIC PANEL: CPT

## 2025-04-27 PROCEDURE — A9270 NON-COVERED ITEM OR SERVICE: HCPCS | Performed by: NURSE PRACTITIONER

## 2025-04-27 PROCEDURE — 36415 COLL VENOUS BLD VENIPUNCTURE: CPT

## 2025-04-27 PROCEDURE — 0DBG8ZX EXCISION OF LEFT LARGE INTESTINE, VIA NATURAL OR ARTIFICIAL OPENING ENDOSCOPIC, DIAGNOSTIC: ICD-10-PCS | Performed by: SPECIALIST

## 2025-04-27 PROCEDURE — 43239 EGD BIOPSY SINGLE/MULTIPLE: CPT | Performed by: SPECIALIST

## 2025-04-27 PROCEDURE — 700102 HCHG RX REV CODE 250 W/ 637 OVERRIDE(OP): Performed by: NURSE PRACTITIONER

## 2025-04-27 PROCEDURE — 700102 HCHG RX REV CODE 250 W/ 637 OVERRIDE(OP): Performed by: STUDENT IN AN ORGANIZED HEALTH CARE EDUCATION/TRAINING PROGRAM

## 2025-04-27 PROCEDURE — 160048 HCHG OR STATISTICAL LEVEL 1-5: Performed by: SPECIALIST

## 2025-04-27 PROCEDURE — 85027 COMPLETE CBC AUTOMATED: CPT | Mod: 91

## 2025-04-27 PROCEDURE — 160208 HCHG ENDO MINUTES - EA ADDL 1 MIN LEVEL 4: Performed by: SPECIALIST

## 2025-04-27 PROCEDURE — 82962 GLUCOSE BLOOD TEST: CPT | Mod: 91

## 2025-04-27 PROCEDURE — 160035 HCHG PACU - 1ST 60 MINS PHASE I: Performed by: SPECIALIST

## 2025-04-27 PROCEDURE — 770020 HCHG ROOM/CARE - TELE (206)

## 2025-04-27 PROCEDURE — 160203 HCHG ENDO MINUTES - 1ST 30 MINS LEVEL 4: Performed by: SPECIALIST

## 2025-04-27 PROCEDURE — A9270 NON-COVERED ITEM OR SERVICE: HCPCS | Performed by: STUDENT IN AN ORGANIZED HEALTH CARE EDUCATION/TRAINING PROGRAM

## 2025-04-27 PROCEDURE — 44389 COLONOSCOPY WITH BIOPSY: CPT | Performed by: SPECIALIST

## 2025-04-27 PROCEDURE — 160009 HCHG ANES TIME/MIN: Performed by: SPECIALIST

## 2025-04-27 PROCEDURE — 700111 HCHG RX REV CODE 636 W/ 250 OVERRIDE (IP): Performed by: ANESTHESIOLOGY

## 2025-04-27 RX ORDER — DEXMEDETOMIDINE HYDROCHLORIDE 100 UG/ML
INJECTION, SOLUTION INTRAVENOUS PRN
Status: DISCONTINUED | OUTPATIENT
Start: 2025-04-27 | End: 2025-04-27 | Stop reason: SURG

## 2025-04-27 RX ORDER — HYDROCORTISONE 100 MG/60ML
1 SUSPENSION RECTAL EVERY EVENING
Status: DISCONTINUED | OUTPATIENT
Start: 2025-04-27 | End: 2025-04-28 | Stop reason: HOSPADM

## 2025-04-27 RX ORDER — EPHEDRINE SULFATE 50 MG/ML
INJECTION, SOLUTION INTRAVENOUS PRN
Status: DISCONTINUED | OUTPATIENT
Start: 2025-04-27 | End: 2025-04-27 | Stop reason: SURG

## 2025-04-27 RX ORDER — SODIUM CHLORIDE, SODIUM LACTATE, POTASSIUM CHLORIDE, CALCIUM CHLORIDE 600; 310; 30; 20 MG/100ML; MG/100ML; MG/100ML; MG/100ML
INJECTION, SOLUTION INTRAVENOUS
Status: DISCONTINUED | OUTPATIENT
Start: 2025-04-27 | End: 2025-04-27 | Stop reason: SURG

## 2025-04-27 RX ORDER — ONDANSETRON 2 MG/ML
4 INJECTION INTRAMUSCULAR; INTRAVENOUS
Status: DISCONTINUED | OUTPATIENT
Start: 2025-04-27 | End: 2025-04-27 | Stop reason: HOSPADM

## 2025-04-27 RX ADMIN — LEVETIRACETAM 1000 MG: 500 TABLET, FILM COATED ORAL at 17:06

## 2025-04-27 RX ADMIN — DEXMEDETOMIDINE 20 MCG: 100 INJECTION, SOLUTION INTRAVENOUS at 09:00

## 2025-04-27 RX ADMIN — HYDROCORTISONE 1 ENEMA: 100 ENEMA RECTAL at 18:34

## 2025-04-27 RX ADMIN — ATORVASTATIN CALCIUM 80 MG: 80 TABLET, FILM COATED ORAL at 17:06

## 2025-04-27 RX ADMIN — EPHEDRINE SULFATE 10 MG: 50 INJECTION, SOLUTION INTRAVENOUS at 09:21

## 2025-04-27 RX ADMIN — SODIUM CHLORIDE, POTASSIUM CHLORIDE, SODIUM LACTATE AND CALCIUM CHLORIDE: 600; 310; 30; 20 INJECTION, SOLUTION INTRAVENOUS at 08:53

## 2025-04-27 RX ADMIN — OMEPRAZOLE 20 MG: 20 CAPSULE, DELAYED RELEASE ORAL at 04:59

## 2025-04-27 RX ADMIN — LEVETIRACETAM 1000 MG: 500 TABLET, FILM COATED ORAL at 04:59

## 2025-04-27 RX ADMIN — GLYCOPYRROLATE 0.1 MG: 0.2 INJECTION INTRAMUSCULAR; INTRAVENOUS at 09:00

## 2025-04-27 RX ADMIN — PROPOFOL 100 MG: 10 INJECTION, EMULSION INTRAVENOUS at 09:00

## 2025-04-27 RX ADMIN — PROPOFOL 20 MG: 10 INJECTION, EMULSION INTRAVENOUS at 09:10

## 2025-04-27 RX ADMIN — INSULIN LISPRO 1 UNITS: 100 INJECTION, SOLUTION INTRAVENOUS; SUBCUTANEOUS at 23:34

## 2025-04-27 ASSESSMENT — ENCOUNTER SYMPTOMS
CARDIOVASCULAR NEGATIVE: 1
MUSCULOSKELETAL NEGATIVE: 1
NEUROLOGICAL NEGATIVE: 1
CONSTITUTIONAL NEGATIVE: 1
RESPIRATORY NEGATIVE: 1
BLOOD IN STOOL: 1
EYES NEGATIVE: 1

## 2025-04-27 ASSESSMENT — FIBROSIS 4 INDEX: FIB4 SCORE: 2.2

## 2025-04-27 ASSESSMENT — PAIN DESCRIPTION - PAIN TYPE
TYPE: ACUTE PAIN
TYPE: ACUTE PAIN
TYPE: ACUTE PAIN;SURGICAL PAIN
TYPE: ACUTE PAIN

## 2025-04-27 ASSESSMENT — PAIN SCALES - GENERAL: PAIN_LEVEL: 0

## 2025-04-27 NOTE — PROCEDURES
OPERATIVE REPORT    PATIENT:   Molly Fountain   1945       PREOPERATIVE DIAGNOSES/INDICATIONS: iron def anemia    POSTOPERATIVE DIAGNOSIS: hiatal hernia    PROCEDURE:  ESOPHAGOGASTRODUODENOSCOPY with biopsy    PHYSICIAN:  Daina Venegas MD    ANESTHESIA:  Per anesthesiologist.    ESTIMATED BLOOD LOSS:nil    LOCATION: [facility]    CONSENT:  OBTAINED. The risks, benefits and alternatives of the procedure were discussed in details. The risks include and are not limited to bleeding, infection, perforation, missed lesions, and sedations risks (cardiopulmonary compromise and allergic reaction to medications).    DESCRIPTION: The patient presented to the procedure room.  After routine checkup was performed, patient was brought into the endoscopy suite.  Patient was placed on his left lateral decubitus position. A bite block was placed in patient's mouth. Patient was sedated by anesthesia.  Vital signs were monitored throughout the procedure.  Oxygenation support was provided throughout the procedure. Upper endoscope was inserted into patient's mouth and advanced to the second portion of the duodenum under direct visualization.      Once the site was reached and examined, the upper endoscope was withdrawn.  Retroflexion was made within the stomach.  The stomach was decompressed, scope was withdrawn and the procedure was terminated.     The patient tolerated the procedure well.  There were no immediate complications.    OPERATIVE FINDINGS:    1. Esophagus:  Normal  2. Stomach: 2 cm hiatal hernia, otherwise normal stomach  3. Duodenum: normal to third portion - biopsy for celiac    IMPRESSION/RECOMMENDATIONS:  Hiatal hernia  IV infusion  Proceed with colonoscopy    This note has been transcribed with digital voice recognition software and although it has been reviewed may contain grammatical or word errors

## 2025-04-27 NOTE — ANESTHESIA POSTPROCEDURE EVALUATION
Patient: Molly Fountain    Procedure Summary       Date: 04/27/25 Room / Location: [facility] OR 06 / SURGERY [facility]    Anesthesia Start: 0853 Anesthesia Stop: 0930    Procedures:       GASTROSCOPY (Esophagus)      COLONOSCOPY (Anus)      COLONOSCOPY, WITH BIOPSY (Anus)      COLONOSCOPY, WITH POLYPECTOMY (Anus) Diagnosis: (hiatal hernia, diverticulosis, rectal ulcer, internal hemorrhoid,)    Surgeons: Daina Venegas M.D. Responsible Provider: Piero Del Toro M.D.    Anesthesia Type: general ASA Status: 2            Final Anesthesia Type: general  Last vitals  BP   Blood Pressure : 122/59, NIBP: 125/93    Temp   36.6 °C (97.8 °F)    Pulse   88   Resp   14    SpO2   99 %      Anesthesia Post Evaluation    Patient location during evaluation: PACU  Patient participation: complete - patient participated  Level of consciousness: awake and alert  Pain score: 0    Airway patency: patent  Anesthetic complications: no  Cardiovascular status: hemodynamically stable  Respiratory status: acceptable  Hydration status: euvolemic    PONV: none          No notable events documented.     Nurse Pain Score: 0 (NPRS)

## 2025-04-27 NOTE — CARE PLAN
The patient is Stable - Low risk of patient condition declining or worsening    Shift Goals  Clinical Goals: NPO at midnight, monitor hemodynamics, I&O  Patient Goals: Comfort  Family Goals: Updates    Progress made toward(s) clinical / shift goals:    Problem: Knowledge Deficit - Standard  Goal: Patient and family/care givers will demonstrate understanding of plan of care, disease process/condition, diagnostic tests and medications  Description: Target End Date:  1-3 days or as soon as patient condition allowsDocument in Patient Education1.  Patient and family/caregiver oriented to unit, equipment, visitation policy and means for communicating concern2.  Complete/review Learning Assessment3.  Assess knowledge level of disease process/condition, treatment plan, diagnostic tests and medications4.  Explain disease process/condition, treatment plan, diagnostic tests and medications  Outcome: Progressing  Note: Pt educated about plan of care today, recent test results, medications for presenting symptoms and treatment going forward. Pt verbalized understanding.       Problem: Psychosocial  Goal: Patient's level of anxiety will decrease  Description: Target End Date:  1-3 days or as soon as patient condition allows1.  Collaborate with patient and family/caregiver to identify triggers and develop strategies to cope with anxiety2.  Implement stimuli reduction, calming techniques3.  Pharmacologic management per provider order4.  Encourage patient/family/care giver participation5.  Collaborate with interdisciplinary team including Psychologist or Behavioral Health Team as needed  Outcome: Progressing  Note: Pt encouraged to voice feelings when tearful about condition, pt education provided to help ease anxiety, pt able to rest after.        Patient is not progressing towards the following goals:

## 2025-04-27 NOTE — OR NURSING
Patient awake and alert and tolerating water without issue. VSS. Pt denies pain and nausea at this time.     Report called to Rocío SHARIF. Pt transported to room in stable condition.

## 2025-04-27 NOTE — PROGRESS NOTES
Tele Summary    Rhythm: SR-ST  Rate:78-94  Ectopy: RPVC  .17/. 06/.39    Per strip from monitor room.

## 2025-04-27 NOTE — PROGRESS NOTES
[facility] Daily Progress Note    Date of Service  4/27/2025    [facility] Team: [facility]   Attending: Flor Golden M.d.  Senior Resident: Dr. Kai Paez  Intern:  Dr. Jigna Benjamin  Contact Number: [phone]    Chief Complaint  Molly Fountain is a 79 y.o. female admitted 4/25/2025 with rectal bleed    Hospital Course  Ms. Molly Fountain is an 79-year-old female with past medical history of NIDDM 2, HTN, seizure disorder, CVA 2023 that presents with hematochezia since 4/23/2025.     Recently admitted 3/15/2024 to 3/17/2020 for similar complaint.  IR, vascular surgery, and GI consulted without any intervention. Patient had similar complaints in December 2023 and had a colonoscopy then that was s/o ischemic colitis.     Patient states that they have been having multiple large bloody bowel movements since yesterday morning.  At outside hospital patient's hemoglobin was 7.5, decreased from 9.4 January 2025.  Hemoglobin stable on admission here, 7.5, received 1 unit PRBC. CTA abdomen pelvis without evidence of acute GI hemorrhage, colonic diverticulosis. GI did colonoscopy on 4/27 which revelaed L side colitis and internal hemorrhoids grade 2 which was the likely source  for which patient is started on Cortenema pr.      Interval Problem Update  -GI did colonoscopy on 4/27 which revelaed L side colitis and internal hemorrhoids grade 2 which was the likely source  for which patient is started on Cortenema pr.  -Started diabetic diet  -Medically stable, Will DC tomorrow per patient request    I have discussed this patient's plan of care and discharge plan at IDT rounds today with Case Management, Nursing, Nursing leadership, and other members of the IDT team.    Consultants/Specialty  GI    Code Status  DNAR/DNI    Disposition  The patient is not medically cleared for discharge to home or a post-acute facility.      I have placed the appropriate orders for post-discharge needs.    Review of  Systems  Review of Systems   Constitutional: Negative.    HENT: Negative.     Eyes: Negative.    Respiratory: Negative.     Cardiovascular: Negative.    Gastrointestinal:  Positive for blood in stool.   Genitourinary: Negative.    Musculoskeletal: Negative.    Neurological: Negative.         Physical Exam  Temp:  [36 °C (96.8 °F)-36.6 °C (97.8 °F)] 36.2 °C (97.2 °F)  Pulse:  [] 77  Resp:  [12-20] 18  BP: ()/(50-92) 100/60  SpO2:  [93 %-99 %] 95 %    Physical Exam  HENT:      Head: Normocephalic.      Mouth/Throat:      Mouth: Mucous membranes are moist.   Eyes:      Extraocular Movements: Extraocular movements intact.      Pupils: Pupils are equal, round, and reactive to light.   Cardiovascular:      Rate and Rhythm: Normal rate and regular rhythm.      Pulses: Normal pulses.      Heart sounds: Normal heart sounds.   Pulmonary:      Effort: Pulmonary effort is normal.      Breath sounds: Normal breath sounds.   Abdominal:      General: Abdomen is flat.      Palpations: Abdomen is soft.      Comments: Painless bloody stool with clots   Musculoskeletal:         General: Normal range of motion.      Cervical back: Normal range of motion.   Skin:     General: Skin is warm.      Capillary Refill: Capillary refill takes less than 2 seconds.   Neurological:      General: No focal deficit present.      Mental Status: She is alert.         Fluids    Intake/Output Summary (Last 24 hours) at 4/27/2025 1422  Last data filed at 4/27/2025 0920  Gross per 24 hour   Intake 1140 ml   Output 0 ml   Net 1140 ml       Laboratory  Recent Labs     04/26/25  1338 04/27/25  0056 04/27/25  1321   WBC 7.4 8.1 5.0   RBC 3.61* 3.37* 3.31*   HEMOGLOBIN 8.0* 7.5* 7.4*   HEMATOCRIT 26.8* 25.1* 24.7*   MCV 74.2* 74.5* 74.6*   MCH 22.2* 22.3* 22.4*   MCHC 29.9* 29.9* 30.0*   RDW 54.4* 55.1* 55.4*   PLATELETCT 280 257 248   MPV 9.4 9.8 9.7     Recent Labs     04/25/25  0401 04/26/25  0234 04/27/25  0056   SODIUM 140 142 142   POTASSIUM  3.4* 4.1 3.7   CHLORIDE 108 113* 111   CO2 19* 19* 20   GLUCOSE 141* 119* 127*   BUN 19 18 13   CREATININE 0.83 0.80 0.64   CALCIUM 8.7 8.4* 8.3*     Recent Labs     04/25/25  0401   APTT 24.8   INR 1.05               Imaging  CTA ABDOMEN PELVIS W & W/O POST PROCESS   Final Result      1.  No evidence of acute GI hemorrhage.   2.  Colonic diverticulosis.           Assessment/Plan  Problem Representation:    * Lower GI bleed- (present on admission)  Assessment & Plan  Patient with multiple episodes of large bloody stool since 4/23 s/p 1 unit PRBC in the ED.  -GI did colonoscopy on 4/27 which revelaed L side colitis and internal hemorrhoids grade 2 which was the likely source  for which patient is started on Cortenema pr.    History of CVA (cerebrovascular accident)- (present on admission)  Assessment & Plan  History of CVA 2023    - Hold home aspirin in the setting of GI bleed  - Continue home atorvastatin    High anion gap metabolic acidosis- (present on admission)  Assessment & Plan  Bicarb stable at 19  -AG closed 10  -LA 1.3  -Resolved    Seizure (HCC)- (present on admission)  Assessment & Plan  - Continue home Keppra    Hypokalemia- (present on admission)  Assessment & Plan  Potassium 3.4  - K>4, Mg>2  -Resolved    Essential hypertension- (present on admission)  Assessment & Plan  - Hold antihypertensives in the setting of GI bleed    Type 2 diabetes mellitus with complication, without long-term current use of insulin (HCC)- (present on admission)  Assessment & Plan  - Hypoglycemia protocol  - Diabetic diet  - SSI  - Hold home empagliflozin in the setting of GI bleed         VTE prophylaxis: SCDs/TEDs    I have performed a physical exam and reviewed and updated ROS and Plan today (4/27/2025). In review of yesterday's note (4/26/2025), there are no changes except as documented above.

## 2025-04-27 NOTE — ANESTHESIA TIME REPORT
Anesthesia Start and Stop Event Times       Date Time Event    4/27/2025 0842 Ready for Procedure     0853 Anesthesia Start     0930 Anesthesia Stop          Responsible Staff  04/27/25      Name Role Begin End    Piero Del Toro M.D. Anesth 0853 0930          Overtime Reason:  no overtime (within assigned shift)    Comments:

## 2025-04-27 NOTE — PROCEDURES
OPERATIVE REPORT        PATIENT: Molly Fountain  1945      PREOPERATIVE DIAGNOSIS/INDICATION: Hematochezia    POSTOPERATIVE DIAGNOSES: rectal ulcer with internal hemorrhoids, mild left sided colitis, diverticulosis,rectal polyp    PROCEDURE: COLONOSCOPY with biopsy    PHYSICIAN: Daina Venegas MD    CONSENT:  OBTAINED. The risks, benefits and alternatives of the procedure were discussed in details. The risks include and are not limited to bleeding, infection, perforation, missed lesions, and sedations risks (cardiopulmonary compromise and allergic reaction to medications).    ANESTHESIA:  Per anesthesiologist.    LOCATION: [facility]    DESCRIPTION:  The patient presented to the procedure room.  After routine checkup was performed, patient was brought into endoscopy suite.  Patient was placed on his left lateral decubitus position.  Patient was sedated by anesthesia. Vital signs were monitored throughout procedure.  Oxygenation support was provided throughout procedure. Digital rectal examination was performed.  Then, a colonoscope was inserted into patient's anus, advanced to the cecum under direct visualization. Both the ileocecal valve and appendiceal orifice were seen.  Once this site was reached and examined, the colonoscope was withdrawn slowly to rectum where retroflexion was performed. The scope was then fully removed from the rectum/anal canal and the  procedure was terminated. Withdrawal time was at least 6 minutes to ensure adequate examination.  The patient tolerated the procedure well.  There were no immediate complications.         Digital rectal examination hemorrhoids  Endoscope advanced to the cecum  Dows prep score:   Right colon: 2; Transverse colon: 2; Left Colon: 2. BPS score: 6    FINDINGS:  Mild left sided colitis. No ulceration. Biopsies taken. Right side normal. Pan diverticulosis. Internal hemorrhoids Grade 2 with evidence of mucosal prolapse and rectal ulceration. Likely source  of bleeding.     RECOMMENDATIONS:  Cortenema pr qhs for one month. This is source of bleeding.    Biopsy left sided colitis, if ischemia again I would readjust her blood pressure meds  She can have regula diet   GI to sign off        This note has been transcribed with digital voice recognition software and although it has been reviewed may contain grammatical or word errors

## 2025-04-27 NOTE — ANESTHESIA PREPROCEDURE EVALUATION
Case: [id number] Date/Time: 04/27/25 0830    Procedures:       GASTROSCOPY (Esophagus)      COLONOSCOPY (Anus)    Anesthesia type: MAC    Pre-op diagnosis: GI bleed    Location: [facility] OR 06 / SURGERY [facility]    Surgeons: Daina Venegas M.D.            Relevant Problems   NEURO   (positive) CVA (cerebral vascular accident) (HCC)   (positive) Seizure (HCC)      CARDIAC   (positive) Essential hypertension      ENDO   (positive) Type 2 diabetes mellitus with complication, without long-term current use of insulin (HCC)       Physical Exam    Airway   Mallampati: II  TM distance: >3 FB  Neck ROM: full       Cardiovascular - normal exam  Rhythm: regular  Rate: normal  (-) murmur     Dental - normal exam           Pulmonary - normal exam  Breath sounds clear to auscultation     Abdominal    Neurological - normal exam                   Anesthesia Plan    ASA 2       Plan - general       Airway plan will be natural airway          Induction: intravenous      Pertinent diagnostic labs and testing reviewed    Informed Consent:    Anesthetic plan and risks discussed with patient.    Use of blood products discussed with: patient whom consented to blood products.

## 2025-04-28 VITALS
OXYGEN SATURATION: 97 % | TEMPERATURE: 97.7 F | WEIGHT: 151.01 LBS | RESPIRATION RATE: 18 BRPM | HEART RATE: 74 BPM | BODY MASS INDEX: 29.65 KG/M2 | HEIGHT: 60 IN | SYSTOLIC BLOOD PRESSURE: 141 MMHG | DIASTOLIC BLOOD PRESSURE: 76 MMHG

## 2025-04-28 LAB
ERYTHROCYTE [DISTWIDTH] IN BLOOD BY AUTOMATED COUNT: 58.2 FL (ref 35.9–50)
ERYTHROCYTE [DISTWIDTH] IN BLOOD BY AUTOMATED COUNT: 58.2 FL (ref 35.9–50)
GLUCOSE BLD STRIP.AUTO-MCNC: 140 MG/DL (ref 65–99)
GLUCOSE BLD STRIP.AUTO-MCNC: 143 MG/DL (ref 65–99)
HCT VFR BLD AUTO: 25.9 % (ref 37–47)
HCT VFR BLD AUTO: 33 % (ref 37–47)
HGB BLD-MCNC: 7.3 G/DL (ref 12–16)
HGB BLD-MCNC: 9.8 G/DL (ref 12–16)
MCH RBC QN AUTO: 21.7 PG (ref 27–33)
MCH RBC QN AUTO: 23.1 PG (ref 27–33)
MCHC RBC AUTO-ENTMCNC: 28.2 G/DL (ref 32.2–35.5)
MCHC RBC AUTO-ENTMCNC: 29.7 G/DL (ref 32.2–35.5)
MCV RBC AUTO: 76.9 FL (ref 81.4–97.8)
MCV RBC AUTO: 77.8 FL (ref 81.4–97.8)
PATHOLOGY CONSULT NOTE: NORMAL
PLATELET # BLD AUTO: 279 K/UL (ref 164–446)
PLATELET # BLD AUTO: 304 K/UL (ref 164–446)
PMV BLD AUTO: 9.4 FL (ref 9–12.9)
PMV BLD AUTO: 9.5 FL (ref 9–12.9)
RBC # BLD AUTO: 3.37 M/UL (ref 4.2–5.4)
RBC # BLD AUTO: 4.24 M/UL (ref 4.2–5.4)
WBC # BLD AUTO: 5.6 K/UL (ref 4.8–10.8)
WBC # BLD AUTO: 7.2 K/UL (ref 4.8–10.8)

## 2025-04-28 PROCEDURE — A9270 NON-COVERED ITEM OR SERVICE: HCPCS | Performed by: NURSE PRACTITIONER

## 2025-04-28 PROCEDURE — 99239 HOSP IP/OBS DSCHRG MGMT >30: CPT | Performed by: INTERNAL MEDICINE

## 2025-04-28 PROCEDURE — 700102 HCHG RX REV CODE 250 W/ 637 OVERRIDE(OP)

## 2025-04-28 PROCEDURE — 36430 TRANSFUSION BLD/BLD COMPNT: CPT

## 2025-04-28 PROCEDURE — P9016 RBC LEUKOCYTES REDUCED: HCPCS

## 2025-04-28 PROCEDURE — A9270 NON-COVERED ITEM OR SERVICE: HCPCS

## 2025-04-28 PROCEDURE — 85027 COMPLETE CBC AUTOMATED: CPT | Mod: 91

## 2025-04-28 PROCEDURE — 82962 GLUCOSE BLOOD TEST: CPT | Mod: 91

## 2025-04-28 PROCEDURE — 88305 TISSUE EXAM BY PATHOLOGIST: CPT | Mod: 59 | Performed by: PATHOLOGY

## 2025-04-28 PROCEDURE — 36415 COLL VENOUS BLD VENIPUNCTURE: CPT

## 2025-04-28 PROCEDURE — 700102 HCHG RX REV CODE 250 W/ 637 OVERRIDE(OP): Performed by: NURSE PRACTITIONER

## 2025-04-28 PROCEDURE — A9270 NON-COVERED ITEM OR SERVICE: HCPCS | Performed by: STUDENT IN AN ORGANIZED HEALTH CARE EDUCATION/TRAINING PROGRAM

## 2025-04-28 PROCEDURE — 86923 COMPATIBILITY TEST ELECTRIC: CPT

## 2025-04-28 PROCEDURE — 700102 HCHG RX REV CODE 250 W/ 637 OVERRIDE(OP): Performed by: STUDENT IN AN ORGANIZED HEALTH CARE EDUCATION/TRAINING PROGRAM

## 2025-04-28 PROCEDURE — 88305 TISSUE EXAM BY PATHOLOGIST: CPT | Mod: 26 | Performed by: PATHOLOGY

## 2025-04-28 RX ORDER — ONDANSETRON 4 MG/1
4 TABLET, ORALLY DISINTEGRATING ORAL EVERY 4 HOURS PRN
Qty: 10 TABLET | Refills: 0 | Status: SHIPPED | OUTPATIENT
Start: 2025-04-28 | End: 2025-04-28

## 2025-04-28 RX ORDER — ALENDRONATE SODIUM 70 MG/1
70 TABLET ORAL
Qty: 4 TABLET | Refills: 0 | Status: SHIPPED | OUTPATIENT
Start: 2025-05-03

## 2025-04-28 RX ORDER — LEVETIRACETAM 1000 MG/1
1000 TABLET ORAL 2 TIMES DAILY
Qty: 60 TABLET | Refills: 4 | Status: SHIPPED | OUTPATIENT
Start: 2025-04-28 | End: 2025-04-28

## 2025-04-28 RX ORDER — OMEPRAZOLE 20 MG/1
20 CAPSULE, DELAYED RELEASE ORAL DAILY
Qty: 30 CAPSULE | Refills: 1 | Status: SHIPPED | OUTPATIENT
Start: 2025-04-28

## 2025-04-28 RX ORDER — FERROUS SULFATE 325(65) MG
325 TABLET ORAL
Status: DISCONTINUED | OUTPATIENT
Start: 2025-04-28 | End: 2025-04-28 | Stop reason: HOSPADM

## 2025-04-28 RX ORDER — ACETAMINOPHEN 325 MG/1
650 TABLET ORAL EVERY 6 HOURS PRN
Qty: 30 TABLET | Refills: 0 | Status: SHIPPED | OUTPATIENT
Start: 2025-04-28

## 2025-04-28 RX ORDER — ONDANSETRON 4 MG/1
4 TABLET, ORALLY DISINTEGRATING ORAL EVERY 4 HOURS PRN
Qty: 10 TABLET | Refills: 0 | Status: SHIPPED | OUTPATIENT
Start: 2025-04-28

## 2025-04-28 RX ORDER — FERROUS SULFATE 325(65) MG
325 TABLET ORAL
Qty: 30 TABLET | Refills: 0 | Status: SHIPPED | OUTPATIENT
Start: 2025-04-30

## 2025-04-28 RX ORDER — LEVETIRACETAM 1000 MG/1
1000 TABLET ORAL 2 TIMES DAILY
Qty: 60 TABLET | Refills: 4 | Status: SHIPPED | OUTPATIENT
Start: 2025-04-28

## 2025-04-28 RX ORDER — ACETAMINOPHEN 500 MG
1000 TABLET ORAL EVERY 4 HOURS PRN
Qty: 30 TABLET | Refills: 0 | Status: SHIPPED | OUTPATIENT
Start: 2025-04-28

## 2025-04-28 RX ORDER — ACETAMINOPHEN 325 MG/1
650 TABLET ORAL EVERY 6 HOURS PRN
Qty: 30 TABLET | Refills: 0 | Status: SHIPPED | OUTPATIENT
Start: 2025-04-28 | End: 2025-04-28

## 2025-04-28 RX ORDER — OMEPRAZOLE 20 MG/1
20 CAPSULE, DELAYED RELEASE ORAL DAILY
Qty: 30 CAPSULE | Refills: 1 | Status: SHIPPED | OUTPATIENT
Start: 2025-04-28 | End: 2025-04-28

## 2025-04-28 RX ORDER — ATORVASTATIN CALCIUM 80 MG/1
80 TABLET, FILM COATED ORAL EVERY EVENING
Qty: 90 TABLET | Refills: 4 | Status: SHIPPED | OUTPATIENT
Start: 2025-04-28

## 2025-04-28 RX ORDER — HYDROCORTISONE 100 MG/60ML
100 SUSPENSION RECTAL EVERY EVENING
Qty: 30 EACH | Refills: 0 | Status: SHIPPED | OUTPATIENT
Start: 2025-04-28

## 2025-04-28 RX ORDER — HYDROCORTISONE 100 MG/60ML
100 SUSPENSION RECTAL EVERY EVENING
Qty: 30 EACH | Refills: 0 | Status: SHIPPED | OUTPATIENT
Start: 2025-04-28 | End: 2025-04-28

## 2025-04-28 RX ORDER — FERROUS SULFATE 325(65) MG
325 TABLET ORAL
Qty: 30 TABLET | Refills: 0 | Status: SHIPPED | OUTPATIENT
Start: 2025-04-30 | End: 2025-04-28

## 2025-04-28 RX ORDER — METFORMIN HYDROCHLORIDE 500 MG/1
500 TABLET, EXTENDED RELEASE ORAL 2 TIMES DAILY
Qty: 100 TABLET | Refills: 3 | Status: SHIPPED | OUTPATIENT
Start: 2025-04-28

## 2025-04-28 RX ADMIN — FERROUS SULFATE TAB 325 MG (65 MG ELEMENTAL FE) 325 MG: 325 (65 FE) TAB at 08:22

## 2025-04-28 RX ADMIN — LEVETIRACETAM 1000 MG: 500 TABLET, FILM COATED ORAL at 05:43

## 2025-04-28 RX ADMIN — OMEPRAZOLE 20 MG: 20 CAPSULE, DELAYED RELEASE ORAL at 05:43

## 2025-04-28 ASSESSMENT — PAIN DESCRIPTION - PAIN TYPE: TYPE: ACUTE PAIN

## 2025-04-28 NOTE — CARE PLAN
The patient is Stable - Low risk of patient condition declining or worsening    Shift Goals  Clinical Goals: Post of vitals, monitor Hgb, monitor bleed  Patient Goals: Rest tonight  Family Goals: VIPIN    Progress made toward(s) clinical / shift goals:    Problem: Knowledge Deficit - Standard  Goal: Patient and family/care givers will demonstrate understanding of plan of care, disease process/condition, diagnostic tests and medications  Description: Target End Date:  1-3 days or as soon as patient condition allowsDocument in Patient Education1.  Patient and family/caregiver oriented to unit, equipment, visitation policy and means for communicating concern2.  Complete/review Learning Assessment3.  Assess knowledge level of disease process/condition, treatment plan, diagnostic tests and medications4.  Explain disease process/condition, treatment plan, diagnostic tests and medications  Outcome: Progressing  Note: Pt educated about plan of care, recent test results, medications for presenting symptoms and treatment going forward. Pt also educated about colonoscopy results and friend of patient given updates about condition with permission of patient, pt verbalized understanding.       Problem: Bowel Elimination  Goal: Establish and maintain regular bowel function  Description: Target End Date:  Prior to discharge or change in level of care1.   Note date of last BM2.   Educate about diet, fluid intake, medication and activity to promote bowel function3.   Educate signs and symptoms of constipation and interventions to implement4.   Pharmacologic bowel management per provider order5.   Regular toileting schedule6.   Upright position for toileting7.   High fiber diet8.   Encourage hydration9.   Collaborate with Clinical [id number]. Care and maintenance of ostomy if applicable  Outcome: Progressing  Note: Pt monitored for worsening or any changes in GI bleed and elimination, pt educated about when to notify team pertaining  to this, Pt encouraged to increase intake.        Patient is not progressing towards the following goals:

## 2025-04-28 NOTE — DISCHARGE SUMMARY
[facility] Discharge Summary    Attending: Flor Golden M.d.  Senior Resident: Dr. Kai Paez  Intern:  Dr. Jigna Benjamin  Contact Number: [phone]    CHIEF COMPLAINT ON ADMISSION  Chief Complaint   Patient presents with    Rectal Bleeding     BIB paramedics from [facility] as transfer  for GIB. Pt reports bright red bleeding from rectum x1 day. H/o GIB + diverticulitis.   No meds PTA. HGB 7.5 at previous facility.   Pt reports GLF yesterday. Denies any HS or LOC       Reason for Admission  EMS     Admission Date  4/25/2025    CODE STATUS  DNAR/DNI    HPI & HOSPITAL COURSE  Ms. Molly Fountain is an 79-year-old female who was admitted to the hospital on 4/23/2025 with hematochezia.  She has a past medical history of type 2 diabetes mellitus, hypertension, seizure disorder, CVA in 2023, ischemic colitis.  In the ED, patient's hemoglobin was 7.5 and received 1 unit PRBC.  CTA abdomen was largely unremarkable with no concerns for acute GI hemorrhage/colonic diverticulosis. Gastroenterology was consulted while patient was hospitalized, who performed a colonoscopy that revealed grade 2 internal hemorrhoids with mucosal prolapse and rectal ulceration which was the likely cause of patient's hematochezia.  Patient was started on Cortenema MA and advised to continue for a month.  Patient advised regarding high-fiber diet.  Patient also transfused another unit of PRBC on the day of discharge given downtrending hemoglobin, she is asymptomatic..  During this hospitalization, patient's blood pressure was soft and her antihypertensive medications were held also for possible ischemic colitis.  Antihypertensive medications will be held upon discharge and request PCP to reassess and restart if required.  Patient advised to pause taking aspirin for a week given concerns of lower GI bleed.    Therefore, she is discharged in good and stable condition to home with close outpatient follow-up.    The patient met  2-midnight criteria for an inpatient stay at the time of discharge.    Discharge Date  4/28/2025    Physical Exam on Day of Discharge  Physical Exam  Constitutional:       Appearance: Normal appearance.   HENT:      Head: Normocephalic.      Mouth/Throat:      Mouth: Mucous membranes are moist.   Eyes:      Extraocular Movements: Extraocular movements intact.   Cardiovascular:      Rate and Rhythm: Normal rate.      Pulses: Normal pulses.      Heart sounds: Normal heart sounds.   Pulmonary:      Effort: Pulmonary effort is normal.      Breath sounds: Normal breath sounds.   Abdominal:      General: Abdomen is flat.      Palpations: Abdomen is soft.   Musculoskeletal:         General: Normal range of motion.      Cervical back: Normal range of motion.   Skin:     General: Skin is warm.      Capillary Refill: Capillary refill takes less than 2 seconds.   Neurological:      General: No focal deficit present.      Mental Status: She is alert.         FOLLOW UP ITEMS POST DISCHARGE  -Hemoglobin upon discharge to be followed up by PCP  - Continue Cortenema for 4 more weeks  - Discontinued all the medications that can lower patient's blood pressure (patient likely has ischemic colitis has had a history of ischemic colitis, low normotensive while inpatient), primary care provider to reassess and restart/titrate as needed    DISCHARGE DIAGNOSES  Principal Problem:    Lower GI bleed (POA: Yes)  Active Problems:    Type 2 diabetes mellitus with complication, without long-term current use of insulin (HCC) (POA: Yes)    Essential hypertension (POA: Yes)    Hypokalemia (POA: Yes)    Seizure (HCC) (POA: Yes)    High anion gap metabolic acidosis (POA: Yes)    History of CVA (cerebrovascular accident) (POA: Yes)  Resolved Problems:    * No resolved hospital problems. *      FOLLOW UP  No future appointments.  Lizbeth So, F.N.P.  [address] [place] [address] [id number] [phone]    Follow up in 1 week(s)        MEDICATIONS ON  DISCHARGE     Medication List        PAUSE taking these medications        Instructions   aspirin 81 MG Chew chewable tablet  Wait to take this until: May 5, 2025  Commonly known as: Asa   Chew 1 Tablet every day.  Dose: 81 mg     polyethylene glycol/lytes 17 g Pack  Wait to take this until your doctor or other care provider tells you to start again.  Commonly known as: Miralax   Take 17 g by mouth 1 time a day as needed.  Dose: 17 g            START taking these medications        Instructions   ferrous sulfate 325 (65 Fe) MG tablet  Start taking on: April 30, 2025   Take 1 Tablet by mouth every 48 hours.  Dose: 325 mg     hydrocortisone 100mg/60mL Enem  Commonly known as: Cortenema   Insert 1 Enema into the rectum every evening.  Dose: 100 mg     ondansetron 4 MG Tbdp  Commonly known as: Zofran ODT   Take 1 Tablet by mouth every four hours as needed for Nausea/Vomiting (give PO if IV route is unavailable.).  Dose: 4 mg            CHANGE how you take these medications        Instructions   * acetaminophen 500 MG Tabs  What changed: Another medication with the same name was added. Make sure you understand how and when to take each.  Commonly known as: Tylenol   Take 1,000 mg by mouth every four hours as needed for Mild Pain.  Dose: 1,000 mg     * acetaminophen 325 MG Tabs  What changed: You were already taking a medication with the same name, and this prescription was added. Make sure you understand how and when to take each.  Commonly known as: Tylenol   Take 2 Tablets by mouth every 6 hours as needed for Moderate Pain.  Dose: 650 mg     levetiracetam 1000 MG tablet  What changed: Another medication with the same name was removed. Continue taking this medication, and follow the directions you see here.  Commonly known as: Keppra   Take 1 Tablet by mouth 2 times a day.  Dose: 1,000 mg           * This list has 2 medication(s) that are the same as other medications prescribed for you. Read the directions carefully,  and ask your doctor or other care provider to review them with you.                CONTINUE taking these medications        Instructions   Alcohol Swabs   Doctor's comments: Per formulary preference. ICD-10 code: E11.65 Uncontrolled type 2 Diabetes Mellitus  Wipe site with prep pad prior to injection.     alendronate 70 MG Tabs  Commonly known as: Fosamax   Take 70 mg by mouth every Saturday.  Dose: 70 mg     atorvastatin 80 MG tablet  Commonly known as: Lipitor   Take 1 Tablet by mouth every evening.  Dose: 80 mg     * Blood Glucose Test Strips   Doctor's comments: Or per formulary preference. ICD-10 code: E11.65 Uncontrolled type 2 Diabetes Mellitus  Use one Accucheck Guide strip to test blood sugar once daily early morning before first meal. For uncontrolled diabetes. E11.65     * Blood Glucose Meter Kit   Doctor's comments: Or per formulary preference. ICD-10 code: E11.65 Uncontrolled type 2 Diabetes Mellitus  Test blood sugar as recommended by provider. One Touch Verio blood glucose monitoring kit.     Lancets   Doctor's comments: Or per formulary preference. ICD-10 code: E11.65 Uncontrolled type 2 Diabetes Mellitus.  Use one fast clix lancet to test blood sugar once daily early morning before first meal. For uncontrolled diabetes. E11.65     metFORMIN  MG Tb24  Commonly known as: Glucophage XR   Take 500 mg by mouth 2 times a day.  Dose: 500 mg     omeprazole 20 MG delayed-release capsule  Commonly known as: PriLOSEC   Take 1 Capsule by mouth every day.  Dose: 20 mg           * This list has 2 medication(s) that are the same as other medications prescribed for you. Read the directions carefully, and ask your doctor or other care provider to review them with you.                STOP taking these medications      amLODIPine 5 MG Tabs  Commonly known as: Norvasc     furosemide 20 MG Tabs  Commonly known as: Lasix     Jardiance 25 MG Tabs  Generic drug: Empagliflozin     lisinopril 30 MG tablet  Commonly  known as: Prinivil              Allergies  Allergies   Allergen Reactions    Semaglutide Unspecified     bleeding    Shellfish-Derived Products Rash     Full body rash    Sulfa Drugs Rash     Full body rash       DIET  Orders Placed This Encounter   Procedures    Diet Order Diet: Consistent CHO (Diabetic)     Standing Status:   Standing     Number of Occurrences:   1     Diet::   Consistent CHO (Diabetic) [4]       ACTIVITY  As tolerated.  Weight bearing as tolerated    CONSULTATIONS  Gastroenterology    PROCEDURES  Colonoscopy on 4/27/2025    LABORATORY  Lab Results   Component Value Date    SODIUM 142 04/27/2025    POTASSIUM 3.7 04/27/2025    CHLORIDE 111 04/27/2025    CO2 20 04/27/2025    GLUCOSE 127 (H) 04/27/2025    BUN 13 04/27/2025    CREATININE 0.64 04/27/2025        Lab Results   Component Value Date    WBC 5.6 04/28/2025    HEMOGLOBIN 7.3 (L) 04/28/2025    HEMATOCRIT 25.9 (L) 04/28/2025    PLATELETCT 279 04/28/2025        Total time of the discharge process exceeds 45 minutes.

## 2025-04-28 NOTE — DISCHARGE INSTRUCTIONS
You were admitted for management of bleeding from your rectum.  During this hospitalization, you underwent a colonoscopy which revealed grade 2 internal hemorrhoids with ulcers in your rectum which was the likely cause of the bleed.  For this you were treated with a steroid enema which you will need to take for total of 1 month.  You were also transfused 2 units of blood for the drop in your hemoglobin.  You are also found to have deficiency of iron and will be discharged on iron pills that you will need to take daily.  Please do not take these iron pills along with milk as it can interfere with absorption.  We will discontinue all the medications that affect your blood pressure including Farxiga as you were found to have low blood pressures while you were admitted and these medications can contribute to this blood supply of your gut which can lead to further bleeding.  Please discuss restarting your antiblood pressure medications with your primary care provider.  Please continue to take your metformin as prescribed.

## 2025-04-28 NOTE — PROGRESS NOTES
Patient with discharge order. PIV x2 removed. Discharge instructions given to patient and daughter at bedside. Both verbalized understanding. Discharge meds sent by MD to [facility], confirmed with pharmacy staff that rx is in and hydrocortisone enema will be ready tomorrow @1100. Patient discharged via wheelchair on room air, no acute distress. VSS.

## 2025-04-28 NOTE — DIETARY
Nutrition Services: Initial Assessment     Day 3 of admit. Molly Fountain is 79 y.o., female with admitting DX of Lower GI bleed [K92.2].    Consult Received for: MST - Malnutrition Screening Tool    Current Hospital Problems List:  Lower GI bleed, History of CVA, high anion gap metabolic acidosis, Type 2 DM with complication without long term current use of insulin, Essential hypertension.Hypokalemia.      Nutrition Assessment:      Height: 152.4 cm (5')  Weight: 68.5 kg (151 lb 0.2 oz)  Weight taken via: Stand Up Scale  BMI Calculated: 29.36  BMI Classification: Overweight       Weight Readings from Last 5 encounters:   Wt Readings from Last 5 Encounters:   04/27/25 68.5 kg (151 lb 0.2 oz)   03/17/24 71.1 kg (156 lb 12 oz)   12/07/23 76.7 kg (169 lb 1.5 oz)   11/26/23 80.3 kg (177 lb 0.5 oz)   05/28/19 62.8 kg (138 lb 7.2 oz)     Weight loss  5 lbs (3.2% ) x 1 year not consider significant.     Objective:   Pertinent Medical Hx: Lower GI Bleed, Type 2 DM, history of CVA   Pertinent Labs: RBC 3.37, Hemoglobin 7.3, Hematocrit 25.9, MCV 76.9.,MCH 21.7, MCHC 28.2, RDW 58.2,   Pertinent Meds: statin, ferrous sulfate, Lasix. Humalog, PPI  Skin/Wounds:  n/a   Food Allergies: NKFA  Last BM:  Type 7: Watery, no solid pieces-entirely liquid  04/27/25       Current Diet Order/Intake:   Consistent CHO diet    Subjective:     Saw patient at the bedside for MST. The patient is admitted for a GI bleed, with a medical history of type 2 DM and CVA in 2023. Colonoscopy completed yesterday revealed colitis and grade 2 internal hemorrhoids. The patient denied abd pain and reported having a normal BM this morning. Her last A1c, dated 11/21/23, was 14.3. The patient stated she follows a diabetic diet at home, working to reduce intake of sugary snacks and consistently choosing low-sugar products. She reported adequate oral intake at home with no concerns. NFPE showed no signs of fat or muscle wasting.    Patient reported UBW: n/a    Dietary Recall/Energy Intake:  Patient denied experiencing appetite loss prior admission, stated PO sufficient, eating 2 meals a day, with PO > 75% at meals      This indicates Sufficient energy intake.   Nutrition Focused Physical Exam (NFPE)  Weight Loss:  x 1 year not significant   Muscle Mass: Well Nourished  Subcutaneous Fat: Well Nourished  Fluid Accumulation: n/a   Reduced  Strength: N/A in acute care setting.    Nutrition Diagnosis:      No nutrition diagnosis identify at this time      based on RD assessment at this time, Patient does not meet criteria in congruence with ASPEN/Academy guidelines for malnutrition    Nutrition Interventions:      Patient aware of active plan of care as appropriate.       Nutrition Monitoring and Evaluation:      RD will follow per hospital protocol     Deb HENNING/AND CRITERIA FOR MALNUTRITION

## 2025-04-28 NOTE — CARE PLAN
The patient is Stable - Low risk of patient condition declining or worsening    Shift Goals  Clinical Goals: NPO diet order; colonoscopy; monitor BMs; safety  Patient Goals: Colonoscopy  Family Goals: VIPIN    Progress made toward(s) clinical / shift goals:    Problem: Knowledge Deficit - Standard  Goal: Patient and family/care givers will demonstrate understanding of plan of care, disease process/condition, diagnostic tests and medications  Description: Target End Date:  1-3 days or as soon as patient condition allowsDocument in Patient Education1.  Patient and family/caregiver oriented to unit, equipment, visitation policy and means for communicating concern2.  Complete/review Learning Assessment3.  Assess knowledge level of disease process/condition, treatment plan, diagnostic tests and medications4.  Explain disease process/condition, treatment plan, diagnostic tests and medications  Outcome: Progressing     Problem: Skin Integrity  Goal: Skin integrity is maintained or improved  Description: Target End Date:  Prior to discharge or change in level of careDocument interventions on Skin Risk/Deuce flowsheet groups and corresponding LDA1.  Assess and monitor skin integrity, appearance and/or temperature2.  Assess risk factors for impaired skin integrity and/or pressures ulcers3.  Implement precautions to protect skin integrity in collaboration with interdisciplinary team4.  Implement pressure ulcer prevention protocol if at risk for skin breakdown5.  Confirm wound care consult if at risk for skin breakdown6.  Ensure patient use of pressure relieving devices  (Low air loss bed, waffle overlay, heel protectors, ROHO cushion, etc)  Outcome: Progressing     Problem: Communication  Goal: The ability to communicate needs accurately and effectively will improve  Outcome: Progressing     Problem: Pain Management  Goal: Pain level will decrease to patient's comfort goal  Outcome: Progressing     Problem: Infection  Goal:  Will remain free from infection  Outcome: Progressing     Problem: Mobility  Goal: Risk for activity intolerance will decrease  Outcome: Progressing       Patient is not progressing towards the following goals:

## 2025-05-20 ENCOUNTER — HOSPITAL ENCOUNTER (OUTPATIENT)
Dept: RADIOLOGY | Facility: MEDICAL CENTER | Age: 80
End: 2025-05-20
Payer: MEDICARE

## 2025-05-22 ENCOUNTER — TELEPHONE (OUTPATIENT)
Dept: RADIOLOGY | Facility: MEDICAL CENTER | Age: 80
End: 2025-05-22
Payer: MEDICARE

## 2025-05-22 NOTE — TELEPHONE ENCOUNTER
Pt's CTA reviewed w/Dr. Guzmán. 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 Recommend continued surveillance but transition to q2 years. Pt called with interpretation and plan, she is in agreement with this. CTA and GFR in 2 years at [facility]. Pt prefers a mailed copy of the orders.

## (undated) DEVICE — SPONGE GAUZE NON-STERILE 4X4 - (2000/CA 10PK/CA)

## (undated) DEVICE — TUBE E-T HI-LO CUFF 7.5MM (10EA/PK)

## (undated) DEVICE — KIT PROCEDURE DOUBLE ENDO ONLY (5/CA)

## (undated) DEVICE — CONTAINER, SPECIMEN, STERILE

## (undated) DEVICE — FORCEP RADIAL JAW 4 STANDARD CAPACITY W/NEEDLE 240CM (40EA/BX)

## (undated) DEVICE — BASIN EMESIS DISP. - (250/CA)

## (undated) DEVICE — WATER IRRIGATION STERILE 1000ML (12EA/CA)

## (undated) DEVICE — BUTTON ENDOSCOPY DISPOSABLE

## (undated) DEVICE — SENSOR OXIMETER ADULT SPO2 RD SET (20EA/BX)

## (undated) DEVICE — NEPTUNE 4 PORT MANIFOLD - (20/PK)

## (undated) DEVICE — CANISTER SUCTION RIGID RED 1500CC (40EA/CA)

## (undated) DEVICE — TUBE CONNECTING SUCTION - CLEAR PLASTIC STERILE 72 IN (50EA/CA)

## (undated) DEVICE — BLOCK BITE MAXI DENTAL RETENTION RIM (100EA/BX)

## (undated) DEVICE — LACTATED RINGERS INJ 1000 ML - (14EA/CA 60CA/PF)

## (undated) DEVICE — SET LEADWIRE 5 LEAD BEDSIDE DISPOSABLE ECG (1SET OF 5/EA)

## (undated) DEVICE — FILM CASSETTE ENDO

## (undated) DEVICE — MANIFOLD NEPTUNE 1 PORT (20/PK)

## (undated) DEVICE — ELECTRODE 850 FOAM ADHESIVE - HYDROGEL RADIOTRNSPRNT (50/PK)

## (undated) DEVICE — MASK OXYGEN VNYL ADLT MED CONC WITH 7 FOOT TUBING - (50EA/CA)

## (undated) DEVICE — SODIUM CHL IRRIGATION 0.9% 1000ML (12EA/CA)

## (undated) DEVICE — KIT CUSTOM PROCEDURE SINGLE FOR ENDO  (15/CA)

## (undated) DEVICE — MASK PANORAMIC OXYGEN PRO2 (30EA/CA)

## (undated) DEVICE — PORT AUXILLARY WATER (50EA/BX)

## (undated) DEVICE — CATHETER IV 20 GA X 1-1/4 ---SURG.& SDS ONLY--- (50EA/BX)

## (undated) DEVICE — TUBE NG SALEM SUMP 14FR (50EA/BX)

## (undated) DEVICE — GOWN SURGEONS X-LARGE - DISP. (30/CA)